# Patient Record
Sex: FEMALE | Race: WHITE | NOT HISPANIC OR LATINO
[De-identification: names, ages, dates, MRNs, and addresses within clinical notes are randomized per-mention and may not be internally consistent; named-entity substitution may affect disease eponyms.]

---

## 2020-03-19 ENCOUNTER — APPOINTMENT (OUTPATIENT)
Dept: GYNECOLOGIC ONCOLOGY | Facility: CLINIC | Age: 50
End: 2020-03-19

## 2020-05-04 ENCOUNTER — APPOINTMENT (OUTPATIENT)
Dept: GYNECOLOGIC ONCOLOGY | Facility: CLINIC | Age: 50
End: 2020-05-04
Payer: COMMERCIAL

## 2020-05-04 VITALS
DIASTOLIC BLOOD PRESSURE: 67 MMHG | HEART RATE: 89 BPM | HEIGHT: 55 IN | BODY MASS INDEX: 11.8 KG/M2 | SYSTOLIC BLOOD PRESSURE: 159 MMHG | WEIGHT: 51 LBS

## 2020-05-04 DIAGNOSIS — Z78.9 OTHER SPECIFIED HEALTH STATUS: ICD-10-CM

## 2020-05-04 DIAGNOSIS — R19.00 INTRA-ABDOMINAL AND PELVIC SWELLING, MASS AND LUMP, UNSPECIFIED SITE: ICD-10-CM

## 2020-05-04 DIAGNOSIS — Z80.1 FAMILY HISTORY OF MALIGNANT NEOPLASM OF TRACHEA, BRONCHUS AND LUNG: ICD-10-CM

## 2020-05-04 PROCEDURE — 99244 OFF/OP CNSLTJ NEW/EST MOD 40: CPT

## 2020-05-04 RX ORDER — LOSARTAN POTASSIUM 50 MG/1
50 TABLET, FILM COATED ORAL
Refills: 0 | Status: ACTIVE | COMMUNITY

## 2020-05-04 RX ORDER — HYDROCHLOROTHIAZIDE 25 MG/1
25 TABLET ORAL
Refills: 0 | Status: ACTIVE | COMMUNITY

## 2020-05-04 RX ORDER — ALPRAZOLAM 2 MG/1
TABLET ORAL
Refills: 0 | Status: ACTIVE | COMMUNITY

## 2020-05-07 LAB — HPV HIGH+LOW RISK DNA PNL CVX: NOT DETECTED

## 2020-05-12 LAB — CYTOLOGY CVX/VAG DOC THIN PREP: NORMAL

## 2020-05-18 ENCOUNTER — TRANSCRIPTION ENCOUNTER (OUTPATIENT)
Age: 50
End: 2020-05-18

## 2020-05-27 ENCOUNTER — TRANSCRIPTION ENCOUNTER (OUTPATIENT)
Age: 50
End: 2020-05-27

## 2022-08-26 ENCOUNTER — APPOINTMENT (OUTPATIENT)
Dept: PEDIATRIC CARDIOLOGY | Facility: CLINIC | Age: 52
End: 2022-08-26

## 2022-08-26 ENCOUNTER — APPOINTMENT (OUTPATIENT)
Dept: CARDIOTHORACIC SURGERY | Facility: CLINIC | Age: 52
End: 2022-08-26

## 2022-08-26 VITALS
WEIGHT: 54.98 LBS | HEIGHT: 55 IN | HEART RATE: 74 BPM | SYSTOLIC BLOOD PRESSURE: 157 MMHG | OXYGEN SATURATION: 98 % | BODY MASS INDEX: 12.72 KG/M2 | DIASTOLIC BLOOD PRESSURE: 70 MMHG

## 2022-08-26 PROCEDURE — 93325 DOPPLER ECHO COLOR FLOW MAPG: CPT

## 2022-08-26 PROCEDURE — 99205 OFFICE O/P NEW HI 60 MIN: CPT

## 2022-08-26 PROCEDURE — 93320 DOPPLER ECHO COMPLETE: CPT

## 2022-08-26 PROCEDURE — 93303 ECHO TRANSTHORACIC: CPT

## 2022-08-26 NOTE — ASSESSMENT
[FreeTextEntry1] : I reviewed  echo images today and her records.  She certainly has important aortic valve regurgitation.  General criteria for intervention depend upon, in the absence of symptoms, the extent of LV dilation and/or dysfunction.  I do no think she meets a surgical indication by those criteria.  We will attempt to review the CT scans that have been done because our echo is limited, and the patient cannot have an MRI because of metal rods used for orthopedic repairs in the past.  I doubt we will see anything on those studies to push us toward valve replacement at this moment.\par \par I think it is important to keep i mind that those guidelines are in the context of aortic valve surgery with the typical expected potential for morbidity or mortality.  It is hard to imagine that her surgical risk is not substantially elevated as she is quite significantly affected by her OI.  I think this should lead us to defer surgery until there is a very clear indication, which I don’t see at this moment.\par \par She also has aortic root dilation.  This has been extensively reported for patients with OI and tends to be mild and stable.  There are very few reported cases of dissection and rupture, so it does not appear, and nor is it in the guidelines, that these patients' aortas should be intervened upon at any earlier size criteria (such as we do for Marfan syndrome).  for now her aorta does not meet any criteria for intervention, but I certainly agree with Dr. Hinojosa's aggressive management of her hypertension.\par \par Should we get to the point that she requires valve surgery we would assess whether she is a candidate for a TAVR.  There are several reasons why this may not be possible for her.\par \par I reviewed all of this carefully with the patient and tried to reassure her that it is unlikely that any acute changes will happen for her.  I suggested she continue to live her life as she is doing and just to let us know if any symptoms develop.  I agree with the current plan of echo and followup every 6 months.  Please recontact us if we can be of assistance.  It was a pleasure to meet this remarkable patient.

## 2022-08-26 NOTE — DATA REVIEWED
[FreeTextEntry1] : transthoracic echo today:\par limited images due to habitus\par minimal aortic stenosis\par significant AI, difficult to grade\par preserved systolic function and only very mild LV enlargment\par aortic root dilation approx 3.5\par \par previous CT scan\par aortic root 3.5 (by report- we do not have those images)

## 2022-08-26 NOTE — HISTORY OF PRESENT ILLNESS
[FreeTextEntry1] : This 52 year old incredible woman with osteogenesis imperfecta is referred by Dr. Hinojosa for aortic insufficiency.  She has been followed for some time for this problem.  She has a bicuspid valve.  She does not feel that she has any limitation.  She is active, worked up until recently (stopping work was not due to physical limitation), she drives, is  and has children.  She travels.  She does not know what subtype of OI she has.

## 2022-08-26 NOTE — CONSULT LETTER
[Consult Letter:] : I had the pleasure of evaluating your patient, [unfilled]. [Please see my note below.] : Please see my note below. [Consult Closing:] : Thank you very much for allowing me to participate in the care of this patient.  If you have any questions, please do not hesitate to contact me. [Sincerely,] : Sincerely, [Dear  ___] : Dear  [unfilled], [FreeTextEntry2] : August 26, 2022\par \par Prosper Hinojosa MD\par [address] [facility] [address]\[place] [address] [FreeTextEntry3] : Zev Stacy MD\par \par Cardiothoracic Surgery and Pediatrics\par [facility]

## 2023-11-16 ENCOUNTER — APPOINTMENT (OUTPATIENT)
Dept: PEDIATRIC CARDIOLOGY | Facility: CLINIC | Age: 53
End: 2023-11-16
Payer: COMMERCIAL

## 2023-11-16 ENCOUNTER — APPOINTMENT (OUTPATIENT)
Dept: CARDIOTHORACIC SURGERY | Facility: CLINIC | Age: 53
End: 2023-11-16
Payer: COMMERCIAL

## 2023-11-16 DIAGNOSIS — Q23.1 CONGENITAL INSUFFICIENCY OF AORTIC VALVE: ICD-10-CM

## 2023-11-16 PROCEDURE — 93303 ECHO TRANSTHORACIC: CPT

## 2023-11-16 PROCEDURE — 93320 DOPPLER ECHO COMPLETE: CPT

## 2023-11-16 PROCEDURE — 93325 DOPPLER ECHO COLOR FLOW MAPG: CPT

## 2023-11-16 PROCEDURE — 99203 OFFICE O/P NEW LOW 30 MIN: CPT

## 2024-03-01 ENCOUNTER — TRANSCRIPTION ENCOUNTER (OUTPATIENT)
Age: 54
End: 2024-03-01

## 2024-03-02 ENCOUNTER — INPATIENT (INPATIENT)
Facility: HOSPITAL | Age: 54
LOS: 9 days | Discharge: NURSING, REHAB OR LONG-TERM CARE | DRG: 987 | End: 2024-03-12
Attending: SURGERY | Admitting: SURGERY
Payer: COMMERCIAL

## 2024-03-02 VITALS
HEART RATE: 65 BPM | HEIGHT: 55 IN | SYSTOLIC BLOOD PRESSURE: 139 MMHG | TEMPERATURE: 98 F | DIASTOLIC BLOOD PRESSURE: 49 MMHG | OXYGEN SATURATION: 95 % | RESPIRATION RATE: 20 BRPM | WEIGHT: 50.93 LBS

## 2024-03-02 DIAGNOSIS — S22.41XA MULTIPLE FRACTURES OF RIBS, RIGHT SIDE, INITIAL ENCOUNTER FOR CLOSED FRACTURE: ICD-10-CM

## 2024-03-02 DIAGNOSIS — Z98.890 OTHER SPECIFIED POSTPROCEDURAL STATES: Chronic | ICD-10-CM

## 2024-03-02 LAB
ALBUMIN SERPL ELPH-MCNC: 4.1 G/DL — SIGNIFICANT CHANGE UP (ref 3.3–5)
ALP SERPL-CCNC: 88 U/L — SIGNIFICANT CHANGE UP (ref 40–120)
ALT FLD-CCNC: 20 U/L — SIGNIFICANT CHANGE UP (ref 10–45)
ANION GAP SERPL CALC-SCNC: 12 MMOL/L — SIGNIFICANT CHANGE UP (ref 5–17)
APPEARANCE UR: CLEAR — SIGNIFICANT CHANGE UP
APTT BLD: 29.4 SEC — SIGNIFICANT CHANGE UP (ref 24.5–35.6)
AST SERPL-CCNC: 37 U/L — SIGNIFICANT CHANGE UP (ref 10–40)
BASOPHILS # BLD AUTO: 0.1 K/UL — SIGNIFICANT CHANGE UP (ref 0–0.2)
BASOPHILS NFR BLD AUTO: 0.6 % — SIGNIFICANT CHANGE UP (ref 0–2)
BILIRUB SERPL-MCNC: 0.5 MG/DL — SIGNIFICANT CHANGE UP (ref 0.2–1.2)
BILIRUB UR-MCNC: NEGATIVE — SIGNIFICANT CHANGE UP
BLD GP AB SCN SERPL QL: NEGATIVE — SIGNIFICANT CHANGE UP
BUN SERPL-MCNC: 13 MG/DL — SIGNIFICANT CHANGE UP (ref 7–23)
CALCIUM SERPL-MCNC: 9.4 MG/DL — SIGNIFICANT CHANGE UP (ref 8.4–10.5)
CHLORIDE SERPL-SCNC: 100 MMOL/L — SIGNIFICANT CHANGE UP (ref 96–108)
CK SERPL-CCNC: 171 U/L — HIGH (ref 25–170)
CO2 SERPL-SCNC: 24 MMOL/L — SIGNIFICANT CHANGE UP (ref 22–31)
COLOR SPEC: YELLOW — SIGNIFICANT CHANGE UP
CREAT SERPL-MCNC: <0.3 MG/DL — LOW (ref 0.5–1.3)
DIFF PNL FLD: NEGATIVE — SIGNIFICANT CHANGE UP
EGFR: 125 ML/MIN/1.73M2 — SIGNIFICANT CHANGE UP
EOSINOPHIL # BLD AUTO: 0.14 K/UL — SIGNIFICANT CHANGE UP (ref 0–0.5)
EOSINOPHIL NFR BLD AUTO: 0.9 % — SIGNIFICANT CHANGE UP (ref 0–6)
GLUCOSE SERPL-MCNC: 130 MG/DL — HIGH (ref 70–99)
GLUCOSE UR QL: NEGATIVE MG/DL — SIGNIFICANT CHANGE UP
HCT VFR BLD CALC: 33.1 % — LOW (ref 34.5–45)
HGB BLD-MCNC: 10.9 G/DL — LOW (ref 11.5–15.5)
IMM GRANULOCYTES NFR BLD AUTO: 1.3 % — HIGH (ref 0–0.9)
INR BLD: 1.05 RATIO — SIGNIFICANT CHANGE UP (ref 0.85–1.18)
KETONES UR-MCNC: 15 MG/DL
LEUKOCYTE ESTERASE UR-ACNC: NEGATIVE — SIGNIFICANT CHANGE UP
LIDOCAIN IGE QN: 52 U/L — SIGNIFICANT CHANGE UP (ref 7–60)
LYMPHOCYTES # BLD AUTO: 14.6 % — SIGNIFICANT CHANGE UP (ref 13–44)
LYMPHOCYTES # BLD AUTO: 2.25 K/UL — SIGNIFICANT CHANGE UP (ref 1–3.3)
MCHC RBC-ENTMCNC: 29.1 PG — SIGNIFICANT CHANGE UP (ref 27–34)
MCHC RBC-ENTMCNC: 32.9 GM/DL — SIGNIFICANT CHANGE UP (ref 32–36)
MCV RBC AUTO: 88.5 FL — SIGNIFICANT CHANGE UP (ref 80–100)
MONOCYTES # BLD AUTO: 0.9 K/UL — SIGNIFICANT CHANGE UP (ref 0–0.9)
MONOCYTES NFR BLD AUTO: 5.8 % — SIGNIFICANT CHANGE UP (ref 2–14)
NEUTROPHILS # BLD AUTO: 11.85 K/UL — HIGH (ref 1.8–7.4)
NEUTROPHILS NFR BLD AUTO: 76.8 % — SIGNIFICANT CHANGE UP (ref 43–77)
NITRITE UR-MCNC: NEGATIVE — SIGNIFICANT CHANGE UP
NRBC # BLD: 0 /100 WBCS — SIGNIFICANT CHANGE UP (ref 0–0)
PH UR: 5 — SIGNIFICANT CHANGE UP (ref 5–8)
PLATELET # BLD AUTO: 391 K/UL — SIGNIFICANT CHANGE UP (ref 150–400)
POTASSIUM SERPL-MCNC: 3.9 MMOL/L — SIGNIFICANT CHANGE UP (ref 3.5–5.3)
POTASSIUM SERPL-SCNC: 3.9 MMOL/L — SIGNIFICANT CHANGE UP (ref 3.5–5.3)
PROT SERPL-MCNC: 7 G/DL — SIGNIFICANT CHANGE UP (ref 6–8.3)
PROT UR-MCNC: NEGATIVE MG/DL — SIGNIFICANT CHANGE UP
PROTHROM AB SERPL-ACNC: 11 SEC — SIGNIFICANT CHANGE UP (ref 9.5–13)
RBC # BLD: 3.74 M/UL — LOW (ref 3.8–5.2)
RBC # FLD: 13.1 % — SIGNIFICANT CHANGE UP (ref 10.3–14.5)
RH IG SCN BLD-IMP: POSITIVE — SIGNIFICANT CHANGE UP
SODIUM SERPL-SCNC: 136 MMOL/L — SIGNIFICANT CHANGE UP (ref 135–145)
SP GR SPEC: >1.03 — HIGH (ref 1–1.03)
UROBILINOGEN FLD QL: 0.2 MG/DL — SIGNIFICANT CHANGE UP (ref 0.2–1)
WBC # BLD: 15.44 K/UL — HIGH (ref 3.8–10.5)
WBC # FLD AUTO: 15.44 K/UL — HIGH (ref 3.8–10.5)

## 2024-03-02 PROCEDURE — 73590 X-RAY EXAM OF LOWER LEG: CPT | Mod: 26,LT,76

## 2024-03-02 PROCEDURE — 73502 X-RAY EXAM HIP UNI 2-3 VIEWS: CPT | Mod: 26,RT

## 2024-03-02 PROCEDURE — 76377 3D RENDER W/INTRP POSTPROCES: CPT | Mod: 26

## 2024-03-02 PROCEDURE — 73060 X-RAY EXAM OF HUMERUS: CPT | Mod: 26

## 2024-03-02 PROCEDURE — 73090 X-RAY EXAM OF FOREARM: CPT | Mod: 26,LT,76

## 2024-03-02 PROCEDURE — 73030 X-RAY EXAM OF SHOULDER: CPT | Mod: 26,LT,76

## 2024-03-02 PROCEDURE — 99222 1ST HOSP IP/OBS MODERATE 55: CPT

## 2024-03-02 PROCEDURE — 71260 CT THORAX DX C+: CPT | Mod: 26,MC

## 2024-03-02 PROCEDURE — 71045 X-RAY EXAM CHEST 1 VIEW: CPT | Mod: 26

## 2024-03-02 PROCEDURE — 70486 CT MAXILLOFACIAL W/O DYE: CPT | Mod: 26,MC

## 2024-03-02 PROCEDURE — 72125 CT NECK SPINE W/O DYE: CPT | Mod: 26,MC

## 2024-03-02 PROCEDURE — 72132 CT LUMBAR SPINE W/DYE: CPT | Mod: 26,MC

## 2024-03-02 PROCEDURE — 74177 CT ABD & PELVIS W/CONTRAST: CPT | Mod: 26,MC

## 2024-03-02 PROCEDURE — 72170 X-RAY EXAM OF PELVIS: CPT | Mod: 26

## 2024-03-02 PROCEDURE — 70450 CT HEAD/BRAIN W/O DYE: CPT | Mod: 26,MC

## 2024-03-02 PROCEDURE — 99291 CRITICAL CARE FIRST HOUR: CPT

## 2024-03-02 PROCEDURE — 73552 X-RAY EXAM OF FEMUR 2/>: CPT | Mod: 26,RT

## 2024-03-02 PROCEDURE — 72129 CT CHEST SPINE W/DYE: CPT | Mod: 26,MC

## 2024-03-02 PROCEDURE — 73701 CT LOWER EXTREMITY W/DYE: CPT | Mod: 26,RT

## 2024-03-02 RX ORDER — ACETAMINOPHEN 500 MG
500 TABLET ORAL EVERY 6 HOURS
Refills: 0 | Status: COMPLETED | OUTPATIENT
Start: 2024-03-02 | End: 2024-03-03

## 2024-03-02 RX ORDER — SODIUM CHLORIDE 9 MG/ML
500 INJECTION, SOLUTION INTRAVENOUS ONCE
Refills: 0 | Status: DISCONTINUED | OUTPATIENT
Start: 2024-03-02 | End: 2024-03-02

## 2024-03-02 RX ORDER — ACETAMINOPHEN 500 MG
350 TABLET ORAL ONCE
Refills: 0 | Status: COMPLETED | OUTPATIENT
Start: 2024-03-02 | End: 2024-03-02

## 2024-03-02 RX ORDER — ALPRAZOLAM 0.25 MG
0.25 TABLET ORAL AT BEDTIME
Refills: 0 | Status: DISCONTINUED | OUTPATIENT
Start: 2024-03-02 | End: 2024-03-09

## 2024-03-02 RX ORDER — LIDOCAINE 4 G/100G
1 CREAM TOPICAL EVERY 24 HOURS
Refills: 0 | Status: DISCONTINUED | OUTPATIENT
Start: 2024-03-02 | End: 2024-03-10

## 2024-03-02 RX ORDER — AMPICILLIN SODIUM AND SULBACTAM SODIUM 250; 125 MG/ML; MG/ML
3 INJECTION, POWDER, FOR SUSPENSION INTRAMUSCULAR; INTRAVENOUS ONCE
Refills: 0 | Status: COMPLETED | OUTPATIENT
Start: 2024-03-02 | End: 2024-03-02

## 2024-03-02 RX ORDER — SODIUM CHLORIDE 9 MG/ML
250 INJECTION INTRAMUSCULAR; INTRAVENOUS; SUBCUTANEOUS ONCE
Refills: 0 | Status: DISCONTINUED | OUTPATIENT
Start: 2024-03-02 | End: 2024-03-02

## 2024-03-02 RX ORDER — OXYCODONE HYDROCHLORIDE 5 MG/1
2.5 TABLET ORAL
Refills: 0 | Status: DISCONTINUED | OUTPATIENT
Start: 2024-03-02 | End: 2024-03-09

## 2024-03-02 RX ORDER — SODIUM CHLORIDE 9 MG/ML
250 INJECTION, SOLUTION INTRAVENOUS ONCE
Refills: 0 | Status: COMPLETED | OUTPATIENT
Start: 2024-03-02 | End: 2024-03-02

## 2024-03-02 RX ORDER — OXYCODONE HYDROCHLORIDE 5 MG/1
5 TABLET ORAL
Refills: 0 | Status: DISCONTINUED | OUTPATIENT
Start: 2024-03-02 | End: 2024-03-05

## 2024-03-02 RX ORDER — ESCITALOPRAM OXALATE 10 MG/1
5 TABLET, FILM COATED ORAL DAILY
Refills: 0 | Status: DISCONTINUED | OUTPATIENT
Start: 2024-03-02 | End: 2024-03-12

## 2024-03-02 RX ORDER — CHLORHEXIDINE GLUCONATE 213 G/1000ML
1 SOLUTION TOPICAL
Refills: 0 | Status: DISCONTINUED | OUTPATIENT
Start: 2024-03-02 | End: 2024-03-12

## 2024-03-02 RX ADMIN — OXYCODONE HYDROCHLORIDE 5 MILLIGRAM(S): 5 TABLET ORAL at 23:04

## 2024-03-02 RX ADMIN — Medication 140 MILLIGRAM(S): at 18:10

## 2024-03-02 RX ADMIN — OXYCODONE HYDROCHLORIDE 5 MILLIGRAM(S): 5 TABLET ORAL at 22:33

## 2024-03-02 RX ADMIN — AMPICILLIN SODIUM AND SULBACTAM SODIUM 200 GRAM(S): 250; 125 INJECTION, POWDER, FOR SUSPENSION INTRAMUSCULAR; INTRAVENOUS at 19:08

## 2024-03-02 RX ADMIN — LIDOCAINE 1 PATCH: 4 CREAM TOPICAL at 23:53

## 2024-03-02 RX ADMIN — Medication 0.25 MILLIGRAM(S): at 23:53

## 2024-03-02 RX ADMIN — SODIUM CHLORIDE 250 MILLILITER(S): 9 INJECTION, SOLUTION INTRAVENOUS at 16:32

## 2024-03-02 NOTE — H&P ADULT - NSHPLABSRESULTS_GEN_ALL_CORE
----------  LABORATORY DATA:  ----------                        10.9   15.44 )-----------( 391      ( 02 Mar 2024 16:16 )             33.1               03-02    136  |  100  |  13  ----------------------------<  130<H>  3.9   |  24  |  <0.30<L>    Ca    9.4      02 Mar 2024 16:16    TPro  7.0  /  Alb  4.1  /  TBili  0.5  /  DBili  x   /  AST  37  /  ALT  20  /  AlkPhos  88  03-02    LIVER FUNCTIONS - ( 02 Mar 2024 16:16 )  Alb: 4.1 g/dL / Pro: 7.0 g/dL / ALK PHOS: 88 U/L / ALT: 20 U/L / AST: 37 U/L / GGT: x           PT/INR - ( 02 Mar 2024 16:16 )   PT: 11.0 sec;   INR: 1.05 ratio         PTT - ( 02 Mar 2024 16:16 )  PTT:29.4 sec  Urinalysis Basic - ( 02 Mar 2024 19:29 )    Color: Yellow / Appearance: Clear / SG: >1.030 / pH: x  Gluc: x / Ketone: 15 mg/dL  / Bili: Negative / Urobili: 0.2 mg/dL   Blood: x / Protein: Negative mg/dL / Nitrite: Negative   Leuk Esterase: Negative / RBC: x / WBC x   Sq Epi: x / Non Sq Epi: x / Bacteria: x      CARDIAC MARKERS ( 02 Mar 2024 16:16 )  x     / x     / 171 U/L / x     / x      High Sensitivity Troponin:x      Serum Pro-BNP: x      ----------    ----------  RADIOGRAPHIC DATA:  ---------  < from: Xray Pelvis AP only (03.02.24 @ 19:19) >    Impression: There is diffuse osseous demineralization with diffuse bony   deformity consistent with osteogenesis imperfecta. There are rods within   both femurs and both proximal and mid tibias. There is an acute,   impacted, periprosthetic fracture at the right distal femoral   metadiaphysis which extends into the lateral femoral condyle.    < end of copied text >    < from: CT Abdomen and Pelvis w/ IV Cont (03.02.24 @ 17:23) >    IMPRESSION:  Likely acute fractures of right 4th-6th ribs.  Age-indeterminate left scapular fracture.  Age-indeterminate fracture of the right humeral head, status post pin   placement.  Fracture of the distal right femur. Status post bilateral femoral anabell   placement.  Please see dedicated reports of the cervical spine, thoracic spine,   lumbar spine, and bilateral femur/pelvis for complete details.  No obvious acute abnormality within the chest, abdomen and pelvis per    < end of copied text >    < from: CT Lumbar Spine Reform w/ IV Cont (03.02.24 @ 17:22) >        < end of copied text >

## 2024-03-02 NOTE — ED PROVIDER NOTE - ATTENDING CONTRIBUTION TO CARE
Attending MD Andrew: I personally have seen and examined this patient.  Resident note reviewed and agree on plan of care and except where noted.  See below for details.     Seen in [facility], brought in by EMS     LEVEL 2 TRAUMA called after initial evaluation    53F with PMH/PSH including osteogenesis imperfecta, extensive orthopedic surgeries including rods in bilateral femurs and humerus, aortic insufficiency, presents to the Emergency Department s/p fall from wheelchair.  Reports did not see a step and steered her wheelchair off of the step, falling forward and onto her R side.  Reports pain at L scapula, R elbow/R upper arm, R thigh.  Reports feels like she "bruised" her chest and her teeth.  Denies lose teeth.  Reports +LOC, reports remembers falling but the next thing she remembers she was on a table.  Reports feels as if her femur and upper arm are broken.  Reports her ortho is Dr. Guillory.  Denies malocclusion, trismus.  Denies neck pain. Denies abdominal pain.  Denies shortness of breath.  Denies vision changes.      Trauma Assessment:  A - airway patent, speaking clearly  B - symmetrical chest rise, no increased work of breathing, breath sounds bilaterally  C - no active bleeding except for small ooze from R upper lip wound, skin warm and dry,   D - GCS 15, PERRL, FS 150s  E - exposed, patient declined removal of underwear    Exam:   General: NAD  HENT: head NCAT except for small abrasion to R temple, R upper lip full thickness lac with ooze, no blood in oropharynx, +chin ecchymosis on R, airway patent  Eyes: bluish tinge to conjunctiva   Lungs: lungs CTAB with good inspiratory effort, no wheezing, no rhonchi, no rales  Cardiac: +S1S2, no obvious m/r/g  GI: abdomen soft with +BS, NT, ND  MSK: ranging neck freely, +tenderness to R shoulder, R elbow with mild edema and ecchymosis, R wrist diffuse tenderness, L scapular tenderness, gentle posteromedial compression of pelvis grossly stable, tenderness to R distal thigh, +distal pulses bilateral UEs and LEs  Neuro: moving all extremities spontaneously, nonfocal  Psych: normal mood and affect     A/P: 53F with     TO BE COMPLETED

## 2024-03-02 NOTE — CONSULT NOTE ADULT - ASSESSMENT
Ms. Atkins is a 53 year old woman with a PMH osteogenesis imperfecta s/p upper & lower extremity hardware, aortic insufficiency, and HTN who presents after fall from her wheelchair. She reports missing a step on the wheelchair, losing control, and falling face first from the wheelchair at a height of around 2-3ft fact first onto a concrete ground. She lost consciousness and regained her awareness of her surroundings some time later, likely seconds to minutes, on a stable with little recollection of the intervening time. She reported L shoulder, R arm, R wrist, and R thigh pain as well but otherwise denied any additional symptoms such as fever, chills, CP, shortness of breath, abdominal pain, nausea or vomiting. She had movement and sensation intact in all four extremities. Secondary survey detailed below was significant for R temporal superficial abrasion, R upper lip laceration through the vermillion border, small ecchymosis on the R chin. It was also notable for tenderness to palpation in the L shoulder, R proximal arm, R wrist, and R thigh with medial rotation of the RLE.     Neuro  -Pain: Tylenol PRN, Oxy PRN, Lidocaine patch  -continue home xanax    Resp:  -maintain O2 saturation >92%  -Incentive spirometry    Cardiac:  -Goal MAP >65    GI  -NPO    Gu  -Strict intake and output q1 hr    Heme  -No chemical prophylaxis    ID  -No active issues    MSK:  -bedrest  -Xrays pending: Elbow AP+ lateral, bilateral, Knee 4 views bilateral, left scapula, right shoulder, 3view bilateral wrist Ms. Atkins is a 53 year old woman with a PMH osteogenesis imperfecta s/p upper & lower extremity hardware, aortic insufficiency, and HTN who presents after fall from her wheelchair. She reports missing a step on the wheelchair, losing control, and falling face first from the wheelchair at a height of around 2-3ft fact first onto a concrete ground. She lost consciousness and regained her awareness of her surroundings some time later, likely seconds to minutes, on a stable with little recollection of the intervening time. She reported L shoulder, R arm, R wrist, and R thigh pain as well but otherwise denied any additional symptoms such as fever, chills, CP, shortness of breath, abdominal pain, nausea or vomiting. She had movement and sensation intact in all four extremities. Secondary survey detailed below was significant for R temporal superficial abrasion, R upper lip laceration through the vermillion border, small ecchymosis on the R chin. It was also notable for tenderness to palpation in the L shoulder, R proximal arm, R wrist, and R thigh with medial rotation of the RLE.     Neuro  -Pain: Tylenol PRN, Oxy PRN, Lidocaine patch  -continue home xanax    Resp:  -maintain O2 saturation >92%  -Incentive spirometry    Cardiac:  -Goal MAP >65  -Acute fractures of right 4th-6th ribs  -Incentive spirometry      GI  -NPO    Gu  -Strict intake and output q1 hr    Heme  -No chemical prophylaxis    ID  -No active issues    MSK:  -bedrest  -Age-indeterminate left scapular fracture, Age-indeterminate fracture of the right humeral head, status post pin   placement, Fracture of the distal right femur, Status post bilateral femoral anabell placement.    -Xrays pending: Elbow AP+ lateral, bilateral, Knee 4 views bilateral, left scapula, right shoulder, 3view bilateral wrist

## 2024-03-02 NOTE — CONSULT NOTE ADULT - ATTENDING COMMENTS
SICU ATTENDING ATTESTATION    I have seen and evaluated this patient at the time of SICU admission. I have reviewed all new labs, imaging and reports. I have participated in formulating the plan for the day, and have read and agree with the history, ROS, exam, assessment and plan as stated above.     Patient with osteogenesis imperfecta s/p fall from wheelchair.   Injuries: right humerus fx, left scapula fracture, right ribs 4-6 fx, right distal hi-prosthetic femur fx.   Plan for admission and multimodal pain control. Will consider dPCA given high fracture burden.   Ortho consult.   IS, repeat CXR in AM.   PT/OT consult.     Total time spent in the care of this patient today (excluding procedures and teaching): 55 min                 Over 50% of the total time was spent in discussion and coordination of care with consulting services, dietary and rehab services.     Jade Marquez M.D., M.S.  Division of Acute Care Surgery

## 2024-03-02 NOTE — ED PROVIDER NOTE - WET READ LAUNCH FT
[Cold Symptoms] : cold symptoms [Moderate] : moderate [___ Days ago] : [unfilled] days ago [Constant] : constant [Congestion] : congestion [Cough] : cough [Wheezing] : no wheezing There are no Wet Read(s) to document. [Chills] : chills [Shortness Of Breath] : no shortness of breath [Earache] : no earache [Fatigue] : fatigue [Fever] : fever [Worsening] : worsening [FreeTextEntry8] : Patient elected and consented today to a TELEHEALTH visit today. This visit was provided via TELEHEALTH using real-time 2-way audio-visual technology. The patient,   was located at home:   at the time of the visit. The provider,  JOYCELYN MOORE M.D. was  located at the medical office located at [address] [place] [address]   Tested Positive today for COVID this morning. [facility] her daughter underwent excision of an ovarian cyst- with ? torsion. Yesterday she noticed some malaise and chills, this morning she noticed diffuse myalgias, worsening chills low-grade fevers and worsening cough and postnasal drip.  She did a home COVID test and the results are positive.  Overall she feels like her symptoms are stable and not worse.  She denies any chest tightness or wheeze or shortness of breath.  She did take NyQuil last night was able to sleep.  She did test positive for COVID back in 2020 symptoms resolved without treatment at that time.

## 2024-03-02 NOTE — ED PROVIDER NOTE - OBJECTIVE STATEMENT
53-year-old history of osteogenesis imperfecta status post bilateral upper extremity, lower extremity rods, hypertension, on aspirin daily presenting to the ED with right shoulder and elbow, left scapular, right femur pain after patient was in a wheel chair and fell off onto the concrete   On her chest.  Patient had possible LOC, as she does not remember events after the fall and remembers waking up on a table.  Denying any nausea, vomiting fevers or chills.  No belly pains.  No dizziness currently.

## 2024-03-02 NOTE — ED ADULT NURSE REASSESSMENT NOTE - NS ED NURSE REASSESS COMMENT FT1
MD [place] bedside. Pt feeling woozy with vision "feeling foggy". Finger stick performed. Manual and digital bp findings consistent.

## 2024-03-02 NOTE — ED ADULT NURSE NOTE - NSFALLHARMRISKINTERV_ED_ALL_ED

## 2024-03-02 NOTE — ED PROVIDER NOTE - PROGRESS NOTE DETAILS
Attending MD Andrew: Plastics at bedside Attending MD Andrew: Called to bedside for patient feeling diaphoretic and SBP 78/36, repeat manual SBP 90s/50s.  When at bedside, patient reports feeling diaphoresis resolved and feeling improved.  CTs reviewed, surgery paged. Attending MD Andrew: Spoke with surgery, will await final recs Attending MD Andrew: Will admit to Dr. Ramos, will await final recs Attending MD Andrew: Called to bedside for patient feeling diaphoretic and SBP 78/36, repeat manual SBP 90s/50s.  When at bedside, patient reports feeling diaphoresis resolved and feeling improved.  CTs reviewed, surgery paged.  Plastics recommended Augmentin for a week. Attending MD Andrew: Call placed to surgery, patient to be admitted to SICU under Dr. Ramos

## 2024-03-02 NOTE — ED PROVIDER NOTE - PHYSICAL EXAMINATION
Const:  Contracted  Eyes: no conjunctival injection, and symmetrical lids.  HEENT:  ecchymosis over right eyebrow and right cheek and mild abrasions over right face  Neck: Symmetric, trachea midline.   RESP: Unlabored respiratory effort.   GI: Nontender/Nondistended,  MSK:  contracted extremities.  Newness over right shoulder, right elbow, right hip and  right knee.  Tenderness to left scapula.  Pulses intact bilateral upper and lower extremities.  Skin: Warm, dry and intact.   Neuro: Motor & Sensation grossly intact.  Psych: Awake, Alert, & Oriented (AAO) x3. Appropriate mood and affect.

## 2024-03-02 NOTE — ED PROVIDER NOTE - CLINICAL SUMMARY MEDICAL DECISION MAKING FREE TEXT BOX
53-year-old history of osteogenesis imperfecta status post bilateral upper extremity, lower extremity rods, hypertension, on aspirin daily presenting to the ED with right shoulder and elbow, left scapular, right femur pain after patient was in a wheel chair and fell off onto the concrete   On her chest.  Patient had possible LOC, as she does not remember events after the fall and remembers waking up on a table.  Denying any nausea, vomiting fevers or chills.  No belly pains.  No dizziness currently.    on examination patient in room, level 2 trauma was called.  Will get trauma scans for likely multiple fractures.  Blood pressure is stable in the room,  not tachycardia currently.

## 2024-03-02 NOTE — CONSULT NOTE ADULT - SUBJECTIVE AND OBJECTIVE BOX
SICU Consult Note    HPI: Ms. Atkins is a 53 year old woman with a PMH osteogenesis imperfecta s/p upper & lower extremity hardware, aortic insufficiency, and HTN who presents after fall from her wheelchair. She reports missing a step on the wheelchair, losing control, and falling face first from the wheelchair at a height of around 2-3ft fact first onto a concrete ground. She lost consciousness and regained her awareness of her surroundings some time later, likely seconds to minutes, on a stable with little recollection of the intervening time. She reported L shoulder, R arm, R wrist, and R thigh pain as well but otherwise denied any additional symptoms such as fever, chills, CP, shortness of breath, abdominal pain, nausea or vomiting. She had movement and sensation intact in all four extremities. Secondary survey detailed below was significant for R temporal superficial abrasion, R upper lip laceration through the vermillion border, small ecchymosis on the R chin. It was also notable for tenderness to palpation in the L shoulder, R proximal arm, R wrist, and R thigh with medial rotation of the RLE.     PMH:   - osteogenesis imperfecta s/p upper & lower extremity hardware   -aortic insufficiency   -HTN     ALLERGIES:  No Known Allergies      --------------------------------------------------------------------------------------    MEDICATIONS:    Neurologic Medications  fentaNYL    Injectable 25 MICROGram(s) IV Push every 5 minutes PRN Moderate Pain (4 - 6)  fentaNYL    Injectable 50 MICROGram(s) IV Push every 15 minutes PRN Severe Pain (7 - 10)  ondansetron Injectable 4 milliGRAM(s) IV Push once PRN Nausea and/or Vomiting    Respiratory Medications    Cardiovascular Medications    Gastrointestinal Medications  lactated ringers. 1000 milliLiter(s) IV Continuous <Continuous>    Genitourinary Medications    Hematologic/Oncologic Medications    Antimicrobial/Immunologic Medications    Endocrine/Metabolic Medications  insulin lispro (ADMELOG) corrective regimen sliding scale   SubCutaneous every 6 hours    Topical/Other Medications  chlorhexidine 4% Liquid 1 Application(s) Topical daily    --------------------------------------------------------------------------------------    VITAL SIGNS:  T(C): 37.2 (03-02-24 @ 20:32), Max: 37.2 (03-02-24 @ 20:32)  HR: 71 (03-02-24 @ 20:32) (64 - 88)  BP: 130/67 (03-02-24 @ 20:32) (80/50 - 139/49)  RR: 20 (03-02-24 @ 20:32) (17 - 20)  SpO2: 100% (03-02-24 @ 20:32) (95% - 100%)  --------------------------------------------------------------------------------------    EXAM    NEURO: NAD, AAOx3, R temporal superficial abrasion, R upper lip laceration, small ecchymosis on R chin, PERRL, EOMI  HEENT: Neck soft, midline trachea  RESPIRATORY: nonlabored respirations, normal CW expansion, Bilateral breath sounds, clear  CARDIO: RRR, S1S2  ABDOMEN: soft, nontender, nondistended, no rebound, no guarding  EXTREMITIES: palp radial b/l UE, b/l DP palp in Lower Extrem, motor and sensory grossly intact in all 4 extremities,  tenderness to palpation in the L shoulder, R proximal arm, R wrist, and R thigh with medial rotation of the RLE.   Back: no TTP, no palpable runoff/stepoff/deformity    --------------------------------------------------------------------------------------    LABS                          10.9   15.44 )-----------( 391      ( 02 Mar 2024 16:16 )             33.1   03-02    136  |  100  |  13  ----------------------------<  130<H>  3.9   |  24  |  <0.30<L>    Ca    9.4      02 Mar 2024 16:16    TPro  7.0  /  Alb  4.1  /  TBili  0.5  /  DBili  x   /  AST  37  /  ALT  20  /  AlkPhos  88  03-02  --------------------------------------------------------------------------------------

## 2024-03-02 NOTE — H&P ADULT - HISTORY OF PRESENT ILLNESS
TRAUMA SERVICE (Acute Care Surgery / [facility] - #[id number])  --------------------------------------------------------------------------------------------    TRAUMA ACTIVATION LEVEL: II    MECHANISM OF INJURY:      [] Blunt  	[] MVC	[x] Fall	[] Pedestrian Struck	[] Motorcycle accident      [] Penetrating  	[] Gun Shot Wound 		[] Stab Wound    GCS: 	E: 4	V: 5	M: 6      HPI:   Patient is a 53y old  Female who presents with a chief complaint of fall from wheelchair     HPI:  Ms. Atkins is a 53 year old woman with a PMH osteogenesis imperfecta s/p upper & lower extremity hardware, aortic insufficiency, and HTN who presents after fall from her wheelchair. She reports missing a step on the wheelchair, losing control, and falling face first from the wheelchair at a height of around 2-3ft fact first onto a concrete ground. She lost consciousness and regained her awareness of her surroundings some time later, likely seconds to minutes, on a stable with little recollection of the intervening time. She reported L shoulder, R arm, R wrist, and R thigh pain as well but otherwise denied any additional symptoms such as fever, chills, CP, shortness of breath, abdominal pain, nausea or vomiting. She had movement and sensation intact in all four extremities. Secondary survey detailed below was significant for R temporal superficial abrasion, R upper lip laceration through the vermillion border, small ecchymosis on the R chin. It was also notable for tenderness to palpation in the L shoulder, R proximal arm, R wrist, and R thigh with medial rotation of the RLE.     Primary Survey:    A - airway intact  B - bilateral breath sounds and good chest rise  C - initial BP: 130/67 (03-02-24 @ 20:32) , HR: 71 (03-02-24 @ 20:32) , palpable pulses in all extremities  D - GCS 15 on arrival  Exposure obtained      Secondary Survey:   General: NAD  HEENT: Normocephalic, R temporal superficial abrasion, R upper lip laceration through the vermillion border, small ecchymosis on the R chin, EOMI, PEERLA.  Neck: Soft, midline trachea  Chest: No chest wall tenderness. , L shoulder tenderness to palpation  Cardiac: S1, S2, RRR  Respiratory: Bilateral breath sounds, clear and equal bilaterally  Abdomen: Soft, non-distended, non-tender, no rebound, no guarding, no masses palpated  Pelvis: Stable, non-tender, no ecchymosis  Ext: palp radial b/l UE, b/l DP palp in Lower Extrem, motor and sensory grossly intact in all 4 extremities,  tenderness to palpation in the L shoulder, R proximal arm, R wrist, and R thigh with medial rotation of the RLE.   Back: no TTP, no palpable runoff/stepoff/deformity      ROS: 10-system review is otherwise negative except HPI above.        HOME MEDICATIONS:   Will follow up for meds/dosing but reports taking   [id number]  multivitamin  HTN medicine

## 2024-03-02 NOTE — H&P ADULT - ASSESSMENT
Ms. Atkins is a 53 year old woman with a PMH osteogenesis imperfecta s/p upper & lower extremity hardware, aortic insufficiency, and HTN who presents after fall from her wheelchair with LOC & multiple injuries noted below.     Injuries:  - acute, comminuted fracture adjacent to the tip of the right femoral anabell which begins in the distal femoral metadiaphysis and continues into the lateral femoral condyle  - right knee hemarthrosis  - Age indeterminate R sacral ala fracture  - Age-indeterminate left scapular body fracture.  - Age-indeterminate fracture of the right humeral head, status post pin placement  -Prior deformity of the right third rib with likely a superimposed   acute fracture.  - Likely acute fractures of right 4th-6th ribs.      Previously known findings/ Chronic Findings  - known large, heterogeneously enhancing fibroid measuring   6.8 x 6.5 cm  - known infrarenal aorta measures 0.8 cm in diameter  - Well-corticated, chronic right ulnar styloid fracture    Plan:  - admit to Dr. Ramos  - appreciate SICU consult  - NPO pending consult recs  - Multimodal pain control, IS   - Orthopedics consult for multiple fractures  - Cardiology consult for comanagement of known AI  - PRS consult for upper lip laceration including vermillion border  - Tertiary in AM  - daily CXR  - med rec in AM  - follow up ortho but will likely need dedicated xrays of wrists, ankles, knees    Efe Munoz, PGY3  Acute Care Surgery   [id number]

## 2024-03-03 DIAGNOSIS — D62 ACUTE POSTHEMORRHAGIC ANEMIA: ICD-10-CM

## 2024-03-03 DIAGNOSIS — I35.1 NONRHEUMATIC AORTIC (VALVE) INSUFFICIENCY: ICD-10-CM

## 2024-03-03 DIAGNOSIS — Q78.0 OSTEOGENESIS IMPERFECTA: ICD-10-CM

## 2024-03-03 DIAGNOSIS — I10 ESSENTIAL (PRIMARY) HYPERTENSION: ICD-10-CM

## 2024-03-03 DIAGNOSIS — T07.XXXA UNSPECIFIED MULTIPLE INJURIES, INITIAL ENCOUNTER: ICD-10-CM

## 2024-03-03 LAB
ANION GAP SERPL CALC-SCNC: 13 MMOL/L — SIGNIFICANT CHANGE UP (ref 5–17)
ANION GAP SERPL CALC-SCNC: 8 MMOL/L — SIGNIFICANT CHANGE UP (ref 5–17)
APTT BLD: 30.5 SEC — SIGNIFICANT CHANGE UP (ref 24.5–35.6)
BUN SERPL-MCNC: 13 MG/DL — SIGNIFICANT CHANGE UP (ref 7–23)
BUN SERPL-MCNC: 9 MG/DL — SIGNIFICANT CHANGE UP (ref 7–23)
CALCIUM SERPL-MCNC: 8.8 MG/DL — SIGNIFICANT CHANGE UP (ref 8.4–10.5)
CALCIUM SERPL-MCNC: 9.2 MG/DL — SIGNIFICANT CHANGE UP (ref 8.4–10.5)
CHLORIDE SERPL-SCNC: 96 MMOL/L — SIGNIFICANT CHANGE UP (ref 96–108)
CHLORIDE SERPL-SCNC: 98 MMOL/L — SIGNIFICANT CHANGE UP (ref 96–108)
CO2 SERPL-SCNC: 24 MMOL/L — SIGNIFICANT CHANGE UP (ref 22–31)
CO2 SERPL-SCNC: 28 MMOL/L — SIGNIFICANT CHANGE UP (ref 22–31)
CREAT SERPL-MCNC: <0.3 MG/DL — LOW (ref 0.5–1.3)
CREAT SERPL-MCNC: <0.3 MG/DL — LOW (ref 0.5–1.3)
EGFR: 125 ML/MIN/1.73M2 — SIGNIFICANT CHANGE UP
EGFR: 125 ML/MIN/1.73M2 — SIGNIFICANT CHANGE UP
GLUCOSE SERPL-MCNC: 119 MG/DL — HIGH (ref 70–99)
GLUCOSE SERPL-MCNC: 145 MG/DL — HIGH (ref 70–99)
HCT VFR BLD CALC: 22 % — LOW (ref 34.5–45)
HCT VFR BLD CALC: 22.2 % — LOW (ref 34.5–45)
HCT VFR BLD CALC: 24.3 % — LOW (ref 34.5–45)
HCT VFR BLD CALC: 26.6 % — LOW (ref 34.5–45)
HGB BLD-MCNC: 7.6 G/DL — LOW (ref 11.5–15.5)
HGB BLD-MCNC: 7.6 G/DL — LOW (ref 11.5–15.5)
HGB BLD-MCNC: 8.2 G/DL — LOW (ref 11.5–15.5)
HGB BLD-MCNC: 8.7 G/DL — LOW (ref 11.5–15.5)
INR BLD: 1.15 RATIO — SIGNIFICANT CHANGE UP (ref 0.85–1.18)
MAGNESIUM SERPL-MCNC: 1.8 MG/DL — SIGNIFICANT CHANGE UP (ref 1.6–2.6)
MAGNESIUM SERPL-MCNC: 1.9 MG/DL — SIGNIFICANT CHANGE UP (ref 1.6–2.6)
MCHC RBC-ENTMCNC: 29 PG — SIGNIFICANT CHANGE UP (ref 27–34)
MCHC RBC-ENTMCNC: 29.5 PG — SIGNIFICANT CHANGE UP (ref 27–34)
MCHC RBC-ENTMCNC: 29.6 PG — SIGNIFICANT CHANGE UP (ref 27–34)
MCHC RBC-ENTMCNC: 29.7 PG — SIGNIFICANT CHANGE UP (ref 27–34)
MCHC RBC-ENTMCNC: 32.7 GM/DL — SIGNIFICANT CHANGE UP (ref 32–36)
MCHC RBC-ENTMCNC: 33.7 GM/DL — SIGNIFICANT CHANGE UP (ref 32–36)
MCHC RBC-ENTMCNC: 34.2 GM/DL — SIGNIFICANT CHANGE UP (ref 32–36)
MCHC RBC-ENTMCNC: 34.5 GM/DL — SIGNIFICANT CHANGE UP (ref 32–36)
MCV RBC AUTO: 85.9 FL — SIGNIFICANT CHANGE UP (ref 80–100)
MCV RBC AUTO: 86 FL — SIGNIFICANT CHANGE UP (ref 80–100)
MCV RBC AUTO: 87.7 FL — SIGNIFICANT CHANGE UP (ref 80–100)
MCV RBC AUTO: 88.7 FL — SIGNIFICANT CHANGE UP (ref 80–100)
NRBC # BLD: 0 /100 WBCS — SIGNIFICANT CHANGE UP (ref 0–0)
PHOSPHATE SERPL-MCNC: 2.6 MG/DL — SIGNIFICANT CHANGE UP (ref 2.5–4.5)
PHOSPHATE SERPL-MCNC: 4 MG/DL — SIGNIFICANT CHANGE UP (ref 2.5–4.5)
PLATELET # BLD AUTO: 234 K/UL — SIGNIFICANT CHANGE UP (ref 150–400)
PLATELET # BLD AUTO: 242 K/UL — SIGNIFICANT CHANGE UP (ref 150–400)
PLATELET # BLD AUTO: 257 K/UL — SIGNIFICANT CHANGE UP (ref 150–400)
PLATELET # BLD AUTO: 295 K/UL — SIGNIFICANT CHANGE UP (ref 150–400)
POTASSIUM SERPL-MCNC: 3.9 MMOL/L — SIGNIFICANT CHANGE UP (ref 3.5–5.3)
POTASSIUM SERPL-MCNC: 4.1 MMOL/L — SIGNIFICANT CHANGE UP (ref 3.5–5.3)
POTASSIUM SERPL-SCNC: 3.9 MMOL/L — SIGNIFICANT CHANGE UP (ref 3.5–5.3)
POTASSIUM SERPL-SCNC: 4.1 MMOL/L — SIGNIFICANT CHANGE UP (ref 3.5–5.3)
PROTHROM AB SERPL-ACNC: 12 SEC — SIGNIFICANT CHANGE UP (ref 9.5–13)
RBC # BLD: 2.56 M/UL — LOW (ref 3.8–5.2)
RBC # BLD: 2.58 M/UL — LOW (ref 3.8–5.2)
RBC # BLD: 2.77 M/UL — LOW (ref 3.8–5.2)
RBC # BLD: 3 M/UL — LOW (ref 3.8–5.2)
RBC # FLD: 12.9 % — SIGNIFICANT CHANGE UP (ref 10.3–14.5)
RBC # FLD: 12.9 % — SIGNIFICANT CHANGE UP (ref 10.3–14.5)
RBC # FLD: 13.1 % — SIGNIFICANT CHANGE UP (ref 10.3–14.5)
RBC # FLD: 13.1 % — SIGNIFICANT CHANGE UP (ref 10.3–14.5)
SODIUM SERPL-SCNC: 132 MMOL/L — LOW (ref 135–145)
SODIUM SERPL-SCNC: 135 MMOL/L — SIGNIFICANT CHANGE UP (ref 135–145)
WBC # BLD: 14.94 K/UL — HIGH (ref 3.8–10.5)
WBC # BLD: 8.49 K/UL — SIGNIFICANT CHANGE UP (ref 3.8–10.5)
WBC # BLD: 9.44 K/UL — SIGNIFICANT CHANGE UP (ref 3.8–10.5)
WBC # BLD: 9.47 K/UL — SIGNIFICANT CHANGE UP (ref 3.8–10.5)
WBC # FLD AUTO: 14.94 K/UL — HIGH (ref 3.8–10.5)
WBC # FLD AUTO: 8.49 K/UL — SIGNIFICANT CHANGE UP (ref 3.8–10.5)
WBC # FLD AUTO: 9.44 K/UL — SIGNIFICANT CHANGE UP (ref 3.8–10.5)
WBC # FLD AUTO: 9.47 K/UL — SIGNIFICANT CHANGE UP (ref 3.8–10.5)

## 2024-03-03 PROCEDURE — 99291 CRITICAL CARE FIRST HOUR: CPT | Mod: GC

## 2024-03-03 PROCEDURE — 71045 X-RAY EXAM CHEST 1 VIEW: CPT | Mod: 26

## 2024-03-03 PROCEDURE — 73560 X-RAY EXAM OF KNEE 1 OR 2: CPT | Mod: 26,RT,59

## 2024-03-03 PROCEDURE — 73070 X-RAY EXAM OF ELBOW: CPT | Mod: 26,RT

## 2024-03-03 PROCEDURE — 99223 1ST HOSP IP/OBS HIGH 75: CPT

## 2024-03-03 RX ORDER — POLYETHYLENE GLYCOL 3350 17 G/17G
17 POWDER, FOR SOLUTION ORAL DAILY
Refills: 0 | Status: DISCONTINUED | OUTPATIENT
Start: 2024-03-03 | End: 2024-03-12

## 2024-03-03 RX ORDER — AMLODIPINE BESYLATE 2.5 MG/1
2.5 TABLET ORAL DAILY
Refills: 0 | Status: DISCONTINUED | OUTPATIENT
Start: 2024-03-03 | End: 2024-03-04

## 2024-03-03 RX ORDER — LOSARTAN POTASSIUM 100 MG/1
50 TABLET, FILM COATED ORAL ONCE
Refills: 0 | Status: COMPLETED | OUTPATIENT
Start: 2024-03-03 | End: 2024-03-03

## 2024-03-03 RX ORDER — SENNA PLUS 8.6 MG/1
2 TABLET ORAL AT BEDTIME
Refills: 0 | Status: DISCONTINUED | OUTPATIENT
Start: 2024-03-03 | End: 2024-03-10

## 2024-03-03 RX ORDER — HEPARIN SODIUM 5000 [USP'U]/ML
5000 INJECTION INTRAVENOUS; SUBCUTANEOUS EVERY 12 HOURS
Refills: 0 | Status: DISCONTINUED | OUTPATIENT
Start: 2024-03-03 | End: 2024-03-12

## 2024-03-03 RX ORDER — SODIUM CHLORIDE 9 MG/ML
1000 INJECTION, SOLUTION INTRAVENOUS
Refills: 0 | Status: DISCONTINUED | OUTPATIENT
Start: 2024-03-03 | End: 2024-03-04

## 2024-03-03 RX ORDER — LOSARTAN POTASSIUM 100 MG/1
50 TABLET, FILM COATED ORAL
Refills: 0 | Status: DISCONTINUED | OUTPATIENT
Start: 2024-03-03 | End: 2024-03-12

## 2024-03-03 RX ADMIN — Medication 500 MILLIGRAM(S): at 11:41

## 2024-03-03 RX ADMIN — LIDOCAINE 1 PATCH: 4 CREAM TOPICAL at 07:12

## 2024-03-03 RX ADMIN — Medication 500 MILLIGRAM(S): at 00:17

## 2024-03-03 RX ADMIN — OXYCODONE HYDROCHLORIDE 5 MILLIGRAM(S): 5 TABLET ORAL at 21:41

## 2024-03-03 RX ADMIN — SODIUM CHLORIDE 30 MILLILITER(S): 9 INJECTION, SOLUTION INTRAVENOUS at 23:13

## 2024-03-03 RX ADMIN — OXYCODONE HYDROCHLORIDE 5 MILLIGRAM(S): 5 TABLET ORAL at 02:55

## 2024-03-03 RX ADMIN — OXYCODONE HYDROCHLORIDE 2.5 MILLIGRAM(S): 5 TABLET ORAL at 14:30

## 2024-03-03 RX ADMIN — OXYCODONE HYDROCHLORIDE 5 MILLIGRAM(S): 5 TABLET ORAL at 02:07

## 2024-03-03 RX ADMIN — Medication 200 MILLIGRAM(S): at 00:03

## 2024-03-03 RX ADMIN — CHLORHEXIDINE GLUCONATE 1 APPLICATION(S): 213 SOLUTION TOPICAL at 06:10

## 2024-03-03 RX ADMIN — LIDOCAINE 1 PATCH: 4 CREAM TOPICAL at 11:26

## 2024-03-03 RX ADMIN — OXYCODONE HYDROCHLORIDE 2.5 MILLIGRAM(S): 5 TABLET ORAL at 08:39

## 2024-03-03 RX ADMIN — Medication 200 MILLIGRAM(S): at 07:18

## 2024-03-03 RX ADMIN — OXYCODONE HYDROCHLORIDE 5 MILLIGRAM(S): 5 TABLET ORAL at 22:41

## 2024-03-03 RX ADMIN — Medication 200 MILLIGRAM(S): at 11:09

## 2024-03-03 RX ADMIN — Medication 500 MILLIGRAM(S): at 19:00

## 2024-03-03 RX ADMIN — LOSARTAN POTASSIUM 50 MILLIGRAM(S): 100 TABLET, FILM COATED ORAL at 18:02

## 2024-03-03 RX ADMIN — ESCITALOPRAM OXALATE 5 MILLIGRAM(S): 10 TABLET, FILM COATED ORAL at 11:05

## 2024-03-03 RX ADMIN — OXYCODONE HYDROCHLORIDE 2.5 MILLIGRAM(S): 5 TABLET ORAL at 09:40

## 2024-03-03 RX ADMIN — Medication 500 MILLIGRAM(S): at 07:23

## 2024-03-03 RX ADMIN — SODIUM CHLORIDE 30 MILLILITER(S): 9 INJECTION, SOLUTION INTRAVENOUS at 19:48

## 2024-03-03 RX ADMIN — LOSARTAN POTASSIUM 50 MILLIGRAM(S): 100 TABLET, FILM COATED ORAL at 09:07

## 2024-03-03 RX ADMIN — AMLODIPINE BESYLATE 2.5 MILLIGRAM(S): 2.5 TABLET ORAL at 09:41

## 2024-03-03 RX ADMIN — Medication 200 MILLIGRAM(S): at 17:49

## 2024-03-03 RX ADMIN — OXYCODONE HYDROCHLORIDE 2.5 MILLIGRAM(S): 5 TABLET ORAL at 15:34

## 2024-03-03 RX ADMIN — HEPARIN SODIUM 5000 UNIT(S): 5000 INJECTION INTRAVENOUS; SUBCUTANEOUS at 18:02

## 2024-03-03 NOTE — CONSULT NOTE ADULT - NSCONSULTADDITIONALINFOA_GEN_ALL_CORE
The necessity of the time spent during the encounter on this date of service was due to:   - Ordering, reviewing, and interpreting labs, testing, and imaging.  - Independently obtaining a review of systems and performing a physical exam  - Reviewing prior hospitalization and where necessary, outpatient records.  - Counselling and educating patient and family regarding interpretation of aforementioned items and plan of care.    Time-based billing (NON-critical care). Total minutes spent: 76

## 2024-03-03 NOTE — CONSULT NOTE ADULT - PROBLEM SELECTOR RECOMMENDATION 5
pt with significant scoliosis, uses a manual wheelchair at baseline  - will need PT/OT      plan d/w and agreed on by pt

## 2024-03-03 NOTE — PROGRESS NOTE ADULT - ASSESSMENT
Ms. Atkins is a 53 year old woman with a PMH osteogenesis imperfecta s/p upper & lower extremity hardware, aortic insufficiency, and HTN who presents after fall from her wheelchair. She reports missing a step on the wheelchair, losing control, and falling face first from the wheelchair at a height of around 2-3ft fact first onto a concrete ground. She lost consciousness and regained her awareness of her surroundings some time later, likely seconds to minutes, on a stable with little recollection of the intervening time. She reported L shoulder, R arm, R wrist, and R thigh pain as well but otherwise denied any additional symptoms such as fever, chills, CP, shortness of breath, abdominal pain, nausea or vomiting. She had movement and sensation intact in all four extremities. Secondary survey detailed below was significant for R temporal superficial abrasion, R upper lip laceration through the vermillion border, small ecchymosis on the R chin. It was also notable for tenderness to palpation in the L shoulder, R proximal arm, R wrist, and R thigh with medial rotation of the RLE.     Neuro  -Pain: Tylenol PRN, Oxy PRN, Lidocaine patch  -continue home xanax    Resp:  -maintain O2 saturation >92%  -Incentive spirometry    Cardiac:  -Goal MAP >65  -Acute fractures of right 4th-6th ribs  -Incentive spirometry      GI  -NPO    Gu  -Strict intake and output q1 hr    Heme  -No chemical prophylaxis    ID  -No active issues    MSK:  -bedrest  -Age-indeterminate left scapular fracture, Age-indeterminate fracture of the right humeral head, status post pin   placement, Fracture of the distal right femur, Status post bilateral femoral anabell placement.  -Xrays pending: Elbow AP+ lateral, bilateral, Knee 4 views bilateral, left scapula, right shoulder, 3view bilateral wrist

## 2024-03-03 NOTE — PROGRESS NOTE ADULT - ASSESSMENT
Ms. Atkins is a 53 year old woman with a PMH osteogenesis imperfecta s/p upper & lower extremity hardware, aortic insufficiency, and HTN who presents after fall from her wheelchair with LOC & multiple injuries noted below.     Injuries:  - acute, comminuted fracture adjacent to the tip of the right femoral anabell which begins in the distal femoral metadiaphysis and continues into the lateral femoral condyle  - right knee hemarthrosis  - Age indeterminate R sacral ala fracture  - Age-indeterminate left scapular body fracture.  - Age-indeterminate fracture of the right humeral head, status post pin placement  -Prior deformity of the right third rib with likely a superimposed   acute fracture.  - Likely acute fractures of right 4th-6th ribs.      Previously known findings/ Chronic Findings  - known large, heterogeneously enhancing fibroid measuring   6.8 x 6.5 cm  - known infrarenal aorta measures 0.8 cm in diameter  - Well-corticated, chronic right ulnar styloid fracture    Plan:  - NPO pending consult recs  - Multimodal pain control, IS   - Orthopedics consult for multiple fractures - pending recommendations  - Cardiology consult for comanagement of known AI  - PRS consult for upper lip laceration including vermillion border  - Tertiary today  - daily CXR  - med rec in AM  - follow up ortho but will likely need dedicated xrays of wrists, ankles, knees    Acute Care Surgery   [id number]

## 2024-03-03 NOTE — CONSULT NOTE ADULT - ASSESSMENT
Ms. Atkins is a 53 year old woman with a PMH osteogenesis imperfecta s/p upper & lower extremity hardware, aortic insufficiency, and HTN who presents after fall from her wheelchair found to have    - acute, comminuted fracture adjacent to the tip of the right femoral anabell which begins in the distal femoral metadiaphysis and continues into the lateral femoral condyle  - right knee hemarthrosis  - Age indeterminate R sacral ala fracture  - Age-indeterminate left scapular body fracture.  - Age-indeterminate fracture of the right humeral head, status post pin placement  -Prior deformity of the right third rib with likely a superimposed   acute fracture.  - Likely acute fractures of right 4th-6th ribs.    now with low hemoglobin of 7.6 compared to OP baseline of 11

## 2024-03-03 NOTE — PROGRESS NOTE ADULT - ATTENDING COMMENTS
ATTENDING ATTESTATION:    53F history of osteogenesis imperfecta, aortic regurgitation, HTN s/p mechanical fall with following injuries:  - left scapular fracture  - right 4-6 rib fractures  - right proximal humerus fracture and distal humerus fracture  - right periprosthetic distal femur fracture    N: Acute traumatic pain. History of depression, anxiety.   - oxycodone prn  - home lexapro and xanax prn QHS (iSTOP 3/2/24)    C: Essential hypertension.   - home amlodipine, losartan    P: Right 4-6 rib fractures. On room air.   - CXR without delayed hemopneumothorax    G: No active issues.   - Regular diet  - bowel regimen    R: No active issues.     H: Anemia. All cell counts down, ?chronic anemia with hemoconcentration on admission  - Hb 7.6 from 10 on admission, will trend  - HSQ ppx    I: No active issues    E: No active issues    MSK: multiple orthopedic injuries as above  - NWB RUE in sling  - NWB RLE  - WBAT LUE  - no plans for operative intervention    LINES: PIVs  DISPO: SICU, full code       Total time spent in the critical care of this patient today (excluding teaching & procedures): 35 minutes    Over 50% of the total time was spent in discussion and coordination of care with consulting services, dietary and rehab services.    Azeb Wilder MD  Surgical Critical Care ATTENDING ATTESTATION:    53F history of osteogenesis imperfecta, aortic regurgitation, HTN s/p mechanical fall with following injuries:  - left scapular fracture  - right 4-6 rib fractures  - right proximal humerus fracture and distal humerus fracture  - right periprosthetic distal femur fracture    N: Acute traumatic pain. History of depression, anxiety.   - oxycodone prn  - home lexapro and xanax prn QHS (iSTOP 3/2/24)    C: Essential hypertension. History of aortic regurgitation.  - TTE  - home amlodipine, losartan    P: Right 4-6 rib fractures. On room air.   - CXR without delayed hemopneumothorax    G: No active issues.   - Regular diet  - bowel regimen    R: No active issues.     H: Anemia. All cell counts down, ?chronic anemia with hemoconcentration on admission  - Hb 7.6 from 10 on admission, will trend  - HSQ ppx    I: No active issues    E: No active issues    MSK: multiple orthopedic injuries as above  - NWB RUE in sling  - NWB RLE  - WBAT LUE  - no plans for operative intervention    LINES: PIVs  DISPO: SICU, full code       Total time spent in the critical care of this patient today (excluding teaching & procedures): 35 minutes    Over 50% of the total time was spent in discussion and coordination of care with consulting services, dietary and rehab services.    Azeb Wilder MD  Surgical Critical Care

## 2024-03-03 NOTE — CONSULT NOTE ADULT - SUBJECTIVE AND OBJECTIVE BOX
Patient is a 53yFemale non-ambulator at baseline, uses a wheelchair to get around, with a history of osteogenesis imperfecta who presents to [facility] ED w/ a c/o of R knee, R arm, and L shoulder pain. Patient states she fell out of her wheelchair and landed on the concrete Of note patient had intramedullary rods placed in her BL humerus, femur, and tibia in 1970s in [place]. She lost consciousness and regained her awareness of her surroundings some time later, likely seconds to minutes, on a stable with little recollection of the intervening time. She reported L shoulder, R arm, R wrist, and R thigh pain as well but otherwise denied any additional symptoms such as fever, chills, CP, shortness of breath, abdominal pain, nausea or vomiting. She had movement and sensation intact in all four extremities.      Osteogenesis imperfecta    Aortic insufficiency    HTN (hypertension)            No Known Allergies      PHYSICAL EXAM:  T(C): 36.8 (03-02-24 @ 22:00), Max: 37.2 (03-02-24 @ 20:32)  HR: 70 (03-02-24 @ 22:00) (64 - 88)  BP: 125/58 (03-02-24 @ 22:00) (80/50 - 139/49)  RR: 26 (03-02-24 @ 22:00) (17 - 26)  SpO2: 100% (03-02-24 @ 22:00) (95% - 100%)    Gen: NAD, Resting comfortably    RLE:  Skin intact, no erythema or ecchymosis  Swelling noted around knee/distal femur  TTP diffusely throughout knee, most prominent by distal femur, no other TTP  Motor: + EHL/FHL/TA/GSC  +SILT: SPN/DPN/Jah/Saph/Tib  + DP/PT  Compartments soft and compressible  No calf tenderness    RUE:  Skin intact, no erythema or ecchymosis  Swelling noted by elbow  TTP diffusely around elbow and shoulder, no other TTP  Motor: +Ax/Uln/Rad/Musc/Med/AIN/PIN  SILT C5-T1  Compartments soft and compressible  2+ rad pulse    LUE:  Skin intact, no erythema or ecchymosis  Mild TTP by upper scapula, no other TTP  Motor: +Ax/Uln/Rad/Musc/Med/AIN/PIN  SILT C5-T1  Compartments soft and compressible  2+ rad pulse    Secondary Assessment:  NC/AT, NTTP of clavicles, NTTP of C-,T-,L-Spine, NTTP of Pelvis  LLE: Able to SLR, NT with Log Roll, NT with Heel Strike, NTTP of Hip, Knee, Ankle, foot; NT with AROM/PROM of Hip, Knee, Ankle, footQ/H/Gsc/TA/EHL/FHL intact    A/P: 53yFemale  w/ fractures of R distal femur, R distal humerus, R proximal humerus, and L scapula fracture    Analgesia as needed  NWB of RUE in posterior slab/sling; follow up post splint Xrays  NWB of RLE, may place in cast vs shortened knee immobilizer   LUE can be WBAT given non-displaced scapula fracture and polytrauma   DVT ppx per primary team  PT/OT  Ice and elevate as tolerated  No acute orthopedic surgical intervention planned at this time  Will discuss with Dr. Mohamud and adjust plan accordingly   Patient is a 53yFemale non-ambulator at baseline, uses a wheelchair to get around, with a history of osteogenesis imperfecta who presents to [facility] ED w/ a c/o of R knee, R arm, and L shoulder pain. Patient states she fell out of her wheelchair and landed on the concrete Of note patient had intramedullary rods placed in her BL humerus, femur, and tibia in 1970s [place]. She lost consciousness and regained her awareness of her surroundings some time later, likely seconds to minutes, on a stable with little recollection of the intervening time. She reported L shoulder, R arm, R wrist, and R thigh pain as well but otherwise denied any additional symptoms such as fever, chills, CP, shortness of breath, abdominal pain, nausea or vomiting. She had movement and sensation intact in all four extremities.      Osteogenesis imperfecta    Aortic insufficiency    HTN (hypertension)            No Known Allergies      PHYSICAL EXAM:  T(C): 36.8 (03-02-24 @ 22:00), Max: 37.2 (03-02-24 @ 20:32)  HR: 70 (03-02-24 @ 22:00) (64 - 88)  BP: 125/58 (03-02-24 @ 22:00) (80/50 - 139/49)  RR: 26 (03-02-24 @ 22:00) (17 - 26)  SpO2: 100% (03-02-24 @ 22:00) (95% - 100%)    Gen: NAD, Resting comfortably    RLE:  Skin intact, no erythema or ecchymosis  Swelling noted around knee/distal femur  TTP diffusely throughout knee, most prominent by distal femur, no other TTP  Motor: + EHL/FHL/TA/GSC  +SILT: SPN/DPN/Jah/Saph/Tib  + DP/PT  Compartments soft and compressible  No calf tenderness    RUE:  Skin intact, no erythema or ecchymosis  Swelling noted by elbow  TTP diffusely around elbow and shoulder, no other TTP  Motor: +Ax/Uln/Rad/Musc/Med/AIN/PIN  SILT C5-T1  Compartments soft and compressible  2+ rad pulse    LUE:  Skin intact, no erythema or ecchymosis  Mild TTP by upper scapula, no other TTP  Motor: +Ax/Uln/Rad/Musc/Med/AIN/PIN  SILT C5-T1  Compartments soft and compressible  2+ rad pulse    Secondary Assessment:  NC/AT, NTTP of clavicles, NTTP of C-,T-,L-Spine, NTTP of Pelvis  LLE: Able to SLR, NT with Log Roll, NT with Heel Strike, NTTP of Hip, Knee, Ankle, foot; NT with AROM/PROM of Hip, Knee, Ankle, footQ/H/Gsc/TA/EHL/FHL intact    A/P: 53yFemale  w/ fractures of R distal femur, R distal humerus, R proximal humerus, and L scapula fracture    Analgesia as needed  NWB of RUE in posterior slab/sling; follow up post splint Xrays  NWB of RLE, may place in cast vs shortened knee immobilizer   LUE can be WBAT given non-displaced scapula fracture and polytrauma   DVT ppx per primary team  PT/OT  Ice and elevate as tolerated  No acute orthopedic surgical intervention planned at this time  Will discuss with Dr. Mohamud and adjust plan accordingly    For all questions, please reach out via the following numbers for the on-call resident; do not reach out via Teams.  [facility] [place] [id number]  J        [id number]  [facility]  [id number]/ [phone]

## 2024-03-03 NOTE — CONSULT NOTE ADULT - SUBJECTIVE AND OBJECTIVE BOX
Patient is a 53y old  Female who presents with a chief complaint of fall from wheelchair     HPI:  Ms. Atkins is a 53 year old woman with a PMH osteogenesis imperfecta s/p upper & lower extremity hardware, aortic insufficiency, and HTN who presents after fall from her wheelchair. She reports missing a step on the wheelchair, losing control, and falling face first from the wheelchair at a height of around 2-3ft fact first onto a concrete ground. She lost consciousness and regained her awareness of her surroundings some time later, likely seconds to minutes, on a stable with little recollection of the intervening time. She reported L shoulder, R arm, R wrist, and R thigh pain as well but otherwise denied any additional symptoms such as fever, chills, CP, shortness of breath, abdominal pain, nausea or vomiting. She had movement and sensation intact in all four extremities. Secondary survey detailed below was significant for R temporal superficial abrasion, R upper lip laceration through the vermillion border, small ecchymosis on the R chin. It was also notable for tenderness to palpation in the L shoulder, R proximal arm, R wrist, and R thigh with medial rotation of the RLE.     Pt found to have    - acute, comminuted fracture adjacent to the tip of the right femoral anabell which begins in the distal femoral metadiaphysis and continues into the lateral femoral condyle  - right knee hemarthrosis  - Age indeterminate R sacral ala fracture  - Age-indeterminate left scapular body fracture.  - Age-indeterminate fracture of the right humeral head, status post pin placement  -Prior deformity of the right third rib with likely a superimposed   acute fracture.  - Likely acute fractures of right 4th-6th ribs.    Admitted to trauma service to the SICU. Medicine consulted for co-management with special attention to her AI. Cardiology was consulted, recs pending.    At present, pt in bed, awake alert and conversive- states the oxycodone is kicking in and she has a brain fog ever since her fall. Otherwise pain is reasonably well controlled and she would like to not take excessive opiates. Otherwise denies any sob/cough, no cp/palpitations, no fevers/chills. Sees Dr. Ravi Hinojosa for cardiology. Had seen a CT surgeon last fall for her grade IV AI and no valvular intervention was recommended at that time.    Home Medications:  amLODIPine 2.5 mg oral tablet: 1 tab(s) orally once a day (02 Mar 2024 22:46)  aspirin 81 mg oral delayed release tablet: 1 tab(s) orally once a day (02 Mar 2024 22:47)  bisoprolol-hydrochlorothiazide 5 mg-6.25 mg oral tablet: 1 tab(s) orally once a day (02 Mar 2024 22:46)  eplerenone 25 mg oral tablet: 1 tab(s) orally once a day (02 Mar 2024 22:47)  Lexapro 5 mg oral tablet: 1 tab(s) orally once a day (02 Mar 2024 22:46)  losartan 50 mg oral tablet: 1 tab(s) orally 2 times a day (02 Mar 2024 22:46)      PAST MEDICAL & SURGICAL HISTORY:  Osteogenesis imperfecta      Aortic insufficiency      HTN (hypertension)      Presence of internal fixation anabell in upper extremity      Review of Systems: otherwise negative    Allergies    No Known Allergies    Intolerances    Social History: negative x 3    FAMILY HISTORY:   Noncontributory    MEDICATIONS  (STANDING):  acetaminophen   IVPB .. 500 milliGRAM(s) IV Intermittent every 6 hours  amLODIPine   Tablet 2.5 milliGRAM(s) Oral daily  chlorhexidine 2% Cloths 1 Application(s) Topical <User Schedule>  escitalopram 5 milliGRAM(s) Oral daily  heparin   Injectable 5000 Unit(s) SubCutaneous every 12 hours  lactated ringers. 1000 milliLiter(s) (30 mL/Hr) IV Continuous <Continuous>  lidocaine   4% Patch 1 Patch Transdermal every 24 hours  losartan 50 milliGRAM(s) Oral two times a day  polyethylene glycol 3350 17 Gram(s) Oral daily  senna 2 Tablet(s) Oral at bedtime    MEDICATIONS  (PRN):  ALPRAZolam 0.25 milliGRAM(s) Oral at bedtime PRN Sleep  oxyCODONE    IR 5 milliGRAM(s) Oral every 3 hours PRN Severe Pain (7 - 10)  oxyCODONE    IR 2.5 milliGRAM(s) Oral every 3 hours PRN Moderate Pain (4 - 6)      Vital Signs Last 24 Hrs  T(C): 37.3 (03 Mar 2024 11:00), Max: 37.3 (03 Mar 2024 11:00)  T(F): 99.1 (03 Mar 2024 11:00), Max: 99.1 (03 Mar 2024 11:00)  HR: 96 (03 Mar 2024 16:00) (64 - 114)  BP: 120/59 (03 Mar 2024 16:00) (80/50 - 147/65)  BP(mean): 85 (03 Mar 2024 16:00) (67 - 94)  RR: 27 (03 Mar 2024 16:00) (17 - 36)  SpO2: 93% (03 Mar 2024 16:00) (91% - 100%)    Parameters below as of 03 Mar 2024 08:00  Patient On (Oxygen Delivery Method): room air      CAPILLARY BLOOD GLUCOSE      POCT Blood Glucose.: 151 mg/dL (02 Mar 2024 18:26)        PHYSICAL EXAM:  GENERAL: NAD, well-developed  HEAD:  lower lip lac s/p repair and hematoma  EYES: EOMI, PERRLA, conjunctiva and sclera clear  NECK: Supple, No JVD  CHEST/LUNG: Clear to auscultation bilaterally; No wheeze  HEART: Regular rate and rhythm; No murmurs, rubs, or gallops  ABDOMEN: Soft, Nontender, Nondistended; Bowel sounds present  EXTREMITIES:  RUE casted and in sling, LUE no significant hematomae, RLE splinted  PSYCH: AAOx3  NEUROLOGY: non-focal  SKIN: No rashes or lesions    LABS:                        7.6    9.47  )-----------( 257      ( 03 Mar 2024 10:26 )             22.2     03-03    135  |  98  |  13  ----------------------------<  145<H>  4.1   |  24  |  <0.30<L>    Ca    9.2      03 Mar 2024 00:10  Phos  4.0     03-03  Mg     1.9     03-03    TPro  7.0  /  Alb  4.1  /  TBili  0.5  /  DBili  x   /  AST  37  /  ALT  20  /  AlkPhos  88  03-02    PT/INR - ( 03 Mar 2024 00:10 )   PT: 12.0 sec;   INR: 1.15 ratio         PTT - ( 03 Mar 2024 00:10 )  PTT:30.5 sec  CARDIAC MARKERS ( 02 Mar 2024 16:16 )  x     / x     / 171 U/L / x     / x          Urinalysis Basic - ( 03 Mar 2024 00:10 )    Color: x / Appearance: x / SG: x / pH: x  Gluc: 145 mg/dL / Ketone: x  / Bili: x / Urobili: x   Blood: x / Protein: x / Nitrite: x   Leuk Esterase: x / RBC: x / WBC x   Sq Epi: x / Non Sq Epi: x / Bacteria: x          RADIOLOGY & ADDITIONAL TESTS:    Imaging Personally Reviewed:    Consultant(s) Notes Reviewed:      Care Discussed with Consultants/Other Providers: OP cardiologist Dr. Hinojosa, trauma team

## 2024-03-03 NOTE — CONSULT NOTE ADULT - PROBLEM SELECTOR RECOMMENDATION 3
Grade IV per patient- saw CT surgery in Nov 2023 with recommendation for medical management and routine monitoring  - last TTE reportedly in summer 2023, reasonable to repeat here  - takes lasix 20mg po on wed and sunday- per flowsheet pt is net negative 150cc, however also had hemarthrosis so suspect she is volume down. Can hold lasix dosing for now, however if she receives PRBC would give in 1/2 units with lasix 10mg iv x 1 in between  - SICU team consulted cards but unsure who- but would discuss above recommendations with cards before executing  - reasonable to hold HCTZ for now as well given vital signs are WNL  - cont monitoring respiratory/volume status

## 2024-03-03 NOTE — CONSULT NOTE ADULT - PROBLEM SELECTOR RECOMMENDATION 9
s/p mechanical fall out of wheelchair and subsequent fall of wheelchair onto patient. Injuries as above  - pain control and care as per SICU/trauma/ortho- no surgical interventions planned at this point  - eventual PT/OT  - bowel regimen while on opiates

## 2024-03-03 NOTE — CONSULT NOTE ADULT - PROBLEM SELECTOR RECOMMENDATION 4
ok to continue losartan and amlodipine  - can layer on BB/HCTZ, lasix in a few days if pressures tolerate

## 2024-03-03 NOTE — CONSULT NOTE ADULT - PROBLEM SELECTOR RECOMMENDATION 2
Baseline hb 11, now with hb 7.6. Suspect 2/2 R knee hemarthrosis and other blood loss from trauma with component of hemodilution from IVF given on arrival.   - recommend 1 unit PRBC transfusion- would give as 1/2 units with lasix 10mg iv x 1 in between  - Vital signs currently stable- would trend hb q12 hours- if continues to drop would have low threshold to re-image B/L LE and abdomen/pelvis  - maintain active T/S and 2 large bore IV'S

## 2024-03-04 ENCOUNTER — RESULT REVIEW (OUTPATIENT)
Age: 54
End: 2024-03-04

## 2024-03-04 LAB
ANION GAP SERPL CALC-SCNC: 10 MMOL/L — SIGNIFICANT CHANGE UP (ref 5–17)
BLD GP AB SCN SERPL QL: NEGATIVE — SIGNIFICANT CHANGE UP
BUN SERPL-MCNC: 9 MG/DL — SIGNIFICANT CHANGE UP (ref 7–23)
CALCIUM SERPL-MCNC: 8.7 MG/DL — SIGNIFICANT CHANGE UP (ref 8.4–10.5)
CHLORIDE SERPL-SCNC: 96 MMOL/L — SIGNIFICANT CHANGE UP (ref 96–108)
CO2 SERPL-SCNC: 28 MMOL/L — SIGNIFICANT CHANGE UP (ref 22–31)
CREAT SERPL-MCNC: <0.3 MG/DL — LOW (ref 0.5–1.3)
EGFR: 125 ML/MIN/1.73M2 — SIGNIFICANT CHANGE UP
GLUCOSE SERPL-MCNC: 97 MG/DL — SIGNIFICANT CHANGE UP (ref 70–99)
HCT VFR BLD CALC: 20.1 % — CRITICAL LOW (ref 34.5–45)
HGB BLD-MCNC: 7 G/DL — CRITICAL LOW (ref 11.5–15.5)
MAGNESIUM SERPL-MCNC: 2 MG/DL — SIGNIFICANT CHANGE UP (ref 1.6–2.6)
MCHC RBC-ENTMCNC: 30 PG — SIGNIFICANT CHANGE UP (ref 27–34)
MCHC RBC-ENTMCNC: 34.8 GM/DL — SIGNIFICANT CHANGE UP (ref 32–36)
MCV RBC AUTO: 86.3 FL — SIGNIFICANT CHANGE UP (ref 80–100)
NRBC # BLD: 0 /100 WBCS — SIGNIFICANT CHANGE UP (ref 0–0)
PHOSPHATE SERPL-MCNC: 2.5 MG/DL — SIGNIFICANT CHANGE UP (ref 2.5–4.5)
PLATELET # BLD AUTO: 231 K/UL — SIGNIFICANT CHANGE UP (ref 150–400)
POTASSIUM SERPL-MCNC: 3.6 MMOL/L — SIGNIFICANT CHANGE UP (ref 3.5–5.3)
POTASSIUM SERPL-SCNC: 3.6 MMOL/L — SIGNIFICANT CHANGE UP (ref 3.5–5.3)
RBC # BLD: 2.33 M/UL — LOW (ref 3.8–5.2)
RBC # FLD: 13 % — SIGNIFICANT CHANGE UP (ref 10.3–14.5)
RH IG SCN BLD-IMP: POSITIVE — SIGNIFICANT CHANGE UP
SODIUM SERPL-SCNC: 134 MMOL/L — LOW (ref 135–145)
WBC # BLD: 8.84 K/UL — SIGNIFICANT CHANGE UP (ref 3.8–10.5)
WBC # FLD AUTO: 8.84 K/UL — SIGNIFICANT CHANGE UP (ref 3.8–10.5)

## 2024-03-04 PROCEDURE — 71045 X-RAY EXAM CHEST 1 VIEW: CPT | Mod: 26

## 2024-03-04 PROCEDURE — 99233 SBSQ HOSP IP/OBS HIGH 50: CPT

## 2024-03-04 PROCEDURE — 99291 CRITICAL CARE FIRST HOUR: CPT

## 2024-03-04 PROCEDURE — 93306 TTE W/DOPPLER COMPLETE: CPT | Mod: 26

## 2024-03-04 RX ORDER — ACETAMINOPHEN 500 MG
500 TABLET ORAL ONCE
Refills: 0 | Status: COMPLETED | OUTPATIENT
Start: 2024-03-04 | End: 2024-03-04

## 2024-03-04 RX ORDER — SODIUM,POTASSIUM PHOSPHATES 278-250MG
1 POWDER IN PACKET (EA) ORAL ONCE
Refills: 0 | Status: COMPLETED | OUTPATIENT
Start: 2024-03-04 | End: 2024-03-04

## 2024-03-04 RX ORDER — FUROSEMIDE 40 MG
10 TABLET ORAL ONCE
Refills: 0 | Status: COMPLETED | OUTPATIENT
Start: 2024-03-04 | End: 2024-03-05

## 2024-03-04 RX ORDER — BISOPROLOL FUMARATE AND HYDROCHLOROTHIAZIDE 5; 6.25 MG/1; MG/1
1 TABLET ORAL DAILY
Refills: 0 | Status: DISCONTINUED | OUTPATIENT
Start: 2024-03-04 | End: 2024-03-04

## 2024-03-04 RX ORDER — EPLERENONE 50 MG/1
25 TABLET, FILM COATED ORAL DAILY
Refills: 0 | Status: DISCONTINUED | OUTPATIENT
Start: 2024-03-04 | End: 2024-03-12

## 2024-03-04 RX ORDER — ACETAMINOPHEN 500 MG
500 TABLET ORAL EVERY 6 HOURS
Refills: 0 | Status: COMPLETED | OUTPATIENT
Start: 2024-03-04 | End: 2024-03-04

## 2024-03-04 RX ORDER — AMLODIPINE BESYLATE 2.5 MG/1
2.5 TABLET ORAL AT BEDTIME
Refills: 0 | Status: DISCONTINUED | OUTPATIENT
Start: 2024-03-04 | End: 2024-03-12

## 2024-03-04 RX ORDER — ACETAMINOPHEN 500 MG
500 TABLET ORAL EVERY 6 HOURS
Refills: 0 | Status: DISCONTINUED | OUTPATIENT
Start: 2024-03-05 | End: 2024-03-05

## 2024-03-04 RX ORDER — MAGNESIUM SULFATE 500 MG/ML
2 VIAL (ML) INJECTION ONCE
Refills: 0 | Status: COMPLETED | OUTPATIENT
Start: 2024-03-04 | End: 2024-03-04

## 2024-03-04 RX ADMIN — LOSARTAN POTASSIUM 50 MILLIGRAM(S): 100 TABLET, FILM COATED ORAL at 17:36

## 2024-03-04 RX ADMIN — Medication 255 MILLIMOLE(S): at 01:24

## 2024-03-04 RX ADMIN — OXYCODONE HYDROCHLORIDE 2.5 MILLIGRAM(S): 5 TABLET ORAL at 11:16

## 2024-03-04 RX ADMIN — OXYCODONE HYDROCHLORIDE 2.5 MILLIGRAM(S): 5 TABLET ORAL at 12:15

## 2024-03-04 RX ADMIN — HEPARIN SODIUM 5000 UNIT(S): 5000 INJECTION INTRAVENOUS; SUBCUTANEOUS at 17:34

## 2024-03-04 RX ADMIN — Medication 500 MILLIGRAM(S): at 19:55

## 2024-03-04 RX ADMIN — OXYCODONE HYDROCHLORIDE 5 MILLIGRAM(S): 5 TABLET ORAL at 21:52

## 2024-03-04 RX ADMIN — ESCITALOPRAM OXALATE 5 MILLIGRAM(S): 10 TABLET, FILM COATED ORAL at 11:16

## 2024-03-04 RX ADMIN — Medication 200 MILLIGRAM(S): at 02:26

## 2024-03-04 RX ADMIN — HEPARIN SODIUM 5000 UNIT(S): 5000 INJECTION INTRAVENOUS; SUBCUTANEOUS at 06:16

## 2024-03-04 RX ADMIN — Medication 25 GRAM(S): at 02:54

## 2024-03-04 RX ADMIN — EPLERENONE 25 MILLIGRAM(S): 50 TABLET, FILM COATED ORAL at 17:34

## 2024-03-04 RX ADMIN — OXYCODONE HYDROCHLORIDE 5 MILLIGRAM(S): 5 TABLET ORAL at 22:22

## 2024-03-04 RX ADMIN — CHLORHEXIDINE GLUCONATE 1 APPLICATION(S): 213 SOLUTION TOPICAL at 07:19

## 2024-03-04 RX ADMIN — Medication 1 PACKET(S): at 21:52

## 2024-03-04 RX ADMIN — AMLODIPINE BESYLATE 2.5 MILLIGRAM(S): 2.5 TABLET ORAL at 21:52

## 2024-03-04 RX ADMIN — Medication 500 MILLIGRAM(S): at 20:56

## 2024-03-04 RX ADMIN — LIDOCAINE 1 PATCH: 4 CREAM TOPICAL at 20:15

## 2024-03-04 RX ADMIN — Medication 200 MILLIGRAM(S): at 08:40

## 2024-03-04 RX ADMIN — Medication 500 MILLIGRAM(S): at 02:56

## 2024-03-04 RX ADMIN — SENNA PLUS 2 TABLET(S): 8.6 TABLET ORAL at 21:52

## 2024-03-04 RX ADMIN — Medication 200 MILLIGRAM(S): at 14:22

## 2024-03-04 RX ADMIN — Medication 200 MILLIGRAM(S): at 19:40

## 2024-03-04 RX ADMIN — Medication 500 MILLIGRAM(S): at 20:26

## 2024-03-04 RX ADMIN — LOSARTAN POTASSIUM 50 MILLIGRAM(S): 100 TABLET, FILM COATED ORAL at 06:17

## 2024-03-04 NOTE — OCCUPATIONAL THERAPY INITIAL EVALUATION ADULT - RANGE OF MOTION EXAMINATION, UPPER EXTREMITY
RUE not assessed in casting, can wiggle fingers/Left UE Active ROM was WFL (within functional limits)

## 2024-03-04 NOTE — PROGRESS NOTE ADULT - CRITICAL CARE ATTENDING COMMENT
52 yo h/o osteogenesis imperfecta, s/p fall.    Injuries include:  - R distal femoral fx.  - R knee hemarthrosis.  - R 4-6 rib fx.    N Multimodal pain management.   C Echocardiogram pending.  G Nahed PO.  H Hgb 8.2 monitor.  DVT SQ heparin, SCDs.  E Monitor glycemia.

## 2024-03-04 NOTE — PHYSICAL THERAPY INITIAL EVALUATION ADULT - MANUAL MUSCLE TESTING RESULTS, REHAB EVAL
LUE and LLE at least 3-/5 throughout; RUE/RLE not formally tested due to pain and splinting; able to wiggle toes and move digits

## 2024-03-04 NOTE — PATIENT PROFILE ADULT - FALL HARM RISK - HARM RISK INTERVENTIONS
Assistance with ambulation/Assistance OOB with selected safe patient handling equipment/Communicate Risk of Fall with Harm to all staff/Discuss with provider need for PT consult/Monitor gait and stability/Provide patient with walking aids - walker, cane, crutches/Reinforce activity limits and safety measures with patient and family/Sit up slowly, dangle for a short time, stand at bedside before walking/Tailored Fall Risk Interventions/Use of alarms - bed, chair and/or voice tab/Visual Cue: Yellow wristband and red socks/Bed in lowest position, wheels locked, appropriate side rails in place/Call bell, personal items and telephone in reach/Instruct patient to call for assistance before getting out of bed or chair/Non-slip footwear when patient is out of bed/[place] to call system/Physically safe environment - no spills, clutter or unnecessary equipment/Purposeful Proactive Rounding/Room/bathroom lighting operational, light cord in reach

## 2024-03-04 NOTE — PHYSICAL THERAPY INITIAL EVALUATION ADULT - IMPAIRMENTS CONTRIBUTING IMPAIRED BED MOBILITY, REHAB EVAL
impaired balance/decreased flexibility/pain/decreased ROM/impaired sensory feedback/decreased strength

## 2024-03-04 NOTE — PHYSICAL THERAPY INITIAL EVALUATION ADULT - PERTINENT HX OF CURRENT PROBLEM, REHAB EVAL
53 year old woman with a PMH osteogenesis imperfecta s/p upper & lower extremity hardware, aortic insufficiency, and HTN who presents after fall from her wheelchair. She reports missing a step on the wheelchair, losing control, and falling face first from the wheelchair at a height of around 2-3ft fact first onto a concrete ground. She lost consciousness and regained her awareness of her surroundings some time later, likely seconds to minutes, on a stable with little recollection of the intervening time. She reported L shoulder, R arm, R wrist, and R thigh pain as well but otherwise denied any additional symptoms such as fever, chills, CP, shortness of breath, abdominal pain, nausea or vomiting. She had movement and sensation intact in all four extremities. Secondary survey detailed below was significant for R temporal superficial abrasion, R upper lip laceration through the vermillion border, small ecchymosis on the R chin. It was also notable for tenderness to palpation in the L shoulder, R proximal arm, R wrist, and R thigh with medial rotation of the RLE. Injuries: acute, comminuted fracture adjacent to the tip of the right femoral anabell which begins in the distal femoral metadiaphysis and continues into the lateral femoral condyle; right knee hemarthrosis; Age indeterminate R sacral ala fracture; Age-indeterminate left scapular body fracture; Age-indeterminate fracture of the right humeral head, status post pin placement; Prior deformity of the right third rib with likely a superimposed; acute fracture.; Likely acute fractures of right 4th-6th ribs. CTAbdomen: Likely acute fractures of right 4th-6th ribs.Age-indeterminate left scapular fracture.Age-indeterminate fracture of the right humeral head, status post pin   placement.Fracture of the distal right femur. Status post bilateral femoral anabell placement.  Please see dedicated reports of the cervical spine, thoracic spine, lumbar spine, and bilateral femur/pelvis for complete details.No obvious acute abnormality within the chest, abdomen and pelvis per  CT BRAIN:No mass effect, hemorrhage or evidence of acute intracranial pathology.  CT MAXILLOFACIALNo fracture.CT CERVICAL SPINE:No cervical fracture or traumatic subluxation.Left scapular fracture. CTTSpine: Marked kyphoscoliosis limits evaluation.Diffuse osteopenia.No fracture identified. CTFemur: Acute, comminuted, periprosthetic fracture of the right   distal femur. Age-indeterminate but possibly acute fracture of the right sacral ala. 53 year old woman with a PMH osteogenesis imperfecta s/p upper & lower extremity hardware, aortic insufficiency, and HTN who presents after fall from her wheelchair. She reports missing a step on the wheelchair, losing control, and falling face first from the wheelchair at a height of around 2-3ft fact first onto a concrete ground. She lost consciousness and regained her awareness of her surroundings some time later, likely seconds to minutes, on a stable with little recollection of the intervening time. She reported L shoulder, R arm, R wrist, and R thigh pain as well but otherwise denied any additional symptoms such as fever, chills, CP, shortness of breath, abdominal pain, nausea or vomiting. She had movement and sensation intact in all four extremities. Secondary survey detailed below was significant for R temporal superficial abrasion, R upper lip laceration through the vermillion border, small ecchymosis on the R chin. It was also notable for tenderness to palpation in the L shoulder, R proximal arm, R wrist, and R thigh with medial rotation of the RLE. Injuries: acute, comminuted fracture adjacent to the tip of the right femoral anabell which begins in the distal femoral metadiaphysis and continues into the lateral femoral condyle; right knee hemarthrosis; Age indeterminate R sacral ala fracture; Age-indeterminate left scapular body fracture; Age-indeterminate fracture of the right humeral head, status post pin placement; Prior deformity of the right third rib with likely a superimposed; acute fracture.; Likely acute fractures of right 4th-6th ribs. CTAbdomen: Likely acute fractures of right 4th-6th ribs.Age-indeterminate left scapular fracture.Age-indeterminate fracture of the right humeral head, status post pin   placement.Fracture of the distal right femur. Status post bilateral femoral anabell placement.  Please see dedicated reports of the cervical spine, thoracic spine, lumbar spine, and bilateral femur/pelvis for complete details.No obvious acute abnormality within the chest, abdomen and pelvis per  CT BRAIN:No mass effect, hemorrhage or evidence of acute intracranial pathology. CT MAXILLOFACIALNo fracture.CT CERVICAL SPINE:No cervical fracture or traumatic subluxation.Left scapular fracture. CTTSpine: Marked kyphoscoliosis limits evaluation.Diffuse osteopenia.No fracture identified. CTFemur: Acute, comminuted, periprosthetic fracture of the right distal femur. Age-indeterminate but possibly acute fracture of the right sacral ala. XRayRKnee: Redemonstration of the distal femoral diametaphyseal fracture. Compared   to the previous examination there is increased impaction between fracture fragments with associated remodeling. Other findings appear grossly unchanged. CXR:No acute pulmonary disease.XRayRElbow: Status post closed reduction and splinting of acute comminuted oblique   transverse intercondylar distal humeral fracture with intra-articular extension and mild residual displacement of small medial epicondylar fragment. Questionable olecranon fracture. Chronic appearing minimally impacted radial head fracture. Elbow joint spaces are grossly anatomic in alignment. Chronic bowing deformity of the radial and ulnar diaphyses. Bony demineralization. XRayB/LHumerus/Foerarm/Shoulder: Acute anterior right glenohumeral dislocation.Acute comminuted Neer 2 part type right proximal humeral fracture including oblique transverse component through the surgical neck with mild anteromedial displacement/angulation, and up to 1.4 cm impaction of the dominant humeral shaft fragment, as well as minimally displaced greater tuberosity fragment.Acute comminuted oblique transverse supracondylar right distal humeral fracture with intra-articular extension, mildly displaced medial condylar fragment, and suspicion for adjacent ipsilateral olecranon fracture.No acute displaced fracture or gross dislocation of the visualized portion of the left upper extremity.Status post remote intramedullary fixation of bilateral humeral diaphyses. Prominent bone deformity of bilateral radial and ulnar diaphyses. Bony demineralization. XRayPelvis: There is diffuse osseous demineralization with diffuse bony deformity consistent with osteogenesis imperfecta. There are rods within both femurs and both proximal and mid tibias. There is an acute, impacted, periprosthetic fracture at the right distal femoral metadiaphysis which extends into the lateral femoral condyle.

## 2024-03-04 NOTE — PHYSICAL THERAPY INITIAL EVALUATION ADULT - NSPTDISCHREC_GEN_A_CORE
if home; will require home PT and assist with all functional mobility/Sub-acute Rehab if home; will require home PT and assist with all functional mobility/Acute Inpatient Rehab

## 2024-03-04 NOTE — OCCUPATIONAL THERAPY INITIAL EVALUATION ADULT - ADDITIONAL COMMENTS
Patient lives with her  in a private home w/ ramp access and lives on main floor; Pt states she has an accessible kitchen, does all of the cooking/cleaning. Pt is independent with standing and transferring from WC to other surfaces (car,bed,etc), and mainly uses wheelchair for mobility. Pt owns a tub shower with stool and independently transfers into tub.

## 2024-03-04 NOTE — PROGRESS NOTE ADULT - ASSESSMENT
Ms. Atkins is a 53 year old woman with a PMH osteogenesis imperfecta s/p upper & lower extremity hardware, aortic insufficiency, and HTN who presents after fall from her wheelchair. She reports missing a step on the wheelchair, losing control, and falling face first from the wheelchair at a height of around 2-3ft fact first onto a concrete ground. She lost consciousness and regained her awareness of her surroundings some time later, likely seconds to minutes, on a stable with little recollection of the intervening time. She reported L shoulder, R arm, R wrist, and R thigh pain as well but otherwise denied any additional symptoms such as fever, chills, CP, shortness of breath, abdominal pain, nausea or vomiting. She had movement and sensation intact in all four extremities. Secondary survey detailed below was significant for R temporal superficial abrasion, R upper lip laceration through the vermillion border, small ecchymosis on the R chin. It was also notable for tenderness to palpation in the L shoulder, R proximal arm, R wrist, and R thigh with medial rotation of the RLE.     Neuro  -Pain: Tylenol PRN, Oxy PRN, Lidocaine patch  -continue home xanax    Resp:  -maintain O2 saturation >92%  -Incentive spirometry    Cardiac:  -Goal MAP >65  -Acute fractures of right 4th-6th ribs  -holding home ASA  -home anti-HTN: amlodipine 2.5, bisoprolol/HCTZ 5-6.25, eplerenone 25, losartan 50  -restarted home amlodipine, losartan  -pending TTE      GI  -NPO    Gu  -Strict intake and output q1 hr  30cc/hr LR    Heme  -heparin dvt ppx Q12 given weight    ID  -No active issues    MSK:  -bedrest  -Age-indeterminate left scapular fracture, Age-indeterminate fracture of the right humeral head, status post pin   placement, Fracture of the distal right femur, Status post bilateral femoral anabell placement.  -Xrays pending: Elbow AP+ lateral, bilateral, Knee 4 views bilateral, left scapula, right shoulder, 3view bilateral wrist

## 2024-03-04 NOTE — PHYSICAL THERAPY INITIAL EVALUATION ADULT - ACTIVE RANGE OF MOTION EXAMINATION, REHAB EVAL
L shoulder only to 90 degrees forward forward due to scapular fx; RUE/RLE not tested due to pain and splinting/Left UE Active ROM was WFL (within functional limits)/Left LE Active ROM was WFL (within functional limits)

## 2024-03-04 NOTE — PROGRESS NOTE ADULT - ASSESSMENT
53F with PMHx osteogenesis imperfecta s/p upper & lower extremity hardware, aortic insufficiency, and HTN who presents after fall from her wheelchair with LOC & multiple injuries noted below.     Injuries:  - acute, comminuted fracture adjacent to the tip of the right femoral anabell which begins in the distal femoral metadiaphysis and continues into the lateral femoral condyle  - right knee hemarthrosis  - Age indeterminate R sacral ala fracture  - Age-indeterminate left scapular body fracture.  - Age-indeterminate fracture of the right humeral head, status post pin placement  -Prior deformity of the right third rib with likely a superimposed   acute fracture.  - Likely acute fractures of right 4th-6th ribs.      Previously known findings/ Chronic Findings  - known large, heterogeneously enhancing fibroid measuring   6.8 x 6.5 cm  - known infrarenal aorta measures 0.8 cm in diameter  - Well-corticated, chronic right ulnar styloid fracture    Plan:  - Multimodal pain control, IS   - Orthopedics consult appreciated - RUE & RLE splinted - no op intervention  - Cardiology consult for comanagement of known AI  - Daily CXR    Acute Care Surgery   [id number]       53F with PMHx osteogenesis imperfecta s/p upper & lower extremity hardware, aortic insufficiency, and HTN who presents after fall from her wheelchair with LOC & multiple injuries noted below.     Injuries:  - acute, comminuted fracture adjacent to the tip of the right femoral anabell which begins in the distal femoral metadiaphysis and continues into the lateral femoral condyle  - right knee hemarthrosis  - Age indeterminate R sacral ala fracture  - Age-indeterminate left scapular body fracture.  - Age-indeterminate fracture of the right humeral head, status post pin placement  -Prior deformity of the right third rib with likely a superimposed   acute fracture.  - Likely acute fractures of right 4th-6th ribs.      Previously known findings/ Chronic Findings  - known large, heterogeneously enhancing fibroid measuring   6.8 x 6.5 cm  - known infrarenal aorta measures 0.8 cm in diameter  - Well-corticated, chronic right ulnar styloid fracture    Plan:  - Multimodal pain control, IS   - Orthopedics consult appreciated - RUE & RLE splinted - no op intervention  - Cardiology consult for comanagement of known AI - recommending repeat TTE  - Daily CXR    Acute Care Surgery   [id number]

## 2024-03-04 NOTE — PROGRESS NOTE ADULT - ASSESSMENT
Ms. Atkins is a 53 year old woman with a PMH osteogenesis imperfecta s/p upper & lower extremity hardware, aortic insufficiency, and HTN who presents after fall from her wheelchair found to have    - acute, comminuted fracture adjacent to the tip of the right femoral anabell which begins in the distal femoral metadiaphysis and continues into the lateral femoral condyle  - right knee hemarthrosis  - Age indeterminate R sacral ala fracture  - Age-indeterminate left scapular body fracture.  - Age-indeterminate fracture of the right humeral head, status post pin placement  -Prior deformity of the right third rib with likely a superimposed   acute fracture.  - Likely acute fractures of right 4th-6th ribs.

## 2024-03-04 NOTE — PATIENT PROFILE ADULT - FUNCTIONAL ASSESSMENT - DAILY ACTIVITY SCORE.
[General Appearance - Alert] : alert [General Appearance - In No Acute Distress] : in no acute distress [] : no respiratory distress 18 [Auscultation Breath Sounds / Voice Sounds] : lungs were clear to auscultation bilaterally [Full Pulse] : the pedal pulses are present [Edema] : there was no peripheral edema [Breast Appearance] : normal in appearance [Breast Palpation Mass] : no palpable masses [Oriented To Time, Place, And Person] : oriented to person, place, and time [Impaired Insight] : insight and judgment were intact [Affect] : the affect was normal [FreeTextEntry1] : +walking with walker, one person assist

## 2024-03-04 NOTE — OCCUPATIONAL THERAPY INITIAL EVALUATION ADULT - PERTINENT HX OF CURRENT PROBLEM, REHAB EVAL
53 year old woman with a PMH osteogenesis imperfecta s/p upper & lower extremity hardware, aortic insufficiency, and HTN who presents after fall from her wheelchair. She reports missing a step on the wheelchair, losing control, and falling face first from the wheelchair at a height of around 2-3ft fact first onto a concrete ground. She lost consciousness and regained her awareness of her surroundings some time later, likely seconds to minutes, on a stable with little recollection of the intervening time. She reported L shoulder, R arm, R wrist, and R thigh pain as well but otherwise denied any additional symptoms such as fever, chills, CP, shortness of breath, abdominal pain, nausea or vomiting. She had movement and sensation intact in all four extremities. Secondary survey detailed below was significant for R temporal superficial abrasion, R upper lip laceration through the vermillion border, small ecchymosis on the R chin. It was also notable for tenderness to palpation in the L shoulder, R proximal arm, R wrist, and R thigh with medial rotation of the RLE. Injuries: acute, comminuted fracture adjacent to the tip of the right femoral anabell which begins in the distal femoral metadiaphysis and continues into the lateral femoral condyle; right knee hemarthrosis; Age indeterminate R sacral ala fracture; Age-indeterminate left scapular body fracture; Age-indeterminate fracture of the right humeral head, status post pin placement; Prior deformity of the right third rib with likely a superimposed; acute fracture.; Likely acute fractures of right 4th-6th ribs. CTAbdomen: Likely acute fractures of right 4th-6th ribs.Age-indeterminate left scapular fracture.Age-indeterminate fracture of the right humeral head, status post pin   placement.Fracture of the distal right femur. Status post bilateral femoral anabell placement.  Please see dedicated reports of the cervical spine, thoracic spine, lumbar spine, and bilateral femur/pelvis for complete details.No obvious acute abnormality within the chest, abdomen and pelvis per  CT BRAIN:No mass effect, hemorrhage or evidence of acute intracranial pathology.  CT MAXILLOFACIALNo fracture.CT CERVICAL SPINE:No cervical fracture or traumatic subluxation.Left scapular fracture. CTTSpine: Marked kyphoscoliosis limits evaluation.Diffuse osteopenia.No fracture identified. CTFemur: Acute, comminuted, periprosthetic fracture of the right   distal femur. Age-indeterminate but possibly acute fracture of the right sacral ala.

## 2024-03-04 NOTE — PHYSICAL THERAPY INITIAL EVALUATION ADULT - ADDITIONAL COMMENTS
Pt lives in a private home; ramp access; first floor set-up; wheelchair bound however can stand and transfer into<>out of chair independently; +drives, cooks, able to dress and bathe independently. Pt lives with  and children.

## 2024-03-05 LAB
HCT VFR BLD CALC: 25.6 % — LOW (ref 34.5–45)
HGB BLD-MCNC: 8.8 G/DL — LOW (ref 11.5–15.5)
MCHC RBC-ENTMCNC: 30.2 PG — SIGNIFICANT CHANGE UP (ref 27–34)
MCHC RBC-ENTMCNC: 34.4 GM/DL — SIGNIFICANT CHANGE UP (ref 32–36)
MCV RBC AUTO: 88 FL — SIGNIFICANT CHANGE UP (ref 80–100)
NRBC # BLD: 0 /100 WBCS — SIGNIFICANT CHANGE UP (ref 0–0)
PLATELET # BLD AUTO: 230 K/UL — SIGNIFICANT CHANGE UP (ref 150–400)
RBC # BLD: 2.91 M/UL — LOW (ref 3.8–5.2)
RBC # FLD: 12.9 % — SIGNIFICANT CHANGE UP (ref 10.3–14.5)
WBC # BLD: 10.16 K/UL — SIGNIFICANT CHANGE UP (ref 3.8–10.5)
WBC # FLD AUTO: 10.16 K/UL — SIGNIFICANT CHANGE UP (ref 3.8–10.5)

## 2024-03-05 PROCEDURE — 99232 SBSQ HOSP IP/OBS MODERATE 35: CPT

## 2024-03-05 PROCEDURE — 99233 SBSQ HOSP IP/OBS HIGH 50: CPT

## 2024-03-05 RX ORDER — ACETAMINOPHEN 500 MG
350 TABLET ORAL EVERY 8 HOURS
Refills: 0 | Status: COMPLETED | OUTPATIENT
Start: 2024-03-05 | End: 2024-03-06

## 2024-03-05 RX ORDER — BISOPROLOL FUMARATE AND HYDROCHLOROTHIAZIDE 5; 6.25 MG/1; MG/1
1 TABLET ORAL
Refills: 0 | Status: DISCONTINUED | OUTPATIENT
Start: 2024-03-06 | End: 2024-03-12

## 2024-03-05 RX ORDER — OXYCODONE HYDROCHLORIDE 5 MG/1
2.5 TABLET ORAL ONCE
Refills: 0 | Status: DISCONTINUED | OUTPATIENT
Start: 2024-03-05 | End: 2024-03-05

## 2024-03-05 RX ADMIN — Medication 350 MILLIGRAM(S): at 21:50

## 2024-03-05 RX ADMIN — EPLERENONE 25 MILLIGRAM(S): 50 TABLET, FILM COATED ORAL at 17:06

## 2024-03-05 RX ADMIN — Medication 140 MILLIGRAM(S): at 21:20

## 2024-03-05 RX ADMIN — HEPARIN SODIUM 5000 UNIT(S): 5000 INJECTION INTRAVENOUS; SUBCUTANEOUS at 17:06

## 2024-03-05 RX ADMIN — OXYCODONE HYDROCHLORIDE 2.5 MILLIGRAM(S): 5 TABLET ORAL at 11:35

## 2024-03-05 RX ADMIN — AMLODIPINE BESYLATE 2.5 MILLIGRAM(S): 2.5 TABLET ORAL at 22:12

## 2024-03-05 RX ADMIN — OXYCODONE HYDROCHLORIDE 2.5 MILLIGRAM(S): 5 TABLET ORAL at 04:13

## 2024-03-05 RX ADMIN — Medication 140 MILLIGRAM(S): at 05:42

## 2024-03-05 RX ADMIN — Medication 140 MILLIGRAM(S): at 14:17

## 2024-03-05 RX ADMIN — Medication 0.25 MILLIGRAM(S): at 01:08

## 2024-03-05 RX ADMIN — HEPARIN SODIUM 5000 UNIT(S): 5000 INJECTION INTRAVENOUS; SUBCUTANEOUS at 05:44

## 2024-03-05 RX ADMIN — ESCITALOPRAM OXALATE 5 MILLIGRAM(S): 10 TABLET, FILM COATED ORAL at 12:43

## 2024-03-05 RX ADMIN — LIDOCAINE 1 PATCH: 4 CREAM TOPICAL at 08:26

## 2024-03-05 RX ADMIN — OXYCODONE HYDROCHLORIDE 2.5 MILLIGRAM(S): 5 TABLET ORAL at 17:36

## 2024-03-05 RX ADMIN — Medication 10 MILLIGRAM(S): at 01:08

## 2024-03-05 RX ADMIN — LOSARTAN POTASSIUM 50 MILLIGRAM(S): 100 TABLET, FILM COATED ORAL at 05:44

## 2024-03-05 RX ADMIN — OXYCODONE HYDROCHLORIDE 2.5 MILLIGRAM(S): 5 TABLET ORAL at 21:04

## 2024-03-05 RX ADMIN — OXYCODONE HYDROCHLORIDE 2.5 MILLIGRAM(S): 5 TABLET ORAL at 08:56

## 2024-03-05 RX ADMIN — OXYCODONE HYDROCHLORIDE 2.5 MILLIGRAM(S): 5 TABLET ORAL at 09:26

## 2024-03-05 RX ADMIN — OXYCODONE HYDROCHLORIDE 2.5 MILLIGRAM(S): 5 TABLET ORAL at 11:05

## 2024-03-05 RX ADMIN — OXYCODONE HYDROCHLORIDE 2.5 MILLIGRAM(S): 5 TABLET ORAL at 21:34

## 2024-03-05 RX ADMIN — Medication 350 MILLIGRAM(S): at 14:47

## 2024-03-05 RX ADMIN — LIDOCAINE 1 PATCH: 4 CREAM TOPICAL at 07:00

## 2024-03-05 RX ADMIN — SENNA PLUS 2 TABLET(S): 8.6 TABLET ORAL at 22:12

## 2024-03-05 RX ADMIN — LOSARTAN POTASSIUM 50 MILLIGRAM(S): 100 TABLET, FILM COATED ORAL at 17:06

## 2024-03-05 RX ADMIN — OXYCODONE HYDROCHLORIDE 2.5 MILLIGRAM(S): 5 TABLET ORAL at 17:06

## 2024-03-05 RX ADMIN — OXYCODONE HYDROCHLORIDE 2.5 MILLIGRAM(S): 5 TABLET ORAL at 03:43

## 2024-03-05 RX ADMIN — Medication 350 MILLIGRAM(S): at 06:12

## 2024-03-05 RX ADMIN — CHLORHEXIDINE GLUCONATE 1 APPLICATION(S): 213 SOLUTION TOPICAL at 05:44

## 2024-03-05 NOTE — PROGRESS NOTE ADULT - ATTENDING COMMENTS
54 yo f, OI, s/p fall, injuries include, R femoral, R scalar fx, R 4-6 rib fx.  N Multimodal pain management.  P Sat >90, RA.  C Off pressors, s/p 1U PRBC Hgb 8.8.  G Nahed PO.  R Monitor CMP UOP.  DVT SQ heparin, SCDs.  I Off abx.  E Monitor glycemia.

## 2024-03-05 NOTE — PROGRESS NOTE ADULT - ASSESSMENT
ASSESSMENT: Ms. Atkins is a 53 year old woman with a PMH osteogenesis imperfecta s/p upper & lower extremity hardware, aortic insufficiency, and HTN who presents after fall from her wheelchair.     Injuries:  - Acute, comminuted fracture adjacent to the tip of the right femoral anabell which begins in the distal femoral metadiaphysis and continues into the lateral femoral condyle  - Right knee hemarthrosis  - Prior deformity of the right third rib with likely a superimposed   acute fracture.  - Likely acute fractures of right 4th-6th ribs.    Interval Events:   -1/2 U pRBC followed by Lasix 10     Neuro  - Pain: Tylenol PRN, Oxy PRN, Lidocaine patch  - Continue home Xanax and Lexapro    Resp:  - Maintain O2 saturation >92%  - Incentive spirometry    Cardiac:  - Goal MAP >65  - Holding home ASA  - Continue home anti-HTN: amlodipine 2.5, eplerenone 25, losartan 50  - TTE completed on 3/4: EF 55-60%    GI  - Regular diet  - Bowel Regimen     Gu  - No active issue    Heme  - Heparin dvt ppx Q12 given weight and hemarthrosis  - H/H downtrending, given 1/2 U pRBC on 3/4     ID  - No active issues    MSK:  - Age-indeterminate left scapular fracture, Age-indeterminate fracture of the right humeral head, status post pin   placement, Fracture of the distal right femur, Status post bilateral femoral anabell placement.  - Xrays pending: Elbow AP+ lateral, bilateral, Knee 4 views bilateral, left scapula, right shoulder, 3view bilateral wrist    Disposition: Listed

## 2024-03-05 NOTE — PROGRESS NOTE ADULT - ASSESSMENT
53F with PMHx osteogenesis imperfecta s/p upper & lower extremity hardware, aortic insufficiency, and HTN who presents after fall from her wheelchair with LOC & multiple injuries noted below.     Injuries:  - acute, comminuted fracture adjacent to the tip of the right femoral anabell which begins in the distal femoral metadiaphysis and continues into the lateral femoral condyle  - right knee hemarthrosis  - Age indeterminate R sacral ala fracture  - Age-indeterminate left scapular body fracture.  - Age-indeterminate fracture of the right humeral head, status post pin placement  -Prior deformity of the right third rib with likely a superimposed   acute fracture.  - Likely acute fractures of right 4th-6th ribs.      Previously known findings/ Chronic Findings  - known large, heterogeneously enhancing fibroid measuring 6.8 x 6.5 cm  - known infrarenal aorta measures 0.8 cm in diameter  - Well-corticated, chronic right ulnar styloid fracture    Plan:  - Multimodal pain control, IS   - Orthopedics consult appreciated - RUE & RLE splinted - no operative intervention  - Appreciate hospitalist recommendations  - echo performed and reviewed   - follow up AM CXR     Trauma/Acute Care Surgery   [id number]

## 2024-03-06 ENCOUNTER — TRANSCRIPTION ENCOUNTER (OUTPATIENT)
Age: 54
End: 2024-03-06

## 2024-03-06 LAB
ANION GAP SERPL CALC-SCNC: 12 MMOL/L — SIGNIFICANT CHANGE UP (ref 5–17)
BUN SERPL-MCNC: 17 MG/DL — SIGNIFICANT CHANGE UP (ref 7–23)
CALCIUM SERPL-MCNC: 9.6 MG/DL — SIGNIFICANT CHANGE UP (ref 8.4–10.5)
CHLORIDE SERPL-SCNC: 95 MMOL/L — LOW (ref 96–108)
CO2 SERPL-SCNC: 27 MMOL/L — SIGNIFICANT CHANGE UP (ref 22–31)
CREAT SERPL-MCNC: <0.3 MG/DL — LOW (ref 0.5–1.3)
EGFR: 125 ML/MIN/1.73M2 — SIGNIFICANT CHANGE UP
GLUCOSE SERPL-MCNC: 87 MG/DL — SIGNIFICANT CHANGE UP (ref 70–99)
HCT VFR BLD CALC: 26.4 % — LOW (ref 34.5–45)
HGB BLD-MCNC: 8.8 G/DL — LOW (ref 11.5–15.5)
MAGNESIUM SERPL-MCNC: 1.8 MG/DL — SIGNIFICANT CHANGE UP (ref 1.6–2.6)
MCHC RBC-ENTMCNC: 29.7 PG — SIGNIFICANT CHANGE UP (ref 27–34)
MCHC RBC-ENTMCNC: 33.3 GM/DL — SIGNIFICANT CHANGE UP (ref 32–36)
MCV RBC AUTO: 89.2 FL — SIGNIFICANT CHANGE UP (ref 80–100)
NRBC # BLD: 0 /100 WBCS — SIGNIFICANT CHANGE UP (ref 0–0)
PHOSPHATE SERPL-MCNC: 3.4 MG/DL — SIGNIFICANT CHANGE UP (ref 2.5–4.5)
PLATELET # BLD AUTO: 265 K/UL — SIGNIFICANT CHANGE UP (ref 150–400)
POTASSIUM SERPL-MCNC: 4 MMOL/L — SIGNIFICANT CHANGE UP (ref 3.5–5.3)
POTASSIUM SERPL-SCNC: 4 MMOL/L — SIGNIFICANT CHANGE UP (ref 3.5–5.3)
RBC # BLD: 2.96 M/UL — LOW (ref 3.8–5.2)
RBC # FLD: 13.3 % — SIGNIFICANT CHANGE UP (ref 10.3–14.5)
SODIUM SERPL-SCNC: 134 MMOL/L — LOW (ref 135–145)
WBC # BLD: 10.02 K/UL — SIGNIFICANT CHANGE UP (ref 3.8–10.5)
WBC # FLD AUTO: 10.02 K/UL — SIGNIFICANT CHANGE UP (ref 3.8–10.5)

## 2024-03-06 PROCEDURE — 99222 1ST HOSP IP/OBS MODERATE 55: CPT

## 2024-03-06 PROCEDURE — 99232 SBSQ HOSP IP/OBS MODERATE 35: CPT

## 2024-03-06 PROCEDURE — 99233 SBSQ HOSP IP/OBS HIGH 50: CPT

## 2024-03-06 RX ORDER — OXYCODONE HYDROCHLORIDE 5 MG/1
5 TABLET ORAL EVERY 4 HOURS
Refills: 0 | Status: DISCONTINUED | OUTPATIENT
Start: 2024-03-06 | End: 2024-03-12

## 2024-03-06 RX ORDER — OXYCODONE HYDROCHLORIDE 5 MG/1
5 TABLET ORAL EVERY 4 HOURS
Refills: 0 | Status: DISCONTINUED | OUTPATIENT
Start: 2024-03-06 | End: 2024-03-06

## 2024-03-06 RX ORDER — ACETAMINOPHEN 500 MG
975 TABLET ORAL EVERY 6 HOURS
Refills: 0 | Status: DISCONTINUED | OUTPATIENT
Start: 2024-03-06 | End: 2024-03-12

## 2024-03-06 RX ADMIN — OXYCODONE HYDROCHLORIDE 2.5 MILLIGRAM(S): 5 TABLET ORAL at 02:49

## 2024-03-06 RX ADMIN — OXYCODONE HYDROCHLORIDE 2.5 MILLIGRAM(S): 5 TABLET ORAL at 11:18

## 2024-03-06 RX ADMIN — Medication 0.25 MILLIGRAM(S): at 01:30

## 2024-03-06 RX ADMIN — HEPARIN SODIUM 5000 UNIT(S): 5000 INJECTION INTRAVENOUS; SUBCUTANEOUS at 06:29

## 2024-03-06 RX ADMIN — AMLODIPINE BESYLATE 2.5 MILLIGRAM(S): 2.5 TABLET ORAL at 17:54

## 2024-03-06 RX ADMIN — EPLERENONE 25 MILLIGRAM(S): 50 TABLET, FILM COATED ORAL at 06:28

## 2024-03-06 RX ADMIN — Medication 975 MILLIGRAM(S): at 12:51

## 2024-03-06 RX ADMIN — OXYCODONE HYDROCHLORIDE 2.5 MILLIGRAM(S): 5 TABLET ORAL at 21:52

## 2024-03-06 RX ADMIN — LOSARTAN POTASSIUM 50 MILLIGRAM(S): 100 TABLET, FILM COATED ORAL at 17:54

## 2024-03-06 RX ADMIN — HEPARIN SODIUM 5000 UNIT(S): 5000 INJECTION INTRAVENOUS; SUBCUTANEOUS at 17:54

## 2024-03-06 RX ADMIN — Medication 140 MILLIGRAM(S): at 06:28

## 2024-03-06 RX ADMIN — OXYCODONE HYDROCHLORIDE 2.5 MILLIGRAM(S): 5 TABLET ORAL at 03:19

## 2024-03-06 RX ADMIN — CHLORHEXIDINE GLUCONATE 1 APPLICATION(S): 213 SOLUTION TOPICAL at 09:28

## 2024-03-06 RX ADMIN — Medication 350 MILLIGRAM(S): at 06:58

## 2024-03-06 RX ADMIN — BISOPROLOL FUMARATE AND HYDROCHLOROTHIAZIDE 1 TABLET(S): 5; 6.25 TABLET ORAL at 09:24

## 2024-03-06 RX ADMIN — LOSARTAN POTASSIUM 50 MILLIGRAM(S): 100 TABLET, FILM COATED ORAL at 06:29

## 2024-03-06 RX ADMIN — Medication 975 MILLIGRAM(S): at 17:54

## 2024-03-06 RX ADMIN — ESCITALOPRAM OXALATE 5 MILLIGRAM(S): 10 TABLET, FILM COATED ORAL at 09:25

## 2024-03-06 RX ADMIN — OXYCODONE HYDROCHLORIDE 2.5 MILLIGRAM(S): 5 TABLET ORAL at 22:22

## 2024-03-06 RX ADMIN — OXYCODONE HYDROCHLORIDE 2.5 MILLIGRAM(S): 5 TABLET ORAL at 12:01

## 2024-03-06 RX ADMIN — Medication 975 MILLIGRAM(S): at 11:05

## 2024-03-06 NOTE — DISCHARGE NOTE PROVIDER - NSDCMRMEDTOKEN_GEN_ALL_CORE_FT
amLODIPine 2.5 mg oral tablet: 1 tab(s) orally once a day  aspirin 81 mg oral delayed release tablet: 1 tab(s) orally once a day  bisoprolol-hydrochlorothiazide 5 mg-6.25 mg oral tablet: 1 tab(s) orally once a day  eplerenone 25 mg oral tablet: 1 tab(s) orally once a day  Lexapro 5 mg oral tablet: 1 tab(s) orally once a day  losartan 50 mg oral tablet: 1 tab(s) orally 2 times a day   acetaminophen 325 mg oral tablet: 3 tab(s) orally every 6 hours  ALPRAZolam 0.25 mg oral tablet: 1 tab(s) orally once a day (at bedtime) As needed Sleep  amLODIPine 2.5 mg oral tablet: 1 tab(s) orally once a day  aspirin 81 mg oral delayed release tablet: 1 tab(s) orally once a day  bisoprolol-hydrochlorothiazide 5 mg-6.25 mg oral tablet: 1 tab(s) orally once a day  cyclobenzaprine 5 mg oral tablet: 1 tab(s) orally 3 times a day As needed Muscle Spasm  eplerenone 25 mg oral tablet: 1 tab(s) orally once a day  Lexapro 5 mg oral tablet: 1 tab(s) orally once a day  losartan 50 mg oral tablet: 1 tab(s) orally 2 times a day  oxyCODONE 5 mg oral tablet: 1 tab(s) orally every 4 hours As needed Severe Pain (7 - 10)  polyethylene glycol 3350 oral powder for reconstitution: 17 gram(s) orally once a day

## 2024-03-06 NOTE — DISCHARGE NOTE PROVIDER - NSDCCPCAREPLAN_GEN_ALL_CORE_FT
PRINCIPAL DISCHARGE DIAGNOSIS  Diagnosis: Fracture of multiple ribs of right side  Assessment and Plan of Treatment: You may take tylenol and motrin around the clock.  Please stagger these medications.  You may take narcotic pain medication for breakthrough (severe) pain not relieved with medications.  -You can call the acute care surgery office with any questions related to your hospital stay.   -You can take Tylenol as needed for mild to moderate pain Please be sure not exceed 4000mg of acetaminophen(Tylenol) in a 24 hour period.   -You can purchase over-the-counter Lidocaine patches such as SalonPas for topical pain relief. You can apply these to your chest wall once daily.  -If you experience fever > 101, pain not controlled with oxycodone, persistent nausea/vomiting please call your surgeon or return to emergency room immediately.   Please follow up with Dr. Tucker or one of his partners: Dr. Prince, Dr. Tinajero, Dr. Garnica, Dr. Ramos, Dr. Marquez, Dr. Lozano,  Dr. Pena, Dr. Wilder, Dr. Davis or Dr. Jeffery.  Please call [phone] to schedule an appointment in 1-2 weeks    -Use your incentive spirometer every hour for deep breathing exercises.

## 2024-03-06 NOTE — CONSULT NOTE ADULT - CONSULT REASON
Rehabilitation consult
R proximal and distal humerus fractures, R distal femur fracture, L scapular fracture
medical co-management
trauma with multiple rib fractures

## 2024-03-06 NOTE — DISCHARGE NOTE PROVIDER - HOSPITAL COURSE
Ms. Atkins is a 53 year old woman with a PMH osteogenesis imperfecta s/p upper & lower extremity hardware, aortic insufficiency, and HTN who presents after fall from her wheelchair. She reports missing a step on the wheelchair, losing control, and falling face first from the wheelchair at a height of around 2-3ft fact first onto a concrete ground. She lost consciousness and regained her awareness of her surroundings some time later, likely seconds to minutes, on a stable with little recollection of the intervening time. She reported L shoulder, R arm, R wrist, and R thigh pain as well but otherwise denied any additional symptoms such as fever, chills, CP, shortness of breath, abdominal pain, nausea or vomiting. She had movement and sensation intact in all four extremities. Secondary survey detailed below was significant for R temporal superficial abrasion, R upper lip laceration through the vermillion border, small ecchymosis on the R chin. It was also notable for tenderness to palpation in the L shoulder, R proximal arm, R wrist, and R thigh with medial rotation of the RLE.  Injuries include - acute, comminuted fracture adjacent to the tip of the right femoral anabell which begins in the distal femoral metadiaphysis and continues into the lateral femoral condyle  - right knee hemarthrosis  - Age indeterminate R sacral ala fracture  - Age-indeterminate left scapular body fracture.  - Age-indeterminate fracture of the right humeral head, status post pin placement  -Prior deformity of the right third rib with likely a superimposed   acute fracture.  - Likely acute fractures of right 4th-6th ribs.  Pt was admitted to the acute care service and was monitored in SICU.  Orthopedics was consulted for multiple fractures, recommended NWB of RUE in posterior slab/sling; follow up post splint Xrays, NWB of RLE, may place in cast vs shortened knee immobilizer, LUE can be WBAT given non-displaced scapula fracture and polytrauma, No acute orthopedic surgical intervention planned at this time.  Tertiary exam showed no additional injuries  Pt was evaluated by PT who recommended [facility]. Ms. Atkins is a 53 year old woman with a PMH osteogenesis imperfecta s/p upper & lower extremity hardware, aortic insufficiency, and HTN who presents after fall from her wheelchair. She reports missing a step on the wheelchair, losing control, and falling face first from the wheelchair at a height of around 2-3ft fact first onto a concrete ground. She lost consciousness and regained her awareness of her surroundings some time later, likely seconds to minutes, on a stable with little recollection of the intervening time. She reported L shoulder, R arm, R wrist, and R thigh pain as well but otherwise denied any additional symptoms such as fever, chills, CP, shortness of breath, abdominal pain, nausea or vomiting. She had movement and sensation intact in all four extremities. Secondary survey detailed below was significant for R temporal superficial abrasion, R upper lip laceration through the vermillion border, small ecchymosis on the R chin. It was also notable for tenderness to palpation in the L shoulder, R proximal arm, R wrist, and R thigh with medial rotation of the RLE.  Injuries include - acute, comminuted fracture adjacent to the tip of the right femoral anabell which begins in the distal femoral metadiaphysis and continues into the lateral femoral condyle  - right knee hemarthrosis  - Age indeterminate R sacral ala fracture  - Age-indeterminate left scapular body fracture.  - Age-indeterminate fracture of the right humeral head, status post pin placement  -Prior deformity of the right third rib with likely a superimposed   acute fracture.  - Likely acute fractures of right 4th-6th ribs.  Pt was admitted to the acute care service and was monitored in SICU.  Orthopedics was consulted for multiple fractures, recommended NWB of RUE in posterior slab/sling; follow up post splint Xrays, NWB of RLE, may place in cast vs shortened knee immobilizer, LUE can be WBAT given non-displaced scapula fracture and polytrauma, No acute orthopedic surgical intervention planned at this time.  Tertiary exam showed no additional injuries  Pt was evaluated by PT who recommended [facility].    PLEASE REVIEW AND UPDATE THIS SUMMARY WHEN THE PT IS DISCHARGED Ms. Atkins is a 53 year old woman with a PMH osteogenesis imperfecta s/p upper & lower extremity hardware, aortic insufficiency, and HTN who presents after fall from her wheelchair. She reports missing a step on the wheelchair, losing control, and falling face first from the wheelchair at a height of around 2-3ft fact first onto a concrete ground. She lost consciousness and regained her awareness of her surroundings some time later, likely seconds to minutes, on a stable with little recollection of the intervening time. She reported L shoulder, R arm, R wrist, and R thigh pain as well but otherwise denied any additional symptoms such as fever, chills, CP, shortness of breath, abdominal pain, nausea or vomiting. She had movement and sensation intact in all four extremities. Secondary survey detailed below was significant for R temporal superficial abrasion, R upper lip laceration through the vermillion border, small ecchymosis on the R chin. It was also notable for tenderness to palpation in the L shoulder, R proximal arm, R wrist, and R thigh with medial rotation of the RLE.  Injuries include - acute, comminuted fracture adjacent to the tip of the right femoral anabell which begins in the distal femoral metadiaphysis and continues into the lateral femoral condyle  - right knee hemarthrosis  - Age indeterminate R sacral ala fracture  - Age-indeterminate left scapular body fracture.  - Age-indeterminate fracture of the right humeral head, status post pin placement  -Prior deformity of the right third rib with likely a superimposed   acute fracture.  - Likely acute fractures of right 4th-6th ribs.  Pt was admitted to the acute care service and was monitored in SICU.  Orthopedics was consulted for multiple fractures, recommended NWB of RUE in posterior slab/sling; follow up post splint Xrays, NWB of RLE, may place in cast vs shortened knee immobilizer, LUE can be WBAT given non-displaced scapula fracture and polytrauma, No acute orthopedic surgical intervention planned at this time.  Tertiary exam showed no additional injuries  Pt was evaluated by PT who recommended acute rehab.  He was evaluated by PM&R.    PLEASE REVIEW AND UPDATE THIS SUMMARY WHEN THE PT IS DISCHARGED Ms. Atkins is a 53 year old woman with a PMH osteogenesis imperfecta s/p upper & lower extremity hardware, aortic insufficiency, and HTN who presents after fall from her wheelchair. She reports missing a step on the wheelchair, losing control, and falling face first from the wheelchair at a height of around 2-3ft fact first onto a concrete ground. She lost consciousness and regained her awareness of her surroundings some time later, likely seconds to minutes, on a stable with little recollection of the intervening time. She reported L shoulder, R arm, R wrist, and R thigh pain as well but otherwise denied any additional symptoms such as fever, chills, CP, shortness of breath, abdominal pain, nausea or vomiting. She had movement and sensation intact in all four extremities. Secondary survey detailed below was significant for R temporal superficial abrasion, R upper lip laceration through the vermillion border, small ecchymosis on the R chin. It was also notable for tenderness to palpation in the L shoulder, R proximal arm, R wrist, and R thigh with medial rotation of the RLE.  Injuries include - acute, comminuted fracture adjacent to the tip of the right femoral anabell which begins in the distal femoral metadiaphysis and continues into the lateral femoral condyle  - right knee hemarthrosis  - Age indeterminate R sacral ala fracture  - Age-indeterminate left scapular body fracture.  - Age-indeterminate fracture of the right humeral head, status post pin placement  -Prior deformity of the right third rib with likely a superimposed   acute fracture.  - Likely acute fractures of right 4th-6th ribs.  Pt was admitted to the acute care service and was monitored in SICU.  Orthopedics was consulted for multiple fractures, recommended NWB of RUE in posterior slab/sling; follow up post splint Xrays, NWB of RLE, may place in cast vs shortened knee immobilizer, LUE can be WBAT given non-displaced scapula fracture and polytrauma, No acute orthopedic surgical intervention planned at this time.  Tertiary exam showed no additional injuries  Pt was evaluated by PT who recommended acute rehab.  He was evaluated by PM&R.

## 2024-03-06 NOTE — PROGRESS NOTE ADULT - ATTENDING COMMENTS
seen and examined 03-06-24 @ 1159    ecchymosis of right lip / chin  milt tenderness over the xiphoid process  RUE in posterior slab splint  RLE in splint    WBC = 10  hgb - stable @ 7-8 g/dL since 3/3      right 4-6 anterolateral nondisplaced rib fractures  -unclear if these fractures are acute given lack of tenderness    possible occult xiphoid fracture  -multimodal pain control    right glenohumeral dislocation  right proximal humerus fracture  supracondylar right humerus fracture  possible right olecranon fracture  -NWB RUE in posterior slab splint    right distal femur periprosthetic fracture  -NMB RLE in splint    left scapula body fracture  -WBAT WILNER      I reviewed her labs and radiologic images.  plan discussed with her  and mother at bedside seen and examined 03-06-24 @ 1159    ecchymosis of right lip / chin  milt tenderness over the xiphoid process  RUE in posterior slab splint  RLE in splint    WBC = 10  hgb - stable @ 7-8 g/dL since 3/3      right 4-6 anterolateral nondisplaced rib fractures  -unclear if these fractures are acute given lack of tenderness    possible occult xiphoid fracture  -multimodal pain control    right glenohumeral dislocation  right proximal humerus fracture  supracondylar right humerus fracture  possible right olecranon fracture  -NWB RUE in posterior slab splint    right distal femur periprosthetic fracture  -NMB RLE in splint    left scapula body fracture  -WBAT LUE    dispo  -transfer to [facility] when available      I reviewed her labs and radiologic images.  plan discussed with her  and mother at bedside seen and examined 03-06-24 @ 1159    ecchymosis of right lip / chin  milt tenderness over the xiphoid process  RUE in posterior slab splint  RLE in splint    WBC = 10  hgb - stable @ 7-8 g/dL since 3/3      right 4-6 anterolateral nondisplaced rib fractures  -unclear if these fractures are acute given lack of tenderness    possible occult xiphoid fracture  -multimodal pain control    right glenohumeral dislocation  right proximal humerus fracture  supracondylar right humerus fracture  possible right olecranon fracture  -NWB RUE in posterior slab splint    right distal femur periprosthetic fracture  -NMB RLE in splint    left scapula body fracture  -WBAT LUE    dispo  -transfer to acute rehab when available      I reviewed her labs and radiologic images.  plan discussed with her  and mother at bedside seen and examined 03-06-24 @ 1159    ecchymosis of right lip / chin  mild tenderness over the xiphoid process  RUE in posterior slab splint  RLE in splint    WBC = 10  hgb - stable @ 7-8 g/dL since 3/3      right 4-6 anterolateral nondisplaced rib fractures  -unclear if these fractures are acute given lack of tenderness    possible occult xiphoid fracture  -multimodal pain control    right glenohumeral dislocation  right proximal humerus fracture  supracondylar right humerus fracture  possible right olecranon fracture  -NWB RUE in posterior slab splint    right distal femur periprosthetic fracture  -NMB RLE in splint    left scapula body fracture  -WBAT LUE    dispo  -transfer to acute rehab when available      I reviewed her labs and radiologic images.  plan discussed with her  and mother at bedside

## 2024-03-06 NOTE — DISCHARGE NOTE PROVIDER - CARE PROVIDER_API CALL
Domenic Newsome  Orthopaedic Surgery  [address]  [place] [address]  Phone: [phone]  Fax: [phone]  Follow Up Time: 2 weeks

## 2024-03-06 NOTE — CONSULT NOTE ADULT - SUBJECTIVE AND OBJECTIVE BOX
HPI:  Ms. Atkins is a 53 year old woman with a PMH osteogenesis imperfecta s/p upper & lower extremity hardware, aortic insufficiency, and HTN who presents after fall from her wheelchair. She reports missing a step on the wheelchair, losing control, and falling face first from the wheelchair at a height of around 2-3ft fact first onto a concrete ground. She lost consciousness and regained her awareness of her surroundings some time later, likely seconds to minutes, on a stable with little recollection of the intervening time. She reported L shoulder, R arm, R wrist, and R thigh pain as well but otherwise denied any additional symptoms such as fever, chills, CP, shortness of breath, abdominal pain, nausea or vomiting. She had movement and sensation intact in all four extremities. Secondary survey detailed below was significant for R temporal superficial abrasion, R upper lip laceration through the vermillion border, small ecchymosis on the R chin. It was also notable for tenderness to palpation in the L shoulder, R proximal arm, R wrist, and R thigh with medial rotation of the RLE.     Patient was admitted on 3/2, found to have acute fracture of right femoral anabell, right knee hemarthrosis, right sacral ala fracture, right rib fractures, right humeral head fracture, seen today. Pain controlled. No recent falls. She transfers in/out of her wheelchair independently, able to stand, was able to pull her chair into her car prior to admission. Works as disability consultant. Feels casts are so heavy, she is unable to move.     REVIEW OF SYSTEMS  Denies chest pain, SOB, N/V, F/C, abdominal pain     VITALS  T(C): 37.6 (03-06-24 @ 13:02), Max: 37.6 (03-06-24 @ 13:02)  HR: 83 (03-06-24 @ 13:02) (65 - 103)  BP: 120/63 (03-06-24 @ 13:02) (120/63 - 156/95)  RR: 18 (03-06-24 @ 13:02) (18 - 23)  SpO2: 91% (03-06-24 @ 13:02) (91% - 100%)  Wt(kg): --    PAST MEDICAL & SURGICAL HISTORY  Osteogenesis imperfecta    Aortic insufficiency    HTN (hypertension)    Presence of internal fixation anabell in upper extremity        FUNCTIONAL HISTORY  Lives with , ramp to enter  Independent ADLs, uses wheelchair     CURRENT FUNCTIONAL STATUS  PT  3/6  therapeutic exercises    PT 3/5  transfers supine to sit max assist x 2    OT 3/5  bed mobility max assist x 1-2  dressing max assist     RECENT LABS/IMAGING  CBC Full  -  ( 06 Mar 2024 07:25 )  WBC Count : 10.02 K/uL  RBC Count : 2.96 M/uL  Hemoglobin : 8.8 g/dL  Hematocrit : 26.4 %  Platelet Count - Automated : 265 K/uL  Mean Cell Volume : 89.2 fl  Mean Cell Hemoglobin : 29.7 pg  Mean Cell Hemoglobin Concentration : 33.3 gm/dL  Auto Neutrophil # : x  Auto Lymphocyte # : x  Auto Monocyte # : x  Auto Eosinophil # : x  Auto Basophil # : x  Auto Neutrophil % : x  Auto Lymphocyte % : x  Auto Monocyte % : x  Auto Eosinophil % : x  Auto Basophil % : x    03-06    134<L>  |  95<L>  |  17  ----------------------------<  87  4.0   |  27  |  <0.30<L>    Ca    9.6      06 Mar 2024 07:27  Phos  3.4     03-06  Mg     1.8     03-06      Urinalysis Basic - ( 06 Mar 2024 07:27 )    Color: x / Appearance: x / SG: x / pH: x  Gluc: 87 mg/dL / Ketone: x  / Bili: x / Urobili: x   Blood: x / Protein: x / Nitrite: x   Leuk Esterase: x / RBC: x / WBC x   Sq Epi: x / Non Sq Epi: x / Bacteria: x    < from: CT Abdomen and Pelvis w/ IV Cont (03.02.24 @ 17:23) >    IMPRESSION:  Likely acute fractures of right 4th-6th ribs.  Age-indeterminate left scapular fracture.  Age-indeterminate fracture of the right humeral head, status post pin   placement.  Fracture of the distal right femur. Status post bilateral femoral anabell   placement.  Please see dedicated reports of the cervical spine, thoracic spine,   lumbar spine, and bilateral femur/pelvis for complete details.  No obvious acute abnormality within the chest, abdomen and pelvis per    < end of copied text >      ALLERGIES  No Known Allergies      MEDICATIONS   acetaminophen     Tablet .. 975 milliGRAM(s) Oral every 6 hours  ALPRAZolam 0.25 milliGRAM(s) Oral at bedtime PRN  amLODIPine   Tablet 2.5 milliGRAM(s) Oral at bedtime  bisoprolol  5 mG/hydrochlorothiazide 6.25 mG 1 Tablet(s) Oral <User Schedule>  chlorhexidine 2% Cloths 1 Application(s) Topical <User Schedule>  eplerenone 25 milliGRAM(s) Oral daily  escitalopram 5 milliGRAM(s) Oral daily  heparin   Injectable 5000 Unit(s) SubCutaneous every 12 hours  lidocaine   4% Patch 1 Patch Transdermal every 24 hours  losartan 50 milliGRAM(s) Oral two times a day  oxyCODONE    IR 2.5 milliGRAM(s) Oral every 3 hours PRN  oxyCODONE    IR 5 milliGRAM(s) Oral every 4 hours PRN  polyethylene glycol 3350 17 Gram(s) Oral daily  senna 2 Tablet(s) Oral at bedtime      ----------------------------------------------------------------------------------------  PHYSICAL EXAM  Constitutional - NAD, Comfortable, in bed   Chest - Breathing comfortably  Cardiovascular - S1S2   Abdomen - Soft   Extremities - decreased left shoulder ROM, chronic    RLE/RUE splint  Neurologic Exam -    follows commands                 Cognitive - Awake, Alert, AAO to self, place, date, year, situation     Communication - Fluent, No dysarthria        Motor -                     LEFT     EF 5/5, EE 5/5, WE 5/5,  5/5                    RIGHT UE -  5/5                    LEFT    LE - HF 5/5, KE 5/5, DF 5/5, PF 5/5                    RIGHT LE - unable to lift      Sensory - Intact to LT     Psychiatric - Mood stable, Affect WNL  ----------------------------------------------------------------------------------------  ASSESSMENT/PLAN  53yFemale h/o OI, HTN, aortic insufficiency with functional deficits after fall from wheelchair  no acute surgical intervention recommended  Rt distal femur fracture, NWB  Rt distal humerus fracture, NWB  L scapula fracture, WBAT  right rib fractures   anemia, s/p transfusion, continue to monitor   Pain - Tylenol oxycodone, lidocaine  DVT PPX - SCDs heparin   Rehab - Will continue to follow for ongoing rehab needs and recommendations.     patient is NWB on right side, limited in mobility, participation with therapy due to heavy splints, she can't lift her right arm/leg, has chronic left shoulder loss of ROM, if lighter material is available would help her rehabilitation    recommend continued inpatient rehabilitation, she will not be independent until weight bearing is restored, and will need assist from family/ on discharge if she goes to acute rehab, also discussed [facility] to allow longer inpatient stay     Recommend ACUTE inpatient rehabilitation for the functional deficits consisting of 3 hours of therapy/day & x 4 weeks, 24 hour RN/daily PMR physician for comorbid medical management. Patient will be able to tolerate 3 hours a day.

## 2024-03-06 NOTE — PROGRESS NOTE ADULT - ASSESSMENT
53F with PMHx osteogenesis imperfecta s/p upper & lower extremity hardware, aortic insufficiency, and HTN who presents after fall from her wheelchair with LOC & multiple injuries noted below.     Injuries:  - acute, comminuted fracture adjacent to the tip of the right femoral anabell which begins in the distal femoral metadiaphysis and continues into the lateral femoral condyle  - right knee hemarthrosis  - Age indeterminate R sacral ala fracture  - Age-indeterminate left scapular body fracture.  - Age-indeterminate fracture of the right humeral head, status post pin placement  -Prior deformity of the right third rib with likely a superimposed   acute fracture.  - Likely acute fractures of right 4th-6th ribs.      Previously known findings/ Chronic Findings  - known large, heterogeneously enhancing fibroid measuring 6.8 x 6.5 cm  - known infrarenal aorta measures 0.8 cm in diameter  - Well-corticated, chronic right ulnar styloid fracture    Plan:  - Multimodal pain control, IS   - Orthopedics consult appreciated - RUE & RLE splinted - no operative intervention  - Appreciate hospitalist recommendations  - echo performed and reviewed   - PT evaluation:  recommending acute rehab  - pending PMNR evaluation  - dispo:  [facility] when arranged     Trauma/Acute Care Surgery   [id number]

## 2024-03-07 LAB
ANION GAP SERPL CALC-SCNC: 10 MMOL/L — SIGNIFICANT CHANGE UP (ref 5–17)
BUN SERPL-MCNC: 18 MG/DL — SIGNIFICANT CHANGE UP (ref 7–23)
CALCIUM SERPL-MCNC: 9.1 MG/DL — SIGNIFICANT CHANGE UP (ref 8.4–10.5)
CHLORIDE SERPL-SCNC: 96 MMOL/L — SIGNIFICANT CHANGE UP (ref 96–108)
CO2 SERPL-SCNC: 28 MMOL/L — SIGNIFICANT CHANGE UP (ref 22–31)
CREAT SERPL-MCNC: <0.3 MG/DL — LOW (ref 0.5–1.3)
EGFR: 125 ML/MIN/1.73M2 — SIGNIFICANT CHANGE UP
GLUCOSE SERPL-MCNC: 84 MG/DL — SIGNIFICANT CHANGE UP (ref 70–99)
HCT VFR BLD CALC: 25.8 % — LOW (ref 34.5–45)
HGB BLD-MCNC: 8.4 G/DL — LOW (ref 11.5–15.5)
MAGNESIUM SERPL-MCNC: 1.8 MG/DL — SIGNIFICANT CHANGE UP (ref 1.6–2.6)
MCHC RBC-ENTMCNC: 29.2 PG — SIGNIFICANT CHANGE UP (ref 27–34)
MCHC RBC-ENTMCNC: 32.6 GM/DL — SIGNIFICANT CHANGE UP (ref 32–36)
MCV RBC AUTO: 89.6 FL — SIGNIFICANT CHANGE UP (ref 80–100)
NRBC # BLD: 0 /100 WBCS — SIGNIFICANT CHANGE UP (ref 0–0)
PHOSPHATE SERPL-MCNC: 3.8 MG/DL — SIGNIFICANT CHANGE UP (ref 2.5–4.5)
PLATELET # BLD AUTO: 282 K/UL — SIGNIFICANT CHANGE UP (ref 150–400)
POTASSIUM SERPL-MCNC: 3.8 MMOL/L — SIGNIFICANT CHANGE UP (ref 3.5–5.3)
POTASSIUM SERPL-SCNC: 3.8 MMOL/L — SIGNIFICANT CHANGE UP (ref 3.5–5.3)
RBC # BLD: 2.88 M/UL — LOW (ref 3.8–5.2)
RBC # FLD: 13.4 % — SIGNIFICANT CHANGE UP (ref 10.3–14.5)
SODIUM SERPL-SCNC: 134 MMOL/L — LOW (ref 135–145)
WBC # BLD: 9.33 K/UL — SIGNIFICANT CHANGE UP (ref 3.8–10.5)
WBC # FLD AUTO: 9.33 K/UL — SIGNIFICANT CHANGE UP (ref 3.8–10.5)

## 2024-03-07 PROCEDURE — 73551 X-RAY EXAM OF FEMUR 1: CPT | Mod: 26,RT

## 2024-03-07 PROCEDURE — 73590 X-RAY EXAM OF LOWER LEG: CPT | Mod: 26,RT

## 2024-03-07 PROCEDURE — 73090 X-RAY EXAM OF FOREARM: CPT | Mod: 26,LT

## 2024-03-07 PROCEDURE — 99232 SBSQ HOSP IP/OBS MODERATE 35: CPT

## 2024-03-07 PROCEDURE — 73060 X-RAY EXAM OF HUMERUS: CPT | Mod: 26,RT,76

## 2024-03-07 RX ADMIN — Medication 975 MILLIGRAM(S): at 05:34

## 2024-03-07 RX ADMIN — EPLERENONE 25 MILLIGRAM(S): 50 TABLET, FILM COATED ORAL at 18:06

## 2024-03-07 RX ADMIN — Medication 975 MILLIGRAM(S): at 13:00

## 2024-03-07 RX ADMIN — OXYCODONE HYDROCHLORIDE 5 MILLIGRAM(S): 5 TABLET ORAL at 21:55

## 2024-03-07 RX ADMIN — Medication 0.25 MILLIGRAM(S): at 01:04

## 2024-03-07 RX ADMIN — OXYCODONE HYDROCHLORIDE 5 MILLIGRAM(S): 5 TABLET ORAL at 01:33

## 2024-03-07 RX ADMIN — CHLORHEXIDINE GLUCONATE 1 APPLICATION(S): 213 SOLUTION TOPICAL at 18:10

## 2024-03-07 RX ADMIN — Medication 975 MILLIGRAM(S): at 00:54

## 2024-03-07 RX ADMIN — OXYCODONE HYDROCHLORIDE 5 MILLIGRAM(S): 5 TABLET ORAL at 01:03

## 2024-03-07 RX ADMIN — OXYCODONE HYDROCHLORIDE 5 MILLIGRAM(S): 5 TABLET ORAL at 22:25

## 2024-03-07 RX ADMIN — LOSARTAN POTASSIUM 50 MILLIGRAM(S): 100 TABLET, FILM COATED ORAL at 18:05

## 2024-03-07 RX ADMIN — Medication 975 MILLIGRAM(S): at 12:48

## 2024-03-07 RX ADMIN — HEPARIN SODIUM 5000 UNIT(S): 5000 INJECTION INTRAVENOUS; SUBCUTANEOUS at 18:08

## 2024-03-07 RX ADMIN — AMLODIPINE BESYLATE 2.5 MILLIGRAM(S): 2.5 TABLET ORAL at 18:06

## 2024-03-07 RX ADMIN — Medication 975 MILLIGRAM(S): at 06:04

## 2024-03-07 RX ADMIN — BISOPROLOL FUMARATE AND HYDROCHLOROTHIAZIDE 1 TABLET(S): 5; 6.25 TABLET ORAL at 10:00

## 2024-03-07 RX ADMIN — Medication 975 MILLIGRAM(S): at 18:00

## 2024-03-07 RX ADMIN — ESCITALOPRAM OXALATE 5 MILLIGRAM(S): 10 TABLET, FILM COATED ORAL at 12:48

## 2024-03-07 RX ADMIN — SENNA PLUS 2 TABLET(S): 8.6 TABLET ORAL at 22:25

## 2024-03-07 RX ADMIN — Medication 975 MILLIGRAM(S): at 00:24

## 2024-03-07 RX ADMIN — LOSARTAN POTASSIUM 50 MILLIGRAM(S): 100 TABLET, FILM COATED ORAL at 05:33

## 2024-03-07 RX ADMIN — OXYCODONE HYDROCHLORIDE 2.5 MILLIGRAM(S): 5 TABLET ORAL at 18:56

## 2024-03-07 RX ADMIN — HEPARIN SODIUM 5000 UNIT(S): 5000 INJECTION INTRAVENOUS; SUBCUTANEOUS at 05:33

## 2024-03-07 RX ADMIN — Medication 975 MILLIGRAM(S): at 18:06

## 2024-03-07 NOTE — PROGRESS NOTE ADULT - ATTENDING COMMENTS
seen and examined 03-07-24 @ 0720    ecchymosis of right lip / chin  no more tenderness over the xiphoid process  no right-sided chest wall tenderness  RUE in posterior slab splint  RLE in splint    WBC = 9  hgb - stable @ 7-8 g/dL since 3/3      right 4-6 anterolateral nondisplaced rib fractures  -unclear if these fractures are acute given lack of tenderness    possible occult xiphoid fracture  -multimodal pain control    right glenohumeral dislocation  right proximal humerus fracture  supracondylar right humerus fracture  possible right olecranon fracture  -NWB RUE in posterior slab splint    right distal femur periprosthetic fracture  -NMB RLE in splint    left scapula body fracture  -WBAT LUE    dispo  -transfer to acute rehab when available      I reviewed her labs and radiologic images.  plan discussed with her  at bedside

## 2024-03-07 NOTE — PROGRESS NOTE ADULT - ASSESSMENT
53F with PMHx osteogenesis imperfecta s/p upper & lower extremity hardware, aortic insufficiency, and HTN who presents after fall from her wheelchair with LOC & multiple injuries noted below.     Injuries:  - acute, comminuted fracture adjacent to the tip of the right femoral anabell which begins in the distal femoral metadiaphysis and continues into the lateral femoral condyle  - right knee hemarthrosis  - Age indeterminate R sacral ala fracture  - Age-indeterminate left scapular body fracture.  - Age-indeterminate fracture of the right humeral head, status post pin placement  -Prior deformity of the right third rib with likely a superimposed   acute fracture.  - Likely acute fractures of right 4th-6th ribs.    Previously known findings/ Chronic Findings  - known large, heterogeneously enhancing fibroid measuring 6.8 x 6.5 cm  - known infrarenal aorta measures 0.8 cm in diameter  - Well-corticated, chronic right ulnar styloid fracture    Plan:  - Multimodal pain control, IS   - Orthopedics consult appreciated - RUE & RLE splinted - no operative intervention, awaiting cast change  - Appreciate hospitalist recommendations  - echo performed and reviewed   - PT evaluation:  recommending acute rehab  - pending PMNR evaluation  - dispo:  [facility] when arranged     Trauma/Acute Care Surgery   [id number]

## 2024-03-08 ENCOUNTER — TRANSCRIPTION ENCOUNTER (OUTPATIENT)
Age: 54
End: 2024-03-08

## 2024-03-08 PROCEDURE — 99232 SBSQ HOSP IP/OBS MODERATE 35: CPT

## 2024-03-08 PROCEDURE — 73551 X-RAY EXAM OF FEMUR 1: CPT | Mod: 26,RT

## 2024-03-08 RX ORDER — METHOCARBAMOL 500 MG/1
500 TABLET, FILM COATED ORAL EVERY 8 HOURS
Refills: 0 | Status: DISCONTINUED | OUTPATIENT
Start: 2024-03-08 | End: 2024-03-09

## 2024-03-08 RX ORDER — METHOCARBAMOL 500 MG/1
500 TABLET, FILM COATED ORAL ONCE
Refills: 0 | Status: COMPLETED | OUTPATIENT
Start: 2024-03-08 | End: 2024-03-08

## 2024-03-08 RX ADMIN — LOSARTAN POTASSIUM 50 MILLIGRAM(S): 100 TABLET, FILM COATED ORAL at 17:42

## 2024-03-08 RX ADMIN — Medication 975 MILLIGRAM(S): at 06:22

## 2024-03-08 RX ADMIN — ESCITALOPRAM OXALATE 5 MILLIGRAM(S): 10 TABLET, FILM COATED ORAL at 12:27

## 2024-03-08 RX ADMIN — HEPARIN SODIUM 5000 UNIT(S): 5000 INJECTION INTRAVENOUS; SUBCUTANEOUS at 05:51

## 2024-03-08 RX ADMIN — METHOCARBAMOL 500 MILLIGRAM(S): 500 TABLET, FILM COATED ORAL at 21:31

## 2024-03-08 RX ADMIN — Medication 975 MILLIGRAM(S): at 05:52

## 2024-03-08 RX ADMIN — OXYCODONE HYDROCHLORIDE 2.5 MILLIGRAM(S): 5 TABLET ORAL at 17:54

## 2024-03-08 RX ADMIN — Medication 975 MILLIGRAM(S): at 17:42

## 2024-03-08 RX ADMIN — OXYCODONE HYDROCHLORIDE 5 MILLIGRAM(S): 5 TABLET ORAL at 01:55

## 2024-03-08 RX ADMIN — Medication 975 MILLIGRAM(S): at 18:09

## 2024-03-08 RX ADMIN — OXYCODONE HYDROCHLORIDE 5 MILLIGRAM(S): 5 TABLET ORAL at 02:25

## 2024-03-08 RX ADMIN — OXYCODONE HYDROCHLORIDE 2.5 MILLIGRAM(S): 5 TABLET ORAL at 10:09

## 2024-03-08 RX ADMIN — Medication 975 MILLIGRAM(S): at 12:57

## 2024-03-08 RX ADMIN — OXYCODONE HYDROCHLORIDE 2.5 MILLIGRAM(S): 5 TABLET ORAL at 18:24

## 2024-03-08 RX ADMIN — POLYETHYLENE GLYCOL 3350 17 GRAM(S): 17 POWDER, FOR SOLUTION ORAL at 12:27

## 2024-03-08 RX ADMIN — METHOCARBAMOL 500 MILLIGRAM(S): 500 TABLET, FILM COATED ORAL at 02:25

## 2024-03-08 RX ADMIN — BISOPROLOL FUMARATE AND HYDROCHLOROTHIAZIDE 1 TABLET(S): 5; 6.25 TABLET ORAL at 08:27

## 2024-03-08 RX ADMIN — CHLORHEXIDINE GLUCONATE 1 APPLICATION(S): 213 SOLUTION TOPICAL at 17:54

## 2024-03-08 RX ADMIN — HEPARIN SODIUM 5000 UNIT(S): 5000 INJECTION INTRAVENOUS; SUBCUTANEOUS at 17:43

## 2024-03-08 RX ADMIN — LOSARTAN POTASSIUM 50 MILLIGRAM(S): 100 TABLET, FILM COATED ORAL at 05:51

## 2024-03-08 RX ADMIN — EPLERENONE 25 MILLIGRAM(S): 50 TABLET, FILM COATED ORAL at 17:42

## 2024-03-08 RX ADMIN — AMLODIPINE BESYLATE 2.5 MILLIGRAM(S): 2.5 TABLET ORAL at 17:43

## 2024-03-08 RX ADMIN — Medication 975 MILLIGRAM(S): at 12:27

## 2024-03-08 RX ADMIN — Medication 0.25 MILLIGRAM(S): at 01:08

## 2024-03-08 RX ADMIN — OXYCODONE HYDROCHLORIDE 2.5 MILLIGRAM(S): 5 TABLET ORAL at 09:39

## 2024-03-08 RX ADMIN — Medication 975 MILLIGRAM(S): at 01:58

## 2024-03-08 RX ADMIN — Medication 975 MILLIGRAM(S): at 01:28

## 2024-03-08 RX ADMIN — METHOCARBAMOL 500 MILLIGRAM(S): 500 TABLET, FILM COATED ORAL at 12:26

## 2024-03-08 NOTE — PROGRESS NOTE ADULT - TIME BILLING
chart review of PT/OT notes, exam, counseling and education on inpatient rehabilitation, coordination of care with rehab team and

## 2024-03-08 NOTE — PROGRESS NOTE ADULT - ASSESSMENT
53F with PMHx osteogenesis imperfecta s/p upper & lower extremity hardware, aortic insufficiency, and HTN who presents after fall from her wheelchair with LOC & multiple injuries noted below.     Injuries:  - acute, comminuted fracture adjacent to the tip of the right femoral anabell which begins in the distal femoral metadiaphysis and continues into the lateral femoral condyle  - right knee hemarthrosis  - Age indeterminate R sacral ala fracture  - Age-indeterminate left scapular body fracture.  - Age-indeterminate fracture of the right humeral head, status post pin placement  -Prior deformity of the right third rib with likely a superimposed   acute fracture.  - Likely acute fractures of right 4th-6th ribs.      Previously known findings/ Chronic Findings  - known large, heterogeneously enhancing fibroid measuring 6.8 x 6.5 cm  - known infrarenal aorta measures 0.8 cm in diameter  - Well-corticated, chronic right ulnar styloid fracture    Plan:  - Multimodal pain control, IS   - Orthopedics consult appreciated - RUE & RLE splinted - no operative intervention  - Appreciate hospitalist recommendations  - echo performed and reviewed   - PT evaluation:  recommending acute rehab  - pending PMNR evaluation  - dispo:  [place] when arranged     Trauma/Acute Care Surgery   [id number]

## 2024-03-08 NOTE — DISCHARGE NOTE NURSING/CASE MANAGEMENT/SOCIAL WORK - NSDCPEFALRISK_GEN_ALL_CORE
For information on Fall & Injury Prevention, visit: [email or web address] OR  [email or web address] OR  https://www.cdc.gov/steadi/patient.html

## 2024-03-08 NOTE — DISCHARGE NOTE NURSING/CASE MANAGEMENT/SOCIAL WORK - PATIENT PORTAL LINK FT
You can access the FollowMyHealth Patient Portal offered by [facility] by registering at the following website: [email or web address]. By joining [employer]’s FollowMyHealth portal, you will also be able to view your health information using other applications (apps) compatible with our system.

## 2024-03-08 NOTE — PROGRESS NOTE ADULT - NSPROGADDITIONALINFOA_GEN_ALL_CORE
time spent reviewing prior charts, meds, discussing plan with patient= 45 minutes    d/w Trauma team
time spent reviewing prior charts, meds, discussing plan with patient= 47 minutes    d/w Trauma team
time spent reviewing prior charts, meds, discussing plan with patient= 47 minutes    d/w SICU team
time spent reviewing prior charts, meds, discussing plan with patient= 54 minutes    d/w  Trauma team
time spent reviewing prior charts, meds, discussing plan = 45 minutes    d/w Trauma team

## 2024-03-08 NOTE — PROGRESS NOTE ADULT - ATTENDING COMMENTS
seen and examined 03-08-24 @ 0905    ecchymosis of right lip / chin  no more tenderness over the xiphoid process  no right-sided chest wall tenderness  RUE in fiberglass cast  RLE in fiberglass cast    hgb - stable @ 7-8 g/dL since 3/3      right 4-6 anterolateral nondisplaced rib fractures  -unclear if these fractures are acute given lack of tenderness    possible occult xiphoid fracture  -multimodal pain control    right glenohumeral dislocation  right proximal humerus fracture  supracondylar right humerus fracture  possible right olecranon fracture  -NWB RUE in cast    right distal femur periprosthetic fracture  -NMB RLE in cast    left scapula body fracture  -WBAT LUE    dispo  -transfer to acute rehab when available

## 2024-03-09 PROCEDURE — 99233 SBSQ HOSP IP/OBS HIGH 50: CPT

## 2024-03-09 RX ORDER — CYCLOBENZAPRINE HYDROCHLORIDE 10 MG/1
5 TABLET, FILM COATED ORAL ONCE
Refills: 0 | Status: COMPLETED | OUTPATIENT
Start: 2024-03-09 | End: 2024-03-09

## 2024-03-09 RX ORDER — ALPRAZOLAM 0.25 MG
0.25 TABLET ORAL AT BEDTIME
Refills: 0 | Status: DISCONTINUED | OUTPATIENT
Start: 2024-03-09 | End: 2024-03-12

## 2024-03-09 RX ORDER — CYCLOBENZAPRINE HYDROCHLORIDE 10 MG/1
5 TABLET, FILM COATED ORAL THREE TIMES A DAY
Refills: 0 | Status: DISCONTINUED | OUTPATIENT
Start: 2024-03-09 | End: 2024-03-12

## 2024-03-09 RX ORDER — OXYCODONE HYDROCHLORIDE 5 MG/1
2.5 TABLET ORAL
Refills: 0 | Status: DISCONTINUED | OUTPATIENT
Start: 2024-03-09 | End: 2024-03-12

## 2024-03-09 RX ADMIN — Medication 975 MILLIGRAM(S): at 05:16

## 2024-03-09 RX ADMIN — ESCITALOPRAM OXALATE 5 MILLIGRAM(S): 10 TABLET, FILM COATED ORAL at 10:06

## 2024-03-09 RX ADMIN — OXYCODONE HYDROCHLORIDE 5 MILLIGRAM(S): 5 TABLET ORAL at 03:41

## 2024-03-09 RX ADMIN — Medication 975 MILLIGRAM(S): at 18:37

## 2024-03-09 RX ADMIN — AMLODIPINE BESYLATE 2.5 MILLIGRAM(S): 2.5 TABLET ORAL at 18:38

## 2024-03-09 RX ADMIN — CYCLOBENZAPRINE HYDROCHLORIDE 5 MILLIGRAM(S): 10 TABLET, FILM COATED ORAL at 12:54

## 2024-03-09 RX ADMIN — HEPARIN SODIUM 5000 UNIT(S): 5000 INJECTION INTRAVENOUS; SUBCUTANEOUS at 05:17

## 2024-03-09 RX ADMIN — Medication 0.25 MILLIGRAM(S): at 00:04

## 2024-03-09 RX ADMIN — OXYCODONE HYDROCHLORIDE 2.5 MILLIGRAM(S): 5 TABLET ORAL at 10:43

## 2024-03-09 RX ADMIN — HEPARIN SODIUM 5000 UNIT(S): 5000 INJECTION INTRAVENOUS; SUBCUTANEOUS at 18:38

## 2024-03-09 RX ADMIN — Medication 975 MILLIGRAM(S): at 00:34

## 2024-03-09 RX ADMIN — Medication 975 MILLIGRAM(S): at 05:46

## 2024-03-09 RX ADMIN — OXYCODONE HYDROCHLORIDE 2.5 MILLIGRAM(S): 5 TABLET ORAL at 11:13

## 2024-03-09 RX ADMIN — CHLORHEXIDINE GLUCONATE 1 APPLICATION(S): 213 SOLUTION TOPICAL at 05:20

## 2024-03-09 RX ADMIN — Medication 975 MILLIGRAM(S): at 00:04

## 2024-03-09 RX ADMIN — LOSARTAN POTASSIUM 50 MILLIGRAM(S): 100 TABLET, FILM COATED ORAL at 18:38

## 2024-03-09 RX ADMIN — Medication 975 MILLIGRAM(S): at 19:00

## 2024-03-09 RX ADMIN — POLYETHYLENE GLYCOL 3350 17 GRAM(S): 17 POWDER, FOR SOLUTION ORAL at 10:07

## 2024-03-09 RX ADMIN — BISOPROLOL FUMARATE AND HYDROCHLOROTHIAZIDE 1 TABLET(S): 5; 6.25 TABLET ORAL at 09:04

## 2024-03-09 RX ADMIN — CYCLOBENZAPRINE HYDROCHLORIDE 5 MILLIGRAM(S): 10 TABLET, FILM COATED ORAL at 22:22

## 2024-03-09 RX ADMIN — Medication 0.25 MILLIGRAM(S): at 22:23

## 2024-03-09 RX ADMIN — EPLERENONE 25 MILLIGRAM(S): 50 TABLET, FILM COATED ORAL at 18:37

## 2024-03-09 RX ADMIN — LOSARTAN POTASSIUM 50 MILLIGRAM(S): 100 TABLET, FILM COATED ORAL at 05:51

## 2024-03-09 RX ADMIN — OXYCODONE HYDROCHLORIDE 5 MILLIGRAM(S): 5 TABLET ORAL at 04:11

## 2024-03-09 RX ADMIN — METHOCARBAMOL 500 MILLIGRAM(S): 500 TABLET, FILM COATED ORAL at 10:06

## 2024-03-09 NOTE — PROGRESS NOTE ADULT - ASSESSMENT
53F with PMHx osteogenesis imperfecta s/p upper & lower extremity hardware, aortic insufficiency, and HTN who presents after fall from her wheelchair with LOC & multiple injuries noted below.     Injuries:  - acute, comminuted fracture adjacent to the tip of the right femoral anabell which begins in the distal femoral metadiaphysis and continues into the lateral femoral condyle  - right knee hemarthrosis  - Age indeterminate R sacral ala fracture  - Age-indeterminate left scapular body fracture.  - Age-indeterminate fracture of the right humeral head, status post pin placement  -Prior deformity of the right third rib with likely a superimposed   acute fracture.  - Likely acute fractures of right 4th-6th ribs.      Previously known findings/ Chronic Findings  - known large, heterogeneously enhancing fibroid measuring 6.8 x 6.5 cm  - known infrarenal aorta measures 0.8 cm in diameter  - Well-corticated, chronic right ulnar styloid fracture    Plan:  - Multimodal pain control, IS   - Orthopedics consult appreciated - RUE & RLE splinted - no operative intervention  - Appreciate hospitalist recommendations  - echo performed and reviewed   - PT evaluation:  recommending acute rehab  - dispo:  [place] when arranged     Trauma/Acute Care Surgery   [id number]

## 2024-03-09 NOTE — PROGRESS NOTE ADULT - ATTENDING COMMENTS
seen and examined 03-09-24 @ 1030    ecchymosis of right lip / chin  no more tenderness over the xiphoid process  no right-sided chest wall tenderness  RUE in fiberglass cast  RLE in fiberglass cast      right 4-6 anterolateral nondisplaced rib fractures  -unclear if these fractures are acute given lack of tenderness    possible occult xiphoid fracture  -multimodal pain control    right glenohumeral dislocation  right proximal humerus fracture  supracondylar right humerus fracture  possible right olecranon fracture  -NWB RUE in cast    right distal femur periprosthetic fracture  -NMB RLE in cast    left scapula body fracture  -WBAT LUE    back spasms  -not controlled with methocarbamol, so will add cyclobenzaprine    dispo  -transfer to acute rehab when available

## 2024-03-10 PROCEDURE — 99232 SBSQ HOSP IP/OBS MODERATE 35: CPT

## 2024-03-10 RX ADMIN — Medication 975 MILLIGRAM(S): at 00:59

## 2024-03-10 RX ADMIN — HEPARIN SODIUM 5000 UNIT(S): 5000 INJECTION INTRAVENOUS; SUBCUTANEOUS at 06:05

## 2024-03-10 RX ADMIN — Medication 975 MILLIGRAM(S): at 11:57

## 2024-03-10 RX ADMIN — CYCLOBENZAPRINE HYDROCHLORIDE 5 MILLIGRAM(S): 10 TABLET, FILM COATED ORAL at 11:58

## 2024-03-10 RX ADMIN — AMLODIPINE BESYLATE 2.5 MILLIGRAM(S): 2.5 TABLET ORAL at 17:55

## 2024-03-10 RX ADMIN — Medication 975 MILLIGRAM(S): at 06:04

## 2024-03-10 RX ADMIN — Medication 975 MILLIGRAM(S): at 17:54

## 2024-03-10 RX ADMIN — Medication 975 MILLIGRAM(S): at 23:33

## 2024-03-10 RX ADMIN — OXYCODONE HYDROCHLORIDE 2.5 MILLIGRAM(S): 5 TABLET ORAL at 13:21

## 2024-03-10 RX ADMIN — Medication 975 MILLIGRAM(S): at 18:25

## 2024-03-10 RX ADMIN — OXYCODONE HYDROCHLORIDE 2.5 MILLIGRAM(S): 5 TABLET ORAL at 13:50

## 2024-03-10 RX ADMIN — ESCITALOPRAM OXALATE 5 MILLIGRAM(S): 10 TABLET, FILM COATED ORAL at 11:58

## 2024-03-10 RX ADMIN — HEPARIN SODIUM 5000 UNIT(S): 5000 INJECTION INTRAVENOUS; SUBCUTANEOUS at 17:55

## 2024-03-10 RX ADMIN — Medication 975 MILLIGRAM(S): at 06:34

## 2024-03-10 RX ADMIN — Medication 975 MILLIGRAM(S): at 12:30

## 2024-03-10 RX ADMIN — Medication 0.25 MILLIGRAM(S): at 22:09

## 2024-03-10 RX ADMIN — CYCLOBENZAPRINE HYDROCHLORIDE 5 MILLIGRAM(S): 10 TABLET, FILM COATED ORAL at 23:03

## 2024-03-10 RX ADMIN — EPLERENONE 25 MILLIGRAM(S): 50 TABLET, FILM COATED ORAL at 17:55

## 2024-03-10 RX ADMIN — Medication 975 MILLIGRAM(S): at 00:29

## 2024-03-10 RX ADMIN — LOSARTAN POTASSIUM 50 MILLIGRAM(S): 100 TABLET, FILM COATED ORAL at 17:55

## 2024-03-10 RX ADMIN — CHLORHEXIDINE GLUCONATE 1 APPLICATION(S): 213 SOLUTION TOPICAL at 12:02

## 2024-03-10 RX ADMIN — BISOPROLOL FUMARATE AND HYDROCHLOROTHIAZIDE 1 TABLET(S): 5; 6.25 TABLET ORAL at 09:58

## 2024-03-10 RX ADMIN — LOSARTAN POTASSIUM 50 MILLIGRAM(S): 100 TABLET, FILM COATED ORAL at 06:04

## 2024-03-10 RX ADMIN — Medication 975 MILLIGRAM(S): at 23:03

## 2024-03-10 NOTE — PROGRESS NOTE ADULT - ATTENDING COMMENTS
seen and examined 03-10-24 @ 0910    back spasms improved after methocarbamol was changed to cyclobenzaprine    ecchymosis of right lip / chin  no more tenderness over the xiphoid process  no right-sided chest wall tenderness  RUE in fiberglass cast  RLE in fiberglass cast      right 4-6 anterolateral nondisplaced rib fractures  -unclear if these fractures are acute given lack of tenderness    possible occult xiphoid fracture  -multimodal pain control    right glenohumeral dislocation  right proximal humerus fracture  supracondylar right humerus fracture  possible right olecranon fracture  -NWB RUE in cast    right distal femur periprosthetic fracture  -NMB RLE in cast    left scapula body fracture  -WBAT LUE    back spasms  -controlled with cyclobenzaprine, and she slept better last night    dispo  -transfer to acute rehab when available      plan discussed with her  at bedside

## 2024-03-10 NOTE — PROGRESS NOTE ADULT - ASSESSMENT
· Assessment	  53F with PMHx osteogenesis imperfecta s/p upper & lower extremity hardware, aortic insufficiency, and HTN who presents after fall from her wheelchair with LOC & multiple injuries noted below.     Injuries:  - acute, comminuted fracture adjacent to the tip of the right femoral anabell which begins in the distal femoral metadiaphysis and continues into the lateral femoral condyle  - right knee hemarthrosis  - Age indeterminate R sacral ala fracture  - Age-indeterminate left scapular body fracture.  - Age-indeterminate fracture of the right humeral head, status post pin placement  -Prior deformity of the right third rib with likely a superimposed   acute fracture.  - Likely acute fractures of right 4th-6th ribs.      Previously known findings/ Chronic Findings  - known large, heterogeneously enhancing fibroid measuring 6.8 x 6.5 cm  - known infrarenal aorta measures 0.8 cm in diameter  - Well-corticated, chronic right ulnar styloid fracture    Plan:  - Multimodal pain control, IS   - Orthopedics consult appreciated - RUE & RLE splinted - no operative intervention, re splinted 3/8 lighter mater   - Appreciate hospitalist recommendations  - echo performed and reviewed   - PT evaluation:  recommending acute rehab  - pending PMNR evaluation  - dispo:  [place] pending bed availability    Trauma/Acute Care Surgery   [id number]

## 2024-03-11 PROCEDURE — 99232 SBSQ HOSP IP/OBS MODERATE 35: CPT

## 2024-03-11 RX ADMIN — CYCLOBENZAPRINE HYDROCHLORIDE 5 MILLIGRAM(S): 10 TABLET, FILM COATED ORAL at 21:04

## 2024-03-11 RX ADMIN — LOSARTAN POTASSIUM 50 MILLIGRAM(S): 100 TABLET, FILM COATED ORAL at 18:05

## 2024-03-11 RX ADMIN — OXYCODONE HYDROCHLORIDE 2.5 MILLIGRAM(S): 5 TABLET ORAL at 00:55

## 2024-03-11 RX ADMIN — Medication 975 MILLIGRAM(S): at 18:05

## 2024-03-11 RX ADMIN — LOSARTAN POTASSIUM 50 MILLIGRAM(S): 100 TABLET, FILM COATED ORAL at 05:23

## 2024-03-11 RX ADMIN — OXYCODONE HYDROCHLORIDE 2.5 MILLIGRAM(S): 5 TABLET ORAL at 01:25

## 2024-03-11 RX ADMIN — HEPARIN SODIUM 5000 UNIT(S): 5000 INJECTION INTRAVENOUS; SUBCUTANEOUS at 18:06

## 2024-03-11 RX ADMIN — AMLODIPINE BESYLATE 2.5 MILLIGRAM(S): 2.5 TABLET ORAL at 18:05

## 2024-03-11 RX ADMIN — OXYCODONE HYDROCHLORIDE 2.5 MILLIGRAM(S): 5 TABLET ORAL at 23:38

## 2024-03-11 RX ADMIN — Medication 975 MILLIGRAM(S): at 05:46

## 2024-03-11 RX ADMIN — EPLERENONE 25 MILLIGRAM(S): 50 TABLET, FILM COATED ORAL at 18:05

## 2024-03-11 RX ADMIN — CYCLOBENZAPRINE HYDROCHLORIDE 5 MILLIGRAM(S): 10 TABLET, FILM COATED ORAL at 10:15

## 2024-03-11 RX ADMIN — CHLORHEXIDINE GLUCONATE 1 APPLICATION(S): 213 SOLUTION TOPICAL at 05:19

## 2024-03-11 RX ADMIN — Medication 975 MILLIGRAM(S): at 05:16

## 2024-03-11 RX ADMIN — Medication 0.25 MILLIGRAM(S): at 22:11

## 2024-03-11 RX ADMIN — Medication 975 MILLIGRAM(S): at 12:06

## 2024-03-11 RX ADMIN — OXYCODONE HYDROCHLORIDE 2.5 MILLIGRAM(S): 5 TABLET ORAL at 23:08

## 2024-03-11 RX ADMIN — BISOPROLOL FUMARATE AND HYDROCHLOROTHIAZIDE 1 TABLET(S): 5; 6.25 TABLET ORAL at 08:13

## 2024-03-11 RX ADMIN — HEPARIN SODIUM 5000 UNIT(S): 5000 INJECTION INTRAVENOUS; SUBCUTANEOUS at 05:16

## 2024-03-11 RX ADMIN — ESCITALOPRAM OXALATE 5 MILLIGRAM(S): 10 TABLET, FILM COATED ORAL at 09:19

## 2024-03-11 NOTE — PROVIDER CONTACT NOTE (OTHER) - SITUATION
Pt requesting removal of IV with no replacement IV.
pt refusing senna and lidocaine patch
Pt declined senna medication and lidocaine patch because she states she had a recent bowel movement and states that the lidocaine patch is not helpful. Pt refuses to have new PIV inserted.
pt refusing tylenol
Pt states tingling sensation in RUE when shifting and moving

## 2024-03-11 NOTE — PROGRESS NOTE ADULT - PROBLEM SELECTOR PLAN 4
- continue losartan and amlodipine  - can add BB/HCTZ, lasix in a few days if pressures tolerate.
- continue losartan and amlodipine  - can add BB/HCTZ, Lasix as BP tolerates
- continue losartan and amlodipine  - can add BB/HCTZ, lasix in a few days if pressures tolerate.
- continue losartan and amlodipine  - can add back BB/HCTZ, Lasix as BP tolerates - BP is well controlled so continue to hold

## 2024-03-11 NOTE — CHART NOTE - NSCHARTNOTESELECT_GEN_ALL_CORE
Cast Placement
IV Refusal
Event Note
Ortho Clearance
SICU Transfer note
Tertiary Exam
iSTOP review/Event Note

## 2024-03-11 NOTE — PROGRESS NOTE ADULT - ASSESSMENT
53F with PMHx osteogenesis imperfecta s/p upper & lower extremity hardware, aortic insufficiency, and HTN who presents after fall from her wheelchair with LOC & multiple injuries noted below.     Injuries:  - acute, comminuted fracture adjacent to the tip of the right femoral anabell which begins in the distal femoral metadiaphysis and continues into the lateral femoral condyle  - right knee hemarthrosis  - Age indeterminate R sacral ala fracture  - Age-indeterminate left scapular body fracture.  - Age-indeterminate fracture of the right humeral head, status post pin placement  -Prior deformity of the right third rib with likely a superimposed   acute fracture.  - Likely acute fractures of right 4th-6th ribs.      Previously known findings/ Chronic Findings  - known large, heterogeneously enhancing fibroid measuring 6.8 x 6.5 cm  - known infrarenal aorta measures 0.8 cm in diameter  - Well-corticated, chronic right ulnar styloid fracture    Plan:  - Multimodal pain control, IS   - Orthopedics consult appreciated - RUE & RLE splinted - no operative intervention, re splinted 3/8 lighter mater   - Appreciate hospitalist recommendations  - echo performed and reviewed   - PT evaluation:  recommending acute rehab  - pending PMNR evaluation  - dispo:  [place] pending bed availability    Trauma/Acute Care Surgery   [id number]

## 2024-03-11 NOTE — PROGRESS NOTE ADULT - PROBLEM SELECTOR PROBLEM 3
Severe aortic insufficiency

## 2024-03-11 NOTE — PROVIDER CONTACT NOTE (OTHER) - ASSESSMENT
pt refusing tylenol, pt received PRN oxy with pain well controlled
Pt states tingling sensation is intermittent and only occurs when shifting around her cast. Feels as tho the cast is hitting a pressure point.
pt refusing senna and lidocaine patch, all VSS
Pt declined senna medication and lidocaine patch because she states she had a recent bowel movement and states that the lidocaine patch is not helpful. Pt very insistent on refusing new PIV insertion after infection risks education provided.
Pt states IV is itchy and does not want a new one due to being a hard stick

## 2024-03-11 NOTE — PROVIDER CONTACT NOTE (OTHER) - REASON
Pt requesting removal of IV with no replacement IV.
pt refusing senna and lidocaine patch
Pt states tingling sensation in RUE when shifting and moving
Pt declined senna medication and lidocaine patch because she states she had a recent bowel movement and states that the lidocaine patch is not helpful. Pt refuses to have new PIV inserted after infection risks education provided.
pt refusing tylenol

## 2024-03-11 NOTE — PROGRESS NOTE ADULT - PROBLEM SELECTOR PROBLEM 1
Critical polytrauma

## 2024-03-11 NOTE — PROVIDER CONTACT NOTE (OTHER) - ACTION/TREATMENT ORDERED:
Will continue to monitor.
no new interventions
Will remove IV upon Provider to RN order.
no new interventions at this time
Quinton Moya MD notified and aware. Provider placed PIV Insertion extension date to be 3/10 morning. Plan of care continues.

## 2024-03-11 NOTE — PROGRESS NOTE ADULT - PROBLEM SELECTOR PLAN 1
s/p mechanical fall out of wheelchair and subsequent fall of wheelchair onto patient. Injuries as above  - pain control and care as per Trauma/Ortho  - no surgical interventions planned at this time  - PT/OT ; recommended for acute rehab  - bowel regimen while on opiates.
s/p mechanical fall out of wheelchair and subsequent fall of wheelchair onto patient. Injuries as above  - pain control and care as per Trauma/Ortho- no surgical interventions planned at this time  - PT/OT ; recommended for acute rehab  - bowel regimen while on opiates.
s/p mechanical fall out of wheelchair and subsequent fall of wheelchair onto patient. Injuries as above  - pain control and care as per SICU/trauma/ortho- no surgical interventions planned at this point  - PT/OT ; recommended for acute rehab  - bowel regimen while on opiates.
s/p mechanical fall out of wheelchair and subsequent fall of wheelchair onto patient. Injuries as above  - pain control and care as per SICU/trauma/ortho- no surgical interventions planned at this point  - PT/OT  - bowel regimen while on opiates.
s/p mechanical fall out of wheelchair and subsequent fall of wheelchair onto patient. Injuries as above  - pain control and care as per Trauma/Ortho  - no surgical interventions planned at this time  - PT/OT ; recommended for acute rehab  - bowel regimen while on opiates.
s/p mechanical fall out of wheelchair and subsequent fall of wheelchair onto patient. Injuries as above  - pain control and care as per Trauma/Ortho- no surgical interventions planned at this time  - PT/OT ; recommended for acute rehab  - bowel regimen while on opiates.

## 2024-03-11 NOTE — CHART NOTE - NSCHARTNOTEFT_GEN_A_CORE
Risks and benefits for long leg splint application over RLE were explained to the patient. The extremity was wrapped with Webril, with care to pad the bony prominences. A plaster splint was applied; wrapped with Webril and an ace wrap; and molded until hardened. Care was taken to avoid sharp edges and to protect the skin. The extremity was elevated on pillows and ice was applied. Neurovascular exam was unchanged after the procedure.    -NWB of RLE in splint  -NWB of RUE in splint/Sling  -WBAT of LUE  -Medical management and DVT ppx per pimray team  -PT/OT  -No orthopedic surgical interventions indicated at this time  -Follow up with Dr. Domenic Newsome 1-2 weeks in office
SICU Transfer Note    Transfer from: SICU  Transfer [facility]: [facility]  Accepting physician: Dr. Ramos    SICU COURSE:  53F with PMHx osteogenesis imperfecta s/p upper & lower extremity hardware, aortic insufficiency, and HTN who presents after fall from her wheelchair with LOC & multiple injuries noted below.     Injuries:  - acute, comminuted fracture adjacent to the tip of the right femoral anabell which begins in the distal femoral metadiaphysis and continues into the lateral femoral condyle  - right knee hemarthrosis  - Age indeterminate R sacral ala fracture  - Age-indeterminate left scapular body fracture.  - Age-indeterminate fracture of the right humeral head, status post pin placement  -Prior deformity of the right third rib with likely a superimposed   acute fracture.  - Likely acute fractures of right 4th-6th ribs.      Previously known findings/ Chronic Findings  - known large, heterogeneously enhancing fibroid measuring 6.8 x 6.5 cm  - known infrarenal aorta measures 0.8 cm in diameter  - Well-corticated, chronic right ulnar styloid fracture    RLE plaster splint placed. RUE splinted. Received PRBC transfusion x1.5 in SICU. CBC trended.     Plan:  - Multimodal pain control, IS   - Orthopedics consult appreciated - RUE & RLE splinted - no operative intervention  - Dispo pending    For Follow-Up:  - Pt requesting change from current splints to lighter splints. Nursing staff already contacted orthopedics    Vital Signs Last 24 Hrs  T(C): 36.7 (05 Mar 2024 18:51), Max: 36.9 (05 Mar 2024 05:00)  T(F): 98.1 (05 Mar 2024 18:51), Max: 98.5 (05 Mar 2024 05:00)  HR: 91 (05 Mar 2024 18:51) (65 - 107)  BP: 121/68 (05 Mar 2024 18:51) (116/58 - 165/63)  BP(mean): 115 (05 Mar 2024 16:00) (81 - 115)  RR: 20 (05 Mar 2024 18:51) (18 - 39)  SpO2: 100% (05 Mar 2024 18:51) (90% - 100%)    Parameters below as of 05 Mar 2024 12:01  Patient On (Oxygen Delivery Method): room air      I&O's Summary    04 Mar 2024 07:01  -  05 Mar 2024 07:00  --------------------------------------------------------  IN: 815 mL / OUT: 1160 mL / NET: -345 mL    05 Mar 2024 07:01  -  05 Mar 2024 20:13  --------------------------------------------------------  IN: 35 mL / OUT: 600 mL / NET: -565 mL          MEDICATIONS  (STANDING):  acetaminophen   IVPB .. 350 milliGRAM(s) IV Intermittent every 8 hours  amLODIPine   Tablet 2.5 milliGRAM(s) Oral at bedtime  chlorhexidine 2% Cloths 1 Application(s) Topical <User Schedule>  eplerenone 25 milliGRAM(s) Oral daily  escitalopram 5 milliGRAM(s) Oral daily  heparin   Injectable 5000 Unit(s) SubCutaneous every 12 hours  lidocaine   4% Patch 1 Patch Transdermal every 24 hours  losartan 50 milliGRAM(s) Oral two times a day  polyethylene glycol 3350 17 Gram(s) Oral daily  senna 2 Tablet(s) Oral at bedtime    MEDICATIONS  (PRN):  ALPRAZolam 0.25 milliGRAM(s) Oral at bedtime PRN Sleep  oxyCODONE    IR 2.5 milliGRAM(s) Oral every 3 hours PRN Moderate Pain (4 - 6)        LABS                                            8.8                   Neurophils% (auto):   x      (03-05 @ 03:44):    10.16)-----------(230          Lymphocytes% (auto):  x                                             25.6                   Eosinphils% (auto):   x        Manual%: Neutrophils x    ; Lymphocytes x    ; Eosinophils x    ; Bands%: x    ; Blasts x                                    134    |  96     |  9                   Calcium: 8.7   / iCa: x      (03-04 @ 21:08)    ----------------------------<  97        Magnesium: 2.0                              3.6     |  28     |  <0.30            Phosphorous: 2.5
Tertiary Trauma Survey (TTS)    Date of TTS:   03/03/24           Time: 1040  Admit Date:    03/02/24              Admit GCS: E- 4    V- 5    M- 6    HPI:    TRAUMA SERVICE (Acute Care Surgery / [facility] - #[id number])  --------------------------------------------------------------------------------------------    TRAUMA ACTIVATION LEVEL: II    MECHANISM OF INJURY:      [] Blunt  	[] MVC	[x] Fall	[] Pedestrian Struck	[] Motorcycle accident      [] Penetrating  	[] Gun Shot Wound 		[] Stab Wound    GCS: 	E: 4	V: 5	M: 6      HPI:   Patient is a 53y old  Female who presents with a chief complaint of fall from wheelchair     HPI:  Ms. Atkins is a 53 year old woman with a PMH osteogenesis imperfecta s/p upper & lower extremity hardware, aortic insufficiency, and HTN who presents after fall from her wheelchair. She reports missing a step on the wheelchair, losing control, and falling face first from the wheelchair at a height of around 2-3ft fact first onto a concrete ground. She lost consciousness and regained her awareness of her surroundings some time later, likely seconds to minutes, on a stable with little recollection of the intervening time. She reported L shoulder, R arm, R wrist, and R thigh pain as well but otherwise denied any additional symptoms such as fever, chills, CP, shortness of breath, abdominal pain, nausea or vomiting. She had movement and sensation intact in all four extremities. Secondary survey detailed below was significant for R temporal superficial abrasion, R upper lip laceration through the vermillion border, small ecchymosis on the R chin. It was also notable for tenderness to palpation in the L shoulder, R proximal arm, R wrist, and R thigh with medial rotation of the RLE.     Primary Survey:    A - airway intact  B - bilateral breath sounds and good chest rise  C - initial BP: 130/67 (03-02-24 @ 20:32) , HR: 71 (03-02-24 @ 20:32) , palpable pulses in all extremities  D - GCS 15 on arrival  Exposure obtained      Secondary Survey:   General: NAD  HEENT: Normocephalic, R temporal superficial abrasion, R upper lip laceration through the vermillion border, small ecchymosis on the R chin, EOMI, PEERLA.  Neck: Soft, midline trachea  Chest: No chest wall tenderness. , L shoulder tenderness to palpation  Cardiac: S1, S2, RRR  Respiratory: Bilateral breath sounds, clear and equal bilaterally  Abdomen: Soft, non-distended, non-tender, no rebound, no guarding, no masses palpated  Pelvis: Stable, non-tender, no ecchymosis  Ext: palp radial b/l UE, b/l DP palp in Lower Extrem, motor and sensory grossly intact in all 4 extremities,  tenderness to palpation in the L shoulder, R proximal arm, R wrist, and R thigh with medial rotation of the RLE.   Back: no TTP, no palpable runoff/stepoff/deformity      ROS: 10-system review is otherwise negative except HPI above.        HOME MEDICATIONS:   Will follow up for meds/dosing but reports taking   ASA81  multivitamin  HTN medicine     (02 Mar 2024 20:58)      PAST MEDICAL & SURGICAL HISTORY:  Osteogenesis imperfecta      Aortic insufficiency      HTN (hypertension)      Presence of internal fixation anabell in upper extremity        [  ] No significant past history as reviewed with the patient and family    FAMILY HISTORY:    [  ] Family history not pertinent as reviewed with the patient and family    SOCIAL HISTORY:    Medications (inpatient): acetaminophen   IVPB .. 500 milliGRAM(s) IV Intermittent every 6 hours  amLODIPine   Tablet 2.5 milliGRAM(s) Oral daily  chlorhexidine 2% Cloths 1 Application(s) Topical <User Schedule>  escitalopram 5 milliGRAM(s) Oral daily  heparin   Injectable 5000 Unit(s) SubCutaneous every 12 hours  lactated ringers. 1000 milliLiter(s) IV Continuous <Continuous>  lidocaine   4% Patch 1 Patch Transdermal every 24 hours  losartan 50 milliGRAM(s) Oral two times a day  polyethylene glycol 3350 17 Gram(s) Oral daily  senna 2 Tablet(s) Oral at bedtime    Medications (PRN):ALPRAZolam 0.25 milliGRAM(s) Oral at bedtime PRN  oxyCODONE    IR 5 milliGRAM(s) Oral every 3 hours PRN  oxyCODONE    IR 2.5 milliGRAM(s) Oral every 3 hours PRN    Allergies: No Known Allergies  (Intolerances: )    Vital Signs Last 24 Hrs  T(C): 36.9 (03 Mar 2024 07:00), Max: 37.2 (02 Mar 2024 20:32)  T(F): 98.5 (03 Mar 2024 07:00), Max: 99 (02 Mar 2024 20:32)  HR: 85 (03 Mar 2024 08:00) (64 - 88)  BP: 147/65 (03 Mar 2024 08:00) (80/50 - 147/65)  BP(mean): 94 (03 Mar 2024 08:00) (67 - 94)  RR: 25 (03 Mar 2024 08:00) (17 - 36)  SpO2: 93% (03 Mar 2024 08:00) (93% - 100%)    Parameters below as of 03 Mar 2024 08:00  Patient On (Oxygen Delivery Method): room air      Drug Dosing Weight  Height (cm): 91.4 (02 Mar 2024 15:30)  Weight (kg): 23.1 (02 Mar 2024 15:30)  BMI (kg/m2): 27.7 (02 Mar 2024 15:30)  BSA (m2): 0.72 (02 Mar 2024 15:30)    PHYSICAL EXAM:  GEN: resting comfortably in bed, with some pain but NAD  HEAD: nontender to palpation   NECK: no JVD, nontender to palpation   CHEST: nontender to palpation across clavicles and b/l anterior ribs   BACK: nontender to palpation along midline and b/l posterior ribs   ABD: soft, nontender, nondistended   EXTREM: RUE in splint and wrapped, LUE nontender to palpation                   RLE in splint and wrapped, LLE nontender to palpation                   Moving all extremities spontaneously; warm, well-perfused, palpable distal pulses   NEURO: AOx3, no focal neuro deficits                           7.6    9.47  )-----------( 257      ( 03 Mar 2024 10:26 )             22.2     03-03    135  |  98  |  13  ----------------------------<  145<H>  4.1   |  24  |  <0.30<L>    Ca    9.2      03 Mar 2024 00:10  Phos  4.0     03-03  Mg     1.9     03-03    TPro  7.0  /  Alb  4.1  /  TBili  0.5  /  DBili  x   /  AST  37  /  ALT  20  /  AlkPhos  88  03-02    PT/INR - ( 03 Mar 2024 00:10 )   PT: 12.0 sec;   INR: 1.15 ratio         PTT - ( 03 Mar 2024 00:10 )  PTT:30.5 sec  Urinalysis Basic - ( 03 Mar 2024 00:10 )    Color: x / Appearance: x / SG: x / pH: x  Gluc: 145 mg/dL / Ketone: x  / Bili: x / Urobili: x   Blood: x / Protein: x / Nitrite: x   Leuk Esterase: x / RBC: x / WBC x   Sq Epi: x / Non Sq Epi: x / Bacteria: x        List Injuries Identified to Date:    List Operative and Interventional Radiological Procedures:     Consults (Date):  [  ] Neurosurgery   [ x ] Orthopedics 03/02/24  [  ] Plastics  [  ] Urology  [  ] PM&R  [  ] Social Work    RADIOLOGICAL FINDINGS REVIEW:    Pelvis XR:    < from: Xray Pelvis AP only (03.02.24 @ 19:19) >    Impression: There is diffuse osseous demineralization with diffuse bony   deformity consistent with osteogenesis imperfecta. There are rods within   both femurs and both proximal and mid tibias. There is an acute,   impacted, periprosthetic fracture at the right distal femoral   metadiaphysis which extends into the lateral femoral condyle.    < end of copied text >      Extremity Films:    < from: Xray Femur 2 Views, Bilat (03.02.24 @ 21:44) >    ACC: [id number] EXAM:  XR TIB FIB AP LAT 2 VIEWS BI   ORDERED BY: KATHLEEN CARMONA     ACC: [id number] EXAM:  XR HIP WITH PELV 2-3V RT   ORDERED BY: KATHLEEN CARMONA     ACC: [id number] EXAM:  XR FEMUR 2 VIEWS BI   ORDERED BY: KATHLEEN CARMONA     PROCEDURE DATE:  03/02/2024      INTERPRETATION:  CLINICAL INFORMATION: Pain. Osteogenesis imperfecta.    COMPARISON: None available.    TECHNIQUE: Single frontal radiograph of the bilateral femurs, single AP   view of bilateral tibia fibula, single AP view of the pelvis single AP   view of the right hip.    IMPRESSION:  Acute mildly comminuted and mildly displaced/impacted oblique   longitudinal fracture through right distal femoral metadiaphysis without   definite intra-articular cortical step-off on this single frontal   radiograph.    No other acute displaced fracture or dislocation. Status post remote   intramedullary anabell fixation of bilateral femoral and tibial diaphyses.   Left worse than right lateral femorotibial compartments predominant knee   arthrosis. Prominent medial bowing deformity of bilateral fibular   diaphyses. Bony demineralization.    < end of copied text >    < from: Xray Humerus, Bilateral (03.02.24 @ 21:45) >    ACC: [id number] EXAM:  XR HUMERUS MIN 2 VIEWS BI   ORDERED BY: KATHLEEN CARMONA     ACC: [id number] EXAM:  XR SHOULDER COMP MIN 2V BI   ORDERED BY: KATHLEEN CARMONA     ACC: [id number] EXAM:  XR FOREARM  2 VIEWS BI   ORDERED BY: KATHLEEN CARMONA     PROCEDURE DATE:  03/02/2024          INTERPRETATION:  CLINICAL INFORMATION: Pain.    COMPARISON: None available.    TECHNIQUE: Single AP view of bilateral forearms, single AP view of   bilateral shoulders, single view of bilateral humerus    IMPRESSION:  Acute anterior right glenohumeral dislocation.    Acute comminuted Neer 2 part type right proximal humeral fracture   including oblique transverse component through the surgical neck with   mild anteromedial displacement/angulation, and up to 1.4 cm impaction of   the dominant humeral shaft fragment, as well as minimally displaced   greater tuberosity fragment.    Acute comminuted oblique transverse supracondylar right distal humeral   fracture with intra-articular extension, mildly displaced medialcondylar   fragment, and suspicion for adjacent ipsilateral olecranon fracture.    No acute displaced fracture or gross dislocation of the visualized   portion of the left upper extremity.    Status post remote intramedullary fixation of bilateral humeral   diaphyses. Prominent bone deformity of bilateral radial and ulnar   diaphyses. Bony demineralization.    < end of copied text >    Head CT:    < from: CT Head No Cont (03.02.24 @ 17:20) >    FINDINGS:  VENTRICLES AND SULCI:  Within normal limits.  INTRA-AXIAL:  No mass, blood or abnormal attenuation is seen.  EXTRA-AXIAL:  Prominent CSF is seen along the high frontoparietal   convexities without associated cortical vein displacement likely   representing prominent subarachnoid spaces. No acute hemorrhage is seen.  CALVARIUM:  Normal.  FACIAL BONES:  Normal. Right medial incisor dental implant within the   maxilla.  MANDIBLE:  Normal.  REMAINING VISUALIZED BONES: Normal.  SINONASAL CAVITIES:  Maxillary sinus hypoplasia. The paranasal sinuses   are clear. Nasal septal deviation to the left anteriorly and to the right   posteriorly. Septal perforation.  ORBITAL CONTENTS:  Normal.  MISCELLANEOUS:  Right supraorbital soft tissue swelling is seen.    CT CERVICAL SPINE:  TECHNIQUE:  Axial images were obtained using multislice helical   technique.  Reformatted coronal and sagittal images were performed.    COMPARISON EXAMINATION:  None.    FINDINGS:  VERTEBRAL BODIES:  Normal in height. No fracture. Diffuse osteopenia.  ALIGNMENT:  No subluxations. Dextroscoliosis of the upper thoracic spine  INTERVERTEBRAL DISC SPACES: Normal.  MISCELLANEOUS:  Bone expansion involving the right third rib. Fused/bifid   ribs are seen on the left.    There is a left scapular fracture which appears angulated.    IMPRESSION:  CT BRAIN:  No mass effect, hemorrhage or evidence of acute intracranial pathology.    CT MAXILLOFACIAL  No fracture.    CT CERVICAL SPINE:  No cervical fracture or traumatic subluxation.    Left scapular fracture.    < end of copied text >    Chest/Abd/Pelvis CT:    < from: CT Chest w/ IV Cont (03.02.24 @ 17:20) >    FINDINGS:  CHEST:  LUNGS AND LARGE AIRWAYS: Patent central airways. Lungs are clear.  PLEURA: Atelectasis at lung bases.  VESSELS: Severe aortic tortuosity. Evaluation is suboptimal due to   breathing artifacts.  HEART: Heart size is normal. No pericardial effusion.  MEDIASTINUM AND LEI: No lymphadenopathy.  CHEST WALL SOFT TISSUES: Within normal limits.  THORACIC OSSEOUS STRUCTURES: Diffuse osteopenia. Age-indeterminate   impacted right humeral neck fracture status post ORIF. Partially imaged   left humeral pin. Age-indeterminate left scapular body fracture. Please   see dedicated report of the thoracic spine for evaluation of the thoracic   spine. Prior deformity of the right third rib with likely a superimposed   acute fracture. Likely additional fractures of right fourth and fifth   ribs. Well-corticated, chronic right ulnar styloid fracture.    ABDOMEN AND PELVIS:  LIVER: Within normal limits.  BILE DUCTS: Normal caliber.  GALLBLADDER: Within normal limits.  SPLEEN: Within normal limits.  PANCREAS: Within normal limits.  ADRENALS: Within normal limits.  KIDNEYS/URETERS: Within normal limits.    BLADDER: Within normal limits.  REPRODUCTIVE ORGANS: Large, heterogeneously enhancing fibroid measuring   6.8 x 6.5 cm.    BOWEL: No bowel obstruction. Appendix is not visualized.  PERITONEUM: No ascites.  VESSELS: The infrarenal aorta measures 0.8 cm in diameter, consistent   with known aortic stenosis.  RETROPERITONEUM/LYMPH NODES: No lymphadenopathy.  ABDOMINAL WALL: Within normal limits.  BONES: Marked scoliosis. Marked osteopenia. Bilateral femoral repairs.   Fracture of the distal right femur. Please see dedicated lumbar spine CT   for report of the lumbar spine and sacrum. Please see bilateral femoral   CT for evaluation of the femur/pelvis.    IMPRESSION:  Likely acute fractures of right 4th-6th ribs.  Age-indeterminate left scapular fracture.  Age-indeterminate fracture of the right humeral head, status post pin   placement.  Fracture of the distal right femur. Status post bilateral femoral anabell   placement.  Please see dedicated reports of the cervical spine, thoracic spine,   lumbar spine, and bilateral femur/pelvis for complete details.  No obvious acute abnormality within the chest, abdomen and pelvis per    < end of copied text >      INTERPRETATION:   Multiple chronic and acute fractures as previously identified. No other acute injuries.    PLAN:  Continue current management.
Xrays of humerus and femur were reviewed with Orthopedic Surgery resident Rubin Antunez, who said that alignment appeared appropriate and patient was ok to discharge from an orthopedic perspective.
iStop reviewed. Patient regularly prescribed alprazolam 0.5mg. Will order for patient sleep.    Reference #: [id number]    Practitioner Count: 1  Pharmacy Count: 1  Current Opioid Prescriptions: 0  Current Benzodiazepine Prescriptions: 1  Current Stimulant Prescriptions: 0      Patient Demographic Information (PDI)       PDI	First Name	Last Name	Birth Date	Gender	Street Address	[facility]	Zip Code  A	Ivis Ortiz	1970	Female	[address]	[address]  B	Ivis Rabago	1970	Female	[address]	[address]    Prescription Information      PDI Filter:    PDI	My Rx	Current Rx	Drug Type	Rx Written	Rx Dispensed	Drug	Quantity	Days Supply	Prescriber Name	Prescriber YUSUF #	Payment Method	Dispenser  A	N	N	B	09/07/2023	09/08/2023	alprazolam 0.5 mg tablet	30	30	Maricel De MD	[id number]	Insurance	[facility] #[id number]  A	N	N	B	07/05/2023	07/07/2023	alprazolam 0.5 mg tablet	30	30	Maricel De MD	[id number]	Insurance	[facility] #[id number]  A	N	N	B	05/11/2023	05/11/2023	alprazolam 0.5 mg tablet	30	15	Maricel De MD	[id number]	Insurance	[facility] #[id number]  A	N	N	B	03/08/2023	03/09/2023	alprazolam 0.5 mg tablet	30	30	Maricel De MD	[id number]	Insurance	[facility] #[id number]  B	N	Y	B	02/05/2024	02/09/2024	alprazolam 0.5 mg tablet	30	30	Maricel De MD	[id number]	Insurance	[facility] #[id number]  B	N	N	B	12/29/2023	01/03/2024	alprazolam 0.5 mg tablet	30	30	Maricel De MD	[id number]	Insurance	[facility] #[id number]  B	N	N	B	10/30/2023	11/02/2023	alprazolam 0.5 mg tablet	30	30	Maricel De MD	[id number]	Insurance	[facility] #[id number]
Ms. Grimm expressed strong desire to have all IVs removed on AM rounds today due to itching and discomfort. Discussed at table rounds, hospital policy is to maintain IV access however given that Ms. Grimm has been stable awaiting discharge for several days without acute medical concerns, and has demonstrated capacity to make decisions against medical advice, she may be permitted to exercise that right and refuse IV access. The above policy and rationale was discussed, and Ms. Grimm verbalized understanding and her continued desire to forego IV access.
Patient was seen due to request for replacement of R long arm and R long leg plaster splints to fiberglass casts.     Well padded casts were placed and patient was NVI afterward.    Please obtain XRays of R arm and R leg to confirm no fracture movement in casts.    Please have patient FU with Dr. Newsome 1 week after discharge, call office to make appointment    For all questions related to patient care, please reach out to the on-call team via the pager.     Catie Coombs, PGY 2  Orthopaedic Surgery  [facility] [phone]  [facility] [phone]  [facility] [phone]

## 2024-03-11 NOTE — PROGRESS NOTE ADULT - PROBLEM SELECTOR PROBLEM 5
Osteogenesis imperfecta

## 2024-03-11 NOTE — PROGRESS NOTE ADULT - PROBLEM SELECTOR PLAN 5
pt with significant scoliosis, uses a manual wheelchair at baseline  - PT/OT as tolerated
pt with significant scoliosis, uses a manual wheelchair at baseline  - PT/OT as tolerated
pt with significant scoliosis, uses a manual wheelchair at baseline  - PT/OT as tolerated  - acute rehab recommended
pt with significant scoliosis, uses a manual wheelchair at baseline  - PT/OT as tolerated  - acute rehab recommended
pt with significant scoliosis, uses a manual wheelchair at baseline  - PT/OT as tolerated
pt with significant scoliosis, uses a manual wheelchair at baseline  - PT/OT as tolerated

## 2024-03-11 NOTE — PROGRESS NOTE ADULT - PROBLEM SELECTOR PLAN 2
Baseline hb 11, now with hb ~8  -Suspect 2/2 R knee hemarthrosis and other blood loss from trauma with component of hemodilution from IVF given on arrival.   - Vital signs currently stable  - trend hb ; if continues to drop would have low threshold to re-image B/L LE and abdomen/pelvis  - maintain active T/S and 2 large bore IV'S.  - if giving PRBC would recommend to give as 1/2 units with lasix 10mg iv x 1 in between
-  s/p 1/2 unit prbc 3/4 ; followed by IV Lasix  - anemia was likely 2/2 R knee hemarthrosis   - Vital signs stable  - trend Hb daily  - maintain active T/S and 2 large bore IV'S.  - agree with transfusing  1/2 unit PRBC  with lasix 10mg iv given severe aortic stenosis
-  s/p 1/2 unit prbc 3/4 ; followed by IV Lasix  - anemia was likely 2/2 R knee hemarthrosis   - Vital signs stable; Hb stable
-  s/p 1/2 unit prbc 3/4 ; followed by IV Lasix  - anemia was likely 2/2 R knee hemarthrosis   - Vital signs stable; Hb stable  - maintain active T/S and 2 large bore IV'S.
Baseline hb 11, down to 7 yesterday  -  s/p 1/2 unit prbc 3/4 ; followed by IV Lasix  - likely 2/2 R knee hemarthrosis   - Vital signs stable  - trend hb per SICU protocol   - maintain active T/S and 2 large bore IV'S.  - agree with transfusing  1/2 unit PRBC  with lasix 10mg iv given severe aortic stenosis
-  s/p 1/2 unit prbc 3/4 ; followed by IV Lasix  - anemia was likely 2/2 R knee hemarthrosis   - Vital signs stable; Hb stable  - maintain active T/S and 2 large bore IVs.

## 2024-03-11 NOTE — PROVIDER CONTACT NOTE (OTHER) - BACKGROUND
Pt is 53 F with PMH osteogenesis imperfecta, HTN, who cam ein due to a fall causing multiple fractures.
Pt is admited s/p causing mutliple fractures on the right side including ribs, RUE and RLE
pt s/p fall
Pt admitted 03/02 for multiple fractures of ribs, right side, initial encounter for closed fracture.
pt s/p fall

## 2024-03-11 NOTE — PROGRESS NOTE ADULT - PROBLEM SELECTOR PLAN 3
Grade IV per patient ; saw CT surgery in Nov 2023 with recommendation for medical management and routine monitoring  - TTE as above  - takes lasix 20mg po on wed and sunday ; would c/t hold for now given acute blood loss  - Cardiology following  - reasonable to hold HCTZ for now as well given vital signs are WNL  - cont monitoring respiratory/volume status.  - follows with Cardiology ( Prosper Hinojosa , [facility] )
Grade IV per patient ; saw CT surgery in Nov 2023 with recommendation for medical management and routine monitoring  - home med Lasix on hold for now given soft BPs  - reasonable to hold HCTZ for now as well   - c/w Eplerenone  - cont monitoring respiratory/volume status.  - Cardiology reccs appreciated  - follows with Cardiology ( Prosper Hinojosa , [facility] )
Grade IV per patient ; saw CT surgery in Nov 2023 with recommendation for medical management and routine monitoring  - TTE as above  - takes lasix 20mg po on wed and sunday ; would c/t hold for now given acute blood loss  - Cardiology following  - reasonable to hold HCTZ for now as well given vital signs are WNL  - cont monitoring respiratory/volume status.  - follows with Cardiology ( Prosper Hinojosa , [facility] )
Grade IV per patient ; saw CT surgery in Nov 2023 with recommendation for medical management and routine monitoring  - home med Lasix on hold for now given soft BPs  - reasonable to hold HCTZ for now as well   - c/w Eplerenone  - cont monitoring respiratory/volume status.  - Cardiology reccs appreciated  - follows with Cardiology ( Prosper Hinojosa , [facility] )
Grade IV per patient ; saw CT surgery in Nov 2023 with recommendation for medical management and routine monitoring  - TTE as above  - takes lasix 20mg po on wed and sunday ; would c/t hold for now given acute blood loss  - Cardiology following  - reasonable to hold HCTZ for now as well given vital signs are WNL  - cont monitoring respiratory/volume status.  - follows with Cardiology ( Prosper Hinojosa , [facility] )
Grade IV per patient- saw CT surgery in Nov 2023 with recommendation for medical management and routine monitoring  - last TTE reportedly in summer 2023, reasonable to repeat here  - takes lasix 20mg po on wed and sunday ; would hold for now given acute blood loss  - Cardiology following  - reasonable to hold HCTZ for now as well given vital signs are WNL  - cont monitoring respiratory/volume status.

## 2024-03-12 ENCOUNTER — INPATIENT (INPATIENT)
Facility: HOSPITAL | Age: 54
LOS: 17 days | Discharge: HOME HEALTH | DRG: 534 | End: 2024-03-30
Attending: PHYSICAL MEDICINE & REHABILITATION | Admitting: PHYSICAL MEDICINE & REHABILITATION
Payer: COMMERCIAL

## 2024-03-12 VITALS
RESPIRATION RATE: 18 BRPM | OXYGEN SATURATION: 94 % | SYSTOLIC BLOOD PRESSURE: 129 MMHG | TEMPERATURE: 98 F | HEART RATE: 82 BPM | DIASTOLIC BLOOD PRESSURE: 60 MMHG

## 2024-03-12 VITALS
HEART RATE: 100 BPM | RESPIRATION RATE: 16 BRPM | SYSTOLIC BLOOD PRESSURE: 119 MMHG | OXYGEN SATURATION: 93 % | DIASTOLIC BLOOD PRESSURE: 57 MMHG | WEIGHT: 60.63 LBS | TEMPERATURE: 99 F

## 2024-03-12 DIAGNOSIS — Z98.890 OTHER SPECIFIED POSTPROCEDURAL STATES: Chronic | ICD-10-CM

## 2024-03-12 DIAGNOSIS — S72.90XA UNSPECIFIED FRACTURE OF UNSPECIFIED FEMUR, INITIAL ENCOUNTER FOR CLOSED FRACTURE: ICD-10-CM

## 2024-03-12 PROBLEM — I10 ESSENTIAL (PRIMARY) HYPERTENSION: Chronic | Status: ACTIVE | Noted: 2024-03-02

## 2024-03-12 PROBLEM — I35.1 NONRHEUMATIC AORTIC (VALVE) INSUFFICIENCY: Chronic | Status: ACTIVE | Noted: 2024-03-02

## 2024-03-12 PROBLEM — Q78.0 OSTEOGENESIS IMPERFECTA: Chronic | Status: ACTIVE | Noted: 2024-03-02

## 2024-03-12 PROCEDURE — 71045 X-RAY EXAM CHEST 1 VIEW: CPT

## 2024-03-12 PROCEDURE — 74177 CT ABD & PELVIS W/CONTRAST: CPT | Mod: MC

## 2024-03-12 PROCEDURE — 82962 GLUCOSE BLOOD TEST: CPT

## 2024-03-12 PROCEDURE — 73560 X-RAY EXAM OF KNEE 1 OR 2: CPT

## 2024-03-12 PROCEDURE — 97162 PT EVAL MOD COMPLEX 30 MIN: CPT

## 2024-03-12 PROCEDURE — 93306 TTE W/DOPPLER COMPLETE: CPT

## 2024-03-12 PROCEDURE — 99291 CRITICAL CARE FIRST HOUR: CPT

## 2024-03-12 PROCEDURE — 85018 HEMOGLOBIN: CPT

## 2024-03-12 PROCEDURE — 36430 TRANSFUSION BLD/BLD COMPNT: CPT

## 2024-03-12 PROCEDURE — 73590 X-RAY EXAM OF LOWER LEG: CPT

## 2024-03-12 PROCEDURE — 85025 COMPLETE CBC W/AUTO DIFF WBC: CPT

## 2024-03-12 PROCEDURE — 81003 URINALYSIS AUTO W/O SCOPE: CPT

## 2024-03-12 PROCEDURE — 83735 ASSAY OF MAGNESIUM: CPT

## 2024-03-12 PROCEDURE — 85730 THROMBOPLASTIN TIME PARTIAL: CPT

## 2024-03-12 PROCEDURE — 80053 COMPREHEN METABOLIC PANEL: CPT

## 2024-03-12 PROCEDURE — 86900 BLOOD TYPING SEROLOGIC ABO: CPT

## 2024-03-12 PROCEDURE — 73701 CT LOWER EXTREMITY W/DYE: CPT | Mod: MC

## 2024-03-12 PROCEDURE — 83605 ASSAY OF LACTIC ACID: CPT

## 2024-03-12 PROCEDURE — 85014 HEMATOCRIT: CPT

## 2024-03-12 PROCEDURE — 73551 X-RAY EXAM OF FEMUR 1: CPT

## 2024-03-12 PROCEDURE — 73030 X-RAY EXAM OF SHOULDER: CPT

## 2024-03-12 PROCEDURE — 70450 CT HEAD/BRAIN W/O DYE: CPT | Mod: MC

## 2024-03-12 PROCEDURE — 97530 THERAPEUTIC ACTIVITIES: CPT

## 2024-03-12 PROCEDURE — 86901 BLOOD TYPING SEROLOGIC RH(D): CPT

## 2024-03-12 PROCEDURE — 82435 ASSAY OF BLOOD CHLORIDE: CPT

## 2024-03-12 PROCEDURE — P9011: CPT

## 2024-03-12 PROCEDURE — 86985 SPLIT BLOOD OR PRODUCTS: CPT

## 2024-03-12 PROCEDURE — 84100 ASSAY OF PHOSPHORUS: CPT

## 2024-03-12 PROCEDURE — 73502 X-RAY EXAM HIP UNI 2-3 VIEWS: CPT

## 2024-03-12 PROCEDURE — 84132 ASSAY OF SERUM POTASSIUM: CPT

## 2024-03-12 PROCEDURE — 84295 ASSAY OF SERUM SODIUM: CPT

## 2024-03-12 PROCEDURE — 70486 CT MAXILLOFACIAL W/O DYE: CPT | Mod: MC

## 2024-03-12 PROCEDURE — 86850 RBC ANTIBODY SCREEN: CPT

## 2024-03-12 PROCEDURE — 97166 OT EVAL MOD COMPLEX 45 MIN: CPT

## 2024-03-12 PROCEDURE — 85027 COMPLETE CBC AUTOMATED: CPT

## 2024-03-12 PROCEDURE — 82550 ASSAY OF CK (CPK): CPT

## 2024-03-12 PROCEDURE — 97110 THERAPEUTIC EXERCISES: CPT

## 2024-03-12 PROCEDURE — 82803 BLOOD GASES ANY COMBINATION: CPT

## 2024-03-12 PROCEDURE — 85610 PROTHROMBIN TIME: CPT

## 2024-03-12 PROCEDURE — 82947 ASSAY GLUCOSE BLOOD QUANT: CPT

## 2024-03-12 PROCEDURE — 86923 COMPATIBILITY TEST ELECTRIC: CPT

## 2024-03-12 PROCEDURE — 72125 CT NECK SPINE W/O DYE: CPT | Mod: MC

## 2024-03-12 PROCEDURE — 71260 CT THORAX DX C+: CPT | Mod: MC

## 2024-03-12 PROCEDURE — 82330 ASSAY OF CALCIUM: CPT

## 2024-03-12 PROCEDURE — 36415 COLL VENOUS BLD VENIPUNCTURE: CPT

## 2024-03-12 PROCEDURE — 73552 X-RAY EXAM OF FEMUR 2/>: CPT

## 2024-03-12 PROCEDURE — 80048 BASIC METABOLIC PNL TOTAL CA: CPT

## 2024-03-12 PROCEDURE — 76377 3D RENDER W/INTRP POSTPROCES: CPT

## 2024-03-12 PROCEDURE — 73090 X-RAY EXAM OF FOREARM: CPT

## 2024-03-12 PROCEDURE — 83690 ASSAY OF LIPASE: CPT

## 2024-03-12 PROCEDURE — 73060 X-RAY EXAM OF HUMERUS: CPT

## 2024-03-12 PROCEDURE — 72170 X-RAY EXAM OF PELVIS: CPT

## 2024-03-12 PROCEDURE — 73070 X-RAY EXAM OF ELBOW: CPT

## 2024-03-12 RX ORDER — OXYCODONE HYDROCHLORIDE 5 MG/1
5 TABLET ORAL EVERY 4 HOURS
Refills: 0 | Status: DISCONTINUED | OUTPATIENT
Start: 2024-03-12 | End: 2024-03-17

## 2024-03-12 RX ORDER — ALPRAZOLAM 0.25 MG
0.25 TABLET ORAL AT BEDTIME
Refills: 0 | Status: DISCONTINUED | OUTPATIENT
Start: 2024-03-12 | End: 2024-03-19

## 2024-03-12 RX ORDER — OXYCODONE HYDROCHLORIDE 5 MG/1
2.5 TABLET ORAL EVERY 4 HOURS
Refills: 0 | Status: DISCONTINUED | OUTPATIENT
Start: 2024-03-12 | End: 2024-03-16

## 2024-03-12 RX ORDER — POLYETHYLENE GLYCOL 3350 17 G/17G
17 POWDER, FOR SOLUTION ORAL
Qty: 0 | Refills: 0 | DISCHARGE
Start: 2024-03-12

## 2024-03-12 RX ORDER — SENNA PLUS 8.6 MG/1
1 TABLET ORAL AT BEDTIME
Refills: 0 | Status: DISCONTINUED | OUTPATIENT
Start: 2024-03-12 | End: 2024-03-30

## 2024-03-12 RX ORDER — ACETAMINOPHEN 500 MG
975 TABLET ORAL EVERY 6 HOURS
Refills: 0 | Status: DISCONTINUED | OUTPATIENT
Start: 2024-03-12 | End: 2024-03-17

## 2024-03-12 RX ORDER — CYCLOBENZAPRINE HYDROCHLORIDE 10 MG/1
1 TABLET, FILM COATED ORAL
Qty: 0 | Refills: 0 | DISCHARGE
Start: 2024-03-12

## 2024-03-12 RX ORDER — ACETAMINOPHEN 500 MG
3 TABLET ORAL
Qty: 0 | Refills: 0 | DISCHARGE
Start: 2024-03-12

## 2024-03-12 RX ORDER — ESCITALOPRAM OXALATE 10 MG/1
5 TABLET, FILM COATED ORAL DAILY
Refills: 0 | Status: DISCONTINUED | OUTPATIENT
Start: 2024-03-13 | End: 2024-03-13

## 2024-03-12 RX ORDER — HEPARIN SODIUM 5000 [USP'U]/ML
5000 INJECTION INTRAVENOUS; SUBCUTANEOUS EVERY 12 HOURS
Refills: 0 | Status: DISCONTINUED | OUTPATIENT
Start: 2024-03-13 | End: 2024-03-18

## 2024-03-12 RX ORDER — CYCLOBENZAPRINE HYDROCHLORIDE 10 MG/1
5 TABLET, FILM COATED ORAL THREE TIMES A DAY
Refills: 0 | Status: DISCONTINUED | OUTPATIENT
Start: 2024-03-12 | End: 2024-03-21

## 2024-03-12 RX ORDER — SENNA PLUS 8.6 MG/1
2 TABLET ORAL AT BEDTIME
Refills: 0 | Status: DISCONTINUED | OUTPATIENT
Start: 2024-03-12 | End: 2024-03-12

## 2024-03-12 RX ORDER — POLYETHYLENE GLYCOL 3350 17 G/17G
17 POWDER, FOR SOLUTION ORAL DAILY
Refills: 0 | Status: DISCONTINUED | OUTPATIENT
Start: 2024-03-12 | End: 2024-03-30

## 2024-03-12 RX ORDER — OXYCODONE HYDROCHLORIDE 5 MG/1
1 TABLET ORAL
Qty: 0 | Refills: 0 | DISCHARGE
Start: 2024-03-12

## 2024-03-12 RX ORDER — LOSARTAN POTASSIUM 100 MG/1
50 TABLET, FILM COATED ORAL
Refills: 0 | Status: DISCONTINUED | OUTPATIENT
Start: 2024-03-13 | End: 2024-03-30

## 2024-03-12 RX ORDER — ALPRAZOLAM 0.25 MG
1 TABLET ORAL
Qty: 0 | Refills: 0 | DISCHARGE
Start: 2024-03-12

## 2024-03-12 RX ORDER — AMLODIPINE BESYLATE 2.5 MG/1
2.5 TABLET ORAL AT BEDTIME
Refills: 0 | Status: DISCONTINUED | OUTPATIENT
Start: 2024-03-12 | End: 2024-03-14

## 2024-03-12 RX ADMIN — LOSARTAN POTASSIUM 50 MILLIGRAM(S): 100 TABLET, FILM COATED ORAL at 06:33

## 2024-03-12 RX ADMIN — HEPARIN SODIUM 5000 UNIT(S): 5000 INJECTION INTRAVENOUS; SUBCUTANEOUS at 18:40

## 2024-03-12 RX ADMIN — Medication 975 MILLIGRAM(S): at 05:42

## 2024-03-12 RX ADMIN — AMLODIPINE BESYLATE 2.5 MILLIGRAM(S): 2.5 TABLET ORAL at 18:40

## 2024-03-12 RX ADMIN — EPLERENONE 25 MILLIGRAM(S): 50 TABLET, FILM COATED ORAL at 18:40

## 2024-03-12 RX ADMIN — Medication 975 MILLIGRAM(S): at 05:12

## 2024-03-12 RX ADMIN — Medication 975 MILLIGRAM(S): at 10:26

## 2024-03-12 RX ADMIN — ESCITALOPRAM OXALATE 5 MILLIGRAM(S): 10 TABLET, FILM COATED ORAL at 09:20

## 2024-03-12 RX ADMIN — CYCLOBENZAPRINE HYDROCHLORIDE 5 MILLIGRAM(S): 10 TABLET, FILM COATED ORAL at 15:24

## 2024-03-12 RX ADMIN — OXYCODONE HYDROCHLORIDE 5 MILLIGRAM(S): 5 TABLET ORAL at 23:37

## 2024-03-12 RX ADMIN — Medication 975 MILLIGRAM(S): at 23:37

## 2024-03-12 RX ADMIN — SENNA PLUS 2 TABLET(S): 8.6 TABLET ORAL at 23:38

## 2024-03-12 RX ADMIN — CYCLOBENZAPRINE HYDROCHLORIDE 5 MILLIGRAM(S): 10 TABLET, FILM COATED ORAL at 03:11

## 2024-03-12 RX ADMIN — BISOPROLOL FUMARATE AND HYDROCHLOROTHIAZIDE 1 TABLET(S): 5; 6.25 TABLET ORAL at 09:20

## 2024-03-12 RX ADMIN — HEPARIN SODIUM 5000 UNIT(S): 5000 INJECTION INTRAVENOUS; SUBCUTANEOUS at 05:12

## 2024-03-12 RX ADMIN — CHLORHEXIDINE GLUCONATE 1 APPLICATION(S): 213 SOLUTION TOPICAL at 05:15

## 2024-03-12 RX ADMIN — CYCLOBENZAPRINE HYDROCHLORIDE 5 MILLIGRAM(S): 10 TABLET, FILM COATED ORAL at 10:26

## 2024-03-12 RX ADMIN — LOSARTAN POTASSIUM 50 MILLIGRAM(S): 100 TABLET, FILM COATED ORAL at 18:40

## 2024-03-12 NOTE — PROGRESS NOTE ADULT - ATTENDING COMMENTS
ATTENDING ATTESTATION  I have seen and examined this patient on rounds thismorning with the surgery team. I have reviewed all new labs, imaging and reports. I have participated in formulating the plan for the day, and have read and agree with the history, ROS, exam, assessment and plan as stated above.     Pending insurance auth for [facility].   No further labs needed, IV removed at patient's request yesterday.     Jade Marquez M.D., M.S.  Division of Acute Care Surgery

## 2024-03-12 NOTE — H&P ADULT - HISTORY OF PRESENT ILLNESS
54 y/o F with PMH osteogenesis imperfecta s/p upper & lower extremity hardware, aortic insufficiency, and HTN who presented to [facility] on 3/2 s/p fall from her wheelchair. She reports missing a step on the wheelchair, losing control, and falling face first from the wheelchair at a height of around 2-3 ft face first onto a concrete ground. She lost consciousness and regained her awareness of her surroundings some time later, likely seconds to minutes, on a stable with little recollection of the intervening time. Patient noted to have right temporal superficial abrasion, R upper lip laceration through the vermillion border, small ecchymosis on the R chin, tenderness to palpation in the L shoulder/R proximal arm/R wrist/ R thigh with medial rotation of the RLE.  Imaging showed acute, comminuted fracture adjacent to the tip of the right femoral anabell which begins in the distal femoral metadiaphysis and continues into the lateral femoral condyle, right knee hemarthrosis, age indeterminate R sacral ala fracture, age-indeterminate left scapular body fracture, age-indeterminate fracture of the right humeral head, status post pin placement, prior deformity of the right third rib with likely a superimposed acute fracture, likely acute fractures of right 4th-6th ribs. Orthopedics was consulted and recommended NWB of RUE in posterior slab/sling; follow up post splint Xrays, NWB of RLE, may place in cast vs shortened knee immobilizer, LUE can be WBAT given non-displaced scapula fracture and polytrauma, No acute orthopedic surgical intervention planned. Patient evaluated by PT/OT and was recommended for acute inpatient rehab. Patient is medically stable for discharge to [facility] on 3/12.   52 y/o F with PMH osteogenesis imperfecta s/p upper & lower extremity hardware, aortic insufficiency, and HTN who presented to [facility] on 3/2 s/p fall from her wheelchair. She reports missing a step on the wheelchair, losing control, and falling face first from the wheelchair at a height of around 2-3 ft face first onto a concrete ground. She lost consciousness and regained her awareness of her surroundings some time later, likely seconds to minutes, on a stable with little recollection of the intervening time. Patient noted to have right temporal superficial abrasion, R upper lip laceration through the vermillion border, small ecchymosis on the R chin, tenderness to palpation in the L shoulder/R proximal arm/R wrist/ R thigh with medial rotation of the RLE.      Imaging showed acute, comminuted fracture adjacent to the tip of the right femoral anabell which begins in the distal femoral metadiaphysis and continues into the lateral femoral condyle, right knee hemarthrosis, age indeterminate R sacral ala fracture, age-indeterminate left scapular body fracture, age-indeterminate fracture of the right humeral head, status post pin placement, prior deformity of the right third rib with likely a superimposed acute fracture, likely acute fractures of right 4th-6th ribs. Orthopedics was consulted and recommended NWB of RUE; NWB of RLE, LUE can be WBAT given non-displaced scapula fracture and polytrauma. Patient now s/p fiberglass casting of R arm and R leg. No acute orthopedic surgical intervention planned. Patient evaluated by PT/OT and was recommended for acute inpatient rehab. Course complicated by anemia likely 2/2 right knee hemarthrosis, s/p 1/2 unit PRBC. Patient is medically stable for discharge to [facility] on 3/12.   54 y/o F with PMH osteogenesis imperfecta s/p upper & lower extremity hardware, aortic insufficiency, and HTN who presented to [facility] on 3/2 s/p fall from her wheelchair. She reports missing a step on the wheelchair, losing control, and falling face first from the wheelchair at a height of around 2-3 ft face first onto a concrete ground. She lost consciousness and regained her awareness of her surroundings some time later, likely seconds to minutes, with little recollection of the intervening time. Patient noted to have right temporal superficial abrasion, R upper lip laceration through the vermillion border, small ecchymosis on the R chin, tenderness to palpation in the L shoulder/R proximal arm/R wrist/ R thigh with medial rotation of the RLE.      Imaging showed acute, comminuted fracture adjacent to the tip of the right femoral anabell which begins in the distal femoral metadiaphysis and continues into the lateral femoral condyle, right knee hemarthrosis, age indeterminate R sacral ala fracture, age-indeterminate left scapular body fracture, age-indeterminate fracture of the right humeral head, status post pin placement, prior deformity of the right third rib with likely a superimposed acute fracture, likely acute fractures of right 4th-6th ribs. Orthopedics was consulted and recommended NWB of RUE; NWB of RLE, LUE can be WBAT given non-displaced scapula fracture and polytrauma. Patient now s/p fiberglass casting of R arm and R leg. No acute orthopedic surgical intervention planned. Patient evaluated by PT/OT and was recommended for acute inpatient rehab. Course complicated by anemia likely 2/2 right knee hemarthrosis, s/p 1/2 unit PRBC. Patient is medically stable for discharge to [facility] on 3/12.   Mrs. Ivis Grimm is a 53-year-old female patient with past medical history of osteogenesis imperfecta s/p upper & lower extremity hardware, aortic insufficiency, and HTN who presented to [facility] on 3/2 s/p fall from her wheelchair. She reports missing a step on the wheelchair, losing control, and falling face first from the wheelchair at a height of around 2-3 ft face first onto a concrete ground. She lost consciousness and regained her awareness of her surroundings some time later, likely seconds to minutes, with little recollection of the intervening time. Patient noted to have right temporal superficial abrasion, R upper lip laceration through the vermillion border, small ecchymosis on the R chin, tenderness to palpation in the L shoulder/R proximal arm/R wrist/ R thigh with medial rotation of the RLE.      Imaging performed reported acute, comminuted fracture adjacent to the tip of the right femoral anabell which begins in the distal femoral metadiaphysis and continues into the lateral femoral condyle, right knee hemarthrosis, age indeterminate R sacral ala fracture, age-indeterminate left scapular body fracture, age-indeterminate fracture of the right humeral head, status post pin placement, prior deformity of the right third rib with likely a superimposed acute fracture, likely acute fractures of right 4th-6th ribs. Orthopedics was consulted and recommended NWB of RUE; NWB of RLE, LUE can be WBAT given non-displaced scapula fracture and polytrauma. Patient now s/p fiberglass casting of R arm and R leg. No acute orthopedic surgical intervention planned. Patient evaluated by PT/OT and was recommended for acute inpatient rehab. Course complicated by anemia likely 2/2 right knee hemarthrosis, s/p 1/2 unit PRBC. Patient was discharged to [facility] on 3/12.

## 2024-03-12 NOTE — PATIENT PROFILE ADULT - FUNCTIONAL ASSESSMENT - BASIC MOBILITY 6.
2-calculated by average/Not able to assess (calculate score using [facility] averaging method)

## 2024-03-12 NOTE — H&P ADULT - NSHPLABSRESULTS_GEN_ALL_CORE
CT Maxillofacial/Head No Cont (03.02.24 @ 17:20): No fracture. No mass effect, hemorrhage or evidence of acute intracranial pathology.    CT Chest w/ IV Cont (03.02.24 @ 17:20)   Likely acute fractures of right 4th-6th ribs. Age-indeterminate left scapular fracture. Age-indeterminate fracture of the right humeral head, status post pin placement. Fracture of the distal right femur. Status post bilateral femoral anabell placement.    CT Cervical Spine No Cont (03.02.24 @ 17:21)   No cervical fracture or traumatic subluxation. Left scapular fracture.    CT Thoracic/Lumbar Spine Reform w/ IV Cont (03.02.24 @ 17:22): Marked kyphoscoliosis limits evaluation. Diffuse osteopenia. No fracture identified.    CT Femur w/ IV Cont, Bilateral (03.02.24 @ 17:21)   Acute, comminuted, periprosthetic fracture of the right distal femur. Age-indeterminate but possibly acute fracture of the right sacral ala.    Xray Pelvis AP only (03.02.24 @ 19:19)   There is diffuse osseous demineralization with diffuse bony deformity consistent with osteogenesis imperfecta. There are rods within both femurs and both proximal and mid tibias. There is an acute, impacted, periprosthetic fracture at the right distal femoral metadiaphysis which extends into the lateral femoral condyle.    Xray Femur 2 Views, Bilat (03.02.24 @ 21:44)   - Acute mildly comminuted and mildly displaced/impacted oblique longitudinal fracture through right distal femoral metadiaphysis without definite intra-articular cortical step-off on this single frontal radiograph.  - No other acute displaced fracture or dislocation. Status post remote intramedullary anabell fixation of bilateral femoral and tibial diaphyses. Left worse than right lateral femorotibial compartments predominant knee arthrosis. Prominent medial bowing deformity of bilateral fibular diaphyses. Bony demineralization.    Xray Humerus, Right (03.07.24 @ 15:47)   - Long arm circumferential cast currently overlies extremity.  - Intact K wire in proximal two thirds of right humerus traversing impacted proximal humeral surgical neck fracture. - - Redemonstrated slightly comminuted distal humeral supracondylar fracture nonunion versus fibrous union deformity and prominent radial diaphyseal bowing deformity and DRUJ malalignment.  - Redemonstrated inferior humeral head subluxation relative to glenoid with widened subacromial space. AC joint alignment maintained.  - Generalized osteopenia otherwise no discrete lytic or blastic lesions.  - No significant change in overall configuration or alignment of proximal and distal right humeral fracture sites and radial shaft bowing deformity.  - Chronic osseous stigmata of underlying osteogenesis imperfecta.    Xray Femur 1 View, Right (03.07.24 @ 15:46)   - Circumferential long leg cast overlies the right lower extremity.  - Gracile osteopenic osseous structures with notable fibular bowing deformity compatible with chronic osseous stigmata of underlying osteogenesis imperfecta.  - Indwelling thick IM wire/nails in bilateral femoral and tibial diaphyses.  - Redemonstrated offset intra-articular distal femoral condylar fracture extending from level of inferior tip of the IM wire/nail 3 medial aspect of the lateral femoral condyle adjacent to intercondylar notch lateral wall.

## 2024-03-12 NOTE — H&P ADULT - NSHPSOCIALHISTORY_GEN_ALL_CORE
Smoking -   EtOH -   Drugs -     Lives   PTA: Independent in ADLs and ambulation     CURRENT FUNCTIONAL STATUS  Bed Mobility:   Transfers:   Gait: Smoking - denies  EtOH - a few times a month 1-2 drinks  Drugs - 1-2x a month    Is a speech language pathologist  PTA: Independent in ADLs and ambulation from .  [place]  and two grown children. Ramp to enter with 1st floor setup.     CURRENT FUNCTIONAL STATUS  Bed Mobility: Max-A  Transfers: Max-A  Gait: n/a

## 2024-03-12 NOTE — PROGRESS NOTE ADULT - PROVIDER SPECIALTY LIST ADULT
Hospitalist
Hospitalist
SICU
SICU
Surgery
Trauma Surgery
SICU
Surgery
Surgery
Trauma Surgery
Surgery
Trauma Surgery
Trauma Surgery
Rehab Medicine
Hospitalist
Internal Medicine

## 2024-03-12 NOTE — PROGRESS NOTE ADULT - ASSESSMENT
53F with PMHx osteogenesis imperfecta s/p upper & lower extremity hardware, aortic insufficiency, and HTN who presents after fall from her wheelchair with LOC & multiple injuries noted below.     Injuries:  - acute, comminuted fracture adjacent to the tip of the right femoral anabell which begins in the distal femoral metadiaphysis and continues into the lateral femoral condyle  - right knee hemarthrosis  - Age indeterminate R sacral ala fracture  - Age-indeterminate left scapular body fracture.  - Age-indeterminate fracture of the right humeral head, status post pin placement  -Prior deformity of the right third rib with likely a superimposed   acute fracture.  - Likely acute fractures of right 4th-6th ribs.      Previously known findings/ Chronic Findings  - known large, heterogeneously enhancing fibroid measuring 6.8 x 6.5 cm  - known infrarenal aorta measures 0.8 cm in diameter  - Well-corticated, chronic right ulnar styloid fracture    Plan:  - Multimodal pain control, IS   - Orthopedics consult appreciated - RUE & RLE splinted - no operative intervention, re splinted 3/8 lighter mater   - Appreciate hospitalist recommendations  - echo performed and reviewed   - PT evaluation:  recommending acute rehab  - pending PMNR evaluation  - dispo:  [place] pending bed availability    Trauma/Acute Care Surgery   [id number]       53F with PMHx osteogenesis imperfecta s/p upper & lower extremity hardware, aortic insufficiency, and HTN who presents after fall from her wheelchair with LOC & multiple injuries noted below.     Injuries:  - acute, comminuted fracture adjacent to the tip of the right femoral anabell which begins in the distal femoral metadiaphysis and continues into the lateral femoral condyle  - right knee hemarthrosis  - Age indeterminate R sacral ala fracture  - Age-indeterminate left scapular body fracture.  - Age-indeterminate fracture of the right humeral head, status post pin placement  -Prior deformity of the right third rib with likely a superimposed   acute fracture.  - Likely acute fractures of right 4th-6th ribs.      Previously known findings/ Chronic Findings  - known large, heterogeneously enhancing fibroid measuring 6.8 x 6.5 cm  - known infrarenal aorta measures 0.8 cm in diameter  - Well-corticated, chronic right ulnar styloid fracture    Plan:  - Multimodal pain control, IS   - Orthopedics consult appreciated - RUE & RLE splinted - no operative intervention, re splinted 3/8 lighter mater   - Appreciate hospitalist recommendations  - echo performed and reviewed   - PT evaluation:  recommending acute rehab  - pending PMNR evaluation  - dispo:  [facility] pending bed availability      non weight bearing for 6-8 weeks per ortho    Trauma/Acute Care Surgery   [id number]

## 2024-03-12 NOTE — H&P ADULT - NSHPREVIEWOFSYSTEMS_GEN_ALL_CORE
REVIEW OF SYSTEMS  Constitutional: No fever, No Chills  HEENT: No eye pain, No visual disturbances   Pulm: No cough,  No shortness of breath  Cardio: No chest pain, No palpitations  GI:  No abdominal pain, No nausea, No vomiting  Neuro: No headaches, No memory loss Constitutional: No fever, No Chills  HEENT: No eye pain, No visual disturbances   Pulm: No cough,  No shortness of breath  Cardio: No chest pain, No palpitations  GI:  No abdominal pain, No nausea, No vomiting  Neuro: No headaches, No memory loss

## 2024-03-12 NOTE — H&P ADULT - ATTENDING COMMENTS
I independently performed the documented the history, exam, and medical decision making. I have made amendments to the documentation where necessary. I have personally seen and examined the patient. Medical records were reviewed and I have made amendments to the documentation where necessary and adjusted the history, physical examination, and plan as documented by the Resident Physicians. Patient was seen and evaluated at bedside today. Reported no overnight events and is in no acute distress. Eager to participate on the recommended rehabilitation program. Denies any CP, SOB, PEREZ, palpitations, fever, chills, body aches, cough, congestion, or any other symptoms at this time. Admission vitals, labs, and physical exam are outlined below.    LAB                        10.3   8.60  )-----------( 495      ( 13 Mar 2024 05:59 )             32.5     03-13    135  |  99  |  20  ----------------------------<  88  4.7   |  25  |  0.28<L>    Ca    10.0      13 Mar 2024 05:59    TPro  7.4  /  Alb  3.4  /  TBili  0.9  /  DBili  x   /  AST  52<H>  /  ALT  45  /  AlkPhos  106  03-13    LIVER FUNCTIONS - ( 13 Mar 2024 05:59 )  Alb: 3.4 g/dL / Pro: 7.4 g/dL / ALK PHOS: 106 U/L / ALT: 45 U/L / AST: 52 U/L / GGT: x             PHYSICAL EXAM  Vital Signs Last 24 Hrs  T(C): 37.1 (12 Mar 2024 22:02), Max: 37.1 (12 Mar 2024 22:02)  T(F): 98.8 (12 Mar 2024 22:02), Max: 98.8 (12 Mar 2024 22:02)  HR: 108 (13 Mar 2024 06:30) (79 - 108)  BP: 109/57 (13 Mar 2024 06:30) (109/57 - 129/60)  RR: 16 (12 Mar 2024 22:02) (16 - 18)  SpO2: 93% (12 Mar 2024 22:02) (93% - 96%) I independently performed the documented the history, exam, and medical decision making. I have made amendments to the documentation where necessary. I have personally seen and examined the patient. Medical records were reviewed and I have made amendments to the documentation where necessary and adjusted the history, physical examination, and plan as documented by the Resident Physicians. Patient was seen and evaluated at bedside today alongside her mother. Reported no overnight events and is in no acute distress. Orthopedic surgery consulted for recommendations regarding RLE cast given patient expressing limited mobility due to its size.  A discussion took place with patient and her mother.  Focus of discussion was treatment plan and initial evaluations. All questions were answered and they expressed understanding and agreement. Eager to participate on the recommended rehabilitation program. Denies any CP, SOB, PEREZ, palpitations, fever, chills, body aches, cough, congestion, or any other symptoms at this time. Admission vitals, labs, and physical exam are outlined below.    LAB                        10.3   8.60  )-----------( 495      ( 13 Mar 2024 05:59 )             32.5     03-13    135  |  99  |  20  ----------------------------<  88  4.7   |  25  |  0.28<L>    Ca    10.0      13 Mar 2024 05:59    TPro  7.4  /  Alb  3.4  /  TBili  0.9  /  DBili  x   /  AST  52<H>  /  ALT  45  /  AlkPhos  106  03-13    LIVER FUNCTIONS - ( 13 Mar 2024 05:59 )  Alb: 3.4 g/dL / Pro: 7.4 g/dL / ALK PHOS: 106 U/L / ALT: 45 U/L / AST: 52 U/L / GGT: x             PHYSICAL EXAM  Vital Signs Last 24 Hrs  T(C): 37.1 (12 Mar 2024 22:02), Max: 37.1 (12 Mar 2024 22:02)  T(F): 98.8 (12 Mar 2024 22:02), Max: 98.8 (12 Mar 2024 22:02)  HR: 108 (13 Mar 2024 06:30) (79 - 108)  BP: 109/57 (13 Mar 2024 06:30) (109/57 - 129/60)  RR: 16 (12 Mar 2024 22:02) (16 - 18)  SpO2: 93% (12 Mar 2024 22:02) (93% - 96%)    Vital Signs  T(C): 37.1 (03-12-24 @ 22:02), Max: 37.1 (03-12-24 @ 22:02)  HR: 100 (03-12-24 @ 22:02) (79 - 100)  BP: 119/57 (03-12-24 @ 22:02) (113/66 - 129/60)  RR: 16 (03-12-24 @ 22:02) (16 - 18)  SpO2: 93% (03-12-24 @ 22:02) (93% - 96%)    Gen - NAD, Comfortable  HEENT -EOMI, MMM. Ecchymosis of chin  Pulm - CTAB, No wheeze, No rhonchi, No crackles  Cardiovascular - RRR, S1S2, +murmur  Abdomen - Soft, NT/ND, +BS  Extremities - right cast on UE and LE.   Neuro-     Cognitive - AAOx3     Communication - Fluent, No dysarthria     Attention: Intact      Judgement: Good evidence of judgement     Memory: Recall 3 objects immediate and 3 min later         Cranial Nerves - CN 2-12 intact     Motor -                    LEFT    UE - ShAB limited 2/2 scap fracture, EF 5/5, EE 5/5, WE 5/5,  5/5                     RIGHT UE - n/a                    LEFT    LE - HF 4/5, KE 4/5, DF 4/5, PF 4/5                    RIGHT LE - n/a     Sensory - Intact to LT     Tone - normal  Psychiatric - Mood stable, Affect WNL  Skin: Intact  Wounds: None Present

## 2024-03-12 NOTE — PROGRESS NOTE ADULT - SUBJECTIVE AND OBJECTIVE BOX
SICU Progress Note    Interval Events:   -1/2 U pRBC for downtrending H/H  -Blood followed by 10 Lasix     SUBJECTIVE: Pt seen and examined at bedside. Patient comfortable and in no-apparent distress. No nausea, vomiting, diarrhea. Pain is controlled. +Gas/+BM. Tolerating diet.    Vital Signs Last 24 Hrs  T(C): 36.8 (04 Mar 2024 22:00), Max: 37.3 (04 Mar 2024 19:00)  T(F): 98.3 (04 Mar 2024 22:00), Max: 99.1 (04 Mar 2024 19:00)  HR: 75 (05 Mar 2024 01:00) (68 - 97)  BP: 132/63 (05 Mar 2024 01:00) (116/58 - 165/63)  BP(mean): 90 (05 Mar 2024 01:00) (81 - 100)  RR: 18 (05 Mar 2024 01:00) (18 - 43)  SpO2: 100% (05 Mar 2024 01:00) (90% - 100%)    Parameters below as of 04 Mar 2024 19:00  Patient On (Oxygen Delivery Method): nasal cannula  O2 Flow (L/min): 1    PHYSICAL EXAM:  Constitutional: NAD  HEENT: R Lip laceration s/p repair  Respiratory: Non-labored breathing, patent airway, IS 500cc  Gastrointestinal: Abdomen soft, nontender, nondistended  Extremities: -NWB of RLE in splint  -NWB of RUE in splint    LABS:                        7.0    8.84  )-----------( 231      ( 04 Mar 2024 21:08 )             20.1     03-04    134<L>  |  96  |  9   ----------------------------<  97  3.6   |  28  |  <0.30<L>    Ca    8.7      04 Mar 2024 21:08  Phos  2.5     03-04  Mg     2.0     03-04        Urinalysis Basic - ( 04 Mar 2024 21:08 )    Color: x / Appearance: x / SG: x / pH: x  Gluc: 97 mg/dL / Ketone: x  / Bili: x / Urobili: x   Blood: x / Protein: x / Nitrite: x   Leuk Esterase: x / RBC: x / WBC x   Sq Epi: x / Non Sq Epi: x / Bacteria: x        INs and OUTs:    03-03-24 @ 07:01  -  03-04-24 @ 07:00  --------------------------------------------------------  IN: 1310 mL / OUT: 750 mL / NET: 560 mL    03-04-24 @ 07:01  -  03-05-24 @ 01:47  --------------------------------------------------------  IN: 780 mL / OUT: 885 mL / NET: -105 mL    
TRAUMA SURGERY DAILY PROGRESS NOTE:         SUBJECTIVE/ROS: Patient seen and examined at bedside.  States new cast feels better.  States she has relief of back spasms after taking Robaxin and pain is controlled on current pain regimen.   Denies nausea, vomiting, chest pain, shortness of breath.           MEDICATIONS  (STANDING):  acetaminophen     Tablet .. 975 milliGRAM(s) Oral every 6 hours  amLODIPine   Tablet 2.5 milliGRAM(s) Oral at bedtime  bisoprolol  5 mG/hydrochlorothiazide 6.25 mG 1 Tablet(s) Oral <User Schedule>  chlorhexidine 2% Cloths 1 Application(s) Topical <User Schedule>  eplerenone 25 milliGRAM(s) Oral daily  escitalopram 5 milliGRAM(s) Oral daily  heparin   Injectable 5000 Unit(s) SubCutaneous every 12 hours  lidocaine   4% Patch 1 Patch Transdermal every 24 hours  losartan 50 milliGRAM(s) Oral two times a day  polyethylene glycol 3350 17 Gram(s) Oral daily  senna 2 Tablet(s) Oral at bedtime    MEDICATIONS  (PRN):  ALPRAZolam 0.25 milliGRAM(s) Oral at bedtime PRN Sleep  oxyCODONE    IR 5 milliGRAM(s) Oral every 4 hours PRN Severe Pain (7 - 10)  oxyCODONE    IR 2.5 milliGRAM(s) Oral every 3 hours PRN Moderate Pain (4 - 6)      OBJECTIVE:    Vital Signs Last 24 Hrs  T(C): 36.3 (08 Mar 2024 06:22), Max: 37.3 (07 Mar 2024 20:43)  T(F): 97.3 (08 Mar 2024 06:22), Max: 99.2 (07 Mar 2024 20:43)  HR: 82 (08 Mar 2024 06:22) (82 - 99)  BP: 102/65 (08 Mar 2024 06:22) (102/65 - 119/65)  BP(mean): --  RR: 18 (08 Mar 2024 06:22) (18 - 18)  SpO2: 92% (08 Mar 2024 06:22) (92% - 95%)    Parameters below as of 08 Mar 2024 06:22  Patient On (Oxygen Delivery Method): room air            I&O's Detail    07 Mar 2024 07:01  -  08 Mar 2024 07:00  --------------------------------------------------------  IN:    Oral Fluid: 120 mL  Total IN: 120 mL    OUT:    Voided (mL): 900 mL  Total OUT: 900 mL    Total NET: -780 mL      PHYSICAL EXAM:  General: laying in bed, NAD  HEENT:  right sided chin and lip bruising and swelling   Respiratory: non labored breathing  Extremities:  RUE and RLE in splints         LABS:                        8.4    9.33  )-----------( 282      ( 07 Mar 2024 07:33 )             25.8     03-07    134<L>  |  96  |  18  ----------------------------<  84  3.8   |  28  |  <0.30<L>    Ca    9.1      07 Mar 2024 07:13  Phos  3.8     03-07  Mg     1.8     03-07        Urinalysis Basic - ( 07 Mar 2024 07:13 )    Color: x / Appearance: x / SG: x / pH: x  Gluc: 84 mg/dL / Ketone: x  / Bili: x / Urobili: x   Blood: x / Protein: x / Nitrite: x   Leuk Esterase: x / RBC: x / WBC x   Sq Epi: x / Non Sq Epi: x / Bacteria: x          
TRAUMA SURGERY DAILY PROGRESS NOTE:     SUBJECTIVE/ROS: Patient seen and examined. She is resting comfortably, no new complaints.      MEDICATIONS  (STANDING):  acetaminophen     Tablet .. 975 milliGRAM(s) Oral every 6 hours  amLODIPine   Tablet 2.5 milliGRAM(s) Oral at bedtime  bisoprolol  5 mG/hydrochlorothiazide 6.25 mG 1 Tablet(s) Oral <User Schedule>  chlorhexidine 2% Cloths 1 Application(s) Topical <User Schedule>  eplerenone 25 milliGRAM(s) Oral daily  escitalopram 5 milliGRAM(s) Oral daily  heparin   Injectable 5000 Unit(s) SubCutaneous every 12 hours  lidocaine   4% Patch 1 Patch Transdermal every 24 hours  losartan 50 milliGRAM(s) Oral two times a day  polyethylene glycol 3350 17 Gram(s) Oral daily  senna 2 Tablet(s) Oral at bedtime    MEDICATIONS  (PRN):  ALPRAZolam 0.25 milliGRAM(s) Oral at bedtime PRN Sleep  oxyCODONE    IR 5 milliGRAM(s) Oral every 4 hours PRN Severe Pain (7 - 10)  oxyCODONE    IR 2.5 milliGRAM(s) Oral every 3 hours PRN Moderate Pain (4 - 6)      OBJECTIVE:    Vital Signs Last 24 Hrs  T(C): 36.6 (07 Mar 2024 04:48), Max: 37.6 (06 Mar 2024 13:02)  T(F): 97.9 (07 Mar 2024 04:48), Max: 99.7 (06 Mar 2024 13:02)  HR: 92 (07 Mar 2024 04:48) (83 - 97)  BP: 114/70 (07 Mar 2024 04:48) (106/60 - 137/72)  BP(mean): --  RR: 18 (07 Mar 2024 04:48) (18 - 18)  SpO2: 93% (07 Mar 2024 04:48) (90% - 93%)    Parameters below as of 07 Mar 2024 04:48  Patient On (Oxygen Delivery Method): room air            I&O's Detail    06 Mar 2024 07:01  -  07 Mar 2024 07:00  --------------------------------------------------------  IN:  Total IN: 0 mL    OUT:    Voided (mL): 800 mL  Total OUT: 800 mL    Total NET: -800 mL          Daily     Daily     LABS:                        8.8    10.02 )-----------( 265      ( 06 Mar 2024 07:25 )             26.4     03-06    134<L>  |  95<L>  |  17  ----------------------------<  87  4.0   |  27  |  <0.30<L>    Ca    9.6      06 Mar 2024 07:27  Phos  3.4     03-06  Mg     1.8     03-06        Urinalysis Basic - ( 06 Mar 2024 07:27 )    Color: x / Appearance: x / SG: x / pH: x  Gluc: 87 mg/dL / Ketone: x  / Bili: x / Urobili: x   Blood: x / Protein: x / Nitrite: x   Leuk Esterase: x / RBC: x / WBC x   Sq Epi: x / Non Sq Epi: x / Bacteria: x                PHYSICAL EXAM:    General: laying in bed, NAD  HEENT:  right sided chin and lip bruising and swelling   Respiratory: non labored breathing  Extremities:  RUE and RLE in splints         
Trauma SURGERY DAILY PROGRESS NOTE:       SUBJECTIVE/ROS: Patient seen and examined. Pain is well controlled. Awaiting transfer to acute rehab.       MEDICATIONS  (STANDING):  acetaminophen     Tablet .. 975 milliGRAM(s) Oral every 6 hours  amLODIPine   Tablet 2.5 milliGRAM(s) Oral at bedtime  bisoprolol  5 mG/hydrochlorothiazide 6.25 mG 1 Tablet(s) Oral <User Schedule>  chlorhexidine 2% Cloths 1 Application(s) Topical <User Schedule>  eplerenone 25 milliGRAM(s) Oral daily  escitalopram 5 milliGRAM(s) Oral daily  heparin   Injectable 5000 Unit(s) SubCutaneous every 12 hours  losartan 50 milliGRAM(s) Oral two times a day  polyethylene glycol 3350 17 Gram(s) Oral daily    MEDICATIONS  (PRN):  ALPRAZolam 0.25 milliGRAM(s) Oral at bedtime PRN Sleep  cyclobenzaprine 5 milliGRAM(s) Oral three times a day PRN Muscle Spasm  oxyCODONE    IR 5 milliGRAM(s) Oral every 4 hours PRN Severe Pain (7 - 10)  oxyCODONE    IR 2.5 milliGRAM(s) Oral every 3 hours PRN Moderate Pain (4 - 6)      OBJECTIVE:    Vital Signs Last 24 Hrs  T(C): 36.9 (12 Mar 2024 06:08), Max: 37 (11 Mar 2024 18:00)  T(F): 98.5 (12 Mar 2024 06:08), Max: 98.6 (11 Mar 2024 18:00)  HR: 98 (12 Mar 2024 06:08) (76 - 98)  BP: 114/71 (12 Mar 2024 06:08) (113/72 - 138/55)  BP(mean): --  RR: 18 (12 Mar 2024 06:08) (18 - 18)  SpO2: 94% (12 Mar 2024 06:08) (93% - 96%)    Parameters below as of 12 Mar 2024 06:08  Patient On (Oxygen Delivery Method): room air            I&O's Detail    11 Mar 2024 07:01  -  12 Mar 2024 07:00  --------------------------------------------------------  IN:    Oral Fluid: 480 mL  Total IN: 480 mL    OUT:    Voided (mL): 400 mL  Total OUT: 400 mL    Total NET: 80 mL          Daily     Daily     LABS:              PHYSICAL EXAM:    General: laying in bed, NAD  HEENT:  right sided chin and lip bruising and swelling   Respiratory: non labored breathing  Extremities:  RUE and RLE in splints         
HISTORY  53y Female    24 HOUR EVENTS:  -heparin dvt ppx  -30cc/hr LR  -3/3 downtrending hgb stable, cbc q6h  -daily labs if cbc stable      SUBJECTIVE/ROS:  [x] A ten-point review of systems was otherwise negative except as noted.  [ ] Due to altered mental status/intubation, subjective information were not able to be obtained from the patient. History was obtained, to the extent possible, from review of the chart and collateral sources of information.      NEURO  Exam: AOx4  Meds: ALPRAZolam 0.25 milliGRAM(s) Oral at bedtime PRN Sleep  escitalopram 5 milliGRAM(s) Oral daily  oxyCODONE    IR 5 milliGRAM(s) Oral every 3 hours PRN Severe Pain (7 - 10)  oxyCODONE    IR 2.5 milliGRAM(s) Oral every 3 hours PRN Moderate Pain (4 - 6)    [x] Adequacy of sedation and pain control has been assessed and adjusted      RESPIRATORY  RR: 23 (03-03-24 @ 23:00) (22 - 41)  SpO2: 99% (03-03-24 @ 23:00) (89% - 100%)  Wt(kg): --  Exam: CTA b/l. No murmurs, rubs, gallops appreciated.       CARDIOVASCULAR  HR: 75 (03-03-24 @ 23:00) (71 - 114)  BP: 140/66 (03-03-24 @ 23:00) (95/54 - 147/65)  BP(mean): 95 (03-03-24 @ 23:00) (72 - 95)  ABP: --  ABP(mean): --  Wt(kg): --  CVP(cm H2O): --  VBG - ( 02 Mar 2024 16:18 )  pH: 7.38  /  pCO2: 43    /  pO2: 51    / HCO3: 25    / Base Excess: 0.1   /  SaO2: 80.5   Lactate: 1.7                Exam: S1S2. No murmurs, rubs, gallops appreciated.  Cardiac Rhythm: NSR  Meds: amLODIPine   Tablet 2.5 milliGRAM(s) Oral daily  losartan 50 milliGRAM(s) Oral two times a day        GI/NUTRITION  Exam: Soft, non-distended, non-tender.   Diet:  Meds: polyethylene glycol 3350 17 Gram(s) Oral daily  senna 2 Tablet(s) Oral at bedtime      GENITOURINARY  I&O's Detail    03-02 @ 07:01  -  03-03 @ 07:00  --------------------------------------------------------  IN:    IV PiggyBack: 50 mL  Total IN: 50 mL    OUT:    Voided (mL): 300 mL  Total OUT: 300 mL    Total NET: -250 mL      03-03 @ 07:01  -  03-04 @ 00:18  --------------------------------------------------------  IN:    IV PiggyBack: 100 mL    Lactated Ringers: 450 mL  Total IN: 550 mL    OUT:    Voided (mL): 500 mL  Total OUT: 500 mL    Total NET: 50 mL          03-03    132<L>  |  96  |  9   ----------------------------<  119<H>  3.9   |  28  |  <0.30<L>    Ca    8.8      03 Mar 2024 22:18  Phos  2.6     03-03  Mg     1.8     03-03    TPro  7.0  /  Alb  4.1  /  TBili  0.5  /  DBili  x   /  AST  37  /  ALT  20  /  AlkPhos  88  03-02    [ ] Mora catheter, indication: N/A  Meds: lactated ringers. 1000 milliLiter(s) IV Continuous <Continuous>        HEMATOLOGIC  Meds: heparin   Injectable 5000 Unit(s) SubCutaneous every 12 hours    [x] VTE Prophylaxis                        8.2    8.49  )-----------( 234      ( 03 Mar 2024 22:18 )             24.3     PT/INR - ( 03 Mar 2024 00:10 )   PT: 12.0 sec;   INR: 1.15 ratio         PTT - ( 03 Mar 2024 00:10 )  PTT:30.5 sec  Transfusion     [ ] PRBC   [ ] Platelets   [ ] FFP   [ ] Cryoprecipitate      INFECTIOUS DISEASES  T(C): 37.2 (03-03-24 @ 22:00), Max: 37.3 (03-03-24 @ 11:00)  Wt(kg): --  WBC Count: 8.49 K/uL (03-03 @ 22:18)  WBC Count: 9.44 K/uL (03-03 @ 16:36)  WBC Count: 9.47 K/uL (03-03 @ 10:26)    Recent Cultures:    Meds:       ENDOCRINE  Capillary Blood Glucose    Meds:       ACCESS DEVICES:  [x] Peripheral IV  [ ] Central Venous Line	[ ] R	[ ] L	[ ] IJ	[ ] Fem	[ ] SC	Placed:   [ ] Arterial Line		[ ] R	[ ] L	[ ] Fem	[ ] Rad	[ ] Ax	Placed:   [ ] PICC:					[ ] Mediport  [ ] Urinary Catheter, Date Placed:   [ ] Necessity of urinary, arterial, and venous catheters discussed    OTHER MEDICATIONS:  chlorhexidine 2% Cloths 1 Application(s) Topical <User Schedule>  lidocaine   4% Patch 1 Patch Transdermal every 24 hours      CODE STATUS: Full    IMAGING:
SURGERY DAILY PROGRESS NOTE    SUBJECTIVE: Patient seen and evaluated on AM rounds.      Overnight Events:  No acute events overnight  -----------------------------------------------------------------------------------------------------------------------------------------------------------------------------------------------------------  OBJECTIVE:  Vital Signs Last 24 Hrs  T(C): 36.9 (10 Mar 2024 05:19), Max: 37.2 (09 Mar 2024 16:34)  T(F): 98.4 (10 Mar 2024 05:19), Max: 98.9 (09 Mar 2024 16:34)  HR: 82 (10 Mar 2024 05:19) (82 - 92)  BP: 116/54 (10 Mar 2024 05:19) (108/59 - 130/69)  BP(mean): --  RR: 18 (10 Mar 2024 05:19) (18 - 18)  SpO2: 95% (10 Mar 2024 05:19) (93% - 95%)    Parameters below as of 10 Mar 2024 05:19  Patient On (Oxygen Delivery Method): room air      I&O's Detail    09 Mar 2024 06:01  -  10 Mar 2024 07:00  --------------------------------------------------------  IN:  Total IN: 0 mL    OUT:    Voided (mL): 775 mL  Total OUT: 775 mL    Total NET: -775 mL        Daily     Daily   MEDICATIONS  (STANDING):  acetaminophen     Tablet .. 975 milliGRAM(s) Oral every 6 hours  amLODIPine   Tablet 2.5 milliGRAM(s) Oral at bedtime  bisoprolol  5 mG/hydrochlorothiazide 6.25 mG 1 Tablet(s) Oral <User Schedule>  chlorhexidine 2% Cloths 1 Application(s) Topical <User Schedule>  eplerenone 25 milliGRAM(s) Oral daily  escitalopram 5 milliGRAM(s) Oral daily  heparin   Injectable 5000 Unit(s) SubCutaneous every 12 hours  losartan 50 milliGRAM(s) Oral two times a day  polyethylene glycol 3350 17 Gram(s) Oral daily  senna 2 Tablet(s) Oral at bedtime    MEDICATIONS  (PRN):  ALPRAZolam 0.25 milliGRAM(s) Oral at bedtime PRN Sleep  cyclobenzaprine 5 milliGRAM(s) Oral three times a day PRN Muscle Spasm  oxyCODONE    IR 2.5 milliGRAM(s) Oral every 3 hours PRN Moderate Pain (4 - 6)  oxyCODONE    IR 5 milliGRAM(s) Oral every 4 hours PRN Severe Pain (7 - 10)      LABS:      PHYSICAL EXAM:  General: laying in bed, NAD  HEENT:  right sided chin and lip bruising and swelling   Respiratory: non labored breathing  Extremities:  RUE and RLE in splints 
TRAUMA SURGERY DAILY PROGRESS NOTE:       SICU Interval Events:   -1/2 U pRBC for downtrending H/H  -Blood followed by 10 Lasix         SUBJECTIVE/ROS: Patient seen and examined at bedside.   States pain controlled with analgesia.   Denies nausea, vomiting, chest pain, shortness of breath.         MEDICATIONS  (STANDING):  acetaminophen   IVPB .. 350 milliGRAM(s) IV Intermittent every 8 hours  amLODIPine   Tablet 2.5 milliGRAM(s) Oral at bedtime  chlorhexidine 2% Cloths 1 Application(s) Topical <User Schedule>  eplerenone 25 milliGRAM(s) Oral daily  escitalopram 5 milliGRAM(s) Oral daily  heparin   Injectable 5000 Unit(s) SubCutaneous every 12 hours  lidocaine   4% Patch 1 Patch Transdermal every 24 hours  losartan 50 milliGRAM(s) Oral two times a day  polyethylene glycol 3350 17 Gram(s) Oral daily  senna 2 Tablet(s) Oral at bedtime    MEDICATIONS  (PRN):  ALPRAZolam 0.25 milliGRAM(s) Oral at bedtime PRN Sleep  oxyCODONE    IR 2.5 milliGRAM(s) Oral every 3 hours PRN Moderate Pain (4 - 6)  oxyCODONE    IR 5 milliGRAM(s) Oral every 3 hours PRN Severe Pain (7 - 10)      OBJECTIVE:    Vital Signs Last 24 Hrs  T(C): 36.9 (05 Mar 2024 05:00), Max: 37.3 (04 Mar 2024 19:00)  T(F): 98.5 (05 Mar 2024 05:00), Max: 99.1 (04 Mar 2024 19:00)  HR: 79 (05 Mar 2024 06:00) (68 - 97)  BP: 153/67 (05 Mar 2024 06:00) (116/58 - 165/63)  BP(mean): 96 (05 Mar 2024 06:00) (81 - 99)  RR: 23 (05 Mar 2024 06:00) (18 - 43)  SpO2: 100% (05 Mar 2024 06:00) (90% - 100%)    Parameters below as of 04 Mar 2024 19:00  Patient On (Oxygen Delivery Method): nasal cannula  O2 Flow (L/min): 1          I&O's Detail    04 Mar 2024 07:01  -  05 Mar 2024 07:00  --------------------------------------------------------  IN:    IV PiggyBack: 185 mL    Lactated Ringers: 30 mL    Oral Fluid: 450 mL    PRBCs (Packed Red Blood Cells): 150 mL  Total IN: 815 mL    OUT:    Voided (mL): 1160 mL  Total OUT: 1160 mL    Total NET: -345 mL          PHYSICAL EXAM:  General:  laying in bed, NAD  Respiratory: non labored breathing      LABS:                        8.8    10.16 )-----------( 230      ( 05 Mar 2024 03:44 )             25.6     03-04    134<L>  |  96  |  9   ----------------------------<  97  3.6   |  28  |  <0.30<L>    Ca    8.7      04 Mar 2024 21:08  Phos  2.5     03-04  Mg     2.0     03-04        Urinalysis Basic - ( 04 Mar 2024 21:08 )    Color: x / Appearance: x / SG: x / pH: x  Gluc: 97 mg/dL / Ketone: x  / Bili: x / Urobili: x   Blood: x / Protein: x / Nitrite: x   Leuk Esterase: x / RBC: x / WBC x   Sq Epi: x / Non Sq Epi: x / Bacteria: x        
complains of back spasms      REVIEW OF SYSTEMS  Constitutional - No fever,  No fatigue  HEENT - No vertigo, No neck pain  Neurological - No headaches, No memory loss  Psychiatric - No depression, No anxiety    FUNCTIONAL PROGRESS  PT 3/7  bed mobility max assist    VITALS  T(C): 36.7 (03-08-24 @ 08:15), Max: 37.3 (03-07-24 @ 20:43)  HR: 82 (03-08-24 @ 08:15) (82 - 99)  BP: 128/65 (03-08-24 @ 08:15) (102/65 - 128/65)  RR: 18 (03-08-24 @ 08:15) (18 - 18)  SpO2: 98% (03-08-24 @ 08:15) (92% - 98%)  Wt(kg): --    MEDICATIONS   acetaminophen     Tablet .. 975 milliGRAM(s) every 6 hours  ALPRAZolam 0.25 milliGRAM(s) at bedtime PRN  amLODIPine   Tablet 2.5 milliGRAM(s) at bedtime  bisoprolol  5 mG/hydrochlorothiazide 6.25 mG 1 Tablet(s) <User Schedule>  chlorhexidine 2% Cloths 1 Application(s) <User Schedule>  eplerenone 25 milliGRAM(s) daily  escitalopram 5 milliGRAM(s) daily  heparin   Injectable 5000 Unit(s) every 12 hours  lidocaine   4% Patch 1 Patch every 24 hours  losartan 50 milliGRAM(s) two times a day  oxyCODONE    IR 5 milliGRAM(s) every 4 hours PRN  oxyCODONE    IR 2.5 milliGRAM(s) every 3 hours PRN  polyethylene glycol 3350 17 Gram(s) daily  senna 2 Tablet(s) at bedtime      RECENT LABS - Reviewed                        8.4    9.33  )-----------( 282      ( 07 Mar 2024 07:33 )             25.8     03-07    134<L>  |  96  |  18  ----------------------------<  84  3.8   |  28  |  <0.30<L>    Ca    9.1      07 Mar 2024 07:13  Phos  3.8     03-07  Mg     1.8     03-07        Urinalysis Basic - ( 07 Mar 2024 07:13 )    Color: x / Appearance: x / SG: x / pH: x  Gluc: 84 mg/dL / Ketone: x  / Bili: x / Urobili: x   Blood: x / Protein: x / Nitrite: x   Leuk Esterase: x / RBC: x / WBC x   Sq Epi: x / Non Sq Epi: x / Bacteria: x      < from: CT Abdomen and Pelvis w/ IV Cont (03.02.24 @ 17:23) >    IMPRESSION:  Likely acute fractures of right 4th-6th ribs.  Age-indeterminate left scapular fracture.  Age-indeterminate fracture of the right humeral head, status post pin   placement.  Fracture of the distal right femur. Status post bilateral femoral anabell   placement.  Please see dedicated reports of the cervical spine, thoracic spine,   lumbar spine, and bilateral femur/pelvis for complete details.  No obvious acute abnormality within the chest, abdomen and pelvis per    < end of copied text >        ----------------------------------------------------------------------------------------  PHYSICAL EXAM  Constitutional - NAD, Comfortable, in bed   Chest - Breathing comfortably  Cardiovascular - S1S2   Abdomen - Soft   Extremities - decreased left shoulder ROM, chronic    RLE/RUE splint  Neurologic Exam -    follows commands                 Cognitive - Awake, Alert, AAO to self, place, date, year, situation     Communication - Fluent, No dysarthria        Motor -                     LEFT     EF 5/5, EE 5/5, WE 5/5,  5/5                    RIGHT UE - moves fingers                     LEFT    LE - HF 5/5, KE 5/5, DF 5/5, PF 5/5                    RIGHT LE - unable to lift      Sensory - Intact to LT     Psychiatric - Mood stable, Affect WNL  ----------------------------------------------------------------------------------------  ASSESSMENT/PLAN  53yFemale h/o OI, HTN, aortic insufficiency with functional deficits after fall from wheelchair  no acute surgical intervention recommended  Rt distal femur fracture, NWB  Rt distal humerus fracture, NWB  L scapula fracture, WBAT  right rib fractures   anemia, s/p transfusion, continue to monitor   Pain - Tylenol oxycodone, lidocaine, back spasms, relief with robaxin   DVT PPX - SCDs heparin   Rehab - Will continue to follow for ongoing rehab needs and recommendations.   plaster splints changed to fiberglass casts, xrays pending   recommend continued inpatient rehabilitation, she will not be independent until weight bearing is restored, and will need assist from family/ on discharge if she goes to acute rehab     Recommend ACUTE inpatient rehabilitation for the functional deficits consisting of 3 hours of therapy/day & x 4 weeks, 24 hour RN/daily PMR physician for comorbid medical management. Patient will be able to tolerate 3 hours a day.       
24 HOUR EVENTS:    SUBJECTIVE/ROS:  [X] A ten-point review of systems was otherwise negative except as noted.  [ ] Due to altered mental status/intubation, subjective information were not able to be obtained from the patient. History was obtained, to the extent possible, from review of the chart and collateral sources of information.      NEURO  RASS: 0     GCS: 15       Exam: awake, alert, oriented  Meds: acetaminophen   IVPB .. 500 milliGRAM(s) IV Intermittent every 6 hours  ALPRAZolam 0.25 milliGRAM(s) Oral at bedtime PRN Sleep  escitalopram 5 milliGRAM(s) Oral daily  oxyCODONE    IR 2.5 milliGRAM(s) Oral every 3 hours PRN Moderate Pain (4 - 6)  oxyCODONE    IR 5 milliGRAM(s) Oral every 3 hours PRN Severe Pain (7 - 10)    [x] Adequacy of sedation and pain control has been assessed and adjusted      RESPIRATORY  RR: 26 (03-02-24 @ 22:00) (17 - 26)  SpO2: 100% (03-02-24 @ 22:00) (95% - 100%)    Exam: unlabored, clear to auscultation bilaterally      CARDIOVASCULAR  HR: 70 (03-02-24 @ 22:00) (64 - 88)  BP: 125/58 (03-02-24 @ 22:00) (80/50 - 139/49)  BP(mean): 83 (03-02-24 @ 22:00) (67 - 83)  VBG - ( 02 Mar 2024 16:18 )  pH: 7.38  /  pCO2: 43    /  pO2: 51    / HCO3: 25    / Base Excess: 0.1   /  SaO2: 80.5   Lactate: 1.7                Exam: regular rate and rhythm  Cardiac Rhythm: sinus  Perfusion     [x]Adequate   [ ]Inadequate  Mentation   [x]Normal       [ ]Reduced  Extremities  [x]Warm         [ ]Cool  Volume Status [ ]Hypervolemic [x]Euvolemic [ ]Hypovolemic      GI/NUTRITION  Exam: soft, nontender, nondistended, incision C/D/I  Diet: NPO  Meds:     GENITOURINARY  I&O's Detail    Weight (kg): 23.1 (03-02 @ 15:30)  03-03    135  |  98  |  13  ----------------------------<  145<H>  4.1   |  24  |  <0.30<L>    Ca    9.2      03 Mar 2024 00:10  Phos  4.0     03-03  Mg     1.9     03-03    TPro  7.0  /  Alb  4.1  /  TBili  0.5  /  DBili  x   /  AST  37  /  ALT  20  /  AlkPhos  88  03-02    [ ] Mora catheter, indication: N/A  Meds:       HEMATOLOGIC  Meds:   [x] VTE Prophylaxis                        8.7    14.94 )-----------( 295      ( 03 Mar 2024 00:10 )             26.6     PT/INR - ( 03 Mar 2024 00:10 )   PT: 12.0 sec;   INR: 1.15 ratio         PTT - ( 03 Mar 2024 00:10 )  PTT:30.5 sec  Transfusion     [ ] PRBC   [ ] Platelets   [ ] FFP   [ ] Cryoprecipitate      INFECTIOUS DISEASES  WBC Count: 14.94 K/uL (03-03 @ 00:10)  WBC Count: 15.44 K/uL (03-02 @ 16:16)    RECENT CULTURES:    Meds:       ENDOCRINE  CAPILLARY BLOOD GLUCOSE      POCT Blood Glucose.: 151 mg/dL (02 Mar 2024 18:26)    Meds:       ACCESS DEVICES:  [X] Peripheral IV  [x] Necessity of urinary, arterial, and venous catheters discussed    OTHER MEDICATIONS:  chlorhexidine 2% Cloths 1 Application(s) Topical <User Schedule>  lidocaine   4% Patch 1 Patch Transdermal every 24 hours      CODE STATUS: FULL      IMAGING:  CT BRAIN/ MAXILLOFACIAL:  TECHNIQUE:  Serial axial images were obtained from the skull base to the   vertex and from the top of the frontal sinuses through the bottom of the   mandible without intravenous contrast. Sagittal and coronal reformatted   images were obtained. Surface rendered 3-D reformatted images to the   maxillofacial region were performed.    COMPARISON EXAMINATION: None.    FINDINGS:  VENTRICLES AND SULCI:  Within normal limits.  INTRA-AXIAL:  No mass, blood or abnormal attenuation is seen.  EXTRA-AXIAL:  Prominent CSF is seen along the high frontoparietal   convexities without associated cortical vein displacement likely   representing prominent subarachnoid spaces. No acute hemorrhage is seen.  CALVARIUM:  Normal.  FACIAL BONES:  Normal. Right medial incisor dental implant within the   maxilla.  MANDIBLE:  Normal.  REMAINING VISUALIZED BONES: Normal.  SINONASAL CAVITIES:  Maxillary sinus hypoplasia. The paranasal sinuses   are clear. Nasal septal deviation to the left anteriorly and to the right   posteriorly. Septal perforation.  ORBITAL CONTENTS:  Normal.  MISCELLANEOUS:  Right supraorbital soft tissue swelling is seen.    CT CERVICAL SPINE:  TECHNIQUE:  Axial images were obtained using multislice helical   technique.  Reformatted coronal and sagittal images were performed.    COMPARISON EXAMINATION:  None.    FINDINGS:  VERTEBRAL BODIES:  Normal in height. No fracture. Diffuse osteopenia.  ALIGNMENT:  No subluxations.Dextroscoliosis of the upper thoracic spine  INTERVERTEBRAL DISC SPACES: Normal.  MISCELLANEOUS:  Bone expansion involving the right third rib. Fused/bifid   ribs are seen on the left.    There is a left scapular fracture which appears angulated.    IMPRESSION:  CT BRAIN:  No mass effect, hemorrhage or evidence of acute intracranial pathology.    CT MAXILLOFACIAL  No fracture.    CT CERVICAL SPINE:  No cervical fracture or traumatic subluxation.    Left scapular fracture.    --- End of Report ---    ACC: [id number] EXAM:  CT ABDOMEN AND PELVIS IC   ORDERED BY: KATHLEEN CARMONA     ACC: [id number] EXAM:  CT CHEST IC   ORDERED BY: KATHLEEN CARMONA     PROCEDURE DATE:  03/02/2024          INTERPRETATION:  CLINICAL INFORMATION: Fall from wheelchair withright   shoulder and leg injury. Patient with osteogenesis imperfecta.    COMPARISON: 3/24/2016.    CONTRAST/COMPLICATIONS:  IV Contrast: Omnipaque 350 90 cc administered   10 cc discarded  Oral Contrast: NONE  Complications: None reported at time ofstudy completion    PROCEDURE:  CT of the Chest, Abdomen and Pelvis was performed.  Imaging was performed through the chest in the arterial phase followed by   imaging of the abdomen and pelvis in the portal venous phase.  Sagittal and coronal reformats were performed.    FINDINGS:  CHEST:  LUNGS AND LARGE AIRWAYS: Patent central airways. Lungs are clear.  PLEURA: Atelectasis at lung bases.  VESSELS: Severe aortic tortuosity. Evaluation is suboptimal due to   breathing artifacts.  HEART: Heart size is normal. No pericardial effusion.  MEDIASTINUM AND LEI: No lymphadenopathy.  CHEST WALL SOFT TISSUES: Within normal limits.  THORACIC OSSEOUS STRUCTURES: Diffuse osteopenia. Age-indeterminate   impacted right humeral neck fracture status post ORIF. Partially imaged   left humeral pin. Age-indeterminate left scapular body fracture. Please   see dedicated report of the thoracic spine for evaluation of the thoracic   spine. Prior deformity of the right third rib with likely a superimposed   acute fracture. Likely additional fractures of right fourth and fifth   ribs. Well-corticated, chronic right ulnar styloid fracture.    ABDOMEN AND PELVIS:  LIVER: Within normal limits.  BILE DUCTS: Normal caliber.  GALLBLADDER: Within normal limits.  SPLEEN: Within normal limits.  PANCREAS: Within normal limits.  ADRENALS: Within normal limits.  KIDNEYS/URETERS: Within normal limits.    BLADDER: Within normal limits.  REPRODUCTIVE ORGANS: Large, heterogeneously enhancing fibroid measuring   6.8 x 6.5 cm.    BOWEL: No bowel obstruction. Appendix is not visualized.  PERITONEUM: No ascites.  VESSELS: The infrarenal aorta measures 0.8 cm in diameter, consistent   with known aortic stenosis.  RETROPERITONEUM/LYMPH NODES: No lymphadenopathy.  ABDOMINAL WALL: Within normal limits.  BONES: Marked scoliosis. Marked osteopenia. Bilateral femoral repairs.   Fracture of the distal right femur. Please see dedicated lumbar spine CT   for report of the lumbar spine and sacrum. Please see bilateral femoral   CT for evaluation of the femur/pelvis.    IMPRESSION:  Likely acute fractures of right 4th-6th ribs.  Age-indeterminate left scapular fracture.  Age-indeterminate fracture of the right humeral head, status post pin   placement.  Fracture of the distal right femur. Status post bilateral femoral anabell   placement.  Please see dedicated reports of the cervical spine, thoracic spine,   lumbar spine, and bilateral femur/pelvis for complete details.  No obvious acute abnormality within the chest, abdomen and pelvis per    --- End of Report ---      ACC: [id number] EXAM:  CT ABDOMEN AND PELVIS IC   ORDERED BY: KATHLEEN CARMONA     ACC: [id number] EXAM:  CT CHEST IC   ORDERED BY: KATHLEEN CARMONA     PROCEDURE DATE:  03/02/2024          INTERPRETATION:  CLINICAL INFORMATION: Fall from wheelchair withright   shoulder and leg injury. Patient with osteogenesis imperfecta.    COMPARISON: 3/24/2016.    CONTRAST/COMPLICATIONS:  IV Contrast: Omnipaque 350 90 cc administered   10 cc discarded  Oral Contrast: NONE  Complications: None reported at time ofstudy completion    PROCEDURE:  CT of the Chest, Abdomen and Pelvis was performed.  Imaging was performed through the chest in the arterial phase followed by   imaging of the abdomen and pelvis in the portal venous phase.  Sagittal and coronal reformats were performed.    FINDINGS:  CHEST:  LUNGS AND LARGE AIRWAYS: Patent central airways. Lungs are clear.  PLEURA: Atelectasis at lung bases.  VESSELS: Severe aortic tortuosity. Evaluation is suboptimal due to   breathing artifacts.  HEART: Heart size is normal. No pericardial effusion.  MEDIASTINUM AND LEI: No lymphadenopathy.  CHEST WALL SOFT TISSUES: Within normal limits.  THORACIC OSSEOUS STRUCTURES: Diffuse osteopenia. Age-indeterminate   impacted right humeral neck fracture status post ORIF. Partially imaged   left humeral pin. Age-indeterminate left scapular body fracture. Please   see dedicated report of the thoracic spine for evaluation of the thoracic   spine. Prior deformity of the right third rib with likely a superimposed   acute fracture. Likely additional fractures of right fourth and fifth   ribs. Well-corticated, chronic right ulnar styloid fracture.    ABDOMEN AND PELVIS:  LIVER: Within normal limits.  BILE DUCTS: Normal caliber.  GALLBLADDER: Within normal limits.  SPLEEN: Within normal limits.  PANCREAS: Within normal limits.  ADRENALS: Within normal limits.  KIDNEYS/URETERS: Within normal limits.    BLADDER: Within normal limits.  REPRODUCTIVE ORGANS: Large, heterogeneously enhancing fibroid measuring   6.8 x 6.5 cm.    BOWEL: No bowel obstruction. Appendix is not visualized.  PERITONEUM: No ascites.  VESSELS: The infrarenal aorta measures 0.8 cm in diameter, consistent   with known aortic stenosis.  RETROPERITONEUM/LYMPH NODES: No lymphadenopathy.  ABDOMINAL WALL: Within normal limits.  BONES: Marked scoliosis. Marked osteopenia. Bilateral femoral repairs.   Fracture of the distal right femur. Please see dedicated lumbar spine CT   for report of the lumbar spine and sacrum. Please see bilateral femoral   CT for evaluation of the femur/pelvis.    IMPRESSION:  Likely acute fractures of right 4th-6th ribs.  Age-indeterminate left scapular fracture.  Age-indeterminate fracture of the right humeral head, status post pin   placement.  Fracture of the distal right femur. Status post bilateral femoral anabell   placement.  Please see dedicated reports of the cervical spine, thoracic spine,   lumbar spine, and bilateral femur/pelvis for complete details.  No obvious acute abnormality within the chest, abdomen and pelvis per    --- End of Report ---        ACC: [id number] EXAM:  XR PELVIS AP ONLY 1-2 VIEWS   ORDERED BY:  DORIAN CANCINO     PROCEDURE DATE:  03/02/2024          INTERPRETATION:  Clinical indications: Fall.    AP view of the pelvis, both femurs and the proximal and mid legs were   performed. No prior studies are available for comparison.    Impression: There is diffuse osseous demineralization with diffuse bony   deformity consistent with osteogenesis imperfecta. There are rods within   both femurs and both proximal and mid tibias. There is an acute,   impacted, periprosthetic fracture at the right distal femoral   metadiaphysis which extends into the lateral femoral condyle.    --- End of Report ---      
Patient is a 53y old  Female who presents with a chief complaint of s/p fall with polytrauma (03 Mar 2024 16:33)      SUBJECTIVE / OVERNIGHT EVENTS:  Pt seen and examined. No acute events overnight.     MEDICATIONS  (STANDING):  acetaminophen     Tablet .. 975 milliGRAM(s) Oral every 6 hours  amLODIPine   Tablet 2.5 milliGRAM(s) Oral at bedtime  bisoprolol  5 mG/hydrochlorothiazide 6.25 mG 1 Tablet(s) Oral <User Schedule>  chlorhexidine 2% Cloths 1 Application(s) Topical <User Schedule>  eplerenone 25 milliGRAM(s) Oral daily  escitalopram 5 milliGRAM(s) Oral daily  heparin   Injectable 5000 Unit(s) SubCutaneous every 12 hours  lidocaine   4% Patch 1 Patch Transdermal every 24 hours  losartan 50 milliGRAM(s) Oral two times a day  polyethylene glycol 3350 17 Gram(s) Oral daily  senna 2 Tablet(s) Oral at bedtime    MEDICATIONS  (PRN):  ALPRAZolam 0.25 milliGRAM(s) Oral at bedtime PRN Sleep  oxyCODONE    IR 5 milliGRAM(s) Oral every 4 hours PRN Severe Pain (7 - 10)  oxyCODONE    IR 2.5 milliGRAM(s) Oral every 3 hours PRN Moderate Pain (4 - 6)      Vital Signs Last 24 Hrs  T(C): 36.8 (07 Mar 2024 11:19), Max: 37.4 (06 Mar 2024 20:46)  T(F): 98.3 (07 Mar 2024 11:19), Max: 99.3 (06 Mar 2024 20:46)  HR: 99 (07 Mar 2024 11:19) (91 - 99)  BP: 110/66 (07 Mar 2024 11:19) (106/60 - 121/67)  BP(mean): --  RR: 18 (07 Mar 2024 11:19) (18 - 18)  SpO2: 94% (07 Mar 2024 11:19) (90% - 94%)    Parameters below as of 07 Mar 2024 11:19  Patient On (Oxygen Delivery Method): room air      CAPILLARY BLOOD GLUCOSE        I&O's Summary    06 Mar 2024 07:01  -  07 Mar 2024 07:00  --------------------------------------------------------  IN: 0 mL / OUT: 800 mL / NET: -800 mL    07 Mar 2024 07:01  -  07 Mar 2024 13:34  --------------------------------------------------------  IN: 120 mL / OUT: 100 mL / NET: 20 mL        PHYSICAL EXAM:  GENERAL: NAD, well-groomed  HEAD:  lower lip lac s/p repair and hematoma  EYES: EOMI, PERRLA, conjunctiva and sclera clear  NECK: Supple, No JVD  CHEST/LUNG: Clear to auscultation bilaterally; No wheeze  HEART: Regular rate and rhythm; grade 4/6 systolic murmur  ABDOMEN: Soft, Nontender, Nondistended; Bowel sounds present  EXTREMITIES:  RUE casted and in sling, LUE no significant hematomae, RLE splinted  PSYCH: AAOx3  NEUROLOGY: non-focal  SKIN: No rashes or lesions    LABS:                        8.4    9.33  )-----------( 282      ( 07 Mar 2024 07:33 )             25.8     03-07    134<L>  |  96  |  18  ----------------------------<  84  3.8   |  28  |  <0.30<L>    Ca    9.1      07 Mar 2024 07:13  Phos  3.8     03-07  Mg     1.8     03-07            Urinalysis Basic - ( 07 Mar 2024 07:13 )    Color: x / Appearance: x / SG: x / pH: x  Gluc: 84 mg/dL / Ketone: x  / Bili: x / Urobili: x   Blood: x / Protein: x / Nitrite: x   Leuk Esterase: x / RBC: x / WBC x   Sq Epi: x / Non Sq Epi: x / Bacteria: x        
SUBJECTIVE: Patient seen and examined on AM rounds. Patient reports that they're feeling well. Denies fever, chills. Reports pain as controlled. No complaints at this time.     Vital Signs Last 24 Hrs  T(C): 36.7 (09 Mar 2024 05:43), Max: 37.1 (08 Mar 2024 16:24)  T(F): 98 (09 Mar 2024 05:43), Max: 98.8 (08 Mar 2024 16:24)  HR: 84 (09 Mar 2024 05:43) (82 - 89)  BP: 127/74 (09 Mar 2024 05:43) (102/65 - 130/73)  BP(mean): --  RR: 18 (09 Mar 2024 05:43) (18 - 18)  SpO2: 93% (09 Mar 2024 05:43) (92% - 98%)    Parameters below as of 09 Mar 2024 05:43  Patient On (Oxygen Delivery Method): room air        General Appearance: Appears well, NAD  Neck: Supple  Chest: Equal expansion bilaterally  CV: Pulse regular presently  Abdomen: Soft, nontense  Extremities: WWP; RUE and RLE in cast    I&O's Summary    07 Mar 2024 07:01  -  08 Mar 2024 07:00  --------------------------------------------------------  IN: 120 mL / OUT: 900 mL / NET: -780 mL    08 Mar 2024 07:01  -  09 Mar 2024 06:21  --------------------------------------------------------  IN: 0 mL / OUT: 1025 mL / NET: -1025 mL      I&O's Detail    07 Mar 2024 07:01  -  08 Mar 2024 07:00  --------------------------------------------------------  IN:    Oral Fluid: 120 mL  Total IN: 120 mL    OUT:    Voided (mL): 900 mL  Total OUT: 900 mL    Total NET: -780 mL      08 Mar 2024 07:01  -  09 Mar 2024 06:21  --------------------------------------------------------  IN:  Total IN: 0 mL    OUT:    Oral Fluid: 0 mL    Voided (mL): 1025 mL  Total OUT: 1025 mL    Total NET: -1025 mL          LABS:                        8.4    9.33  )-----------( 282      ( 07 Mar 2024 07:33 )             25.8     03-07    134<L>  |  96  |  18  ----------------------------<  84  3.8   |  28  |  <0.30<L>    Ca    9.1      07 Mar 2024 07:13  Phos  3.8     03-07  Mg     1.8     03-07        Urinalysis Basic - ( 07 Mar 2024 07:13 )    Color: x / Appearance: x / SG: x / pH: x  Gluc: 84 mg/dL / Ketone: x  / Bili: x / Urobili: x   Blood: x / Protein: x / Nitrite: x   Leuk Esterase: x / RBC: x / WBC x   Sq Epi: x / Non Sq Epi: x / Bacteria: x        RADIOLOGY & ADDITIONAL STUDIES:
SUBJECTIVE: Patient seen and examined on AM rounds. Patient reports that they're feeling well. Denies fever, chills. Reports pain as controlled. No complaints at this time.     Vital Signs Last 24 Hrs  T(C): 36.8 (02 Mar 2024 22:00), Max: 37.2 (02 Mar 2024 20:32)  T(F): 98.2 (02 Mar 2024 22:00), Max: 99 (02 Mar 2024 20:32)  HR: 78 (03 Mar 2024 04:00) (64 - 88)  BP: 128/61 (03 Mar 2024 03:00) (80/50 - 139/49)  BP(mean): 88 (03 Mar 2024 03:00) (67 - 88)  RR: 25 (03 Mar 2024 04:00) (17 - 36)  SpO2: 100% (03 Mar 2024 04:00) (95% - 100%)    Parameters below as of 02 Mar 2024 20:32  Patient On (Oxygen Delivery Method): nasal cannula  O2 Flow (L/min): 2      General Appearance: Appears well, NAD  Chest: Equal expansion bilaterally  CV: Pulse regular presently  Abdomen: Soft, nontense, nontender  Extremities: [facility]    I&O's Summary    02 Mar 2024 07:01  -  03 Mar 2024 04:31  --------------------------------------------------------  IN: 0 mL / OUT: 300 mL / NET: -300 mL      I&O's Detail    02 Mar 2024 07:01  -  03 Mar 2024 04:31  --------------------------------------------------------  IN:  Total IN: 0 mL    OUT:    Voided (mL): 300 mL  Total OUT: 300 mL    Total NET: -300 mL          LABS:                        8.7    14.94 )-----------( 295      ( 03 Mar 2024 00:10 )             26.6     03-03    135  |  98  |  13  ----------------------------<  145<H>  4.1   |  24  |  <0.30<L>    Ca    9.2      03 Mar 2024 00:10  Phos  4.0     03-03  Mg     1.9     03-03    TPro  7.0  /  Alb  4.1  /  TBili  0.5  /  DBili  x   /  AST  37  /  ALT  20  /  AlkPhos  88  03-02    PT/INR - ( 03 Mar 2024 00:10 )   PT: 12.0 sec;   INR: 1.15 ratio         PTT - ( 03 Mar 2024 00:10 )  PTT:30.5 sec  Urinalysis Basic - ( 03 Mar 2024 00:10 )    Color: x / Appearance: x / SG: x / pH: x  Gluc: 145 mg/dL / Ketone: x  / Bili: x / Urobili: x   Blood: x / Protein: x / Nitrite: x   Leuk Esterase: x / RBC: x / WBC x   Sq Epi: x / Non Sq Epi: x / Bacteria: x        RADIOLOGY & ADDITIONAL STUDIES:    
SURGERY DAILY PROGRESS NOTE:     SUBJECTIVE/ROS: No acute events overnight. Patient seen and examined bedside on AM rounds.  Continue to trend H&H. Pt c/o back pain.      OBJECTIVE:  Vital Signs Last 24 Hrs  T(C): 37.2 (04 Mar 2024 05:00), Max: 37.3 (03 Mar 2024 11:00)  T(F): 99 (04 Mar 2024 05:00), Max: 99.1 (03 Mar 2024 11:00)  HR: 83 (04 Mar 2024 07:00) (73 - 114)  BP: 123/58 (04 Mar 2024 07:00) (95/54 - 154/65)  BP(mean): 83 (04 Mar 2024 07:00) (72 - 100)  RR: 21 (04 Mar 2024 07:00) (21 - 41)  SpO2: 98% (04 Mar 2024 07:00) (89% - 99%)    Parameters below as of 03 Mar 2024 20:00  Patient On (Oxygen Delivery Method): nasal cannula  O2 Flow (L/min): 1      PHYSICAL EXAM:  Constitutional: NAD  HEENT: R Lip laceration s/p repair  Respiratory: non-labored breathing, patent airway, IS 500cc  Gastrointestinal: abdomen soft, nontender, nondistended  Extremities: -NWB of RLE in splint  -NWB of RUE in splint/Sling  Neurological: intact                          8.2    8.49  )-----------( 234      ( 03 Mar 2024 22:18 )             24.3     03-03    132<L>  |  96  |  9   ----------------------------<  119<H>  3.9   |  28  |  <0.30<L>    Ca    8.8      03 Mar 2024 22:18  Phos  2.6     03-03  Mg     1.8     03-03    TPro  7.0  /  Alb  4.1  /  TBili  0.5  /  DBili  x   /  AST  37  /  ALT  20  /  AlkPhos  88  03-02   PT/INR - ( 03 Mar 2024 00:10 )   PT: 12.0 sec;   INR: 1.15 ratio         PTT - ( 03 Mar 2024 00:10 )  PTT:30.5 sec  I&O's Detail    03 Mar 2024 07:01  -  04 Mar 2024 07:00  --------------------------------------------------------  IN:    IV PiggyBack: 150 mL    IV PiggyBack: 250 mL    IV PiggyBack: 50 mL    Lactated Ringers: 660 mL    Oral Fluid: 200 mL  Total IN: 1310 mL    OUT:    Voided (mL): 750 mL  Total OUT: 750 mL    Total NET: 560 mL    
TRAUMA SURGERY DAILY PROGRESS NOTE:         SUBJECTIVE/ROS: Patient seen and examined at bedside. No events overnight. States pain controlled with analgesia.   Denies nausea, vomiting, chest pain, shortness of breath.  States splints of RUE and RLE are too heavy and are requesting a lighter splint.  Ortho contacted.          MEDICATIONS  (STANDING):  acetaminophen     Tablet .. 975 milliGRAM(s) Oral every 6 hours  amLODIPine   Tablet 2.5 milliGRAM(s) Oral at bedtime  bisoprolol  5 mG/hydrochlorothiazide 6.25 mG 1 Tablet(s) Oral <User Schedule>  chlorhexidine 2% Cloths 1 Application(s) Topical <User Schedule>  eplerenone 25 milliGRAM(s) Oral daily  escitalopram 5 milliGRAM(s) Oral daily  heparin   Injectable 5000 Unit(s) SubCutaneous every 12 hours  lidocaine   4% Patch 1 Patch Transdermal every 24 hours  losartan 50 milliGRAM(s) Oral two times a day  oxyCODONE    IR 5 milliGRAM(s) Oral every 4 hours  polyethylene glycol 3350 17 Gram(s) Oral daily  senna 2 Tablet(s) Oral at bedtime    MEDICATIONS  (PRN):  ALPRAZolam 0.25 milliGRAM(s) Oral at bedtime PRN Sleep  oxyCODONE    IR 2.5 milliGRAM(s) Oral every 3 hours PRN Moderate Pain (4 - 6)      OBJECTIVE:    Vital Signs Last 24 Hrs  T(C): 37.3 (06 Mar 2024 06:30), Max: 37.3 (06 Mar 2024 06:30)  T(F): 99.2 (06 Mar 2024 06:30), Max: 99.2 (06 Mar 2024 06:30)  HR: 103 (06 Mar 2024 06:30) (65 - 107)  BP: 135/73 (06 Mar 2024 06:30) (121/68 - 156/95)  BP(mean): 115 (05 Mar 2024 16:00) (83 - 115)  RR: 18 (06 Mar 2024 06:30) (18 - 36)  SpO2: 100% (05 Mar 2024 18:51) (90% - 100%)    Parameters below as of 05 Mar 2024 12:01  Patient On (Oxygen Delivery Method): room air            I&O's Detail    05 Mar 2024 07:01  -  06 Mar 2024 07:00  --------------------------------------------------------  IN:    IV PiggyBack: 35 mL  Total IN: 35 mL    OUT:    Voided (mL): 800 mL  Total OUT: 800 mL    Total NET: -765 mL        PHYSICAL EXAM:  General: laying in bed, NAD  HEENT:  right sided chin and lip bruising and swelling   Respiratory: non labored breathing  Extremities:  RUE and RLE in splints     LABS:                        8.8    10.02 )-----------( 265      ( 06 Mar 2024 07:25 )             26.4     03-04    134<L>  |  96  |  9   ----------------------------<  97  3.6   |  28  |  <0.30<L>    Ca    8.7      04 Mar 2024 21:08  Phos  2.5     03-04  Mg     2.0     03-04        Urinalysis Basic - ( 04 Mar 2024 21:08 )    Color: x / Appearance: x / SG: x / pH: x  Gluc: 97 mg/dL / Ketone: x  / Bili: x / Urobili: x   Blood: x / Protein: x / Nitrite: x   Leuk Esterase: x / RBC: x / WBC x   Sq Epi: x / Non Sq Epi: x / Bacteria: x          
TRAUMA SURGERY DAILY PROGRESS NOTE:     SUBJECTIVE/ROS: Patient seen and examined. Pain is well controlled. Awaiting transfer to acute rehab.     MEDICATIONS  (STANDING):  acetaminophen     Tablet .. 975 milliGRAM(s) Oral every 6 hours  amLODIPine   Tablet 2.5 milliGRAM(s) Oral at bedtime  bisoprolol  5 mG/hydrochlorothiazide 6.25 mG 1 Tablet(s) Oral <User Schedule>  chlorhexidine 2% Cloths 1 Application(s) Topical <User Schedule>  eplerenone 25 milliGRAM(s) Oral daily  escitalopram 5 milliGRAM(s) Oral daily  heparin   Injectable 5000 Unit(s) SubCutaneous every 12 hours  losartan 50 milliGRAM(s) Oral two times a day  polyethylene glycol 3350 17 Gram(s) Oral daily    MEDICATIONS  (PRN):  ALPRAZolam 0.25 milliGRAM(s) Oral at bedtime PRN Sleep  cyclobenzaprine 5 milliGRAM(s) Oral three times a day PRN Muscle Spasm  oxyCODONE    IR 5 milliGRAM(s) Oral every 4 hours PRN Severe Pain (7 - 10)  oxyCODONE    IR 2.5 milliGRAM(s) Oral every 3 hours PRN Moderate Pain (4 - 6)      OBJECTIVE:    Vital Signs Last 24 Hrs  T(C): 36.8 (11 Mar 2024 05:23), Max: 36.8 (10 Mar 2024 15:45)  T(F): 98.2 (11 Mar 2024 05:23), Max: 98.2 (10 Mar 2024 15:45)  HR: 83 (11 Mar 2024 05:23) (75 - 86)  BP: 125/70 (11 Mar 2024 05:23) (104/57 - 125/70)  BP(mean): --  RR: 18 (11 Mar 2024 05:23) (18 - 18)  SpO2: 94% (11 Mar 2024 05:23) (94% - 96%)    Parameters below as of 11 Mar 2024 05:23  Patient On (Oxygen Delivery Method): room air            I&O's Detail    10 Mar 2024 07:01  -  11 Mar 2024 07:00  --------------------------------------------------------  IN:  Total IN: 0 mL    OUT:    Voided (mL): 800 mL  Total OUT: 800 mL    Total NET: -800 mL          Daily     Daily     LABS:                        PHYSICAL EXAM:    General: laying in bed, NAD  HEENT:  right sided chin and lip bruising and swelling   Respiratory: non labored breathing  Extremities:  RUE and RLE in splints           
Patient is a 53y old  Female who presents with a chief complaint of s/p fall with polytrauma (03 Mar 2024 16:33)      SUBJECTIVE / OVERNIGHT EVENTS:  Pt seen and examined. s/p 1/2 u prbc yesterday followed by Lasix 10mg IVP x 1. Well tolerated. She denies SOB.     MEDICATIONS  (STANDING):  acetaminophen   IVPB .. 350 milliGRAM(s) IV Intermittent every 8 hours  amLODIPine   Tablet 2.5 milliGRAM(s) Oral at bedtime  chlorhexidine 2% Cloths 1 Application(s) Topical <User Schedule>  eplerenone 25 milliGRAM(s) Oral daily  escitalopram 5 milliGRAM(s) Oral daily  heparin   Injectable 5000 Unit(s) SubCutaneous every 12 hours  lidocaine   4% Patch 1 Patch Transdermal every 24 hours  losartan 50 milliGRAM(s) Oral two times a day  polyethylene glycol 3350 17 Gram(s) Oral daily  senna 2 Tablet(s) Oral at bedtime    MEDICATIONS  (PRN):  ALPRAZolam 0.25 milliGRAM(s) Oral at bedtime PRN Sleep  oxyCODONE    IR 2.5 milliGRAM(s) Oral every 3 hours PRN Moderate Pain (4 - 6)      Vital Signs Last 24 Hrs  T(C): 36.9 (05 Mar 2024 11:00), Max: 37.3 (04 Mar 2024 19:00)  T(F): 98.4 (05 Mar 2024 11:00), Max: 99.1 (04 Mar 2024 19:00)  HR: 95 (05 Mar 2024 12:01) (68 - 107)  BP: 141/65 (05 Mar 2024 12:01) (116/58 - 165/63)  BP(mean): 93 (05 Mar 2024 12:01) (81 - 99)  RR: 36 (05 Mar 2024 12:01) (18 - 39)  SpO2: 90% (05 Mar 2024 12:01) (90% - 100%)    Parameters below as of 05 Mar 2024 12:01  Patient On (Oxygen Delivery Method): room air      CAPILLARY BLOOD GLUCOSE        I&O's Summary    04 Mar 2024 07:01  -  05 Mar 2024 07:00  --------------------------------------------------------  IN: 815 mL / OUT: 1160 mL / NET: -345 mL        PHYSICAL EXAM:  GENERAL: NAD, well-groomed  HEAD:  lower lip lac s/p repair and hematoma  EYES: EOMI, PERRLA, conjunctiva and sclera clear  NECK: Supple, No JVD  CHEST/LUNG: Clear to auscultation bilaterally; No wheeze  HEART: Regular rate and rhythm; grade 4/6 systolic murmur  ABDOMEN: Soft, Nontender, Nondistended; Bowel sounds present  EXTREMITIES:  RUE casted and in sling, LUE no significant hematomae, RLE splinted  PSYCH: AAOx3  NEUROLOGY: non-focal  SKIN: No rashes or lesions    LABS:                        8.8    10.16 )-----------( 230      ( 05 Mar 2024 03:44 )             25.6     03-04    134<L>  |  96  |  9   ----------------------------<  97  3.6   |  28  |  <0.30<L>    Ca    8.7      04 Mar 2024 21:08  Phos  2.5     03-04  Mg     2.0     03-04            Urinalysis Basic - ( 04 Mar 2024 21:08 )    Color: x / Appearance: x / SG: x / pH: x  Gluc: 97 mg/dL / Ketone: x  / Bili: x / Urobili: x   Blood: x / Protein: x / Nitrite: x   Leuk Esterase: x / RBC: x / WBC x   Sq Epi: x / Non Sq Epi: x / Bacteria: x        RADIOLOGY & ADDITIONAL TESTS:    Imaging Personally Reviewed:  TTE 3/4  1. Technically difficult image quality.   2. Left ventricular wall thickness is normal. Left ventricular systolic function is normal with an ejection fraction visually estimated at 55 to 60 %. There are no regional wall motion abnormalities seen.   3. There is moderate (grade 2) left ventricular diastolic dysfunction.   4. Basal septum mesures 1.6cm, there is very turbulant flow seen witth color Doppler. Theres LVOT obstruction however gradients are LIMITED. Highest 20mmHg which may be underestimated.   5. Normal right ventricular cavity size, with wall thickness, and normal systolic function.   6. The aortic valve anatomy cannot be determined. There is mild thickening of the aortic valve leaflets.   7. Severe aortic regurgitation. Know severe AR-No Previous TTE here.   8. Estimated pulmonary artery systolic pressure is 67 mmHg, consistent with severe pulmonary hypertension.     
[facility] Division of Hospital Medicine  Lashanda Conte DO  Available on Teams [place]    Patient is a 53y old  Female who presents with a chief complaint of s/p fall with polytrauma (03 Mar 2024 16:33)      SUBJECTIVE / OVERNIGHT EVENTS: none. Patient feels some back spasms that are well managed with flexeril. No diarrhea/constipation. No BM yesterday. Asks to have her peripheral IV removed since she is otherwise ready for discharge.  ADDITIONAL REVIEW OF SYSTEMS: negative    MEDICATIONS  (STANDING):  acetaminophen     Tablet .. 975 milliGRAM(s) Oral every 6 hours  amLODIPine   Tablet 2.5 milliGRAM(s) Oral at bedtime  bisoprolol  5 mG/hydrochlorothiazide 6.25 mG 1 Tablet(s) Oral <User Schedule>  chlorhexidine 2% Cloths 1 Application(s) Topical <User Schedule>  eplerenone 25 milliGRAM(s) Oral daily  escitalopram 5 milliGRAM(s) Oral daily  heparin   Injectable 5000 Unit(s) SubCutaneous every 12 hours  losartan 50 milliGRAM(s) Oral two times a day  polyethylene glycol 3350 17 Gram(s) Oral daily    MEDICATIONS  (PRN):  ALPRAZolam 0.25 milliGRAM(s) Oral at bedtime PRN Sleep  cyclobenzaprine 5 milliGRAM(s) Oral three times a day PRN Muscle Spasm  oxyCODONE    IR 5 milliGRAM(s) Oral every 4 hours PRN Severe Pain (7 - 10)  oxyCODONE    IR 2.5 milliGRAM(s) Oral every 3 hours PRN Moderate Pain (4 - 6)      CAPILLARY BLOOD GLUCOSE        I&O's Summary    10 Mar 2024 07:01  -  11 Mar 2024 07:00  --------------------------------------------------------  IN: 0 mL / OUT: 800 mL / NET: -800 mL    11 Mar 2024 07:01  -  11 Mar 2024 12:30  --------------------------------------------------------  IN: 240 mL / OUT: 150 mL / NET: 90 mL        PHYSICAL EXAM:  Vital Signs Last 24 Hrs  T(C): 36.7 (11 Mar 2024 09:24), Max: 36.8 (10 Mar 2024 15:45)  T(F): 98 (11 Mar 2024 09:24), Max: 98.2 (10 Mar 2024 15:45)  HR: 76 (11 Mar 2024 09:24) (75 - 86)  BP: 113/72 (11 Mar 2024 09:24) (104/57 - 125/70)  BP(mean): --  RR: 18 (11 Mar 2024 09:24) (18 - 18)  SpO2: 94% (11 Mar 2024 09:24) (94% - 95%)    Parameters below as of 11 Mar 2024 09:24  Patient On (Oxygen Delivery Method): room air        CONSTITUTIONAL: Well-groomed, in no apparent distress, short stature  EYES: No conjunctival or scleral injection, non-icteric; PERRLA and symmetric  ENMT: No external nasal lesions; no pharyngeal injection or exudates, oral mucosa with moist membranes  NECK: Trachea midline without palpable neck mass; thyroid not enlarged and non-tender  RESPIRATORY: Breathing comfortably; lungs CTA without wheeze/rhonchi/rales. +Pectus carinatum  CARDIOVASCULAR: +S1S2, RRR, +systolic murmur; pedal pulses full and symmetric; no lower extremity edema  GASTROINTESTINAL: No palpable masses or tenderness, +BS throughout, no rebound/guarding  MUSCULOSKELETAL: RLE splinted. B/L upper extremities w/deformity  NEUROLOGIC: CN II-XII intact; sensation intact in LEs b/l to light touch  PSYCHIATRIC: A+O x 3; mood and affect appropriate; appropriate insight and judgment  
Patient is a 53y old  Female who presents with a chief complaint of s/p fall with polytrauma (03 Mar 2024 16:33)      SUBJECTIVE / OVERNIGHT EVENTS:  Pt seen and examined w/ at bedside. No acute events overnight. She states that pain is fairly well controlled. Denies SOB.    MEDICATIONS  (STANDING):  acetaminophen   IVPB .. 500 milliGRAM(s) IV Intermittent every 6 hours  amLODIPine   Tablet 2.5 milliGRAM(s) Oral at bedtime  chlorhexidine 2% Cloths 1 Application(s) Topical <User Schedule>  eplerenone 25 milliGRAM(s) Oral daily  escitalopram 5 milliGRAM(s) Oral daily  heparin   Injectable 5000 Unit(s) SubCutaneous every 12 hours  lidocaine   4% Patch 1 Patch Transdermal every 24 hours  losartan 50 milliGRAM(s) Oral two times a day  polyethylene glycol 3350 17 Gram(s) Oral daily  senna 2 Tablet(s) Oral at bedtime    MEDICATIONS  (PRN):  ALPRAZolam 0.25 milliGRAM(s) Oral at bedtime PRN Sleep  oxyCODONE    IR 2.5 milliGRAM(s) Oral every 3 hours PRN Moderate Pain (4 - 6)  oxyCODONE    IR 5 milliGRAM(s) Oral every 3 hours PRN Severe Pain (7 - 10)      Vital Signs Last 24 Hrs  T(C): 36.8 (04 Mar 2024 15:00), Max: 37.2 (03 Mar 2024 22:00)  T(F): 98.2 (04 Mar 2024 15:00), Max: 99 (03 Mar 2024 22:00)  HR: 68 (04 Mar 2024 15:00) (68 - 97)  BP: 117/56 (04 Mar 2024 15:00) (114/55 - 154/65)  BP(mean): 81 (04 Mar 2024 15:00) (79 - 100)  RR: 18 (04 Mar 2024 15:00) (18 - 43)  SpO2: 99% (04 Mar 2024 15:00) (89% - 99%)    Parameters below as of 04 Mar 2024 12:04  Patient On (Oxygen Delivery Method): room air      CAPILLARY BLOOD GLUCOSE        I&O's Summary    03 Mar 2024 07:01  -  04 Mar 2024 07:00  --------------------------------------------------------  IN: 1310 mL / OUT: 750 mL / NET: 560 mL    04 Mar 2024 07:01  -  04 Mar 2024 15:55  --------------------------------------------------------  IN: 430 mL / OUT: 350 mL / NET: 80 mL        PHYSICAL EXAM:  GENERAL: NAD, well-groomed  HEAD:  lower lip lac s/p repair and hematoma  EYES: EOMI, PERRLA, conjunctiva and sclera clear  NECK: Supple, No JVD  CHEST/LUNG: Clear to auscultation bilaterally; No wheeze  HEART: Regular rate and rhythm; grade 4/6 systolic murmur  ABDOMEN: Soft, Nontender, Nondistended; Bowel sounds present  EXTREMITIES:  RUE casted and in sling, LUE no significant hematomae, RLE splinted  PSYCH: AAOx3  NEUROLOGY: non-focal  SKIN: No rashes or lesions    LABS:                        8.2    8.49  )-----------( 234      ( 03 Mar 2024 22:18 )             24.3     03-03    132<L>  |  96  |  9   ----------------------------<  119<H>  3.9   |  28  |  <0.30<L>    Ca    8.8      03 Mar 2024 22:18  Phos  2.6     03-03  Mg     1.8     03-03    TPro  7.0  /  Alb  4.1  /  TBili  0.5  /  DBili  x   /  AST  37  /  ALT  20  /  AlkPhos  88  03-02    PT/INR - ( 03 Mar 2024 00:10 )   PT: 12.0 sec;   INR: 1.15 ratio         PTT - ( 03 Mar 2024 00:10 )  PTT:30.5 sec  CARDIAC MARKERS ( 02 Mar 2024 16:16 )  x     / x     / 171 U/L / x     / x          Urinalysis Basic - ( 03 Mar 2024 22:18 )    Color: x / Appearance: x / SG: x / pH: x  Gluc: 119 mg/dL / Ketone: x  / Bili: x / Urobili: x   Blood: x / Protein: x / Nitrite: x   Leuk Esterase: x / RBC: x / WBC x   Sq Epi: x / Non Sq Epi: x / Bacteria: x        
Patient is a 53y old  Female who presents with a chief complaint of s/p fall with polytrauma (03 Mar 2024 16:33)      SUBJECTIVE / OVERNIGHT EVENTS: Pt seen and examined. No acute events overnight. Pt denies any acute c/o.    MEDICATIONS  (STANDING):  acetaminophen     Tablet .. 975 milliGRAM(s) Oral every 6 hours  amLODIPine   Tablet 2.5 milliGRAM(s) Oral at bedtime  bisoprolol  5 mG/hydrochlorothiazide 6.25 mG 1 Tablet(s) Oral <User Schedule>  chlorhexidine 2% Cloths 1 Application(s) Topical <User Schedule>  eplerenone 25 milliGRAM(s) Oral daily  escitalopram 5 milliGRAM(s) Oral daily  heparin   Injectable 5000 Unit(s) SubCutaneous every 12 hours  lidocaine   4% Patch 1 Patch Transdermal every 24 hours  losartan 50 milliGRAM(s) Oral two times a day  polyethylene glycol 3350 17 Gram(s) Oral daily  senna 2 Tablet(s) Oral at bedtime    MEDICATIONS  (PRN):  ALPRAZolam 0.25 milliGRAM(s) Oral at bedtime PRN Sleep  methocarbamol 500 milliGRAM(s) Oral every 8 hours PRN Muscle Spasm  oxyCODONE    IR 5 milliGRAM(s) Oral every 4 hours PRN Severe Pain (7 - 10)  oxyCODONE    IR 2.5 milliGRAM(s) Oral every 3 hours PRN Moderate Pain (4 - 6)      Vital Signs Last 24 Hrs  T(C): 36.7 (08 Mar 2024 12:44), Max: 37.3 (07 Mar 2024 20:43)  T(F): 98.1 (08 Mar 2024 12:44), Max: 99.2 (07 Mar 2024 20:43)  HR: 86 (08 Mar 2024 12:44) (82 - 90)  BP: 110/61 (08 Mar 2024 12:44) (102/65 - 128/65)  BP(mean): --  RR: 18 (08 Mar 2024 12:44) (18 - 18)  SpO2: 93% (08 Mar 2024 12:44) (92% - 98%)    Parameters below as of 08 Mar 2024 12:44  Patient On (Oxygen Delivery Method): room air      CAPILLARY BLOOD GLUCOSE        I&O's Summary    07 Mar 2024 07:01  -  08 Mar 2024 07:00  --------------------------------------------------------  IN: 120 mL / OUT: 900 mL / NET: -780 mL        PHYSICAL EXAM:  GENERAL: NAD, well-groomed  HEAD:  lower lip lac s/p repair and hematoma  EYES: EOMI, PERRLA, conjunctiva and sclera clear  NECK: Supple, No JVD  CHEST/LUNG: Clear to auscultation bilaterally; No wheeze  HEART: Regular rate and rhythm; grade 4/6 systolic murmur  ABDOMEN: Soft, Nontender, Nondistended; Bowel sounds present  EXTREMITIES:  RUE casted and in sling, LUE no significant hematomae, RLE splinted  PSYCH: somnolent but arousable, oriented x 3  NEUROLOGY: non-focal  SKIN: No rashes or lesions    LABS:                        8.4    9.33  )-----------( 282      ( 07 Mar 2024 07:33 )             25.8     03-07    134<L>  |  96  |  18  ----------------------------<  84  3.8   |  28  |  <0.30<L>    Ca    9.1      07 Mar 2024 07:13  Phos  3.8     03-07  Mg     1.8     03-07            Urinalysis Basic - ( 07 Mar 2024 07:13 )    Color: x / Appearance: x / SG: x / pH: x  Gluc: 84 mg/dL / Ketone: x  / Bili: x / Urobili: x   Blood: x / Protein: x / Nitrite: x   Leuk Esterase: x / RBC: x / WBC x   Sq Epi: x / Non Sq Epi: x / Bacteria: x        
Patient is a 53y old  Female who presents with a chief complaint of s/p fall with polytrauma (03 Mar 2024 16:33)      SUBJECTIVE / OVERNIGHT EVENTS: Pt seen and examined with  at bedside. No acute events overnight. She denies SOB. Reports right sided chest soreness.    MEDICATIONS  (STANDING):  acetaminophen     Tablet .. 975 milliGRAM(s) Oral every 6 hours  amLODIPine   Tablet 2.5 milliGRAM(s) Oral at bedtime  bisoprolol  5 mG/hydrochlorothiazide 6.25 mG 1 Tablet(s) Oral <User Schedule>  chlorhexidine 2% Cloths 1 Application(s) Topical <User Schedule>  eplerenone 25 milliGRAM(s) Oral daily  escitalopram 5 milliGRAM(s) Oral daily  heparin   Injectable 5000 Unit(s) SubCutaneous every 12 hours  lidocaine   4% Patch 1 Patch Transdermal every 24 hours  losartan 50 milliGRAM(s) Oral two times a day  polyethylene glycol 3350 17 Gram(s) Oral daily  senna 2 Tablet(s) Oral at bedtime    MEDICATIONS  (PRN):  ALPRAZolam 0.25 milliGRAM(s) Oral at bedtime PRN Sleep  oxyCODONE    IR 2.5 milliGRAM(s) Oral every 3 hours PRN Moderate Pain (4 - 6)  oxyCODONE    IR 5 milliGRAM(s) Oral every 4 hours PRN Severe Pain (7 - 10)      Vital Signs Last 24 Hrs  T(C): 37.3 (06 Mar 2024 06:30), Max: 37.3 (06 Mar 2024 06:30)  T(F): 99.2 (06 Mar 2024 06:30), Max: 99.2 (06 Mar 2024 06:30)  HR: 91 (06 Mar 2024 08:15) (65 - 103)  BP: 137/72 (06 Mar 2024 08:15) (121/68 - 156/95)  BP(mean): 115 (05 Mar 2024 16:00) (115 - 115)  RR: 18 (06 Mar 2024 08:15) (18 - 23)  SpO2: 91% (06 Mar 2024 08:15) (91% - 100%)      CAPILLARY BLOOD GLUCOSE        I&O's Summary    05 Mar 2024 07:01  -  06 Mar 2024 07:00  --------------------------------------------------------  IN: 35 mL / OUT: 800 mL / NET: -765 mL    06 Mar 2024 07:01  -  06 Mar 2024 12:43  --------------------------------------------------------  IN: 0 mL / OUT: 400 mL / NET: -400 mL        PHYSICAL EXAM:  GENERAL: NAD, well-groomed  HEAD:  lower lip lac s/p repair and hematoma  EYES: EOMI, PERRLA, conjunctiva and sclera clear  NECK: Supple, No JVD  CHEST/LUNG: Clear to auscultation bilaterally; No wheeze  HEART: Regular rate and rhythm; grade 4/6 systolic murmur  ABDOMEN: Soft, Nontender, Nondistended; Bowel sounds present  EXTREMITIES:  RUE casted and in sling, LUE no significant hematomae, RLE splinted  PSYCH: AAOx3  NEUROLOGY: non-focal  SKIN: No rashes or lesions    LABS:                        8.8    10.02 )-----------( 265      ( 06 Mar 2024 07:25 )             26.4     03-06    134<L>  |  95<L>  |  17  ----------------------------<  87  4.0   |  27  |  <0.30<L>    Ca    9.6      06 Mar 2024 07:27  Phos  3.4     03-06  Mg     1.8     03-06            Urinalysis Basic - ( 06 Mar 2024 07:27 )    Color: x / Appearance: x / SG: x / pH: x  Gluc: 87 mg/dL / Ketone: x  / Bili: x / Urobili: x   Blood: x / Protein: x / Nitrite: x   Leuk Esterase: x / RBC: x / WBC x   Sq Epi: x / Non Sq Epi: x / Bacteria: x

## 2024-03-12 NOTE — PATIENT PROFILE ADULT - NSPROIMPLANTSMEDDEV_GEN_A_NUR
Screening Questions for Radiology Injections:    Injection to be done at which interventional clinic site? [facility]    Procedure ordered by Olga Chowdhury     Procedure ordered? caudal epidural injection     Transforaminal Cervical LUANNE - Send to [facility] [place] ([facility]) - No [facility] providers perform this procedure    What insurance would patient like us to bill for this procedure? MEDICA & MEDICARE     IF SCHEDULING IN [place] PAIN OR SPINE PLEASE SCHEDULE AT LEAST 7-10 BUSINESS DAYS OUT SO A PA CAN BE OBTAINED    Worker's comp or MVA (motor vehicle accident) -Any injection DO NOT SCHEDULE and route to Kofi Kaur.      [employer] insurance - For SI joint injections, DO NOT SCHEDULE and route to Marnie Canas.       ALL BCBS, [employer] and [employer] - DO NOT SCHEDULE and route to Marnie Canas    MEDICA- facet joint injections, route to Marnie Canas    Is patient scheduled at [facility]?    If YES, route every encounter to [facility]INE [facility] CARE NAVIGATION POOL [[id number]]    Is an  needed? No     Patient has a  home? (Review Grid) YES: Informed     Any chance of pregnancy? Not Applicable   If YES, do NOT schedule and route to RN pool  - Dr. Badillo route to Teresa Perry and PM&R Nurse  [[id number]]      Is patient actively being treated for cancer or immunocompromised? No  If YES, do NOT schedule and route to RN pool/ Dr. Badillo's Team    Does the patient have a bleeding or clotting disorder? No     If YES, okay to schedule AND route to RN nurse coni/ Dr. Badillo's Team     (For any patients with platelet count <100, RN must forward to provider)    Is patient taking any Blood Thinners OR Antiplatelet medication?  No   If hold needed, do NOT schedule, route to RN pool/ Dr. Badillo's Team    Examples:   o Blood Thinners: (Coumadin, Warfarin, Jantoven, Pradaxa, Xarelto, Eliquis, Edoxaban, Enoxaparin, Lovenox, Heparin, Arixtra, Fondaparinux or Fragmin)  o Antiplatelet  Medications: (Plavix, Brilinta or Effient)     Is patient taking any aspirin products (includes Excedrin and Fiorinal)? No     If more than 325mg/day, OK to schedule; Instruct Pt to decrease to less than 325 mg for 7 days AND route to RN pool/ Dr. Badillo's Team     For CERVICAL procedures, hold all aspirin products for 6 days.     Tell Pt that if aspirin product is not held for 6 days, the procedure WILL BE cancelled.     Any allergies to contrast dye, iodine, shellfish, or numbing and steroid medications? No    If YES, schedule and add allergy information to appointment notes AND route to the RN pool/ Dr. Badillo's Team    If LUANNE and Contrast Dye / Iodine Allergy? DO NOT SCHEDULE, route to RN pool/ Dr. Badillo's Team    Allergies: Amoxicillin     Does patient have an active infection or treated for one within the past week? No    Is patient currently taking any antibiotics or steroid medications?  No     For patients on chronic, preventative, or prophylactic antibiotics, procedures may be scheduled.     For patients on antibiotics for active or recent infection, schedule 4 days after completed.    For patients on steroid medications, schedule 4 days after completed.     Has the patient had a flu shot or any other vaccinations within the past 7 days? No  If yes, explain that for the vaccine to work best they need to:       wait 1 week before and 1 week after getting any Vaccine    wait 1 week before and 2 weeks after getting Covid Vaccine #2 or BOOSTER    If patient has concerns about the timing, send to RN pool/ Dr. Badillo's Team    Does patient have an MRI/CT?  YES: 05/24/22  Include Date and Check Procedure Scheduling Grid to see if required.    Was the MRI/CT done within the last 3 years?  Yes     If no route to RN Pool/ Dr. Badillo's Team    If yes, where was the MRI/CT done? [facility]      Refer to PACS Transmissions list for approved external locations and route to RN Pool High Priority/ Dr. Badillo's Team    If  MRI was not done at approved external location do NOT schedule and route to RN pool/ Dr. Badillo's Team      If patient has an imaging disc, the injection MAY be scheduled but patient must bring disc to appt or appt will be cancelled.    Is patient able to transfer to a procedure table with minimal or no assistance? Yes     If no, do NOT schedule and route to RN Pool/ Dr. Badillo's Team    Procedure Specific Instructions:    If celiac plexus block, informed patient NPO for 6 hours and that it is okay to take medications with sips of water, especially blood pressure medications Not Applicable         If this is for a cervical procedure, informed patient that aspirin needs to be held for 6 days.   Not Applicable      Sedation, If Sedation is ordered for any procedure, patient must be NPO for 6 hours prior to procedure Not Applicable      If IV needed:    Do not schedule procedures requiring IV placement in the first appointment of the day or first appointment after lunch. Do NOT schedule at 0745, 0815 or 1245.       Instructed patient to arrive 30 minutes early for IV start if required. (Check Procedure Scheduling Grid)  Not Applicable    Reminders:    If you are started on any steroids or antibiotics between now and your appointment, you must contact us because the procedure may need to be cancelled.  Yes      As a reminder, receiving steroids can decrease your body's ability to fight infection.   Would you still like to move forward with scheduling the injection?  Yes      IV Sedation is not provided for procedures. If oral anti-anxiety medication is needed, the patient should request this from their referring provider.      Instruct patient to arrive as directed prior to the scheduled appointment time:  If IV needed 30 minutes before appointment time       For patients 85 or older we recommend having an adult stay w/ them for the remainder of the day.       If the patient is Diabetic, remind them to bring their  glucometer.      Does the patient have any questions?  NO  Delaney Triplett  [facility]      None Long bone metal rods b/l upper arms , b/l upper and lower leg/None

## 2024-03-12 NOTE — H&P ADULT - ASSESSMENT
Assessment/Plan:  LO VLADOVINOS is a 53y with PMH osteogenesis imperfecta s/p upper & lower extremity hardware, aortic insufficiency, and HTN who presented to [facility] on 3/2 s/p fall from her wheelchair. Found to have right distal femur fracture, right distal humerus fracture, left scapula fracture, and right rib fractures.  Hospital Course complicated by anemia, s/p transfusion. Patient now admitted for a multidisciplinary rehab program. 03-12-24 @ 15:20    Polytrauma s/p fall  - acute, comminuted fracture adjacent to the tip of the right femoral anabell which begins in the distal femoral metadiaphysis and continues into the lateral femoral condyle,   - Age indeterminate fractures: R sacral ala, left scapular body, and right humeral head, status post pin placement  - Prior deformity of the right 3rd rib with likely a superimposed acute fracture, likely acute fractures of right 4th-6th ribs  - s/p fiberglass casting of R arm and R leg  - Impaired ADLs and mobility  - Need for assistance with personal care   - Start comprehensive rehab program of PT/OT/SLP - 3 hours a day, 5 days a week. P&O as needed   - Pain control as below  - Precautions: Fall  - WB Status: NWB of JAZMIN, NWB of WILNER APONTE WBAT  - f/u with orthopedics, Dr. Newsome, 1 week after discharge    Pain Control  - Tylenol 975 mg Q6H  - Flexeril 5 mg TID PRN muscle spasm  - Oxycodone 2.5 Q4H PRN mod pain, oxycodone 5 mg Q4H PRN severe pain    HTN  - Losartan 50 mg BID  - Norvasc 2.5 mg QHS  - Eplerenone 25 mg QD  - Bisoprolol 5 mg/HCTZ 6.25 mg QD  - Monitor BP    Mood / Cognition  - Neuropsychology consult PRN  - Lexapro 5 mg QD    Sleep  - Maintain quiet hours and a low stim environment.   - Xanax 0.25 mg PRN    GI / Bowel  - Senna qHS  - Miralax PRN Daily     / Bladder  - Continue bladder scans Q8 hours with straight cath for >400cc    Skin / Pressure injury  - Skin assessment on admission performed [  ] :   - Monitor Incisions:    - Pressure Injury/Skin: OOB to chair, PT/OT  - nursing to monitor skin qShift    Diet/Dysphagia:  - Diet Consistency: regular  - Nutrition consult    DVT prophylaxis:   - Heparin 5000u Q12H      Outpatient Follow-up:  Domenic Newsome  Orthopaedic Surgery  [address], [address]  [place] [address]  Phone: [phone]  Fax: [phone]  Follow Up Time: 2 weeks      Code Status/Emergency Contact:      ---------------    Goals: Safe discharge to home  Estimated Length of Stay: 10-14 days  Rehab Potential: Good  Medical Prognosis: Good  Estimated Disposition: Home with home care      PRESCREEN COMPARISON:  I have reviewed the prescreen information and I have found no relevant changes between the preadmission screening and my post admission evaluation.    RATIONALE FOR INPATIENT ADMISSION: Patient demonstrates the following:  [X] Medically appropriate for rehabilitation admission  [X] Has attainable rehab goals with an appropriate initial discharge plan  [X]Has rehabilitation potential (expected to make a significant improvement within a reasonable period of time)  [X] Requires close medical management by a rehab physician, rehab nursing care, Hospitalist and comprehensive interdisciplinary team (including PT, OT and/or SLP, Prosthetics and Orthotics)     Assessment/Plan:  LO VALDOVINOS is a 53y with PMH osteogenesis imperfecta s/p upper & lower extremity hardware, aortic insufficiency, and HTN who presented to [facility] on 3/2 s/p fall from her wheelchair. Found to have right distal femur fracture, right distal humerus fracture, left scapula fracture, and right rib fractures.  Hospital Course complicated by anemia likely 2/2 right knee hemarthrosis, s/p 1/2 unit PRBC transfusion. Patient now admitted for a multidisciplinary rehab program. 03-12-24 @ 15:20    Polytrauma s/p fall  - uses manual wheelchair at baseline  - acute, comminuted fracture adjacent to the tip of the right femoral anabell which begins in the distal femoral metadiaphysis and continues into the lateral femoral condyle  - Age indeterminate fractures: R sacral ala, left scapular body, and right humeral head, status post pin placement  - Prior deformity of the right 3rd rib with likely a superimposed acute fracture, likely acute fractures of right 4th-6th ribs  - s/p fiberglass casting of R arm and R leg  - Impaired ADLs and mobility  - Need for assistance with personal care   - Start comprehensive rehab program of PT/OT/SLP - 3 hours a day, 5 days a week. P&O as needed   - Pain control as below  - Precautions: Fall  - WB Status: NWB of JAZMIN, NWB of WILNER APONTE WBAT  - f/u with orthopedics, Dr. Newsome, 1 week after discharge    Pain Control  - Tylenol 975 mg Q6H  - Flexeril 5 mg TID PRN muscle spasm  - Oxycodone 2.5 Q4H PRN mod pain, oxycodone 5 mg Q4H PRN severe pain    Anemia  - likely 2/2 right knee hemarthrosis  - s/p 1/2 unit PRBC 3/4  - monitor H/H    Aortic Insufficiency/HTN  - Losartan 50 mg BID  - Norvasc 2.5 mg QHS  - Eplerenone 25 mg QD  - Bisoprolol 5 mg/HCTZ 6.25 mg QD  - Monitor BP    Mood / Cognition  - Neuropsychology consult PRN  - Lexapro 5 mg QD    Sleep  - Maintain quiet hours and a low stim environment.   - Xanax 0.25 mg PRN    GI / Bowel  - Senna qHS  - Miralax PRN Daily     / Bladder  - Continue bladder scans Q8 hours with straight cath for >400cc    Skin / Pressure injury  - Skin assessment on admission performed [  ] :   - Monitor Incisions:    - Pressure Injury/Skin: OOB to chair, PT/OT  - nursing to monitor skin qShift    Diet/Dysphagia:  - Diet Consistency: regular  - Nutrition consult    DVT prophylaxis:   - Heparin 5000u Q12H      Outpatient Follow-up:  Domenic Newsome  Orthopaedic Surgery  [address], [address]  [place] [address]  Phone: [phone]  Fax: [phone]  Follow Up Time: 2 weeks      Code Status/Emergency Contact:      ---------------    Goals: Safe discharge to home  Estimated Length of Stay: 10-14 days  Rehab Potential: Good  Medical Prognosis: Good  Estimated Disposition: Home with home care      PRESCREEN COMPARISON:  I have reviewed the prescreen information and I have found no relevant changes between the preadmission screening and my post admission evaluation.    RATIONALE FOR INPATIENT ADMISSION: Patient demonstrates the following:  [X] Medically appropriate for rehabilitation admission  [X] Has attainable rehab goals with an appropriate initial discharge plan  [X]Has rehabilitation potential (expected to make a significant improvement within a reasonable period of time)  [X] Requires close medical management by a rehab physician, rehab nursing care, Hospitalist and comprehensive interdisciplinary team (including PT, OT and/or SLP, Prosthetics and Orthotics)     Assessment/Plan:  LO VALDOVINOS is a 53y with PMH osteogenesis imperfecta s/p upper & lower extremity hardware, aortic insufficiency, and HTN who presented to [facility] on 3/2 s/p fall from her wheelchair. Found to have right distal femur fracture, right distal humerus fracture, left scapula fracture, and right rib fractures.  Hospital Course complicated by anemia likely 2/2 right knee hemarthrosis, s/p 1/2 unit PRBC transfusion. Patient now admitted for a multidisciplinary rehab program. 03-12-24 @ 15:20    Polytrauma s/p fall  - uses manual wheelchair at baseline  - acute, comminuted fracture adjacent to the tip of the right femoral anabell which begins in the distal femoral metadiaphysis and continues into the lateral femoral condyle  - Age indeterminate fractures: R sacral ala, left scapular body, and right humeral head, status post pin placement  - Prior deformity of the right 3rd rib with likely a superimposed acute fracture, likely acute fractures of right 4th-6th ribs  - s/p fiberglass casting of R arm and R leg  - Impaired ADLs and mobility  - Need for assistance with personal care   - Start comprehensive rehab program of PT/OT/SLP - 3 hours a day, 5 days a week. P&O as needed   - Pain control as below  - Precautions: Fall  - WB Status: NWB of JAZMIN, NWB of WILNER APONTE WBAT  - f/u with orthopedics, Dr. Newsome, 1 week after discharge    Pain Control  - Tylenol 975 mg Q6H  - Flexeril 5 mg TID PRN muscle spasm  - Oxycodone 2.5 Q4H PRN mod pain, oxycodone 5 mg Q4H PRN severe pain    Anemia  - likely 2/2 right knee hemarthrosis  - s/p 1/2 unit PRBC 3/4  - monitor H/H    Aortic Insufficiency/HTN  - Losartan 50 mg BID  - Norvasc 2.5 mg QHS  - Eplerenone 25 mg QD  - Bisoprolol 5 mg/HCTZ 6.25 mg QD **Home med, please have pharmacy verify. It is not in a prescription bottle, may need to call pharmacy.   - Monitor BP    Mood / Cognition  - Neuropsychology consult PRN  - Lexapro 5 mg QD    Sleep  - Maintain quiet hours and a low stim environment.   - Xanax 0.25 mg PRN    GI / Bowel  - Senna 1 cap qHS  - Miralax PRN Daily     / Bladder  - Continue bladder scans Q8 hours with straight cath for >400cc    Skin / Pressure injury  - Skin assessment on admission performed: intact  - Monitor Incisions:    - Pressure Injury/Skin: OOB to chair, PT/OT  - nursing to monitor skin qShift    Diet/Dysphagia:  - Diet Consistency: regular  - Nutrition consult    DVT prophylaxis:   - Heparin 5000u Q12H      Outpatient Follow-up:  Domenic Newsome  Orthopaedic Surgery  [address], [address]  [place] [address]  Phone: [phone]  Fax: [phone]  Follow Up Time: 2 weeks      Code Status/Emergency Contact:      ---------------    Goals: Safe discharge to home  Estimated Length of Stay: 10-14 days  Rehab Potential: Good  Medical Prognosis: Good  Estimated Disposition: Home with home care      PRESCREEN COMPARISON:  I have reviewed the prescreen information and I have found no relevant changes between the preadmission screening and my post admission evaluation.    RATIONALE FOR INPATIENT ADMISSION: Patient demonstrates the following:  [X] Medically appropriate for rehabilitation admission  [X] Has attainable rehab goals with an appropriate initial discharge plan  [X]Has rehabilitation potential (expected to make a significant improvement within a reasonable period of time)  [X] Requires close medical management by a rehab physician, rehab nursing care, Hospitalist and comprehensive interdisciplinary team (including PT, OT and/or SLP, Prosthetics and Orthotics)     Assessment/Plan:  Mrs. Ivis Grimm is a 53-year-old female patient with past medical history of osteogenesis imperfecta s/p upper & lower extremity hardware, aortic insufficiency, and HTN who is admitted for Acute Inpatient Rehabilitation with a multidisciplinary rehab program at [facility] with functional impairments in ADLs and mobility secondary to multiple fall related trauma.      Polytrauma s/p fall  - uses manual wheelchair at baseline  - acute, comminuted fracture adjacent to the tip of the right femoral anabell which begins in the distal femoral metadiaphysis and continues into the lateral femoral condyle  - Age indeterminate fractures: R sacral ala, left scapular body, and right humeral head, status post pin placement  - Prior deformity of the right 3rd rib with likely a superimposed acute fracture, likely acute fractures of right 4th-6th ribs  - s/p fiberglass casting of R arm and R leg  - Impaired ADLs and mobility  - Need for assistance with personal care   - Start comprehensive rehab program of PT/OT/SLP - 3 hours a day, 5 days a week. P&O as needed   - Pain control as below  - Precautions: Fall  - WB Status: NWB of RUE, NWB of RLE, JAMARIE WBAT  - f/u with orthopedics, Dr. Newsome, 1 week after discharge    Pain Control  - Tylenol 975 mg Q6H  - Flexeril 5 mg TID PRN muscle spasm  - Oxycodone 2.5 Q4H PRN mod pain, oxycodone 5 mg Q4H PRN severe pain    Anemia  - likely 2/2 right knee hemarthrosis  - s/p 1/2 unit PRBC 3/4  - monitor H/H    Aortic Insufficiency/HTN  - Losartan 50 mg BID  - Norvasc 2.5 mg QHS  - Eplerenone 25 mg QD  - Bisoprolol 5 mg/HCTZ 6.25 mg QD **Home med, please have pharmacy verify. It is not in a prescription bottle, may need to call pharmacy.   - Monitor BP    Mood / Cognition  - Neuropsychology consult PRN  - Lexapro 5 mg QD    Sleep  - Maintain quiet hours and a low stim environment.   - Xanax 0.25 mg PRN    GI / Bowel  - Senna 1 cap qHS  - Miralax PRN Daily     / Bladder  - Continue bladder scans Q8 hours with straight cath for >400cc    Skin / Pressure injury  - Skin assessment on admission performed: intact  - Monitor Incisions:    - Pressure Injury/Skin: OOB to chair, PT/OT  - nursing to monitor skin qShift    Diet/Dysphagia:  - Diet Consistency: regular  - Nutrition consult    DVT prophylaxis:   - Heparin 5000u Q12H      Outpatient Follow-up:  Domenic Newsome  Orthopaedic Surgery  [address], [address]  [place] [address]  Phone: [phone]  Fax: [phone]  Follow Up Time: 2 weeks      Code Status/Emergency Contact:      ---------------    Goals: Safe discharge to home  Estimated Length of Stay: 10-14 days  Rehab Potential: Good  Medical Prognosis: Good  Estimated Disposition: Home with home care      PRESCREEN COMPARISON:  I have reviewed the prescreen information and I have found no relevant changes between the preadmission screening and my post admission evaluation.    RATIONALE FOR INPATIENT ADMISSION: Patient demonstrates the following:  [X] Medically appropriate for rehabilitation admission  [X] Has attainable rehab goals with an appropriate initial discharge plan  [X]Has rehabilitation potential (expected to make a significant improvement within a reasonable period of time)  [X] Requires close medical management by a rehab physician, rehab nursing care, Hospitalist and comprehensive interdisciplinary team (including PT, OT and/or SLP, Prosthetics and Orthotics)     Assessment/Plan:  Mrs. Ivis Grimm is a 53-year-old female patient with past medical history of osteogenesis imperfecta s/p upper & lower extremity hardware, aortic insufficiency, and HTN who is admitted for Acute Inpatient Rehabilitation with a multidisciplinary rehab program at [facility] with functional impairments in ADLs and mobility secondary to multiple fall related trauma.      Polytrauma       * S/P fall       * acute, comminuted fracture adjacent to the tip of the right femoral anabell which begins in the distal femoral metadiaphysis and continues into the lateral femoral condyle       * Age indeterminate fractures: R sacral ala, left scapular body, and right humeral head, status post pin placement       * Prior deformity of the right 3rd rib with likely a superimposed acute fracture, likely acute fractures of right 4th-6th ribs       * S/P fiberglass casting of R arm and R leg       * WB Status: NWB of RUE, NWB of RLE, LUE WBAT       * f/u with orthopedics, Dr. Newsome, 1 week after discharge       * Used manual (self-propelled) wheelchair at baseline  - Impaired ADLs and mobility  - Need for assistance with personal care   - Start comprehensive rehab program of PT/OT - 3 hours a day, 5 days a week. P&O as needed        * Pain control as below       * Precautions: Fall    Pain Control  - Tylenol 975 mg Q6H  - Flexeril 5 mg TID PRN muscle spasm  - Oxycodone 2.5 Q4H PRN mod pain, oxycodone 5 mg Q4H PRN severe pain    Anemia  - likely 2/2 right knee hemarthrosis  - s/p 1/2 unit PRBC 3/4  - monitor H/H    Aortic Insufficiency/HTN  - Losartan 50 mg BID  - Norvasc 2.5 mg QHS  - Eplerenone 25 mg QD  - Bisoprolol 5 mg/HCTZ 6.25 mg QD **Home med, please have pharmacy verify. It is not in a prescription bottle, may need to call pharmacy.   - Monitor BP    Mood / Cognition  - Neuropsychology consult PRN  - Lexapro 5 mg QD    Sleep  - Maintain quiet hours and a low stim environment.   - Xanax 0.25 mg PRN    GI / Bowel  - Senna 1 cap qHS  - Miralax PRN Daily     / Bladder  - Continue bladder scans Q8 hours with straight cath for >400cc    Skin / Pressure injury  - Skin assessment on admission performed: intact  - Monitor Incisions:    - Pressure Injury/Skin: OOB to chair, PT/OT  - nursing to monitor skin qShift    Diet/Dysphagia:  - Diet Consistency: regular  - Nutrition consult    DVT prophylaxis:   - Heparin 5000u Q12H      Outpatient Follow-up:  Domenic Newsome  Orthopaedic Surgery  [address], [address]  [place] [address]  Phone: [phone]  Fax: [phone]  Follow Up Time: 2 weeks      Code Status/Emergency Contact:      ---------------    Goals: Safe discharge to home  Estimated Length of Stay: 10-14 days  Rehab Potential: Good  Medical Prognosis: Good  Estimated Disposition: Home with home care      PRESCREEN COMPARISON:  I have reviewed the prescreen information and I have found no relevant changes between the preadmission screening and my post admission evaluation.    RATIONALE FOR INPATIENT ADMISSION: Patient demonstrates the following:  [X] Medically appropriate for rehabilitation admission  [X] Has attainable rehab goals with an appropriate initial discharge plan  [X]Has rehabilitation potential (expected to make a significant improvement within a reasonable period of time)  [X] Requires close medical management by a rehab physician, rehab nursing care, Hospitalist and comprehensive interdisciplinary team (including PT, OT and/or SLP, Prosthetics and Orthotics)     Assessment/Plan:  Mrs. Ivis Grimm is a 53-year-old female patient with past medical history of osteogenesis imperfecta s/p upper & lower extremity hardware, aortic insufficiency, and HTN who is admitted for Acute Inpatient Rehabilitation with a multidisciplinary rehab program at [facility] with functional impairments in ADLs and mobility secondary to multiple fall related trauma.      Polytrauma       * S/P fall       * acute, comminuted periprosthetic fracture adjacent to the tip of the right femoral anabell which begins in the distal femoral metadiaphysis and continues into the lateral femoral condyle       * Age indeterminate fractures: R sacral ala, left scapular body, and right humeral head, status post pin placement       * Prior deformity of the right 3rd rib with likely a superimposed acute fracture, likely acute fractures of right 4th-6th ribs       * S/P fiberglass casting of R arm and R leg       * WB Status: NWB of RUE, NWB of RLE, LUE WBAT       * f/u with orthopedics, Dr. Newsome, 1 week after discharge       * Used manual (self-propelled) wheelchair at baseline  - Impaired ADLs and mobility  - Need for assistance with personal care   - Start comprehensive rehab program of PT/OT - 3 hours a day, 5 days a week. P&O as needed        * Pain control as below       * Precautions: Fall    Pain Control  - Tylenol 975 mg Q6H  - Flexeril 5 mg TID PRN muscle spasm  - Oxycodone 2.5 Q4H PRN mod pain, oxycodone 5 mg Q4H PRN severe pain    Anemia  - likely 2/2 right knee hemarthrosis  - s/p 1/2 unit PRBC 3/4  - monitor H/H    Aortic Insufficiency/HTN  - Losartan 50 mg BID  - Norvasc 2.5 mg QHS  - Eplerenone 25 mg QD  - Bisoprolol 5 mg/HCTZ 6.25 mg QD **Home med, please have pharmacy verify. It is not in a prescription bottle, may need to call pharmacy.   - Monitor BP    Mood / Cognition  - Neuropsychology consult PRN  - Lexapro 5 mg QD    Sleep  - Maintain quiet hours and a low stim environment.   - Xanax 0.25 mg PRN    GI / Bowel  - Senna 1 cap qHS  - Miralax PRN Daily     / Bladder  - Continue bladder scans Q8 hours with straight cath for >400cc    Skin / Pressure injury  - Skin assessment on admission performed: intact  - Monitor Incisions:    - Pressure Injury/Skin: OOB to chair, PT/OT  - nursing to monitor skin qShift    Diet/Dysphagia:  - Diet Consistency: regular  - Nutrition consult    DVT prophylaxis:   - Heparin 5000u Q12H      Outpatient Follow-up:  Domenic Newsome  Orthopaedic Surgery  [address], [address]  [place] [address]  Phone: [phone]  Fax: [phone]  Follow Up Time: 2 weeks      Code Status/Emergency Contact:      ---------------    Goals: Safe discharge to home  Estimated Length of Stay: 10-14 days  Rehab Potential: Good  Medical Prognosis: Good  Estimated Disposition: Home with home care      PRESCREEN COMPARISON:  I have reviewed the prescreen information and I have found no relevant changes between the preadmission screening and my post admission evaluation.    RATIONALE FOR INPATIENT ADMISSION: Patient demonstrates the following:  [X] Medically appropriate for rehabilitation admission  [X] Has attainable rehab goals with an appropriate initial discharge plan  [X]Has rehabilitation potential (expected to make a significant improvement within a reasonable period of time)  [X] Requires close medical management by a rehab physician, rehab nursing care, Hospitalist and comprehensive interdisciplinary team (including PT, OT and/or SLP, Prosthetics and Orthotics)

## 2024-03-12 NOTE — H&P ADULT - NSHPPHYSICALEXAM_GEN_ALL_CORE
Vital Signs  T(C): 37.1 (03-12-24 @ 22:02), Max: 37.1 (03-12-24 @ 22:02)  HR: 100 (03-12-24 @ 22:02) (79 - 100)  BP: 119/57 (03-12-24 @ 22:02) (113/66 - 129/60)  RR: 16 (03-12-24 @ 22:02) (16 - 18)  SpO2: 93% (03-12-24 @ 22:02) (93% - 96%)    Gen - NAD, Comfortable  HEENT -EOMI, MMM. Ecchymosis of chin  Neck - Supple, No limited ROM  Pulm - CTAB, No wheeze, No rhonchi, No crackles  Cardiovascular - RRR, S1S2, +murmur  Abdomen - Soft, NT/ND, +BS  Extremities - right cast on UE and LE.   Neuro-     Cognitive - AAOx3     Communication - Fluent, No dysarthria     Attention: Intact      Judgement: Good evidence of judgement     Memory: Recall 3 objects immediate and 3 min later         Cranial Nerves - CN 2-12 intact     Motor -                    LEFT    UE - ShAB limited 2/2 scap fracture, EF 5/5, EE 5/5, WE 5/5,  5/5                     RIGHT UE - ShAB n/a, EF 5/5, EE 5/5, WE 5/5,  5/5                    LEFT    LE - HF 4/5, KE 4/5, DF 4/5, PF 4/5                    RIGHT LE - n/a     Sensory - Intact to LT     Tone - normal  Psychiatric - Mood stable, Affect WNL  Skin: Intact  Wounds: None Present

## 2024-03-13 LAB
ALBUMIN SERPL ELPH-MCNC: 3.4 G/DL — SIGNIFICANT CHANGE UP (ref 3.3–5)
ALP SERPL-CCNC: 106 U/L — SIGNIFICANT CHANGE UP (ref 40–120)
ALT FLD-CCNC: 45 U/L — SIGNIFICANT CHANGE UP (ref 10–45)
ANION GAP SERPL CALC-SCNC: 11 MMOL/L — SIGNIFICANT CHANGE UP (ref 5–17)
AST SERPL-CCNC: 52 U/L — HIGH (ref 10–40)
BASOPHILS # BLD AUTO: 0.06 K/UL — SIGNIFICANT CHANGE UP (ref 0–0.2)
BASOPHILS NFR BLD AUTO: 0.7 % — SIGNIFICANT CHANGE UP (ref 0–2)
BILIRUB SERPL-MCNC: 0.9 MG/DL — SIGNIFICANT CHANGE UP (ref 0.2–1.2)
BUN SERPL-MCNC: 20 MG/DL — SIGNIFICANT CHANGE UP (ref 7–23)
CALCIUM SERPL-MCNC: 10 MG/DL — SIGNIFICANT CHANGE UP (ref 8.4–10.5)
CHLORIDE SERPL-SCNC: 99 MMOL/L — SIGNIFICANT CHANGE UP (ref 96–108)
CO2 SERPL-SCNC: 25 MMOL/L — SIGNIFICANT CHANGE UP (ref 22–31)
CREAT SERPL-MCNC: 0.28 MG/DL — LOW (ref 0.5–1.3)
EGFR: 127 ML/MIN/1.73M2 — SIGNIFICANT CHANGE UP
EOSINOPHIL # BLD AUTO: 0.44 K/UL — SIGNIFICANT CHANGE UP (ref 0–0.5)
EOSINOPHIL NFR BLD AUTO: 5.1 % — SIGNIFICANT CHANGE UP (ref 0–6)
FLUAV AG NPH QL: SIGNIFICANT CHANGE UP
FLUBV AG NPH QL: SIGNIFICANT CHANGE UP
GLUCOSE SERPL-MCNC: 88 MG/DL — SIGNIFICANT CHANGE UP (ref 70–99)
HCT VFR BLD CALC: 32.5 % — LOW (ref 34.5–45)
HGB BLD-MCNC: 10.3 G/DL — LOW (ref 11.5–15.5)
IMM GRANULOCYTES NFR BLD AUTO: 0.8 % — SIGNIFICANT CHANGE UP (ref 0–0.9)
LYMPHOCYTES # BLD AUTO: 2.05 K/UL — SIGNIFICANT CHANGE UP (ref 1–3.3)
LYMPHOCYTES # BLD AUTO: 23.8 % — SIGNIFICANT CHANGE UP (ref 13–44)
MCHC RBC-ENTMCNC: 28.8 PG — SIGNIFICANT CHANGE UP (ref 27–34)
MCHC RBC-ENTMCNC: 31.7 GM/DL — LOW (ref 32–36)
MCV RBC AUTO: 90.8 FL — SIGNIFICANT CHANGE UP (ref 80–100)
MONOCYTES # BLD AUTO: 0.86 K/UL — SIGNIFICANT CHANGE UP (ref 0–0.9)
MONOCYTES NFR BLD AUTO: 10 % — SIGNIFICANT CHANGE UP (ref 2–14)
NEUTROPHILS # BLD AUTO: 5.12 K/UL — SIGNIFICANT CHANGE UP (ref 1.8–7.4)
NEUTROPHILS NFR BLD AUTO: 59.6 % — SIGNIFICANT CHANGE UP (ref 43–77)
NRBC # BLD: 0 /100 WBCS — SIGNIFICANT CHANGE UP (ref 0–0)
PLATELET # BLD AUTO: 495 K/UL — HIGH (ref 150–400)
POTASSIUM SERPL-MCNC: 4.7 MMOL/L — SIGNIFICANT CHANGE UP (ref 3.5–5.3)
POTASSIUM SERPL-SCNC: 4.7 MMOL/L — SIGNIFICANT CHANGE UP (ref 3.5–5.3)
PROT SERPL-MCNC: 7.4 G/DL — SIGNIFICANT CHANGE UP (ref 6–8.3)
RBC # BLD: 3.58 M/UL — LOW (ref 3.8–5.2)
RBC # FLD: 14.7 % — HIGH (ref 10.3–14.5)
RSV RNA NPH QL NAA+NON-PROBE: SIGNIFICANT CHANGE UP
SARS-COV-2 RNA SPEC QL NAA+PROBE: SIGNIFICANT CHANGE UP
SODIUM SERPL-SCNC: 135 MMOL/L — SIGNIFICANT CHANGE UP (ref 135–145)
WBC # BLD: 8.6 K/UL — SIGNIFICANT CHANGE UP (ref 3.8–10.5)
WBC # FLD AUTO: 8.6 K/UL — SIGNIFICANT CHANGE UP (ref 3.8–10.5)

## 2024-03-13 PROCEDURE — 99223 1ST HOSP IP/OBS HIGH 75: CPT

## 2024-03-13 RX ORDER — BISOPROLOL FUMARATE AND HYDROCHLOROTHIAZIDE 5; 6.25 MG/1; MG/1
1 TABLET ORAL DAILY
Refills: 0 | Status: DISCONTINUED | OUTPATIENT
Start: 2024-03-13 | End: 2024-03-30

## 2024-03-13 RX ORDER — EPLERENONE 50 MG/1
25 TABLET, FILM COATED ORAL DAILY
Refills: 0 | Status: DISCONTINUED | OUTPATIENT
Start: 2024-03-13 | End: 2024-03-14

## 2024-03-13 RX ORDER — ASPIRIN/CALCIUM CARB/MAGNESIUM 324 MG
81 TABLET ORAL DAILY
Refills: 0 | Status: DISCONTINUED | OUTPATIENT
Start: 2024-03-13 | End: 2024-03-13

## 2024-03-13 RX ORDER — NYSTATIN CREAM 100000 [USP'U]/G
1 CREAM TOPICAL
Refills: 0 | Status: DISCONTINUED | OUTPATIENT
Start: 2024-03-13 | End: 2024-03-30

## 2024-03-13 RX ORDER — ESCITALOPRAM OXALATE 10 MG/1
5 TABLET, FILM COATED ORAL
Refills: 0 | Status: DISCONTINUED | OUTPATIENT
Start: 2024-03-13 | End: 2024-03-30

## 2024-03-13 RX ADMIN — CYCLOBENZAPRINE HYDROCHLORIDE 5 MILLIGRAM(S): 10 TABLET, FILM COATED ORAL at 08:58

## 2024-03-13 RX ADMIN — HEPARIN SODIUM 5000 UNIT(S): 5000 INJECTION INTRAVENOUS; SUBCUTANEOUS at 17:24

## 2024-03-13 RX ADMIN — Medication 0.25 MILLIGRAM(S): at 21:24

## 2024-03-13 RX ADMIN — NYSTATIN CREAM 1 APPLICATION(S): 100000 CREAM TOPICAL at 06:40

## 2024-03-13 RX ADMIN — OXYCODONE HYDROCHLORIDE 5 MILLIGRAM(S): 5 TABLET ORAL at 00:30

## 2024-03-13 RX ADMIN — EPLERENONE 25 MILLIGRAM(S): 50 TABLET, FILM COATED ORAL at 06:22

## 2024-03-13 RX ADMIN — Medication 975 MILLIGRAM(S): at 19:09

## 2024-03-13 RX ADMIN — BISOPROLOL FUMARATE AND HYDROCHLOROTHIAZIDE 1 TABLET(S): 5; 6.25 TABLET ORAL at 08:38

## 2024-03-13 RX ADMIN — LOSARTAN POTASSIUM 50 MILLIGRAM(S): 100 TABLET, FILM COATED ORAL at 19:37

## 2024-03-13 RX ADMIN — OXYCODONE HYDROCHLORIDE 5 MILLIGRAM(S): 5 TABLET ORAL at 23:49

## 2024-03-13 RX ADMIN — Medication 975 MILLIGRAM(S): at 12:15

## 2024-03-13 RX ADMIN — HEPARIN SODIUM 5000 UNIT(S): 5000 INJECTION INTRAVENOUS; SUBCUTANEOUS at 06:23

## 2024-03-13 RX ADMIN — Medication 975 MILLIGRAM(S): at 00:30

## 2024-03-13 RX ADMIN — Medication 975 MILLIGRAM(S): at 06:22

## 2024-03-13 RX ADMIN — Medication 975 MILLIGRAM(S): at 11:35

## 2024-03-13 RX ADMIN — AMLODIPINE BESYLATE 2.5 MILLIGRAM(S): 2.5 TABLET ORAL at 21:24

## 2024-03-13 RX ADMIN — NYSTATIN CREAM 1 APPLICATION(S): 100000 CREAM TOPICAL at 21:24

## 2024-03-13 RX ADMIN — ESCITALOPRAM OXALATE 5 MILLIGRAM(S): 10 TABLET, FILM COATED ORAL at 06:40

## 2024-03-13 RX ADMIN — LOSARTAN POTASSIUM 50 MILLIGRAM(S): 100 TABLET, FILM COATED ORAL at 08:38

## 2024-03-13 NOTE — DIETITIAN INITIAL EVALUATION ADULT - FLUID ACCUMULATION
Daily Note     Today's date: 2019  Patient name: Dipak Steele  : 2001  MRN: [id number]  Referring provider: Chio Stack PA-C  Dx:   Encounter Diagnosis     ICD-10-CM    1  Concussion without loss of consciousness, subsequent encounter S06 0X0D    2  Dizziness and giddiness R42    3  Low back pain without sciatica, unspecified back pain laterality, unspecified chronicity M54 5    4  Neck pain M54 2    5  Chronic migraine without aura without status migrainosus, not intractable G43 709                   Subjective: Pt has 8/10 HA today  Objective: See treatment diary below    Objective: See treatment diary below     Precautions none      Specialty Daily Treatment Diary      Manual   1/10  1/14          SOR 5 min   3 min                                                                        Exercise Diary   1/10  1/14  1/21       UT S  30" x 2     30" x 2       LS S  30" x 2     30" x 2       Chin tucks  5" 10x    5" 10x       Upright bike  L1, 6 min   L1, 10 min  L1, 10 min        VOR x 1  45" x 2 standing plain  45" x 2 standing plain  45" x 2 standing plain       VOR cx  45" x 2 standing plain  45" x 2 standing plain  45" x 2 standing plain       Ambulation with HT/HN  2 laps  2 laps  2 laps        FAEC - airex  30" x 2  FTEC - airex 30" x 2 FTEC - airex 30" x 2       Tandem gait  foam beams 2 laps  foam beams 2 laps  fw/bw foam beams 2 laps         Rockerboard   EO 30x A/P, M/L          Sidestepping   Foam beams 2 laps         Ambulation with turns    180 deg 2 laps ea   180 deg 2 laps ea                                                                                                                              Modalities  1/10  1/14  1/21        Stim + MHP  10 min     10 min         Stim + Ice   10 min                           5:00-5:25: self directed  5:25-5:35: group  5:35-6:00: one on one    Assessment: Tolerated treatment fair   No significant increase in HA reported with light aerobics on recumbent bike  Continues to be challenged with ambulation with HT/HN and static balance utilizing vestibular cues  Patient would benefit from continued PT      Plan: Progress treatment as tolerated  None Noted (as Per Documentation)

## 2024-03-13 NOTE — DIETITIAN INITIAL EVALUATION ADULT - OTHER INFO
Initial Nutrition Assessment   53yr Old Female   Denies Food Allergy/Intolerance  Tolerates Diet Well - No Chewing/Swallowing Complications (Per Patient)  No Pressure Ulcers (as Per Nursing Flow Sheets)  No Edema Noted (as Per Nursing Flow Sheets)  No Recent Nausea/Vomiting/Diarrhea/Constipation (as Per Patient)

## 2024-03-13 NOTE — DIETITIAN INITIAL EVALUATION ADULT - NS FNS DIET ORDER
on Regular Diet (IDDSI Level 7) w/ Thin Liquids (IDDSI Level 0)   Declines Nutrition Supplementation & Declines Multivitamin  Education Provided on Proper Nutrition

## 2024-03-13 NOTE — DIETITIAN INITIAL EVALUATION ADULT - FACTORS AFF FOOD INTAKE
Poor PO Intake over Last Week  Consuming Less than Prior to Admission to Hospital (Per Patient)/persistent lack of appetite

## 2024-03-13 NOTE — DIETITIAN INITIAL EVALUATION ADULT - ORAL INTAKE PTA/DIET HISTORY
Patient Does Follow Low Carb, High Protein/Fiber Diet @Home  Consumes 3 Meals a Day   Usually Cooks For Self & Family  Does Take Vitamin/Supplements @Home (Vitamin D, Iron, Multivitamin, Electrolyte Drink)

## 2024-03-13 NOTE — DIETITIAN INITIAL EVALUATION ADULT - ETIOLOGY-BASIS
Ongoing SW/CM Assessment/Plan of Care Note     See SW/CM flowsheets for goals and other objective data.    Patient/Family discharge goal (s):  Goal #1: Extended Care Facility discharge arranged  Goal #2: Transportation arranged or issues addressed       PT Recommendation:  Recommendation for Discharge: PT WI: Sub-acute nursing home    OT Recommendation:       SLP Recommendation:       Disposition:       Progress note:   Notes reviewed. Met with pt at bedside to confirm plans. She has been at a [facility] in [place] for rehab for the past 2 months. Pt plans to return to [facility] at d/c. At the SNF, she uses a walker and a w/c. She is independent with dressing and toileting.  She needs staff assistance for bathing.  Pt is on 2L oxygen. She is noncompliant with a fluid restriction, per the nurse this restriction has been dc'd.  Pt. Also continues to smoke at the facility.      Transport will be by [facility]. Try to schedule ride 24 hours in advance.  [facility] pays for transport.    [facility] -  #  [phone]  Fax # [phone]     [facility] - call at [place]  Ph#  [phone]     [facility] # [phone]     Discharge Destination:  SNF        SNF’s: Facility Choices Offered. Patient's preferred facilities are:  [facility]     Following Provider at [facility]:  Emiliano Feliciano     Transportation:  Uses [facility] transport.  SNF uses [facility]   # [phone].  Call to schedule ride preferably 24 hours in advance, bill transport cost to [facility].     Completed ADA card: N/A     Medical Appointments: TBD      Hospital Discharge Appeal Notices: IMM  pending     Medications will be filled by facility pharmacy and DME provided by facility.                     Acute illness or injury

## 2024-03-13 NOTE — CONSULT NOTE ADULT - ASSESSMENT
53-year-old female patient with past medical history of osteogenesis imperfecta s/p upper & lower extremity hardware, aortic insufficiency, and HTN who is admitted for Acute Inpatient Rehabilitation with a multidisciplinary rehab program at [facility] with functional impairments in ADLs and mobility secondary to multiple fall related trauma.    #History of Osteogenesis Imperfecta  #Polytrauma Status Post Fall   #Gait and Functional Impairment  -Imaging notable for acute comminuted fracture adjacent to the tip of the right femoral anabell which begins in the distal femoral metadiaphysis and continues into the lateral femoral condyle, Age indeterminate fractures: R sacral ala, left scapular body, and right humeral head, status post pin placement, Prior deformity of the right 3rd rib with likely a superimposed acute fracture, likely acute fractures of right 4th-6th ribs, right knee hemarthrosis   -Seen by orthopedics, status post fiberglass casting of R arm and R leg  -WB Status: NWB of RUE, NWB of RLE, LUE WBAT  -Fall precaution  -Pain control and bowel regimen per rehab team   -Comprehensive rehab program with PT/OT  -f/u with orthopedics, Dr. Newsome, 1 week after discharge  -Used manual (self-propelled) wheelchair at baseline    Anemia  - likely 2/2 right knee hemarthrosis  - s/p 1/2 unit PRBC 3/4  - H/H stable  - Monitor CBC     Severe Aortic Insufficiency  HTN  -Grade IV per patient ; saw CT surgery in Nov 2023 with recommendation for medical management and routine monitoring  -Echo 3/4 showed EF 55 to 60 %, grade 2 LV diastolic dysfunction. There's LVOT obstruction however gradients are LIMITED. Highest 20mmHg which may be underestimated, Severe aortic regurgitation, severe pulmonary hypertension.  -Continue aspirin  -Continue amlodipine, losartan, eplerenone, bisoprolol/HCTZ (home med)  -Monitor BP  -Monitor respiratory/volume status   -Outpatient cardiology follow up ( Prosper Hinojosa , [facility] ).    Anxiety  - Continue Lexapro 5 mg QD  - Continue xanax 0.25mg PO QHS PRN     DVT PPx  -Heparin SC    Discussed with rehab team    53-year-old female patient with past medical history of osteogenesis imperfecta s/p upper & lower extremity hardware, aortic insufficiency, and HTN who is admitted for Acute Inpatient Rehabilitation with a multidisciplinary rehab program at [facility] with functional impairments in ADLs and mobility secondary to multiple fall related trauma.    #History of Osteogenesis Imperfecta  #Polytrauma Status Post Fall   #Gait and Functional Impairment  -Imaging notable for acute comminuted fracture adjacent to the tip of the right femoral anabell which begins in the distal femoral metadiaphysis and continues into the lateral femoral condyle, Age indeterminate fractures: R sacral ala, left scapular body, and right humeral head, status post pin placement, Prior deformity of the right 3rd rib with likely a superimposed acute fracture, likely acute fractures of right 4th-6th ribs, right knee hemarthrosis   -Seen by orthopedics, status post fiberglass casting of R arm and R leg  -WB Status: NWB of RUE, NWB of RLE, LUE WBAT  -Fall precaution  -Pain control and bowel regimen per rehab team   -Comprehensive rehab program with PT/OT  -f/u with orthopedics, Dr. Newsome, 1 week after discharge  -Used manual (self-propelled) wheelchair at baseline    Anemia  - likely 2/2 right knee hemarthrosis  - s/p 1/2 unit PRBC 3/4  - H/H stable  - Monitor CBC     Severe Aortic Insufficiency  HTN  -Grade IV per patient ; saw CT surgery in Nov 2023 with recommendation for medical management and routine monitoring  -Echo 3/4 showed EF 55 to 60 %, grade 2 LV diastolic dysfunction. There's LVOT obstruction however gradients are LIMITED. Highest 20mmHg which may be underestimated, Severe aortic regurgitation, severe pulmonary hypertension.  -She takes a baby aspirin at home (started by her cardiologist during her previous hospitalization for bacteremia), she does not want to continue it while at rehab since she is also on heparin SC, discussed with Dr. Hinojosa - no true indication from his standpoint, okay to discontinue  -Continue amlodipine, losartan, eplerenone, bisoprolol/HCTZ (home med)  -Monitor BP  -Monitor respiratory/volume status   -Outpatient cardiology follow up ( Prosper Hinojosa , [facility] ).    Anxiety  - Continue Lexapro 5 mg QD  - Continue xanax 0.25mg PO QHS PRN     DVT PPx  -Heparin SC    Discussed with rehab team

## 2024-03-13 NOTE — DIETITIAN INITIAL EVALUATION ADULT - PERTINENT LABORATORY DATA
03-13    135  |  99  |  20  ----------------------------<  88  4.7   |  25  |  0.28<L>    Ca    10.0      13 Mar 2024 05:59    TPro  7.4  /  Alb  3.4  /  TBili  0.9  /  DBili  x   /  AST  52<H>  /  ALT  45  /  AlkPhos  106  03-13

## 2024-03-13 NOTE — DIETITIAN INITIAL EVALUATION ADULT - PERTINENT MEDS FT
MEDICATIONS  (STANDING):  acetaminophen     Tablet .. 975 milliGRAM(s) Oral every 6 hours  amLODIPine   Tablet 2.5 milliGRAM(s) Oral at bedtime  aspirin enteric coated 81 milliGRAM(s) Oral daily  bisoprolol  5 mG/hydrochlorothiazide 6.25 mG 1 Tablet(s) Oral daily  eplerenone 25 milliGRAM(s) Oral daily  escitalopram 5 milliGRAM(s) Oral <User Schedule>  heparin   Injectable 5000 Unit(s) SubCutaneous every 12 hours  losartan 50 milliGRAM(s) Oral two times a day  nystatin Powder 1 Application(s) Topical two times a day  senna 1 Tablet(s) Oral at bedtime    MEDICATIONS  (PRN):  ALPRAZolam 0.25 milliGRAM(s) Oral at bedtime PRN Sleep  cyclobenzaprine 5 milliGRAM(s) Oral three times a day PRN Muscle Spasm  oxyCODONE    IR 2.5 milliGRAM(s) Oral every 4 hours PRN Moderate Pain (4 - 6)  oxyCODONE    IR 5 milliGRAM(s) Oral every 4 hours PRN Severe Pain (7 - 10)  polyethylene glycol 3350 17 Gram(s) Oral daily PRN Constipation

## 2024-03-14 PROCEDURE — 99232 SBSQ HOSP IP/OBS MODERATE 35: CPT

## 2024-03-14 RX ORDER — EPLERENONE 50 MG/1
25 TABLET, FILM COATED ORAL
Refills: 0 | Status: DISCONTINUED | OUTPATIENT
Start: 2024-03-15 | End: 2024-03-30

## 2024-03-14 RX ORDER — AMLODIPINE BESYLATE 2.5 MG/1
2.5 TABLET ORAL
Refills: 0 | Status: DISCONTINUED | OUTPATIENT
Start: 2024-03-15 | End: 2024-03-30

## 2024-03-14 RX ORDER — AMLODIPINE BESYLATE 2.5 MG/1
2.5 TABLET ORAL ONCE
Refills: 0 | Status: COMPLETED | OUTPATIENT
Start: 2024-03-14 | End: 2024-03-14

## 2024-03-14 RX ADMIN — OXYCODONE HYDROCHLORIDE 5 MILLIGRAM(S): 5 TABLET ORAL at 22:46

## 2024-03-14 RX ADMIN — LOSARTAN POTASSIUM 50 MILLIGRAM(S): 100 TABLET, FILM COATED ORAL at 06:24

## 2024-03-14 RX ADMIN — OXYCODONE HYDROCHLORIDE 5 MILLIGRAM(S): 5 TABLET ORAL at 00:49

## 2024-03-14 RX ADMIN — HEPARIN SODIUM 5000 UNIT(S): 5000 INJECTION INTRAVENOUS; SUBCUTANEOUS at 19:00

## 2024-03-14 RX ADMIN — NYSTATIN CREAM 1 APPLICATION(S): 100000 CREAM TOPICAL at 19:01

## 2024-03-14 RX ADMIN — CYCLOBENZAPRINE HYDROCHLORIDE 5 MILLIGRAM(S): 10 TABLET, FILM COATED ORAL at 08:52

## 2024-03-14 RX ADMIN — BISOPROLOL FUMARATE AND HYDROCHLOROTHIAZIDE 1 TABLET(S): 5; 6.25 TABLET ORAL at 06:09

## 2024-03-14 RX ADMIN — LOSARTAN POTASSIUM 50 MILLIGRAM(S): 100 TABLET, FILM COATED ORAL at 19:01

## 2024-03-14 RX ADMIN — Medication 975 MILLIGRAM(S): at 06:08

## 2024-03-14 RX ADMIN — CYCLOBENZAPRINE HYDROCHLORIDE 5 MILLIGRAM(S): 10 TABLET, FILM COATED ORAL at 21:29

## 2024-03-14 RX ADMIN — EPLERENONE 25 MILLIGRAM(S): 50 TABLET, FILM COATED ORAL at 06:08

## 2024-03-14 RX ADMIN — AMLODIPINE BESYLATE 2.5 MILLIGRAM(S): 2.5 TABLET ORAL at 19:00

## 2024-03-14 RX ADMIN — HEPARIN SODIUM 5000 UNIT(S): 5000 INJECTION INTRAVENOUS; SUBCUTANEOUS at 06:25

## 2024-03-14 RX ADMIN — NYSTATIN CREAM 1 APPLICATION(S): 100000 CREAM TOPICAL at 06:08

## 2024-03-14 RX ADMIN — ESCITALOPRAM OXALATE 5 MILLIGRAM(S): 10 TABLET, FILM COATED ORAL at 06:08

## 2024-03-14 NOTE — PROGRESS NOTE ADULT - ASSESSMENT
Mrs. Ivis Grimm is a 53-year-old female patient with past medical history of osteogenesis imperfecta s/p upper & lower extremity hardware, aortic insufficiency, and HTN who is admitted for Acute Inpatient Rehabilitation with a multidisciplinary rehab program at [facility] with functional impairments in ADLs and mobility secondary to multiple fall related trauma.      Polytrauma       * S/P fall       * acute, comminuted fracture adjacent to the tip of the right femoral anabell which begins in the distal femoral metadiaphysis and continues into the lateral femoral condyle       * Age indeterminate fractures: R sacral ala, left scapular body, and right humeral head, status post pin placement       * Prior deformity of the right 3rd rib with likely a superimposed acute fracture, likely acute fractures of right 4th-6th ribs       * S/P fiberglass casting of R arm and R leg       * WB Status: NWB of RUE, NWB of RLE, LUE WBAT       * f/u with orthopedics, Dr. Neswome, 1 week after discharge       * Used manual (self-propelled) wheelchair at baseline  - Impaired ADLs and mobility  - Need for assistance with personal care   - Continue comprehensive rehab program of PT/OT - 3 hours a day, 5 days a week. P&O as needed        * Pain control as below       * Precautions: Fall    Pain Control  - Tylenol 975 mg Q6H  - Flexeril 5 mg TID PRN muscle spasm  - Oxycodone 2.5 Q4H PRN mod pain, oxycodone 5 mg Q4H PRN severe pain    Anemia  - likely 2/2 right knee hemarthrosis  - s/p 1/2 unit PRBC 3/4  - monitor H/H    Aortic Insufficiency/HTN  - Losartan 50 mg BID  - Norvasc 2.5 mg QHS  - Eplerenone 25 mg QD  - Bisoprolol 5 mg/HCTZ 6.25 mg QD **Home med, please have pharmacy verify. It is not in a prescription bottle, may need to call pharmacy.   - Monitor BP    Mood / Cognition  - Neuropsychology consult PRN  - Lexapro 5 mg QD    Sleep  - Maintain quiet hours and a low stim environment.   - Xanax 0.25 mg PRN    GI / Bowel  - Senna 1 cap qHS  - Miralax PRN Daily     / Bladder  - Continue bladder scans Q8 hours with straight cath for >400cc    Skin / Pressure injury  - Skin assessment on admission performed: intact  - Monitor Incisions:    - Pressure Injury/Skin: OOB to chair, PT/OT  - nursing to monitor skin qShift    Diet/Dysphagia:  - Diet Consistency: regular  - Nutrition consult    DVT prophylaxis:   - Heparin 5000u Q12H      Outpatient Follow-up:  Domenic Newsome  Orthopaedic Surgery  [address], [address]  [place] [address]  Phone: [phone]  Fax: [phone]  Follow Up Time: 2 weeks Mrs. Ivis Grimm is a 53-year-old female patient with past medical history of osteogenesis imperfecta s/p upper & lower extremity hardware, aortic insufficiency, and HTN who is admitted for Acute Inpatient Rehabilitation with a multidisciplinary rehab program at [facility] with functional impairments in ADLs and mobility secondary to multiple fall related trauma.      Polytrauma       * S/P fall       * acute, comminuted periprosthetic fracture adjacent to the tip of the right femoral anabell which begins in the distal femoral metadiaphysis and continues into the lateral femoral condyle       * Age indeterminate fractures: R sacral ala, left scapular body, and right humeral head, status post pin placement       * Prior deformity of the right 3rd rib with likely a superimposed acute fracture, likely acute fractures of right 4th-6th ribs       * S/P fiberglass casting of R arm and R leg       * WB Status: NWB of RUE, NWB of RLE, LUE WBAT       * f/u with orthopedics, Dr. Newsome, 1 week after discharge       * Used manual (self-propelled) wheelchair at baseline  - Impaired ADLs and mobility  - Need for assistance with personal care   - Continue comprehensive rehab program of PT/OT - 3 hours a day, 5 days a week. P&O as needed        * Pain control as below       * Precautions: Fall    Pain Control  - Tylenol 975 mg Q6H  - Flexeril 5 mg TID PRN muscle spasm  - Oxycodone 2.5 Q4H PRN mod pain, oxycodone 5 mg Q4H PRN severe pain    Anemia  - likely 2/2 right knee hemarthrosis  - s/p 1/2 unit PRBC 3/4  - monitor H/H    Aortic Insufficiency/HTN  - Losartan 50 mg BID  - Norvasc 2.5 mg QHS  - Eplerenone 25 mg QD  - Bisoprolol 5 mg/HCTZ 6.25 mg QD **Home med, please have pharmacy verify. It is not in a prescription bottle, may need to call pharmacy.   - Monitor BP    Mood / Cognition  - Neuropsychology consult PRN  - Lexapro 5 mg QD    Sleep  - Maintain quiet hours and a low stim environment.   - Xanax 0.25 mg PRN    GI / Bowel  - Senna 1 cap qHS  - Miralax PRN Daily     / Bladder  - Continue bladder scans Q8 hours with straight cath for >400cc    Skin / Pressure injury  - Skin assessment on admission performed: intact  - Monitor Incisions:    - Pressure Injury/Skin: OOB to chair, PT/OT  - nursing to monitor skin qShift    Diet/Dysphagia:  - Diet Consistency: regular  - Nutrition consult    DVT prophylaxis:   - Heparin 5000u Q12H      Outpatient Follow-up:  Domenic Newsome  Orthopaedic Surgery  [address], [address]  [place] [address]  Phone: [phone]  Fax: [phone]  Follow Up Time: 2 weeks

## 2024-03-14 NOTE — PROGRESS NOTE ADULT - SUBJECTIVE AND OBJECTIVE BOX
HPI:  Mrs. Ivis Grimm is a 53-year-old female patient with past medical history of osteogenesis imperfecta s/p upper & lower extremity hardware, aortic insufficiency, and HTN who presented to [facility] on 3/2 s/p fall from her wheelchair. She reports missing a step on the wheelchair, losing control, and falling face first from the wheelchair at a height of around 2-3 ft face first onto a concrete ground. She lost consciousness and regained her awareness of her surroundings some time later, likely seconds to minutes, with little recollection of the intervening time. Patient noted to have right temporal superficial abrasion, R upper lip laceration through the vermillion border, small ecchymosis on the R chin, tenderness to palpation in the L shoulder/R proximal arm/R wrist/ R thigh with medial rotation of the RLE.      Imaging performed reported acute, comminuted fracture adjacent to the tip of the right femoral anabell which begins in the distal femoral metadiaphysis and continues into the lateral femoral condyle, right knee hemarthrosis, age indeterminate R sacral ala fracture, age-indeterminate left scapular body fracture, age-indeterminate fracture of the right humeral head, status post pin placement, prior deformity of the right third rib with likely a superimposed acute fracture, likely acute fractures of right 4th-6th ribs. Orthopedics was consulted and recommended NWB of RUE; NWB of RLE, LUE can be WBAT given non-displaced scapula fracture and polytrauma. Patient now s/p fiberglass casting of R arm and R leg. No acute orthopedic surgical intervention planned. Patient evaluated by PT/OT and was recommended for acute inpatient rehab. Course complicated by anemia likely 2/2 right knee hemarthrosis, s/p 1/2 unit PRBC. Patient was discharged to [facility] on 3/12.   (12 Mar 2024 15:15)    ___________________________________________________________________________    SUBJECTIVE/ROS  Patient was seen and evaluated at bedside today.  Reported no overnight events and is in no acute distress.  Tolerated admission process and initial evaluations.  Orthopedic surgery consulted for reassessment on immobilizers and will see tomorrow.  Case to be evaluated at Interdisciplinary Team meeting tomorrow.  Eager to participate on the recommended rehabilitation program.  Denies any CP, SOB, PEREZ, palpitations, fever, chills, body aches, cough, congestion, or any other symptoms at this time.   ___________________________________________________________________________    VITALS  T(C): 36.7 (03-13-24 @ 21:19), Max: 36.8 (03-13-24 @ 08:41)  HR: 96 (03-14-24 @ 06:04) (88 - 96)  BP: 112/72 (03-14-24 @ 06:04) (112/72 - 131/74)  RR: 16 (03-13-24 @ 21:19) (16 - 17)  SpO2: 94% (03-13-24 @ 21:19) (94% - 95%)  ___________________________________________________________________________    LABS                          10.3   8.60  )-----------( 495      ( 13 Mar 2024 05:59 )             32.5       03-13    135  |  99  |  20  ----------------------------<  88  4.7   |  25  |  0.28<L>    Ca    10.0      13 Mar 2024 05:59    TPro  7.4  /  Alb  3.4  /  TBili  0.9  /  DBili  x   /  AST  52<H>  /  ALT  45  /  AlkPhos  106  03-13    ___________________________________________________________________________    MEDICATIONS  (STANDING):  acetaminophen     Tablet .. 975 milliGRAM(s) Oral every 6 hours  amLODIPine   Tablet 2.5 milliGRAM(s) Oral at bedtime  bisoprolol  5 mG/hydrochlorothiazide 6.25 mG 1 Tablet(s) Oral daily  eplerenone 25 milliGRAM(s) Oral daily  escitalopram 5 milliGRAM(s) Oral <User Schedule>  heparin   Injectable 5000 Unit(s) SubCutaneous every 12 hours  losartan 50 milliGRAM(s) Oral two times a day  nystatin Powder 1 Application(s) Topical two times a day  senna 1 Tablet(s) Oral at bedtime    MEDICATIONS  (PRN):  ALPRAZolam 0.25 milliGRAM(s) Oral at bedtime PRN Sleep  cyclobenzaprine 5 milliGRAM(s) Oral three times a day PRN Muscle Spasm  oxyCODONE    IR 5 milliGRAM(s) Oral every 4 hours PRN Severe Pain (7 - 10)  oxyCODONE    IR 2.5 milliGRAM(s) Oral every 4 hours PRN Moderate Pain (4 - 6)  polyethylene glycol 3350 17 Gram(s) Oral daily PRN Constipation    ___________________________________________________________________________    PHYSICAL EXAM:    Gen - NAD, Comfortable  HEENT -EOMI, MMM. Ecchymosis of chin  Pulm - CTAB, No wheeze, No rhonchi, No crackles  Cardiovascular - RRR, S1S2, +murmur  Abdomen - Soft, NT/ND, +BS  Extremities - right cast on UE and LE.   Neuro-     Cognitive - AAOx3     Communication - Fluent, No dysarthria     Attention: Intact      Judgement: Good evidence of judgement     Memory: Recall 3 objects immediate and 3 min later         Cranial Nerves - CN 2-12 intact     Motor -                    LEFT    UE - ShAB limited 2/2 scap fracture, EF 5/5, EE 5/5, WE 5/5,  5/5                     RIGHT UE - n/a                    LEFT    LE - HF 4/5, KE 4/5, DF 4/5, PF 4/5                    RIGHT LE - n/a     Sensory - Intact to LT     Tone - normal  Psychiatric - Mood stable, Affect WNL  Skin: Intact  Wounds: None Present.     ___________________________________________________________________________ HPI:  Mrs. Ivis Grimm is a 53-year-old female patient with past medical history of osteogenesis imperfecta s/p upper & lower extremity hardware, aortic insufficiency, and HTN who presented to [facility] on 3/2 s/p fall from her wheelchair. She reports missing a step on the wheelchair, losing control, and falling face first from the wheelchair at a height of around 2-3 ft face first onto a concrete ground. She lost consciousness and regained her awareness of her surroundings some time later, likely seconds to minutes, with little recollection of the intervening time. Patient noted to have right temporal superficial abrasion, R upper lip laceration through the vermillion border, small ecchymosis on the R chin, tenderness to palpation in the L shoulder/R proximal arm/R wrist/ R thigh with medial rotation of the RLE.      Imaging performed reported acute, comminuted fracture adjacent to the tip of the right femoral anabell which begins in the distal femoral metadiaphysis and continues into the lateral femoral condyle, right knee hemarthrosis, age indeterminate R sacral ala fracture, age-indeterminate left scapular body fracture, age-indeterminate fracture of the right humeral head, status post pin placement, prior deformity of the right third rib with likely a superimposed acute fracture, likely acute fractures of right 4th-6th ribs. Orthopedics was consulted and recommended NWB of RUE; NWB of RLE, LUE can be WBAT given non-displaced scapula fracture and polytrauma. Patient now s/p fiberglass casting of R arm and R leg. No acute orthopedic surgical intervention planned. Patient evaluated by PT/OT and was recommended for acute inpatient rehab. Course complicated by anemia likely 2/2 right knee hemarthrosis, s/p 1/2 unit PRBC. Patient was discharged to [facility] on 3/12.   (12 Mar 2024 15:15)    ___________________________________________________________________________    SUBJECTIVE/ROS  Patient was seen and evaluated at OT gym and at bedside today.  Reported no overnight events and is in no acute distress.  Tolerated admission process and initial evaluations.  Orthopedic surgery consulted for reassessment on immobilizers and will see tomorrow.  Case to be evaluated at Interdisciplinary Team meeting tomorrow.  Eager to participate on the recommended rehabilitation program.  Denies any CP, SOB, PEREZ, palpitations, fever, chills, body aches, cough, congestion, or any other symptoms at this time.   ___________________________________________________________________________    VITALS  T(C): 36.7 (03-13-24 @ 21:19), Max: 36.8 (03-13-24 @ 08:41)  HR: 96 (03-14-24 @ 06:04) (88 - 96)  BP: 112/72 (03-14-24 @ 06:04) (112/72 - 131/74)  RR: 16 (03-13-24 @ 21:19) (16 - 17)  SpO2: 94% (03-13-24 @ 21:19) (94% - 95%)  ___________________________________________________________________________    LABS                          10.3   8.60  )-----------( 495      ( 13 Mar 2024 05:59 )             32.5       03-13    135  |  99  |  20  ----------------------------<  88  4.7   |  25  |  0.28<L>    Ca    10.0      13 Mar 2024 05:59    TPro  7.4  /  Alb  3.4  /  TBili  0.9  /  DBili  x   /  AST  52<H>  /  ALT  45  /  AlkPhos  106  03-13    ___________________________________________________________________________    MEDICATIONS  (STANDING):  acetaminophen     Tablet .. 975 milliGRAM(s) Oral every 6 hours  amLODIPine   Tablet 2.5 milliGRAM(s) Oral at bedtime  bisoprolol  5 mG/hydrochlorothiazide 6.25 mG 1 Tablet(s) Oral daily  eplerenone 25 milliGRAM(s) Oral daily  escitalopram 5 milliGRAM(s) Oral <User Schedule>  heparin   Injectable 5000 Unit(s) SubCutaneous every 12 hours  losartan 50 milliGRAM(s) Oral two times a day  nystatin Powder 1 Application(s) Topical two times a day  senna 1 Tablet(s) Oral at bedtime    MEDICATIONS  (PRN):  ALPRAZolam 0.25 milliGRAM(s) Oral at bedtime PRN Sleep  cyclobenzaprine 5 milliGRAM(s) Oral three times a day PRN Muscle Spasm  oxyCODONE    IR 5 milliGRAM(s) Oral every 4 hours PRN Severe Pain (7 - 10)  oxyCODONE    IR 2.5 milliGRAM(s) Oral every 4 hours PRN Moderate Pain (4 - 6)  polyethylene glycol 3350 17 Gram(s) Oral daily PRN Constipation    ___________________________________________________________________________    PHYSICAL EXAM:    Gen - NAD, Comfortable  HEENT -EOMI, MMM. Ecchymosis of chin  Pulm - CTAB, No wheeze, No rhonchi, No crackles  Cardiovascular - RRR, S1S2, +murmur  Abdomen - Soft, NT/ND, +BS  Extremities - right cast on UE and LE.   Neuro-     Cognitive - AAOx3     Communication - Fluent, No dysarthria     Attention: Intact      Judgement: Good evidence of judgement     Memory: Recall 3 objects immediate and 3 min later         Cranial Nerves - CN 2-12 intact     Motor -                    LEFT    UE - ShAB limited 2/2 scap fracture, EF 5/5, EE 5/5, WE 5/5,  5/5                     RIGHT UE - n/a                    LEFT    LE - HF 4/5, KE 4/5, DF 4/5, PF 4/5                    RIGHT LE - n/a     Sensory - Intact to LT     Tone - normal  Psychiatric - Mood stable, Affect WNL  Skin: Intact  Wounds: None Present.     ___________________________________________________________________________ HPI:  Mrs. Ivis Grimm is a 53-year-old female patient with past medical history of osteogenesis imperfecta s/p upper & lower extremity hardware, aortic insufficiency, and HTN who presented to [facility] on 3/2 s/p fall from her wheelchair. She reports missing a step on the wheelchair, losing control, and falling face first from the wheelchair at a height of around 2-3 ft face first onto a concrete ground. She lost consciousness and regained her awareness of her surroundings some time later, likely seconds to minutes, with little recollection of the intervening time. Patient noted to have right temporal superficial abrasion, R upper lip laceration through the vermillion border, small ecchymosis on the R chin, tenderness to palpation in the L shoulder/R proximal arm/R wrist/ R thigh with medial rotation of the RLE.      Imaging performed reported acute, comminuted fracture adjacent to the tip of the right femoral anabell which begins in the distal femoral metadiaphysis and continues into the lateral femoral condyle, right knee hemarthrosis, age indeterminate R sacral ala fracture, age-indeterminate left scapular body fracture, age-indeterminate fracture of the right humeral head, status post pin placement, prior deformity of the right third rib with likely a superimposed acute fracture, likely acute fractures of right 4th-6th ribs. Orthopedics was consulted and recommended NWB of RUE; NWB of RLE, LUE can be WBAT given non-displaced scapula fracture and polytrauma. Patient now s/p fiberglass casting of R arm and R leg. No acute orthopedic surgical intervention planned. Patient evaluated by PT/OT and was recommended for acute inpatient rehab. Course complicated by anemia likely 2/2 right knee hemarthrosis, s/p 1/2 unit PRBC. Patient was discharged to [facility] on 3/12.   (12 Mar 2024 15:15)    ___________________________________________________________________________    SUBJECTIVE/ROS  Patient was seen and evaluated at OT gym and at bedside today.  Reported no overnight events and is in no acute distress.  Tolerated admission process and initial evaluations.  Orthopedic surgery consulted for reassessment on immobilizers and will see tomorrow.  Case to be evaluated at Interdisciplinary Team meeting later today.  Eager to participate on the recommended rehabilitation program.  Denies any CP, SOB, PEREZ, palpitations, fever, chills, body aches, cough, congestion, or any other symptoms at this time.   ___________________________________________________________________________    VITALS  T(C): 36.7 (03-13-24 @ 21:19), Max: 36.8 (03-13-24 @ 08:41)  HR: 96 (03-14-24 @ 06:04) (88 - 96)  BP: 112/72 (03-14-24 @ 06:04) (112/72 - 131/74)  RR: 16 (03-13-24 @ 21:19) (16 - 17)  SpO2: 94% (03-13-24 @ 21:19) (94% - 95%)  ___________________________________________________________________________    LABS                          10.3   8.60  )-----------( 495      ( 13 Mar 2024 05:59 )             32.5       03-13    135  |  99  |  20  ----------------------------<  88  4.7   |  25  |  0.28<L>    Ca    10.0      13 Mar 2024 05:59    TPro  7.4  /  Alb  3.4  /  TBili  0.9  /  DBili  x   /  AST  52<H>  /  ALT  45  /  AlkPhos  106  03-13    ___________________________________________________________________________    MEDICATIONS  (STANDING):  acetaminophen     Tablet .. 975 milliGRAM(s) Oral every 6 hours  amLODIPine   Tablet 2.5 milliGRAM(s) Oral at bedtime  bisoprolol  5 mG/hydrochlorothiazide 6.25 mG 1 Tablet(s) Oral daily  eplerenone 25 milliGRAM(s) Oral daily  escitalopram 5 milliGRAM(s) Oral <User Schedule>  heparin   Injectable 5000 Unit(s) SubCutaneous every 12 hours  losartan 50 milliGRAM(s) Oral two times a day  nystatin Powder 1 Application(s) Topical two times a day  senna 1 Tablet(s) Oral at bedtime    MEDICATIONS  (PRN):  ALPRAZolam 0.25 milliGRAM(s) Oral at bedtime PRN Sleep  cyclobenzaprine 5 milliGRAM(s) Oral three times a day PRN Muscle Spasm  oxyCODONE    IR 5 milliGRAM(s) Oral every 4 hours PRN Severe Pain (7 - 10)  oxyCODONE    IR 2.5 milliGRAM(s) Oral every 4 hours PRN Moderate Pain (4 - 6)  polyethylene glycol 3350 17 Gram(s) Oral daily PRN Constipation    ___________________________________________________________________________    PHYSICAL EXAM:    Gen - NAD, Comfortable  HEENT -EOMI, MMM. Ecchymosis of chin  Pulm - CTAB, No wheeze, No rhonchi, No crackles  Cardiovascular - RRR, S1S2, +murmur  Abdomen - Soft, NT/ND, +BS  Extremities - right cast on UE and LE.   Neuro-     Cognitive - AAOx3     Communication - Fluent, No dysarthria     Attention: Intact      Judgement: Good evidence of judgement     Memory: Recall 3 objects immediate and 3 min later         Cranial Nerves - CN 2-12 intact     Motor -                    LEFT    UE - ShAB limited 2/2 scap fracture, EF 5/5, EE 5/5, WE 5/5,  5/5                     RIGHT UE - n/a                    LEFT    LE - HF 4/5, KE 4/5, DF 4/5, PF 4/5                    RIGHT LE - n/a     Sensory - Intact to LT     Tone - normal  Psychiatric - Mood stable, Affect WNL  Skin: Intact  Wounds: None Present.     ___________________________________________________________________________

## 2024-03-14 NOTE — PROGRESS NOTE ADULT - SUBJECTIVE AND OBJECTIVE BOX
PROGRESS NOTE:     Patient is a 53y old  Female who presents with a chief complaint of Polytrauma (14 Mar 2024 07:56)          SUBJECTIVE & OBJECTIVE:   Pt seen and examined at bedside in AM. pt complaining about frequency of blood draws. if possible prefer once a week   no overnight events.       REVIEW OF SYSTEMS: remaining ROS negative     PHYSICAL EXAM:  VITALS:  Vital Signs Last 24 Hrs  T(C): 36.9 (14 Mar 2024 08:39), Max: 36.9 (14 Mar 2024 08:39)  T(F): 98.5 (14 Mar 2024 08:39), Max: 98.5 (14 Mar 2024 08:39)  HR: 87 (14 Mar 2024 08:39) (87 - 96)  BP: 112/66 (14 Mar 2024 08:39) (112/66 - 131/74)  BP(mean): --  RR: 16 (14 Mar 2024 08:39) (16 - 16)  SpO2: 94% (14 Mar 2024 08:39) (94% - 98%)    Parameters below as of 14 Mar 2024 08:39  Patient On (Oxygen Delivery Method): room air          GENERAL: NAD,  no increased WOB  EYES: EOMI, conjunctiva and sclera clear  ENMT: Moist mucous membranes  NERVOUS SYSTEM:  Alert & Oriented   CHEST/LUNG: Clear to auscultation bilaterally;  HEART: Regular rate and rhythm; + murmur  ABDOMEN: Soft, Nontender, Nondistended; Bowel sounds present  EXTREMITIES:  cast on RUE      MEDICATIONS  (STANDING):  acetaminophen     Tablet .. 975 milliGRAM(s) Oral every 6 hours  amLODIPine   Tablet 2.5 milliGRAM(s) Oral at bedtime  bisoprolol  5 mG/hydrochlorothiazide 6.25 mG 1 Tablet(s) Oral daily  eplerenone 25 milliGRAM(s) Oral daily  escitalopram 5 milliGRAM(s) Oral <User Schedule>  heparin   Injectable 5000 Unit(s) SubCutaneous every 12 hours  losartan 50 milliGRAM(s) Oral two times a day  nystatin Powder 1 Application(s) Topical two times a day  senna 1 Tablet(s) Oral at bedtime    MEDICATIONS  (PRN):  ALPRAZolam 0.25 milliGRAM(s) Oral at bedtime PRN Sleep  cyclobenzaprine 5 milliGRAM(s) Oral three times a day PRN Muscle Spasm  oxyCODONE    IR 2.5 milliGRAM(s) Oral every 4 hours PRN Moderate Pain (4 - 6)  oxyCODONE    IR 5 milliGRAM(s) Oral every 4 hours PRN Severe Pain (7 - 10)  polyethylene glycol 3350 17 Gram(s) Oral daily PRN Constipation      Allergies    No Known Allergies    Intolerances              LABS:                           10.3   8.60  )-----------( 495      ( 13 Mar 2024 05:59 )             32.5     03-13    135  |  99  |  20  ----------------------------<  88  4.7   |  25  |  0.28<L>    Ca    10.0      13 Mar 2024 05:59    TPro  7.4  /  Alb  3.4  /  TBili  0.9  /  DBili  x   /  AST  52<H>  /  ALT  45  /  AlkPhos  106  03-13      Urinalysis Basic - ( 13 Mar 2024 05:59 )    Color: x / Appearance: x / SG: x / pH: x  Gluc: 88 mg/dL / Ketone: x  / Bili: x / Urobili: x   Blood: x / Protein: x / Nitrite: x   Leuk Esterase: x / RBC: x / WBC x   Sq Epi: x / Non Sq Epi: x / Bacteria: x      CAPILLARY BLOOD GLUCOSE                    RECENT CULTURES:          RADIOLOGY & ADDITIONAL TESTS:

## 2024-03-14 NOTE — PROGRESS NOTE ADULT - ASSESSMENT
53-year-old female patient with past medical history of osteogenesis imperfecta s/p upper & lower extremity hardware, aortic insufficiency, and HTN who is admitted for Acute Inpatient Rehabilitation with a multidisciplinary rehab program at [facility] with functional impairments in ADLs and mobility secondary to multiple fall related trauma.    #History of Osteogenesis Imperfecta  #Polytrauma Status Post Fall   #Gait and Functional Impairment  -Imaging notable for acute comminuted fracture adjacent to the tip of the right femoral anabell which begins in the distal femoral metadiaphysis and continues into the lateral femoral condyle, Age indeterminate fractures: R sacral ala, left scapular body, and right humeral head, status post pin placement, Prior deformity of the right 3rd rib with likely a superimposed acute fracture, likely acute fractures of right 4th-6th ribs, right knee hemarthrosis   -Seen by orthopedics, status post fiberglass casting of R arm and R leg  -WB Status: NWB of RUE, NWB of RLE, LUE WBAT  -Fall precaution  -Pain control and bowel regimen per rehab team   -Comprehensive rehab program with PT/OT  -f/u with orthopedics, Dr. Newsome, 1 week after discharge  -Used manual (self-propelled) wheelchair at baseline    Anemia  - likely 2/2 right knee hemarthrosis  - s/p 1/2 unit PRBC 3/4  - H/H stable      Severe Aortic Insufficiency  HTN  -Grade IV per patient ; saw CT surgery in Nov 2023 with recommendation for medical management and routine monitoring  -Echo 3/4 showed EF 55 to 60 %, grade 2 LV diastolic dysfunction. There's LVOT obstruction however gradients are LIMITED. Highest 20mmHg which may be underestimated, Severe aortic regurgitation, severe pulmonary hypertension.  -She takes a baby aspirin at home (started by her cardiologist during her previous hospitalization for bacteremia), she does not want to continue it while at rehab since she is also on heparin SC, discussed with Dr. Hinojosa - no true indication from his standpoint, okay to discontinue  -Continue amlodipine, losartan, eplerenone, bisoprolol/HCTZ (home med)  -Monitor BP  -Monitor respiratory/volume status   -Outpatient cardiology follow up ( Prosper Hinojosa , [facility] [place]    Anxiety  - Continue Lexapro 5 mg QD  - Continue xanax 0.25mg PO QHS PRN     DVT PPx  -Heparin SC

## 2024-03-15 LAB
ALBUMIN SERPL ELPH-MCNC: 3.4 G/DL — SIGNIFICANT CHANGE UP (ref 3.3–5)
ALP SERPL-CCNC: 124 U/L — HIGH (ref 40–120)
ALT FLD-CCNC: 28 U/L — SIGNIFICANT CHANGE UP (ref 10–45)
ANION GAP SERPL CALC-SCNC: 10 MMOL/L — SIGNIFICANT CHANGE UP (ref 5–17)
AST SERPL-CCNC: 22 U/L — SIGNIFICANT CHANGE UP (ref 10–40)
BILIRUB SERPL-MCNC: 0.9 MG/DL — SIGNIFICANT CHANGE UP (ref 0.2–1.2)
BUN SERPL-MCNC: 21 MG/DL — SIGNIFICANT CHANGE UP (ref 7–23)
CALCIUM SERPL-MCNC: 9.7 MG/DL — SIGNIFICANT CHANGE UP (ref 8.4–10.5)
CHLORIDE SERPL-SCNC: 99 MMOL/L — SIGNIFICANT CHANGE UP (ref 96–108)
CO2 SERPL-SCNC: 25 MMOL/L — SIGNIFICANT CHANGE UP (ref 22–31)
CREAT SERPL-MCNC: 0.33 MG/DL — LOW (ref 0.5–1.3)
EGFR: 122 ML/MIN/1.73M2 — SIGNIFICANT CHANGE UP
GLUCOSE SERPL-MCNC: 72 MG/DL — SIGNIFICANT CHANGE UP (ref 70–99)
HCT VFR BLD CALC: 30.8 % — LOW (ref 34.5–45)
HGB BLD-MCNC: 10.1 G/DL — LOW (ref 11.5–15.5)
MCHC RBC-ENTMCNC: 29.4 PG — SIGNIFICANT CHANGE UP (ref 27–34)
MCHC RBC-ENTMCNC: 32.8 GM/DL — SIGNIFICANT CHANGE UP (ref 32–36)
MCV RBC AUTO: 89.8 FL — SIGNIFICANT CHANGE UP (ref 80–100)
NRBC # BLD: 0 /100 WBCS — SIGNIFICANT CHANGE UP (ref 0–0)
PLATELET # BLD AUTO: 862 K/UL — HIGH (ref 150–400)
POTASSIUM SERPL-MCNC: 4.1 MMOL/L — SIGNIFICANT CHANGE UP (ref 3.5–5.3)
POTASSIUM SERPL-SCNC: 4.1 MMOL/L — SIGNIFICANT CHANGE UP (ref 3.5–5.3)
PROT SERPL-MCNC: 7.1 G/DL — SIGNIFICANT CHANGE UP (ref 6–8.3)
RBC # BLD: 3.43 M/UL — LOW (ref 3.8–5.2)
RBC # FLD: 14.5 % — SIGNIFICANT CHANGE UP (ref 10.3–14.5)
SODIUM SERPL-SCNC: 134 MMOL/L — LOW (ref 135–145)
WBC # BLD: 11 K/UL — HIGH (ref 3.8–10.5)
WBC # FLD AUTO: 11 K/UL — HIGH (ref 3.8–10.5)

## 2024-03-15 PROCEDURE — 99232 SBSQ HOSP IP/OBS MODERATE 35: CPT

## 2024-03-15 RX ADMIN — Medication 975 MILLIGRAM(S): at 05:53

## 2024-03-15 RX ADMIN — CYCLOBENZAPRINE HYDROCHLORIDE 5 MILLIGRAM(S): 10 TABLET, FILM COATED ORAL at 09:44

## 2024-03-15 RX ADMIN — Medication 0.25 MILLIGRAM(S): at 21:35

## 2024-03-15 RX ADMIN — BISOPROLOL FUMARATE AND HYDROCHLOROTHIAZIDE 1 TABLET(S): 5; 6.25 TABLET ORAL at 05:55

## 2024-03-15 RX ADMIN — NYSTATIN CREAM 1 APPLICATION(S): 100000 CREAM TOPICAL at 17:51

## 2024-03-15 RX ADMIN — ESCITALOPRAM OXALATE 5 MILLIGRAM(S): 10 TABLET, FILM COATED ORAL at 05:53

## 2024-03-15 RX ADMIN — OXYCODONE HYDROCHLORIDE 5 MILLIGRAM(S): 5 TABLET ORAL at 22:56

## 2024-03-15 RX ADMIN — LOSARTAN POTASSIUM 50 MILLIGRAM(S): 100 TABLET, FILM COATED ORAL at 05:53

## 2024-03-15 RX ADMIN — EPLERENONE 25 MILLIGRAM(S): 50 TABLET, FILM COATED ORAL at 17:42

## 2024-03-15 RX ADMIN — LOSARTAN POTASSIUM 50 MILLIGRAM(S): 100 TABLET, FILM COATED ORAL at 17:42

## 2024-03-15 RX ADMIN — NYSTATIN CREAM 1 APPLICATION(S): 100000 CREAM TOPICAL at 05:54

## 2024-03-15 RX ADMIN — OXYCODONE HYDROCHLORIDE 5 MILLIGRAM(S): 5 TABLET ORAL at 22:54

## 2024-03-15 RX ADMIN — Medication 975 MILLIGRAM(S): at 05:56

## 2024-03-15 RX ADMIN — CYCLOBENZAPRINE HYDROCHLORIDE 5 MILLIGRAM(S): 10 TABLET, FILM COATED ORAL at 16:14

## 2024-03-15 RX ADMIN — HEPARIN SODIUM 5000 UNIT(S): 5000 INJECTION INTRAVENOUS; SUBCUTANEOUS at 17:43

## 2024-03-15 RX ADMIN — Medication 975 MILLIGRAM(S): at 17:42

## 2024-03-15 RX ADMIN — HEPARIN SODIUM 5000 UNIT(S): 5000 INJECTION INTRAVENOUS; SUBCUTANEOUS at 05:53

## 2024-03-15 RX ADMIN — OXYCODONE HYDROCHLORIDE 5 MILLIGRAM(S): 5 TABLET ORAL at 05:40

## 2024-03-15 RX ADMIN — AMLODIPINE BESYLATE 2.5 MILLIGRAM(S): 2.5 TABLET ORAL at 17:42

## 2024-03-15 NOTE — PROGRESS NOTE ADULT - SUBJECTIVE AND OBJECTIVE BOX
HPI:  Mrs. Ivis Grimm is a 53-year-old female patient with past medical history of osteogenesis imperfecta s/p upper & lower extremity hardware, aortic insufficiency, and HTN who presented to [facility] on 3/2 s/p fall from her wheelchair. She reports missing a step on the wheelchair, losing control, and falling face first from the wheelchair at a height of around 2-3 ft face first onto a concrete ground. She lost consciousness and regained her awareness of her surroundings some time later, likely seconds to minutes, with little recollection of the intervening time. Patient noted to have right temporal superficial abrasion, R upper lip laceration through the vermillion border, small ecchymosis on the R chin, tenderness to palpation in the L shoulder/R proximal arm/R wrist/ R thigh with medial rotation of the RLE.      Imaging performed reported acute, comminuted fracture adjacent to the tip of the right femoral anabell which begins in the distal femoral metadiaphysis and continues into the lateral femoral condyle, right knee hemarthrosis, age indeterminate R sacral ala fracture, age-indeterminate left scapular body fracture, age-indeterminate fracture of the right humeral head, status post pin placement, prior deformity of the right third rib with likely a superimposed acute fracture, likely acute fractures of right 4th-6th ribs. Orthopedics was consulted and recommended NWB of RUE; NWB of RLE, LUE can be WBAT given non-displaced scapula fracture and polytrauma. Patient now s/p fiberglass casting of R arm and R leg. No acute orthopedic surgical intervention planned. Patient evaluated by PT/OT and was recommended for acute inpatient rehab. Course complicated by anemia likely 2/2 right knee hemarthrosis, s/p 1/2 unit PRBC. Patient was discharged to [facility] on 3/12.   (12 Mar 2024 15:15)    TDD: 4/9/24 to Home  ___________________________________________________________________________    SUBJECTIVE/ROS  Patient was seen and evaluated at bedside today alongside mother present.  Reported no overnight events and is in no acute distress.  Orthopedic surgery communication regarding reassessment on immobilizers took place and will continue as is.  Leukocytosis noted today with no fever or notable infectious focus.  Will repeat CBC in AM tomorrow and reassess.  Case was discussed at Interdisciplinary Team meeting yesterday.  A tentative discharge date is outlined above.  Reviewed weekend coverage with patient.  Patient expressed understanding and felt comfortable.  Patient is motivated to participate on the recommended rehabilitation program.  Denies any CP, SOB, PEREZ, palpitations, fever, chills, body aches, cough, congestion, or any other symptoms at this time.   ___________________________________________________________________________    Vital Signs Last 24 Hrs  T(C): 36.8 (15 Mar 2024 08:20), Max: 36.8 (15 Mar 2024 08:20)  T(F): 98.2 (15 Mar 2024 08:20), Max: 98.2 (15 Mar 2024 08:20)  HR: 82 (15 Mar 2024 08:20) (70 - 82)  BP: 105/66 (15 Mar 2024 08:20) (105/66 - 116/71)  RR: 16 (15 Mar 2024 08:20) (16 - 17)  SpO2: 95% (15 Mar 2024 08:20) (95% - 95%)    ___________________________________________________________________________    LAB                        10.1   11.00 )-----------( 862      ( 15 Mar 2024 06:00 )             30.8                           10.3   8.60  )-----------( 495      ( 13 Mar 2024 05:59 )             32.5       03-15    134<L>  |  99  |  21  ----------------------------<  72  4.1   |  25  |  0.33<L>    Ca    9.7      15 Mar 2024 06:00    TPro  7.1  /  Alb  3.4  /  TBili  0.9  /  DBili  x   /  AST  22  /  ALT  28  /  AlkPhos  124<H>  03-15    LIVER FUNCTIONS - ( 15 Mar 2024 06:00 )  Alb: 3.4 g/dL / Pro: 7.1 g/dL / ALK PHOS: 124 U/L / ALT: 28 U/L / AST: 22 U/L / GGT: x             03-13    135  |  99  |  20  ----------------------------<  88  4.7   |  25  |  0.28<L>    Ca    10.0      13 Mar 2024 05:59    TPro  7.4  /  Alb  3.4  /  TBili  0.9  /  DBili  x   /  AST  52<H>  /  ALT  45  /  AlkPhos  106  03-13    ___________________________________________________________________________    MEDICATIONS  (STANDING):  acetaminophen     Tablet .. 975 milliGRAM(s) Oral every 6 hours  amLODIPine   Tablet 2.5 milliGRAM(s) Oral at bedtime  bisoprolol  5 mG/hydrochlorothiazide 6.25 mG 1 Tablet(s) Oral daily  eplerenone 25 milliGRAM(s) Oral daily  escitalopram 5 milliGRAM(s) Oral <User Schedule>  heparin   Injectable 5000 Unit(s) SubCutaneous every 12 hours  losartan 50 milliGRAM(s) Oral two times a day  nystatin Powder 1 Application(s) Topical two times a day  senna 1 Tablet(s) Oral at bedtime    MEDICATIONS  (PRN):  ALPRAZolam 0.25 milliGRAM(s) Oral at bedtime PRN Sleep  cyclobenzaprine 5 milliGRAM(s) Oral three times a day PRN Muscle Spasm  oxyCODONE    IR 5 milliGRAM(s) Oral every 4 hours PRN Severe Pain (7 - 10)  oxyCODONE    IR 2.5 milliGRAM(s) Oral every 4 hours PRN Moderate Pain (4 - 6)  polyethylene glycol 3350 17 Gram(s) Oral daily PRN Constipation    ___________________________________________________________________________    PHYSICAL EXAM:    Gen - NAD, Comfortable  HEENT -EOMI, MMM. Ecchymosis of chin  Pulm - CTAB, No wheeze, No rhonchi, No crackles  Cardiovascular - RRR, S1S2, +murmur  Abdomen - Soft, NT/ND, +BS  Extremities - right cast on UE and LE.   Neuro-     Cognitive - AAOx3     Communication - Fluent, No dysarthria     Attention: Intact      Judgement: Good evidence of judgement     Memory: Recall 3 objects immediate and 3 min later         Cranial Nerves - CN 2-12 intact     Motor -                    LEFT    UE - ShAB limited 2/2 scap fracture, EF 5/5, EE 5/5, WE 5/5,  5/5                     RIGHT UE - n/a                    LEFT    LE - HF 4/5, KE 4/5, DF 4/5, PF 4/5                    RIGHT LE - n/a     Sensory - Intact to LT     Tone - normal  Psychiatric - Mood stable, Affect WNL  Skin: Intact  Wounds: None Present.     ___________________________________________________________________________

## 2024-03-15 NOTE — PROGRESS NOTE ADULT - ASSESSMENT
Mrs. Ivis Grimm is a 53-year-old female patient with past medical history of osteogenesis imperfecta s/p upper & lower extremity hardware, aortic insufficiency, and HTN who is admitted for Acute Inpatient Rehabilitation with a multidisciplinary rehab program at [facility] [place] with functional impairments in ADLs and mobility secondary to multiple fall related trauma.      Polytrauma       * S/P fall       * acute, comminuted periprosthetic fracture adjacent to the tip of the right femoral anabell which begins in the distal femoral metadiaphysis and continues into the lateral femoral condyle       * Age indeterminate fractures: R sacral ala, left scapular body, and right humeral head, status post pin placement       * Prior deformity of the right 3rd rib with likely a superimposed acute fracture, likely acute fractures of right 4th-6th ribs       * S/P fiberglass casting of R arm and R leg       * WB Status: NWB of RUE, NWB of RLE, LUE WBAT       * f/u with orthopedics, Dr. Newsome, 1 week after discharge       * Used manual (self-propelled) wheelchair at baseline  - Impaired ADLs and mobility  - Need for assistance with personal care   - Continue comprehensive rehab program of PT/OT - 3 hours a day, 5 days a week. P&O as needed        * Pain control as below       * Precautions: Fall    Pain Control  - Tylenol 975 mg Q6H  - Flexeril 5 mg TID PRN muscle spasm  - Oxycodone 2.5 Q4H PRN mod pain, oxycodone 5 mg Q4H PRN severe pain    Anemia  - likely 2/2 right knee hemarthrosis  - s/p 1/2 unit PRBC 3/4  - monitor H/H    Aortic Insufficiency/HTN  - Losartan 50 mg BID  - Norvasc 2.5 mg QHS  - Eplerenone 25 mg QD  - Bisoprolol 5 mg/HCTZ 6.25 mg QD **Home med, please have pharmacy verify. It is not in a prescription bottle, may need to call pharmacy.   - Monitor BP    Mood / Cognition  - Neuropsychology consult PRN  - Lexapro 5 mg QD    Sleep  - Maintain quiet hours and a low stim environment.   - Xanax 0.25 mg PRN    GI / Bowel  - Senna 1 cap qHS  - Miralax PRN Daily     / Bladder  - Continue bladder scans Q8 hours with straight cath for >400cc    Skin / Pressure injury  - Skin assessment on admission performed: intact  - Monitor Incisions:    - Pressure Injury/Skin: OOB to chair, PT/OT  - nursing to monitor skin qShift    Diet/Dysphagia:  - Diet Consistency: regular  - Nutrition consult    DVT prophylaxis:   - Heparin 5000u Q12H      Outpatient Follow-up:  Domenic Newsome  Orthopaedic Surgery  [address], [address]  [place] [address]  Phone: [phone]  Fax: [phone]  Follow Up Time: 2 weeks

## 2024-03-16 PROCEDURE — 99232 SBSQ HOSP IP/OBS MODERATE 35: CPT

## 2024-03-16 RX ADMIN — Medication 975 MILLIGRAM(S): at 06:00

## 2024-03-16 RX ADMIN — LOSARTAN POTASSIUM 50 MILLIGRAM(S): 100 TABLET, FILM COATED ORAL at 06:00

## 2024-03-16 RX ADMIN — Medication 975 MILLIGRAM(S): at 06:37

## 2024-03-16 RX ADMIN — BISOPROLOL FUMARATE AND HYDROCHLOROTHIAZIDE 1 TABLET(S): 5; 6.25 TABLET ORAL at 06:01

## 2024-03-16 RX ADMIN — NYSTATIN CREAM 1 APPLICATION(S): 100000 CREAM TOPICAL at 17:17

## 2024-03-16 RX ADMIN — CYCLOBENZAPRINE HYDROCHLORIDE 5 MILLIGRAM(S): 10 TABLET, FILM COATED ORAL at 22:48

## 2024-03-16 RX ADMIN — ESCITALOPRAM OXALATE 5 MILLIGRAM(S): 10 TABLET, FILM COATED ORAL at 06:00

## 2024-03-16 RX ADMIN — Medication 975 MILLIGRAM(S): at 13:47

## 2024-03-16 RX ADMIN — HEPARIN SODIUM 5000 UNIT(S): 5000 INJECTION INTRAVENOUS; SUBCUTANEOUS at 17:18

## 2024-03-16 RX ADMIN — Medication 975 MILLIGRAM(S): at 11:38

## 2024-03-16 RX ADMIN — CYCLOBENZAPRINE HYDROCHLORIDE 5 MILLIGRAM(S): 10 TABLET, FILM COATED ORAL at 07:33

## 2024-03-16 RX ADMIN — EPLERENONE 25 MILLIGRAM(S): 50 TABLET, FILM COATED ORAL at 17:18

## 2024-03-16 RX ADMIN — HEPARIN SODIUM 5000 UNIT(S): 5000 INJECTION INTRAVENOUS; SUBCUTANEOUS at 06:00

## 2024-03-16 RX ADMIN — OXYCODONE HYDROCHLORIDE 2.5 MILLIGRAM(S): 5 TABLET ORAL at 22:49

## 2024-03-16 RX ADMIN — Medication 0.25 MILLIGRAM(S): at 22:49

## 2024-03-16 NOTE — PROGRESS NOTE ADULT - SUBJECTIVE AND OBJECTIVE BOX
Cc: Gait dysfunction    HPI: Patient seen and examined at bedside. No acute events overnight.   Pain somewhat controlled but gets spasms. Muscle relaxant helps when taken. She is trying not to take oxycodone.  No other new ROS  Has been tolerating rehabilitation program.    acetaminophen     Tablet .. 975 milliGRAM(s) Oral every 6 hours  ALPRAZolam 0.25 milliGRAM(s) Oral at bedtime PRN  amLODIPine   Tablet 2.5 milliGRAM(s) Oral <User Schedule>  bisoprolol  5 mG/hydrochlorothiazide 6.25 mG 1 Tablet(s) Oral daily  cyclobenzaprine 5 milliGRAM(s) Oral three times a day PRN  eplerenone 25 milliGRAM(s) Oral <User Schedule>  escitalopram 5 milliGRAM(s) Oral <User Schedule>  heparin   Injectable 5000 Unit(s) SubCutaneous every 12 hours  losartan 50 milliGRAM(s) Oral two times a day  nystatin Powder 1 Application(s) Topical two times a day  oxyCODONE    IR 2.5 milliGRAM(s) Oral every 4 hours PRN  oxyCODONE    IR 5 milliGRAM(s) Oral every 4 hours PRN  polyethylene glycol 3350 17 Gram(s) Oral daily PRN  senna 1 Tablet(s) Oral at bedtime      T(C): 36.7 (03-16-24 @ 08:42), Max: 36.7 (03-16-24 @ 08:42)  HR: 78 (03-16-24 @ 08:42) (68 - 78)  BP: 101/63 (03-16-24 @ 08:42) (101/63 - 122/75)  RR: 16 (03-16-24 @ 08:42) (16 - 18)  SpO2: 94% (03-16-24 @ 08:42) (94% - 98%)    In NAD  HEENT- EOMI  Heart- S1S2  Lungs- CTA bl.  Abd- Non-distended  Ext- RLE in hard cast, able to wiggle R toes  Neuro- Exam unchanged      Imp: Patient with diagnosis of polytrauma s/p fall admitted for comprehensive acute rehabilitation.    Plan:  - Continue PT/OT/SLP as indicated  - DVT prophylaxis - Continue heparin subQ  - Skin- Turn q2h, check skin daily  - Continue current medications  -Active issues-   Pain - Continue Tylenol, Cyclobenzaprine PRN, Oxycodone PRN  - Patient is stable to continue current rehabilitation program.    Cc: Gait dysfunction    HPI: Patient seen and examined at bedside. No acute events overnight.   Pain somewhat controlled but gets spasms. Muscle relaxant helps when taken. She is trying not to take oxycodone.  No other new ROS  Has been tolerating rehabilitation program.    acetaminophen     Tablet .. 975 milliGRAM(s) Oral every 6 hours  ALPRAZolam 0.25 milliGRAM(s) Oral at bedtime PRN  amLODIPine   Tablet 2.5 milliGRAM(s) Oral <User Schedule>  bisoprolol  5 mG/hydrochlorothiazide 6.25 mG 1 Tablet(s) Oral daily  cyclobenzaprine 5 milliGRAM(s) Oral three times a day PRN  eplerenone 25 milliGRAM(s) Oral <User Schedule>  escitalopram 5 milliGRAM(s) Oral <User Schedule>  heparin   Injectable 5000 Unit(s) SubCutaneous every 12 hours  losartan 50 milliGRAM(s) Oral two times a day  nystatin Powder 1 Application(s) Topical two times a day  oxyCODONE    IR 2.5 milliGRAM(s) Oral every 4 hours PRN  oxyCODONE    IR 5 milliGRAM(s) Oral every 4 hours PRN  polyethylene glycol 3350 17 Gram(s) Oral daily PRN  senna 1 Tablet(s) Oral at bedtime      T(C): 36.7 (03-16-24 @ 08:42), Max: 36.7 (03-16-24 @ 08:42)  HR: 78 (03-16-24 @ 08:42) (68 - 78)  BP: 101/63 (03-16-24 @ 08:42) (101/63 - 122/75)  RR: 16 (03-16-24 @ 08:42) (16 - 18)  SpO2: 94% (03-16-24 @ 08:42) (94% - 98%)    In NAD  HEENT- EOMI  Heart- S1S2  Lungs- CTA bl.  Abd- Non-distended  Ext- RLE in hard cast, able to wiggle R toes  Neuro- Exam unchanged    CBC 3/15/24 reviewed  CMP 3/15/24 reviewed  CMP 3/13/24 reviewed    Imp: Patient with diagnosis of polytrauma s/p fall admitted for comprehensive acute rehabilitation.    Plan:  - Continue PT/OT/SLP as indicated  - DVT prophylaxis - Continue heparin subQ  - Skin- Turn q2h, check skin daily  - Continue current medications  -Active issues-   Pain - Continue Tylenol, Cyclobenzaprine PRN, Oxycodone PRN  - Patient is stable to continue current rehabilitation program.

## 2024-03-17 PROCEDURE — 99232 SBSQ HOSP IP/OBS MODERATE 35: CPT

## 2024-03-17 RX ORDER — ALPRAZOLAM 0.25 MG
0.25 TABLET ORAL AT BEDTIME
Refills: 0 | Status: DISCONTINUED | OUTPATIENT
Start: 2024-03-20 | End: 2024-03-25

## 2024-03-17 RX ORDER — GABAPENTIN 400 MG/1
100 CAPSULE ORAL AT BEDTIME
Refills: 0 | Status: DISCONTINUED | OUTPATIENT
Start: 2024-03-17 | End: 2024-03-30

## 2024-03-17 RX ORDER — OXYCODONE HYDROCHLORIDE 5 MG/1
5 TABLET ORAL EVERY 4 HOURS
Refills: 0 | Status: DISCONTINUED | OUTPATIENT
Start: 2024-03-17 | End: 2024-03-22

## 2024-03-17 RX ORDER — ACETAMINOPHEN 500 MG
975 TABLET ORAL EVERY 6 HOURS
Refills: 0 | Status: DISCONTINUED | OUTPATIENT
Start: 2024-03-17 | End: 2024-03-30

## 2024-03-17 RX ORDER — OXYCODONE HYDROCHLORIDE 5 MG/1
2.5 TABLET ORAL EVERY 4 HOURS
Refills: 0 | Status: DISCONTINUED | OUTPATIENT
Start: 2024-03-20 | End: 2024-03-22

## 2024-03-17 RX ADMIN — EPLERENONE 25 MILLIGRAM(S): 50 TABLET, FILM COATED ORAL at 18:02

## 2024-03-17 RX ADMIN — Medication 0.25 MILLIGRAM(S): at 21:35

## 2024-03-17 RX ADMIN — Medication 975 MILLIGRAM(S): at 21:35

## 2024-03-17 RX ADMIN — Medication 975 MILLIGRAM(S): at 06:47

## 2024-03-17 RX ADMIN — LOSARTAN POTASSIUM 50 MILLIGRAM(S): 100 TABLET, FILM COATED ORAL at 18:02

## 2024-03-17 RX ADMIN — HEPARIN SODIUM 5000 UNIT(S): 5000 INJECTION INTRAVENOUS; SUBCUTANEOUS at 18:02

## 2024-03-17 RX ADMIN — AMLODIPINE BESYLATE 2.5 MILLIGRAM(S): 2.5 TABLET ORAL at 18:02

## 2024-03-17 RX ADMIN — HEPARIN SODIUM 5000 UNIT(S): 5000 INJECTION INTRAVENOUS; SUBCUTANEOUS at 06:47

## 2024-03-17 RX ADMIN — ESCITALOPRAM OXALATE 5 MILLIGRAM(S): 10 TABLET, FILM COATED ORAL at 06:47

## 2024-03-17 RX ADMIN — BISOPROLOL FUMARATE AND HYDROCHLOROTHIAZIDE 1 TABLET(S): 5; 6.25 TABLET ORAL at 06:51

## 2024-03-17 RX ADMIN — Medication 975 MILLIGRAM(S): at 07:30

## 2024-03-17 RX ADMIN — LOSARTAN POTASSIUM 50 MILLIGRAM(S): 100 TABLET, FILM COATED ORAL at 06:47

## 2024-03-17 RX ADMIN — NYSTATIN CREAM 1 APPLICATION(S): 100000 CREAM TOPICAL at 18:07

## 2024-03-17 RX ADMIN — CYCLOBENZAPRINE HYDROCHLORIDE 5 MILLIGRAM(S): 10 TABLET, FILM COATED ORAL at 21:35

## 2024-03-17 NOTE — PROGRESS NOTE ADULT - ASSESSMENT
53-year-old female patient with past medical history of osteogenesis imperfecta s/p upper & lower extremity hardware, aortic insufficiency, and HTN who is admitted for Acute Inpatient Rehabilitation with a multidisciplinary rehab program at [facility] with functional impairments in ADLs and mobility secondary to multiple fall related trauma.    #History of Osteogenesis Imperfecta  #Polytrauma Status Post Fall  #Gait and Functional Impairment  -Imaging notable for acute comminuted fracture adjacent to the tip of the right femoral anabell which begins in the distal femoral metadiaphysis and continues into the lateral femoral condyle, Age indeterminate fractures: R sacral ala, left scapular body, and right humeral head, status post pin placement, Prior deformity of the right 3rd rib with likely a superimposed acute fracture, likely acute fractures of right 4th-6th ribs, right knee hemarthrosis   -Seen by orthopedics, status post fiberglass casting of R arm and R leg  -WB Status: NWB of RUE, NWB of RLE, LUE WBAT  -Fall precaution  -Pain control and bowel regimen per rehab team   -Comprehensive rehab program with PT/OT  -f/u with orthopedics, Dr. Newsome, 1 week after discharge  -Used manual (self-propelled) wheelchair at baseline    Anemia  - likely 2/2 right knee hemarthrosis  - s/p 1/2 unit PRBC 3/4  - H/H stable    Mild Leukocytosis  Thrombocytosis  -She has been afebrile/hemodynamically stable, monitor off abx  -Thrombocytosis possibly likely reactive  -Monitor CBC   -Would consider heme eval for platelet continues to uptrend     Severe Aortic Insufficiency  HTN  -Grade IV per patient ; saw CT surgery in Nov 2023 with recommendation for medical management and routine monitoring  -Echo 3/4 showed EF 55 to 60 %, grade 2 LV diastolic dysfunction. There's LVOT obstruction however gradients are LIMITED. Highest 20mmHg which may be underestimated, Severe aortic regurgitation, severe pulmonary hypertension.  -She takes a baby aspirin at home (started by her cardiologist during her previous hospitalization for bacteremia), she does not want to continue it while at rehab since she is also on heparin SC, discussed with Dr. Hinojosa - no true indication from his standpoint, okay to discontinue  -Continue amlodipine, losartan, eplerenone, bisoprolol/HCTZ (home med)  -Monitor BP  -Monitor respiratory/volume status   -Outpatient cardiology follow up ( Prosper Hinojosa , [facility] ).    Anxiety  - Continue Lexapro 5 mg QD  - Continue xanax 0.25mg PO QHS PRN     DVT PPx  -Heparin SC

## 2024-03-17 NOTE — PROGRESS NOTE ADULT - SUBJECTIVE AND OBJECTIVE BOX
Interval History  Patient seen and examined at bedside. No acute events noted.  Patient reports having back spasms, flexeril is helping.   No chest pain/SOB.   No other complaints.     ALLERGIES:  No Known Allergies    MEDICATIONS  (STANDING):  amLODIPine   Tablet 2.5 milliGRAM(s) Oral <User Schedule>  bisoprolol  5 mG/hydrochlorothiazide 6.25 mG 1 Tablet(s) Oral daily  eplerenone 25 milliGRAM(s) Oral <User Schedule>  escitalopram 5 milliGRAM(s) Oral <User Schedule>  heparin   Injectable 5000 Unit(s) SubCutaneous every 12 hours  losartan 50 milliGRAM(s) Oral two times a day  nystatin Powder 1 Application(s) Topical two times a day  senna 1 Tablet(s) Oral at bedtime    MEDICATIONS  (PRN):  acetaminophen     Tablet .. 975 milliGRAM(s) Oral every 6 hours PRN Mild Pain (1 - 3)  ALPRAZolam 0.25 milliGRAM(s) Oral at bedtime PRN Sleep  cyclobenzaprine 5 milliGRAM(s) Oral three times a day PRN Muscle Spasm  oxyCODONE    IR 2.5 milliGRAM(s) Oral every 4 hours PRN Moderate Pain (4 - 6)  oxyCODONE    IR 5 milliGRAM(s) Oral every 4 hours PRN Severe Pain (7 - 10)  polyethylene glycol 3350 17 Gram(s) Oral daily PRN Constipation    Vital Signs Last 24 Hrs  T(F): 98.4 (17 Mar 2024 08:24), Max: 98.4 (17 Mar 2024 08:24)  HR: 88 (17 Mar 2024 08:24) (88 - 91)  BP: 115/74 (17 Mar 2024 08:24) (107/66 - 122/67)  RR: 16 (17 Mar 2024 08:24) (16 - 16)  SpO2: 96% (17 Mar 2024 08:24) (96% - 96%)  I&O's Summary    PHYSICAL EXAM:  GENERAL: NAD  HEENT: NCAT, resolving right mandible ecchymosis  CHEST/LUNG: Clear to percussion bilaterally; No wheezing  HEART: Regular rate and rhythm; +murmur   ABDOMEN: Soft, Nontender, Nondistended; Bowel sounds present  MUSCULOSKELETAL/EXTREMITIES:  RUE/RLE cast in place, no LLE swelling  PSYCH: Appropriate affect  NEURO: Alert & Oriented x 3    I personally reviewed the below data/images/labs:    LABS:                        10.1   11.00 )-----------( 862      ( 15 Mar 2024 06:00 )             30.8       03-15    134  |  99  |  21  ----------------------------<  72  4.1   |  25  |  0.33    Ca    9.7      15 Mar 2024 06:00    TPro  7.1  /  Alb  3.4  /  TBili  0.9  /  DBili  x   /  AST  22  /  ALT  28  /  AlkPhos  124  03-15     Urinalysis Basic - ( 15 Mar 2024 06:00 )    Color: x / Appearance: x / SG: x / pH: x  Gluc: 72 mg/dL / Ketone: x  / Bili: x / Urobili: x   Blood: x / Protein: x / Nitrite: x   Leuk Esterase: x / RBC: x / WBC x   Sq Epi: x / Non Sq Epi: x / Bacteria: x    Consultant(s) Notes Reviewed:   Care Discused with Consultants/Other Providers:  Imaging Personally Reviewed:

## 2024-03-17 NOTE — PROGRESS NOTE ADULT - SUBJECTIVE AND OBJECTIVE BOX
Cc: Gait dysfunction    HPI: Patient seen and examined at bedside. No acute events overnight but did have episode of spasms again, lasting for a few seconds.   Pain otherwise controlled, no chest pain, no N/V, no Fevers/Chills. No other new ROS  Has been tolerating rehabilitation program.    acetaminophen     Tablet .. 975 milliGRAM(s) Oral every 6 hours PRN  ALPRAZolam 0.25 milliGRAM(s) Oral at bedtime PRN  amLODIPine   Tablet 2.5 milliGRAM(s) Oral <User Schedule>  bisoprolol  5 mG/hydrochlorothiazide 6.25 mG 1 Tablet(s) Oral daily  cyclobenzaprine 5 milliGRAM(s) Oral three times a day PRN  eplerenone 25 milliGRAM(s) Oral <User Schedule>  escitalopram 5 milliGRAM(s) Oral <User Schedule>  gabapentin 100 milliGRAM(s) Oral at bedtime  heparin   Injectable 5000 Unit(s) SubCutaneous every 12 hours  losartan 50 milliGRAM(s) Oral two times a day  nystatin Powder 1 Application(s) Topical two times a day  oxyCODONE    IR 2.5 milliGRAM(s) Oral every 4 hours PRN  oxyCODONE    IR 5 milliGRAM(s) Oral every 4 hours PRN  polyethylene glycol 3350 17 Gram(s) Oral daily PRN  senna 1 Tablet(s) Oral at bedtime      T(C): 36.9 (03-17-24 @ 08:24), Max: 36.9 (03-17-24 @ 08:24)  HR: 88 (03-17-24 @ 08:24) (88 - 91)  BP: 115/74 (03-17-24 @ 08:24) (107/66 - 122/67)  RR: 16 (03-17-24 @ 08:24) (16 - 16)  SpO2: 96% (03-17-24 @ 08:24) (96% - 96%)    In NAD  HEENT- EOMI  Heart- S1S2  Lungs- CTA bl.  Abd- Non-distended  Ext- RLE in hard cast, able to wiggle R toes  Neuro- Exam unchanged    ACC: [id number] EXAM: XR FEMUR 1 VIEW RT ORDERED BY: PILY CAMPA    PROCEDURE DATE: 03/08/2024      X-ray R femur 3/7/24 reviewed and interpreted by me: distal femur fracture seen  INTERPRETATION: XR RIGHT FEMUR AND TIBIA AND FIBULA    HISTORY: Distal femur fracture. Osteogenesis imperfecta.    VIEWS: 1 IMAGES: 1    COMPARISON: Right femur x-rays dated 3/7/2024.    FINDINGS:    OSSEOUS STRUCTURES   Fractures: Fracture is again seen involving the distal femoral metadiaphysis with impaction and mildly displaced intercondylar component. Overlying cast is noted.   Morphology: Severe gracile appearance of the femoral, tibial, and fibular diaphyses is again seen with medial bowing of the fibula. There is dysmorphic appearance of the visualized pelvis.   Postoperative Changes: Flexible intramedullary rods are noted within the femoral and tibial diaphyses. The femoral anabell extends approximately 1 cm proud and superior to the greater trochanter.    JOINTS   Joint Space(s): There is dysmorphic appearance of the visualized joints.    IMPRESSION:  1. Distal femur fracture is again seen that appears similar on this AP view.    --- End of Report ---            TAMIKO THOMPSON MD; Attending Radiologist  This document has been electronically signed. Mar 8 2024 1:41PM    Imp: Patient with diagnosis of polytrauma s/p fall admitted for comprehensive acute rehabilitation.    Plan:  - Continue PT/OT/SLP as indicated  - DVT prophylaxis - Continue heparin subQ  - Skin- Turn q2h, check skin daily  - Continue current medications  -Active issues-   Pain - Continue Tylenol, Cyclobenzaprine PRN, Oxycodone PRN. Will start Gabapentin 100mg Qhs.   - Patient is stable to continue current rehabilitation program.

## 2024-03-18 PROCEDURE — 99232 SBSQ HOSP IP/OBS MODERATE 35: CPT

## 2024-03-18 RX ORDER — ENOXAPARIN SODIUM 100 MG/ML
30 INJECTION SUBCUTANEOUS EVERY 24 HOURS
Refills: 0 | Status: DISCONTINUED | OUTPATIENT
Start: 2024-03-19 | End: 2024-03-30

## 2024-03-18 RX ADMIN — EPLERENONE 25 MILLIGRAM(S): 50 TABLET, FILM COATED ORAL at 17:41

## 2024-03-18 RX ADMIN — LOSARTAN POTASSIUM 50 MILLIGRAM(S): 100 TABLET, FILM COATED ORAL at 06:17

## 2024-03-18 RX ADMIN — Medication 0.25 MILLIGRAM(S): at 22:01

## 2024-03-18 RX ADMIN — AMLODIPINE BESYLATE 2.5 MILLIGRAM(S): 2.5 TABLET ORAL at 17:41

## 2024-03-18 RX ADMIN — Medication 975 MILLIGRAM(S): at 18:29

## 2024-03-18 RX ADMIN — NYSTATIN CREAM 1 APPLICATION(S): 100000 CREAM TOPICAL at 22:07

## 2024-03-18 RX ADMIN — BISOPROLOL FUMARATE AND HYDROCHLOROTHIAZIDE 1 TABLET(S): 5; 6.25 TABLET ORAL at 05:59

## 2024-03-18 RX ADMIN — Medication 975 MILLIGRAM(S): at 08:56

## 2024-03-18 RX ADMIN — ESCITALOPRAM OXALATE 5 MILLIGRAM(S): 10 TABLET, FILM COATED ORAL at 05:58

## 2024-03-18 RX ADMIN — Medication 975 MILLIGRAM(S): at 22:01

## 2024-03-18 RX ADMIN — Medication 975 MILLIGRAM(S): at 23:12

## 2024-03-18 RX ADMIN — HEPARIN SODIUM 5000 UNIT(S): 5000 INJECTION INTRAVENOUS; SUBCUTANEOUS at 05:58

## 2024-03-18 RX ADMIN — CYCLOBENZAPRINE HYDROCHLORIDE 5 MILLIGRAM(S): 10 TABLET, FILM COATED ORAL at 09:15

## 2024-03-18 RX ADMIN — CYCLOBENZAPRINE HYDROCHLORIDE 5 MILLIGRAM(S): 10 TABLET, FILM COATED ORAL at 22:01

## 2024-03-18 RX ADMIN — LOSARTAN POTASSIUM 50 MILLIGRAM(S): 100 TABLET, FILM COATED ORAL at 17:41

## 2024-03-18 NOTE — PROGRESS NOTE ADULT - ASSESSMENT
Mrs. Ivis Grimm is a 53-year-old female patient with past medical history of osteogenesis imperfecta s/p upper & lower extremity hardware, aortic insufficiency, and HTN who is admitted for Acute Inpatient Rehabilitation with a multidisciplinary rehab program at [facility] with functional impairments in ADLs and mobility secondary to multiple fall related trauma.      Polytrauma       * S/P fall       * acute, comminuted periprosthetic fracture adjacent to the tip of the right femoral anabell which begins in the distal femoral metadiaphysis and continues into the lateral femoral condyle       * Age indeterminate fractures: R sacral ala, left scapular body, and right humeral head, status post pin placement       * Prior deformity of the right 3rd rib with likely a superimposed acute fracture, likely acute fractures of right 4th-6th ribs       * S/P fiberglass casting of R arm and R leg       * WB Status: NWB of RUE, NWB of RLE, LUE WBAT       * f/u with orthopedics, Dr. Newsome, 1 week after discharge       * Used manual (self-propelled) wheelchair at baseline  - Impaired ADLs and mobility  - Need for assistance with personal care   - Continue comprehensive rehab program of PT/OT - 3 hours a day, 5 days a week. P&O as needed        * Pain control as below       * Precautions: Fall    Pain Control  - Tylenol 975 mg Q6H  - Flexeril 5 mg TID PRN muscle spasm  - Oxycodone 2.5 Q4H PRN mod pain, oxycodone 5 mg Q4H PRN severe pain    Anemia  - likely 2/2 right knee hemarthrosis  - s/p 1/2 unit PRBC 3/4  - monitor H/H    Aortic Insufficiency/HTN  - Losartan 50 mg BID  - Norvasc 2.5 mg QHS  - Eplerenone 25 mg QD  - Bisoprolol 5 mg/HCTZ 6.25 mg QD **Home med, please have pharmacy verify. It is not in a prescription bottle, may need to call pharmacy.   - Monitor BP    Mood / Cognition  - Neuropsychology consult PRN  - Lexapro 5 mg QD    Sleep  - Maintain quiet hours and a low stim environment.   - Xanax 0.25 mg PRN    GI / Bowel  - Senna 1 cap qHS  - Miralax PRN Daily     / Bladder  - Continue bladder scans Q8 hours with straight cath for >400cc    Skin / Pressure injury  - Skin assessment on admission performed: intact  - Monitor Incisions:    - Pressure Injury/Skin: OOB to chair, PT/OT  - nursing to monitor skin qShift    Diet/Dysphagia:  - Diet Consistency: regular  - Nutrition consult    DVT prophylaxis:   - Heparin 5000u Q12H      Outpatient Follow-up:  Domenic Newsome  Orthopaedic Surgery  [address], [address]  [place] [address]  Phone: [phone]  Fax: [phone]  Follow Up Time: 2 weeks

## 2024-03-18 NOTE — PROGRESS NOTE ADULT - SUBJECTIVE AND OBJECTIVE BOX
HPI:  Mrs. Ivis Grimm is a 53-year-old female patient with past medical history of osteogenesis imperfecta s/p upper & lower extremity hardware, aortic insufficiency, and HTN who presented to [facility] on 3/2 s/p fall from her wheelchair. She reports missing a step on the wheelchair, losing control, and falling face first from the wheelchair at a height of around 2-3 ft face first onto a concrete ground. She lost consciousness and regained her awareness of her surroundings some time later, likely seconds to minutes, with little recollection of the intervening time. Patient noted to have right temporal superficial abrasion, R upper lip laceration through the vermillion border, small ecchymosis on the R chin, tenderness to palpation in the L shoulder/R proximal arm/R wrist/ R thigh with medial rotation of the RLE.      Imaging performed reported acute, comminuted fracture adjacent to the tip of the right femoral anabell which begins in the distal femoral metadiaphysis and continues into the lateral femoral condyle, right knee hemarthrosis, age indeterminate R sacral ala fracture, age-indeterminate left scapular body fracture, age-indeterminate fracture of the right humeral head, status post pin placement, prior deformity of the right third rib with likely a superimposed acute fracture, likely acute fractures of right 4th-6th ribs. Orthopedics was consulted and recommended NWB of RUE; NWB of RLE, LUE can be WBAT given non-displaced scapula fracture and polytrauma. Patient now s/p fiberglass casting of R arm and R leg. No acute orthopedic surgical intervention planned. Patient evaluated by PT/OT and was recommended for acute inpatient rehab. Course complicated by anemia likely 2/2 right knee hemarthrosis, s/p 1/2 unit PRBC. Patient was discharged to [facility] on 3/12.   (12 Mar 2024 15:15)    ___________________________________________________________________________    SUBJECTIVE/ROS  Patient was seen and evaluated at bedside today alongside her mother.  Reported no overnight events and is in no acute distress.  Refused labs over weekend and counseled regarding need to monitor parameters.  Heparin to be changed for Lovenox today by Hositalist.  Eager to participate on the recommended rehabilitation program.  Denies any CP, SOB, PEREZ, palpitations, fever, chills, body aches, cough, congestion, or any other symptoms at this time.   ___________________________________________________________________________    Vital Signs Last 24 Hrs  T(C): 37 (18 Mar 2024 09:34), Max: 37 (18 Mar 2024 09:34)  T(F): 98.6 (18 Mar 2024 09:34), Max: 98.6 (18 Mar 2024 09:34)  HR: 91 (18 Mar 2024 09:34) (87 - 91)  BP: 101/64 (18 Mar 2024 09:34) (101/64 - 132/79)  RR: 16 (18 Mar 2024 09:34) (16 - 17)  SpO2: 94% (18 Mar 2024 09:34) (94% - 98%)    ___________________________________________________________________________    LAB                          10.3   8.60  )-----------( 495      ( 13 Mar 2024 05:59 )             32.5       03-13    135  |  99  |  20  ----------------------------<  88  4.7   |  25  |  0.28<L>    Ca    10.0      13 Mar 2024 05:59    TPro  7.4  /  Alb  3.4  /  TBili  0.9  /  DBili  x   /  AST  52<H>  /  ALT  45  /  AlkPhos  106  03-13    ___________________________________________________________________________    MEDICATIONS  (STANDING):  amLODIPine   Tablet 2.5 milliGRAM(s) Oral <User Schedule>  bisoprolol  5 mG/hydrochlorothiazide 6.25 mG 1 Tablet(s) Oral daily  eplerenone 25 milliGRAM(s) Oral <User Schedule>  escitalopram 5 milliGRAM(s) Oral <User Schedule>  gabapentin 100 milliGRAM(s) Oral at bedtime  losartan 50 milliGRAM(s) Oral two times a day  nystatin Powder 1 Application(s) Topical two times a day  senna 1 Tablet(s) Oral at bedtime    MEDICATIONS  (PRN):  acetaminophen     Tablet .. 975 milliGRAM(s) Oral every 6 hours PRN Mild Pain (1 - 3)  ALPRAZolam 0.25 milliGRAM(s) Oral at bedtime PRN Sleep  cyclobenzaprine 5 milliGRAM(s) Oral three times a day PRN Muscle Spasm  oxyCODONE    IR 2.5 milliGRAM(s) Oral every 4 hours PRN Moderate Pain (4 - 6)  oxyCODONE    IR 5 milliGRAM(s) Oral every 4 hours PRN Severe Pain (7 - 10)  polyethylene glycol 3350 17 Gram(s) Oral daily PRN Constipation    ___________________________________________________________________________    PHYSICAL EXAM:    Gen - NAD, Comfortable  HEENT -EOMI, MMM. Ecchymosis of chin  Pulm - CTAB, No wheeze, No rhonchi, No crackles  Cardiovascular - RRR, S1S2, +murmur  Abdomen - Soft, NT/ND, +BS  Extremities - right cast on UE and LE.   Neuro-     Cognitive - AAOx3     Communication - Fluent, No dysarthria     Attention: Intact      Judgement: Good evidence of judgement     Memory: Recall 3 objects immediate and 3 min later         Cranial Nerves - CN 2-12 intact     Motor -                    LEFT    UE - ShAB limited 2/2 scap fracture, EF 5/5, EE 5/5, WE 5/5,  5/5                     RIGHT UE - n/a                    LEFT    LE - HF 4/5, KE 4/5, DF 4/5, PF 4/5                    RIGHT LE - n/a     Sensory - Intact to LT     Tone - normal  Psychiatric - Mood stable, Affect WNL  Skin: Intact  Wounds: None Present.     ___________________________________________________________________________

## 2024-03-18 NOTE — PROGRESS NOTE ADULT - SUBJECTIVE AND OBJECTIVE BOX
|-------------------------------------------|                       Subjective   |-------------------------------------------|    No events overnight  No new complaints to offer me    |------------------------------------------|                       Objective  |------------------------------------------|    Vital Signs Last 24 Hrs  T(F): 98.6 (18 Mar 2024 09:34), Max: 98.6 (18 Mar 2024 09:34)  HR: 91 (18 Mar 2024 09:34) (87 - 91)  BP: 101/64 (18 Mar 2024 09:34) (101/64 - 132/79)  RR: 16 (18 Mar 2024 09:34) (16 - 17)  SpO2: 94% (18 Mar 2024 09:34) (94% - 98%)  I&O's Summary      Examination:   General: NAD, Comfortable  Pulm: CTA BL No Rhonchi Rales or wheezes  Neck: Supple, No JVD  CVS: RRR No rubs murmurs or gallops  Abdomen: Soft, Nontender, Nondistended; No masses or organomegally  Extremities: No calf tenderness, No pitting edema  Labs:    |-------------------------------------------------|                  Assessment and plan  |-------------------------------------------------|  53F w/ Osteogenesis imperfecta, Aortic Insufficiency HTN  Admit SP Fall from wheelchair/Polytrauma      Polytrauma Status Post Fall  acute comminuted fracture femur  Scaula, Humerus, Rt Rib  SP casting of R arm and R leg  orthopedics, Dr. Mercedez del angel in clinic one week post dc  manual (self-propelled) wheelchair at baseline    Anemia  attributed to right knee hemarthrosis  s/p 1/2 unit PRBC 3/4  ASA was held. appears to be taken for primary prevention. May resume outpt      Severe Aortic Insufficiency  -Grade IV per patient ; saw CT surgery in Nov 2023 with recommendation for medical management and routine monitoring  Echo 3/4 showed EF 55 to 60 %, grade 2 LV diastolic dysfunction. There's LVOT obstruction however gradients are LIMITED. Highest 20mmHg which may be underestimated, Severe aortic regurgitation, severe pulmonary hypertension.    HTN  -on amlodipine, losartan, eplerenone, bisoprolol/HCTZ (home med)    Anxiety  - Continue Lexapro 5 mg QD  - Continue xanax 0.25mg PO QHS PRN     DVT PPx  -Heparin SC

## 2024-03-18 NOTE — CHART NOTE - NSCHARTNOTEFT_GEN_A_CORE
Nutrition Follow Up Note  Hospital Course   (Per Electronic Medical Record)    Source:  Patient [X]  Medical Record [X]      Diet:   Regular Diet (IDDSI Level 7) w/ Thin Liquids (IDDSI Level 0)  Tolerates Diet Consistency Well  No Chewing/Swallowing Difficulties  No Recent Nausea, Vomiting, Diarrhea or Constipation (as Per Patient)  Consumes % of Meals (as Per Documentation) - States Good PO Intake/Appetite (Per Patient)   Declines Nutrition Supplementation  Obtained Food Preferences from Patient    Enteral/Parenteral Nutrition: Not Applicable    Current Weight: 60.6lb on 3/17  Obtain New Weight  Obtain Weights Weekly     Pertinent Medications: MEDICATIONS  (STANDING):  amLODIPine   Tablet 2.5 milliGRAM(s) Oral <User Schedule>  bisoprolol  5 mG/hydrochlorothiazide 6.25 mG 1 Tablet(s) Oral daily  eplerenone 25 milliGRAM(s) Oral <User Schedule>  escitalopram 5 milliGRAM(s) Oral <User Schedule>  gabapentin 100 milliGRAM(s) Oral at bedtime  heparin   Injectable 5000 Unit(s) SubCutaneous every 12 hours  losartan 50 milliGRAM(s) Oral two times a day  nystatin Powder 1 Application(s) Topical two times a day  senna 1 Tablet(s) Oral at bedtime    MEDICATIONS  (PRN):  acetaminophen     Tablet .. 975 milliGRAM(s) Oral every 6 hours PRN Mild Pain (1 - 3)  ALPRAZolam 0.25 milliGRAM(s) Oral at bedtime PRN Sleep  cyclobenzaprine 5 milliGRAM(s) Oral three times a day PRN Muscle Spasm  oxyCODONE    IR 2.5 milliGRAM(s) Oral every 4 hours PRN Moderate Pain (4 - 6)  oxyCODONE    IR 5 milliGRAM(s) Oral every 4 hours PRN Severe Pain (7 - 10)  polyethylene glycol 3350 17 Gram(s) Oral daily PRN Constipation    Pertinent Labs:   03-15 Alb 3.4 g/dL    Skin: No Pressure Ulcers     Edema: None Noted (as Per Documentation)     Last Bowel Movement: on 3/17    Estimated Needs:   [X] No Change Since Previous Assessment    Previous Nutrition Diagnosis:   Severe Malnutrition    Nutrition Diagnosis is [X] Ongoing - Declines Nutrition Supplementation     New Nutrition Diagnosis: [X] Not Applicable    Interventions:   1. Recommend Continue Nutrition Plan of Care     Monitoring & Evaluation:   [X] Weights   [X] PO Intake   [X] Skin Integrity   [X] Follow Up (Per Protocol)  [X] Tolerance to Diet Prescription   [X] Other: Labs     Registered Dietitian/Nutritionist Remains Available.  Armando Cheung, YRNN, CDN    Phone# [phone]

## 2024-03-19 LAB
ALBUMIN SERPL ELPH-MCNC: 3.3 G/DL — SIGNIFICANT CHANGE UP (ref 3.3–5)
ALP SERPL-CCNC: 153 U/L — HIGH (ref 40–120)
ALT FLD-CCNC: 25 U/L — SIGNIFICANT CHANGE UP (ref 10–45)
ANION GAP SERPL CALC-SCNC: 10 MMOL/L — SIGNIFICANT CHANGE UP (ref 5–17)
AST SERPL-CCNC: 22 U/L — SIGNIFICANT CHANGE UP (ref 10–40)
BILIRUB SERPL-MCNC: 0.9 MG/DL — SIGNIFICANT CHANGE UP (ref 0.2–1.2)
BUN SERPL-MCNC: 18 MG/DL — SIGNIFICANT CHANGE UP (ref 7–23)
CALCIUM SERPL-MCNC: 9.5 MG/DL — SIGNIFICANT CHANGE UP (ref 8.4–10.5)
CHLORIDE SERPL-SCNC: 98 MMOL/L — SIGNIFICANT CHANGE UP (ref 96–108)
CO2 SERPL-SCNC: 27 MMOL/L — SIGNIFICANT CHANGE UP (ref 22–31)
CREAT SERPL-MCNC: 0.2 MG/DL — LOW (ref 0.5–1.3)
EGFR: 138 ML/MIN/1.73M2 — SIGNIFICANT CHANGE UP
GLUCOSE SERPL-MCNC: 82 MG/DL — SIGNIFICANT CHANGE UP (ref 70–99)
HCT VFR BLD CALC: 32.1 % — LOW (ref 34.5–45)
HGB BLD-MCNC: 10.6 G/DL — LOW (ref 11.5–15.5)
MCHC RBC-ENTMCNC: 29.4 PG — SIGNIFICANT CHANGE UP (ref 27–34)
MCHC RBC-ENTMCNC: 33 GM/DL — SIGNIFICANT CHANGE UP (ref 32–36)
MCV RBC AUTO: 89.2 FL — SIGNIFICANT CHANGE UP (ref 80–100)
NRBC # BLD: 0 /100 WBCS — SIGNIFICANT CHANGE UP (ref 0–0)
PLATELET # BLD AUTO: 767 K/UL — HIGH (ref 150–400)
POTASSIUM SERPL-MCNC: 4.1 MMOL/L — SIGNIFICANT CHANGE UP (ref 3.5–5.3)
POTASSIUM SERPL-SCNC: 4.1 MMOL/L — SIGNIFICANT CHANGE UP (ref 3.5–5.3)
PROT SERPL-MCNC: 7.2 G/DL — SIGNIFICANT CHANGE UP (ref 6–8.3)
RBC # BLD: 3.6 M/UL — LOW (ref 3.8–5.2)
RBC # FLD: 14 % — SIGNIFICANT CHANGE UP (ref 10.3–14.5)
SODIUM SERPL-SCNC: 135 MMOL/L — SIGNIFICANT CHANGE UP (ref 135–145)
WBC # BLD: 6.86 K/UL — SIGNIFICANT CHANGE UP (ref 3.8–10.5)
WBC # FLD AUTO: 6.86 K/UL — SIGNIFICANT CHANGE UP (ref 3.8–10.5)

## 2024-03-19 PROCEDURE — 99232 SBSQ HOSP IP/OBS MODERATE 35: CPT

## 2024-03-19 RX ADMIN — BISOPROLOL FUMARATE AND HYDROCHLOROTHIAZIDE 1 TABLET(S): 5; 6.25 TABLET ORAL at 06:51

## 2024-03-19 RX ADMIN — Medication 975 MILLIGRAM(S): at 08:19

## 2024-03-19 RX ADMIN — NYSTATIN CREAM 1 APPLICATION(S): 100000 CREAM TOPICAL at 18:14

## 2024-03-19 RX ADMIN — LOSARTAN POTASSIUM 50 MILLIGRAM(S): 100 TABLET, FILM COATED ORAL at 06:51

## 2024-03-19 RX ADMIN — SENNA PLUS 1 TABLET(S): 8.6 TABLET ORAL at 18:20

## 2024-03-19 RX ADMIN — ENOXAPARIN SODIUM 30 MILLIGRAM(S): 100 INJECTION SUBCUTANEOUS at 06:50

## 2024-03-19 RX ADMIN — LOSARTAN POTASSIUM 50 MILLIGRAM(S): 100 TABLET, FILM COATED ORAL at 18:18

## 2024-03-19 RX ADMIN — NYSTATIN CREAM 1 APPLICATION(S): 100000 CREAM TOPICAL at 06:55

## 2024-03-19 RX ADMIN — Medication 975 MILLIGRAM(S): at 22:03

## 2024-03-19 RX ADMIN — Medication 975 MILLIGRAM(S): at 11:31

## 2024-03-19 RX ADMIN — Medication 0.25 MILLIGRAM(S): at 22:04

## 2024-03-19 RX ADMIN — AMLODIPINE BESYLATE 2.5 MILLIGRAM(S): 2.5 TABLET ORAL at 18:18

## 2024-03-19 RX ADMIN — CYCLOBENZAPRINE HYDROCHLORIDE 5 MILLIGRAM(S): 10 TABLET, FILM COATED ORAL at 08:20

## 2024-03-19 RX ADMIN — EPLERENONE 25 MILLIGRAM(S): 50 TABLET, FILM COATED ORAL at 18:18

## 2024-03-19 RX ADMIN — Medication 975 MILLIGRAM(S): at 23:00

## 2024-03-19 RX ADMIN — ESCITALOPRAM OXALATE 5 MILLIGRAM(S): 10 TABLET, FILM COATED ORAL at 06:50

## 2024-03-19 RX ADMIN — CYCLOBENZAPRINE HYDROCHLORIDE 5 MILLIGRAM(S): 10 TABLET, FILM COATED ORAL at 22:04

## 2024-03-19 NOTE — PROGRESS NOTE ADULT - SUBJECTIVE AND OBJECTIVE BOX
HPI:  Mrs. Ivis Grimm is a 53-year-old female patient with past medical history of osteogenesis imperfecta s/p upper & lower extremity hardware, aortic insufficiency, and HTN who presented to [facility] on 3/2 s/p fall from her wheelchair. She reports missing a step on the wheelchair, losing control, and falling face first from the wheelchair at a height of around 2-3 ft face first onto a concrete ground. She lost consciousness and regained her awareness of her surroundings some time later, likely seconds to minutes, with little recollection of the intervening time. Patient noted to have right temporal superficial abrasion, R upper lip laceration through the vermillion border, small ecchymosis on the R chin, tenderness to palpation in the L shoulder/R proximal arm/R wrist/ R thigh with medial rotation of the RLE.      Imaging performed reported acute, comminuted fracture adjacent to the tip of the right femoral anabell which begins in the distal femoral metadiaphysis and continues into the lateral femoral condyle, right knee hemarthrosis, age indeterminate R sacral ala fracture, age-indeterminate left scapular body fracture, age-indeterminate fracture of the right humeral head, status post pin placement, prior deformity of the right third rib with likely a superimposed acute fracture, likely acute fractures of right 4th-6th ribs. Orthopedics was consulted and recommended NWB of RUE; NWB of RLE, LUE can be WBAT given non-displaced scapula fracture and polytrauma. Patient now s/p fiberglass casting of R arm and R leg. No acute orthopedic surgical intervention planned. Patient evaluated by PT/OT and was recommended for acute inpatient rehab. Course complicated by anemia likely 2/2 right knee hemarthrosis, s/p 1/2 unit PRBC. Patient was discharged to [facility] on 3/12.   (12 Mar 2024 15:15)    ___________________________________________________________________________    SUBJECTIVE/ROS  Patient was seen and evaluated at bedside today alongside Occupational Therapy.  Reported no overnight events and is in no acute distress.  Established communication with her primary care physician's office today.  Resolution of leukocytosis noted today with persistence of thrombocytosis.  She states previous episodes of thrombocytosis and will consult Hematology for assessment.  Transition yesterday to Lovenox.  Follow-up imaging to take place on 3/23 at the three-week [place] since her trauma.  She remains eager to participate on the recommended rehabilitation program.  Denies any CP, SOB, PEREZ, palpitations, fever, chills, body aches, cough, congestion, or any other symptoms at this time.   ___________________________________________________________________________    Vital Signs Last 24 Hrs  T(C): 36.7 (19 Mar 2024 09:10), Max: 36.8 (18 Mar 2024 20:51)  T(F): 98 (19 Mar 2024 09:10), Max: 98.3 (18 Mar 2024 20:51)  HR: 88 (19 Mar 2024 09:10) (88 - 99)  BP: 88/57 (19 Mar 2024 09:10) (88/57 - 130/76)  RR: 18 (19 Mar 2024 09:10) (17 - 18)  SpO2: 95% (19 Mar 2024 09:10) (95% - 96%)    ___________________________________________________________________________    LAB                        10.6   6.86  )-----------( 767      ( 19 Mar 2024 05:30 )             32.1                             10.3   8.60  )-----------( 495      ( 13 Mar 2024 05:59 )             32.5         03-19    135  |  98  |  18  ----------------------------<  82  4.1   |  27  |  0.20<L>    Ca    9.5      19 Mar 2024 05:30    TPro  7.2  /  Alb  3.3  /  TBili  0.9  /  DBili  x   /  AST  22  /  ALT  25  /  AlkPhos  153<H>  03-19    LIVER FUNCTIONS - ( 19 Mar 2024 05:30 )  Alb: 3.3 g/dL / Pro: 7.2 g/dL / ALK PHOS: 153 U/L / ALT: 25 U/L / AST: 22 U/L / GGT: x           ___________________________________________________________________________    MEDICATIONS  (STANDING):  amLODIPine   Tablet 2.5 milliGRAM(s) Oral <User Schedule>  bisoprolol  5 mG/hydrochlorothiazide 6.25 mG 1 Tablet(s) Oral daily  enoxaparin Injectable 30 milliGRAM(s) SubCutaneous every 24 hours  eplerenone 25 milliGRAM(s) Oral <User Schedule>  escitalopram 5 milliGRAM(s) Oral <User Schedule>  gabapentin 100 milliGRAM(s) Oral at bedtime  losartan 50 milliGRAM(s) Oral two times a day  nystatin Powder 1 Application(s) Topical two times a day  senna 1 Tablet(s) Oral at bedtime    MEDICATIONS  (PRN):  acetaminophen     Tablet .. 975 milliGRAM(s) Oral every 6 hours PRN Mild Pain (1 - 3)  ALPRAZolam 0.25 milliGRAM(s) Oral at bedtime PRN Sleep  cyclobenzaprine 5 milliGRAM(s) Oral three times a day PRN Muscle Spasm  oxyCODONE    IR 2.5 milliGRAM(s) Oral every 4 hours PRN Moderate Pain (4 - 6)  oxyCODONE    IR 5 milliGRAM(s) Oral every 4 hours PRN Severe Pain (7 - 10)  polyethylene glycol 3350 17 Gram(s) Oral daily PRN Constipation    ___________________________________________________________________________    PHYSICAL EXAM:    Gen - NAD, Comfortable  HEENT -EOMI, MMM. Ecchymosis of chin  Pulm - CTAB, No wheeze, No rhonchi, No crackles  Cardiovascular - RRR, S1S2, +murmur  Abdomen - Soft, NT/ND, +BS  Extremities - right cast on UE and LE.   Neuro-     Cognitive - AAOx3     Communication - Fluent, No dysarthria     Attention: Intact      Judgement: Good evidence of judgement     Memory: Recall 3 objects immediate and 3 min later         Cranial Nerves - CN 2-12 intact     Motor -                    LEFT    UE - ShAB limited 2/2 scap fracture, EF 5/5, EE 5/5, WE 5/5,  5/5                     RIGHT UE - n/a                    LEFT    LE - HF 4/5, KE 4/5, DF 4/5, PF 4/5                    RIGHT LE - n/a     Sensory - Intact to LT     Tone - normal  Psychiatric - Mood stable, Affect WNL  Skin: Intact  Wounds: None Present.     ___________________________________________________________________________

## 2024-03-19 NOTE — PROGRESS NOTE ADULT - ASSESSMENT
Mrs. Ivis Grimm is a 53-year-old female patient with past medical history of osteogenesis imperfecta s/p upper & lower extremity hardware, aortic insufficiency, and HTN who is admitted for Acute Inpatient Rehabilitation with a multidisciplinary rehab program at [facility] with functional impairments in ADLs and mobility secondary to multiple fall related trauma.      Polytrauma       * S/P fall       * acute, comminuted periprosthetic fracture adjacent to the tip of the right femoral anabell which begins in the distal femoral metadiaphysis and continues into the lateral femoral condyle       * Age indeterminate fractures: R sacral ala, left scapular body, and right humeral head, status post pin placement       * Prior deformity of the right 3rd rib with likely a superimposed acute fracture, likely acute fractures of right 4th-6th ribs       * S/P fiberglass casting of R arm and R leg       * WB Status: NWB of RUE, NWB of RLE, LUE WBAT       * f/u with orthopedics, Dr. Newsome, 1 week after discharge       * Used manual (self-propelled) wheelchair at baseline  - Impaired ADLs and mobility  - Need for assistance with personal care   - Continue comprehensive rehab program of PT/OT - 3 hours a day, 5 days a week. P&O as needed        * Pain control as below       * Precautions: Fall    Pain Control  - Tylenol 975 mg Q6H  - Flexeril 5 mg TID PRN muscle spasm  - Oxycodone 2.5 Q4H PRN mod pain, oxycodone 5 mg Q4H PRN severe pain    Anemia  - likely 2/2 right knee hemarthrosis  - s/p 1/2 unit PRBC 3/4  - monitor H/H    Aortic Insufficiency/HTN  - Losartan 50 mg BID  - Norvasc 2.5 mg QHS  - Eplerenone 25 mg QD  - Bisoprolol 5 mg/HCTZ 6.25 mg QD **Home med, please have pharmacy verify. It is not in a prescription bottle, may need to call pharmacy.   - Monitor BP    Mood / Cognition  - Neuropsychology consult PRN  - Lexapro 5 mg QD    Sleep  - Maintain quiet hours and a low stim environment.   - Xanax 0.25 mg PRN    GI / Bowel  - Senna 1 cap qHS  - Miralax PRN Daily     / Bladder  - Continue bladder scans Q8 hours with straight cath for >400cc    Skin / Pressure injury  - Skin assessment on admission performed: intact  - Monitor Incisions:    - Pressure Injury/Skin: OOB to chair, PT/OT  - nursing to monitor skin qShift    Diet/Dysphagia:  - Diet Consistency: regular  - Nutrition consult    DVT prophylaxis:   - Lovenox      Outpatient Follow-up:  Domenic Newsome  Orthopaedic Surgery  [address], [address]  [place] [address]  Phone: [phone]  Fax: [phone]  Follow Up Time: 2 weeks

## 2024-03-19 NOTE — PROGRESS NOTE ADULT - SUBJECTIVE AND OBJECTIVE BOX
|-------------------------------------------|                       Subjective   |-------------------------------------------|    No events overnight  No new complaints to offer me    |------------------------------------------|                       Objective  |------------------------------------------|    Vital Signs Last 24 Hrs  T(F): 98 (19 Mar 2024 09:10), Max: 98.3 (18 Mar 2024 20:51)  HR: 88 (19 Mar 2024 09:10) (88 - 99)  BP: 88/57 (19 Mar 2024 09:10) (88/57 - 130/76)  RR: 18 (19 Mar 2024 09:10) (17 - 18)  SpO2: 95% (19 Mar 2024 09:10) (95% - 96%)  I&O's Summary      Examination:   General: NAD, Comfortable  Pulm: CTA BL No Rhonchi Rales or wheezes  Neck: Supple, No JVD  CVS: RRR No rubs murmurs or gallops  Abdomen: Soft, Nontender, Nondistended; No masses or organomegally  Extremities: No calf tenderness, No pitting edema  Labs:                        10.6   6.86  )-----------( 767      ( 19 Mar 2024 05:30 )             32.1       03-19    135  |  98  |  18  ----------------------------<  82  4.1   |  27  |  0.20    Ca    9.5      19 Mar 2024 05:30    TPro  7.2  /  Alb  3.3  /  TBili  0.9  /  DBili  x   /  AST  22  /  ALT  25  /  AlkPhos  153  03-19       |-------------------------------------------------|                  Assessment and plan  |-------------------------------------------------|    53F w/ Osteogenesis imperfecta, Aortic Insufficiency HTN  Admit SP Fall from wheelchair/Polytrauma      Polytrauma Status Post Fall  acute comminuted fracture femur  Scaula, Humerus, Rt Rib  SP casting of R arm and R leg  orthopedics, Dr. Mercedez del angel in clinic one week post dc  manual (self-propelled) wheelchair at baseline    Anemia  attributed to right knee hemarthrosis  s/p 1/2 unit PRBC 3/4  ASA was held. appears to be taken for primary prevention. May resume outpt      Severe Aortic Insufficiency  -Grade IV per patient ; saw CT surgery in Nov 2023 with recommendation for medical management and routine monitoring  Echo 3/4 showed EF 55 to 60 %, grade 2 LV diastolic dysfunction. There's LVOT obstruction however gradients are LIMITED. Highest 20mmHg which may be underestimated, Severe aortic regurgitation, severe pulmonary hypertension.    HTN  -on amlodipine, losartan, eplerenone, bisoprolol/HCTZ (home med)    Anxiety  - Continue Lexapro 5 mg QD  - Continue xanax 0.25mg PO QHS PRN     DVT PPx  Lovenox for dvt prox. minimal dose

## 2024-03-20 DIAGNOSIS — D75.839 THROMBOCYTOSIS, UNSPECIFIED: ICD-10-CM

## 2024-03-20 PROCEDURE — 99232 SBSQ HOSP IP/OBS MODERATE 35: CPT

## 2024-03-20 RX ADMIN — Medication 975 MILLIGRAM(S): at 22:09

## 2024-03-20 RX ADMIN — ENOXAPARIN SODIUM 30 MILLIGRAM(S): 100 INJECTION SUBCUTANEOUS at 06:31

## 2024-03-20 RX ADMIN — CYCLOBENZAPRINE HYDROCHLORIDE 5 MILLIGRAM(S): 10 TABLET, FILM COATED ORAL at 22:09

## 2024-03-20 RX ADMIN — ESCITALOPRAM OXALATE 5 MILLIGRAM(S): 10 TABLET, FILM COATED ORAL at 06:31

## 2024-03-20 RX ADMIN — EPLERENONE 25 MILLIGRAM(S): 50 TABLET, FILM COATED ORAL at 18:19

## 2024-03-20 RX ADMIN — LOSARTAN POTASSIUM 50 MILLIGRAM(S): 100 TABLET, FILM COATED ORAL at 06:31

## 2024-03-20 RX ADMIN — BISOPROLOL FUMARATE AND HYDROCHLOROTHIAZIDE 1 TABLET(S): 5; 6.25 TABLET ORAL at 06:32

## 2024-03-20 RX ADMIN — LOSARTAN POTASSIUM 50 MILLIGRAM(S): 100 TABLET, FILM COATED ORAL at 18:19

## 2024-03-20 RX ADMIN — CYCLOBENZAPRINE HYDROCHLORIDE 5 MILLIGRAM(S): 10 TABLET, FILM COATED ORAL at 06:31

## 2024-03-20 RX ADMIN — Medication 0.25 MILLIGRAM(S): at 22:09

## 2024-03-20 RX ADMIN — Medication 975 MILLIGRAM(S): at 11:54

## 2024-03-20 RX ADMIN — AMLODIPINE BESYLATE 2.5 MILLIGRAM(S): 2.5 TABLET ORAL at 18:19

## 2024-03-20 RX ADMIN — Medication 975 MILLIGRAM(S): at 23:00

## 2024-03-20 RX ADMIN — SENNA PLUS 1 TABLET(S): 8.6 TABLET ORAL at 22:10

## 2024-03-20 RX ADMIN — Medication 975 MILLIGRAM(S): at 11:03

## 2024-03-20 NOTE — CONSULT NOTE ADULT - ASSESSMENT
Mrs. Ivis Grimm is a 53-year-old female patient with past medical history of osteogenesis imperfecta s/p upper & lower extremity hardware, aortic insufficiency, and HTN who presented to [facility] on 3/2 s/p fall from her wheelchair. She reports missing a step on the wheelchair, losing control, and falling face first from the wheelchair at a height of around 2-3 ft face first onto a concrete ground. She lost consciousness and regained her awareness of her surroundings some time later, likely seconds to minutes, with little recollection of the intervening time.     Imaging performed reported acute, comminuted fracture adjacent to the tip of the right femoral anabell which begins in the distal femoral metadiaphysis and continues into the lateral femoral condyle, right knee hemarthrosis, age indeterminate R sacral ala fracture, age-indeterminate left scapular body fracture, age-indeterminate fracture of the right humeral head, status post pin placement, prior deformity of the right third rib with likely a superimposed acute fracture, likely acute fractures of right 4th-6th ribs. Orthopedics was consulted and recommended NWB of RUE; NWB of RLE, LUE can be WBAT given non-displaced scapula fracture and polytrauma. Patient now s/p fiberglass casting of R arm and R leg. No acute orthopedic surgical intervention planned. Patient evaluated by PT/OT and was recommended for acute inpatient rehab. Course complicated by anemia likely 2/2 right knee hemarthrosis, s/p 1/2 unit PRBC. Patient was discharged to [facility] on 3/12.  Hematology consulted for thrombocytosis.

## 2024-03-20 NOTE — PROGRESS NOTE ADULT - ASSESSMENT
53F w/ Osteogenesis imperfecta, Aortic Insufficiency HTN  Admit SP Fall from wheelchair/Polytrauma      Polytrauma Status Post Fall  acute comminuted fracture femur  Scaula, Humerus, Rt Rib  SP casting of R arm and R leg  orthopedics, Dr. Mercedez del angel in clinic one week post dc  manual (self-propelled) wheelchair at baseline    Anemia  attributed to right knee hemarthrosis  s/p 1/2 unit PRBC 3/4  ASA was held. appears to be taken for primary prevention. May resume outpt    Thrombocytosis   - seen by heme/onc, suspect reactive process. continue to monitor cbc. if persistent, may need further workup.     Severe Aortic Insufficiency  -Grade IV per patient ; saw CT surgery in Nov 2023 with recommendation for medical management and routine monitoring  Echo 3/4 showed EF 55 to 60 %, grade 2 LV diastolic dysfunction. There's LVOT obstruction however gradients are LIMITED. Highest 20mmHg which may be underestimated, Severe aortic regurgitation, severe pulmonary hypertension.    HTN  -on amlodipine, losartan, eplerenone, bisoprolol    Anxiety  - Continue Lexapro 5 mg QD  - Continue xanax 0.25mg PO QHS PRN     DVT PPx  Lovenox for dvt prox. minimal dose

## 2024-03-20 NOTE — PROGRESS NOTE ADULT - SUBJECTIVE AND OBJECTIVE BOX
Patient is a 53y old  Female who presents with a chief complaint of Polytrauma (20 Mar 2024 08:42)      Subjective and overnight events:  Patient seen and examined at bedside. no new complaints. slept well. no fever, chills, sob, cp, abd pain.    ALLERGIES:  No Known Allergies    MEDICATIONS  (STANDING):  amLODIPine   Tablet 2.5 milliGRAM(s) Oral <User Schedule>  bisoprolol  5 mG/hydrochlorothiazide 6.25 mG 1 Tablet(s) Oral daily  enoxaparin Injectable 30 milliGRAM(s) SubCutaneous every 24 hours  eplerenone 25 milliGRAM(s) Oral <User Schedule>  escitalopram 5 milliGRAM(s) Oral <User Schedule>  gabapentin 100 milliGRAM(s) Oral at bedtime  losartan 50 milliGRAM(s) Oral two times a day  nystatin Powder 1 Application(s) Topical two times a day  senna 1 Tablet(s) Oral at bedtime    MEDICATIONS  (PRN):  acetaminophen     Tablet .. 975 milliGRAM(s) Oral every 6 hours PRN Mild Pain (1 - 3)  ALPRAZolam 0.25 milliGRAM(s) Oral at bedtime PRN Sleep  cyclobenzaprine 5 milliGRAM(s) Oral three times a day PRN Muscle Spasm  oxyCODONE    IR 5 milliGRAM(s) Oral every 4 hours PRN Severe Pain (7 - 10)  oxyCODONE    IR 2.5 milliGRAM(s) Oral every 4 hours PRN Moderate Pain (4 - 6)  polyethylene glycol 3350 17 Gram(s) Oral daily PRN Constipation    Vital Signs Last 24 Hrs  T(F): 98.1 (20 Mar 2024 08:00), Max: 98.1 (20 Mar 2024 08:00)  HR: 89 (20 Mar 2024 08:00) (89 - 101)  BP: 102/64 (20 Mar 2024 08:00) (102/64 - 146/72)  RR: 17 (20 Mar 2024 08:00) (17 - 18)  SpO2: 94% (20 Mar 2024 08:00) (94% - 96%)  I&O's Summary    PHYSICAL EXAM:  General: NAD, A/O x 3  ENT: MMM  Neck: Supple, No JVD  Lungs: Clear to auscultation bilaterally  Cardio: RRR, S1/S2, No murmurs  Abdomen: Soft, Nontender, Nondistended; Bowel sounds present  Extremities: No calf tenderness, No pitting edema    LABS:                        10.6   6.86  )-----------( 767      ( 19 Mar 2024 05:30 )             32.1     03-19    135  |  98  |  18  ----------------------------<  82  4.1   |  27  |  0.20    Ca    9.5      19 Mar 2024 05:30    TPro  7.2  /  Alb  3.3  /  TBili  0.9  /  DBili  x   /  AST  22  /  ALT  25  /  AlkPhos  153  03-19          Urinalysis Basic - ( 19 Mar 2024 05:30 )    Color: x / Appearance: x / SG: x / pH: x  Gluc: 82 mg/dL / Ketone: x  / Bili: x / Urobili: x   Blood: x / Protein: x / Nitrite: x   Leuk Esterase: x / RBC: x / WBC x   Sq Epi: x / Non Sq Epi: x / Bacteria: x            RADIOLOGY & ADDITIONAL TESTS:    Care Discussed with Consultants/Other Providers:

## 2024-03-20 NOTE — PROGRESS NOTE ADULT - ASSESSMENT
Mrs. Ivis Grimm is a 53-year-old female patient with past medical history of osteogenesis imperfecta s/p upper & lower extremity hardware, aortic insufficiency, and HTN who is admitted for Acute Inpatient Rehabilitation with a multidisciplinary rehab program at [facility] with functional impairments in ADLs and mobility secondary to multiple fall related trauma.      Polytrauma       * S/P fall       * acute, comminuted periprosthetic fracture adjacent to the tip of the right femoral anabell which begins in the distal femoral metadiaphysis and continues into the lateral femoral condyle       * Age indeterminate fractures: R sacral ala, left scapular body, and right humeral head, status post pin placement       * Prior deformity of the right 3rd rib with likely a superimposed acute fracture, likely acute fractures of right 4th-6th ribs       * S/P fiberglass casting of R arm and R leg       * WB Status: NWB of RUE, NWB of RLE, LUE WBAT       * f/u with orthopedics, Dr. Newsome, 1 week after discharge       * Used manual (self-propelled) wheelchair at baseline  - Impaired ADLs and mobility  - Need for assistance with personal care   - Continue comprehensive rehab program of PT/OT - 3 hours a day, 5 days a week. P&O as needed        * Pain control as below       * Precautions: Fall    Pain Control  - Tylenol 975 mg Q6H  - Flexeril 5 mg TID PRN muscle spasm  - Oxycodone 2.5 Q4H PRN mod pain, oxycodone 5 mg Q4H PRN severe pain    Anemia  - likely 2/2 right knee hemarthrosis  - s/p 1/2 unit PRBC 3/4  - monitor H/H    Aortic Insufficiency/HTN  - Losartan 50 mg BID  - Norvasc 2.5 mg QHS  - Eplerenone 25 mg QD  - Bisoprolol 5 mg/HCTZ 6.25 mg QD **Home med, please have pharmacy verify. It is not in a prescription bottle, may need to call pharmacy.   - Monitor BP    Mood / Cognition  - Neuropsychology consult PRN  - Lexapro 5 mg QD    Sleep  - Maintain quiet hours and a low stim environment.   - Xanax 0.25 mg PRN    GI / Bowel  - Senna 1 cap qHS  - Miralax PRN Daily     / Bladder  - Continue bladder scans Q8 hours with straight cath for >400cc    Skin / Pressure injury  - Skin assessment on admission performed: intact  - Monitor Incisions:    - Pressure Injury/Skin: OOB to chair, PT/OT  - nursing to monitor skin qShift    Diet/Dysphagia:  - Diet Consistency: regular  - Nutrition consult    DVT prophylaxis:   - Lovenox      Outpatient Follow-up:  Domenic Newsome  Orthopaedic Surgery  [address], [address]  [place] [address]  Phone: [phone]  Fax: [phone]  Follow Up Time: 2 weeks

## 2024-03-20 NOTE — CONSULT NOTE ADULT - SUBJECTIVE AND OBJECTIVE BOX
HPI  53-year-old female patient with past medical history of osteogenesis imperfecta s/p upper & lower extremity hardware, aortic insufficiency, and HTN who presented to [facility] on 3/2 s/p fall from her wheelchair. Imaging performed reported acute, comminuted fracture adjacent to the tip of the right femoral anabell which begins in the distal femoral metadiaphysis and continues into the lateral femoral condyle, right knee hemarthrosis, age indeterminate R sacral ala fracture, age-indeterminate left scapular body fracture, age-indeterminate fracture of the right humeral head, status post pin placement, prior deformity of the right third rib with likely a superimposed acute fracture, likely acute fractures of right 4th-6th ribs. Orthopedics was consulted and recommended NWB of RUE; NWB of RLE, LUE can be WBAT given non-displaced scapula fracture and polytrauma. Patient now s/p fiberglass casting of R arm and R leg. No acute orthopedic surgical intervention planned. Patient evaluated by PT/OT and was recommended for acute inpatient rehab. Course complicated by anemia likely 2/2 right knee hemarthrosis, s/p 1/2 unit PRBC. Patient was discharged to [facility] on 3/12.    Patient seen and examined at bedside. Endorses back spasm but otherwise no complaints. ROS is negative.    ALLERGIES:  No Known Allergies    PAST MEDICAL HISTORY:  Aortic insufficiency   HTN (hypertension)   Osteogenesis imperfecta.     PAST SURGICAL HISTORY:  Presence of internal fixation anabell in upper extremity.      Social History (marital status, living situation, occupation, and sexual history)	  Smoking - denies  EtOH - a few times a month 1-2 drinks  Drugs - 1-2x a month    MEDICATIONS  (STANDING):  acetaminophen     Tablet .. 975 milliGRAM(s) Oral every 6 hours  amLODIPine   Tablet 2.5 milliGRAM(s) Oral at bedtime  aspirin enteric coated 81 milliGRAM(s) Oral daily  bisoprolol  5 mG/hydrochlorothiazide 6.25 mG 1 Tablet(s) Oral daily  eplerenone 25 milliGRAM(s) Oral daily  escitalopram 5 milliGRAM(s) Oral <User Schedule>  heparin   Injectable 5000 Unit(s) SubCutaneous every 12 hours  losartan 50 milliGRAM(s) Oral two times a day  nystatin Powder 1 Application(s) Topical two times a day  senna 1 Tablet(s) Oral at bedtime    MEDICATIONS  (PRN):  ALPRAZolam 0.25 milliGRAM(s) Oral at bedtime PRN Sleep  cyclobenzaprine 5 milliGRAM(s) Oral three times a day PRN Muscle Spasm  oxyCODONE    IR 2.5 milliGRAM(s) Oral every 4 hours PRN Moderate Pain (4 - 6)  oxyCODONE    IR 5 milliGRAM(s) Oral every 4 hours PRN Severe Pain (7 - 10)  polyethylene glycol 3350 17 Gram(s) Oral daily PRN Constipation    Vital Signs Last 24 Hrs  T(F): 98.3 (13 Mar 2024 08:41), Max: 98.8 (12 Mar 2024 22:02)  HR: 94 (13 Mar 2024 08:41) (79 - 108)  BP: 120/69 (13 Mar 2024 08:41) (109/57 - 129/60)  RR: 17 (13 Mar 2024 08:41) (16 - 18)  SpO2: 95% (13 Mar 2024 08:41) (93% - 96%)  I&O's Summary    PHYSICAL EXAM:  GENERAL: NAD  HEENT: NCAT, resolving right mandible ecchymosis  CHEST/LUNG: Clear to percussion bilaterally; No wheezing  HEART: Regular rate and rhythm; +murmur   ABDOMEN: Soft, Nontender, Nondistended; Bowel sounds present  MUSCULOSKELETAL/EXTREMITIES:  RUE/RLE cast in place, no LLE swelling  PSYCH: Appropriate affect  NEURO: Alert & Oriented x 3    I personally reviewed the below data/images/labs:    LABS:                        10.3   8.60  )-----------( 495      ( 13 Mar 2024 05:59 )             32.5       03-13    135  |  99  |  20  ----------------------------<  88  4.7   |  25  |  0.28    Ca    10.0      13 Mar 2024 05:59    TPro  7.4  /  Alb  3.4  /  TBili  0.9  /  DBili  x   /  AST  52  /  ALT  45  /  AlkPhos  106  03-13    Urinalysis Basic - ( 13 Mar 2024 05:59 )    Color: x / Appearance: x / SG: x / pH: x  Gluc: 88 mg/dL / Ketone: x  / Bili: x / Urobili: x   Blood: x / Protein: x / Nitrite: x   Leuk Esterase: x / RBC: x / WBC x   Sq Epi: x / Non Sq Epi: x / Bacteria: x    Consultant(s) Notes Reviewed: yes  Care Discused with Consultants/Other Providers: yes  Imaging Personally Reviewed:   
LO GRIMM  53y  Female  Admitting: [facility]    HPI:  Mrs. Lo Grimm is a 53-year-old female patient with past medical history of osteogenesis imperfecta s/p upper & lower extremity hardware, aortic insufficiency, and HTN who presented to [facility] on 3/2 s/p fall from her wheelchair. She reports missing a step on the wheelchair, losing control, and falling face first from the wheelchair at a height of around 2-3 ft face first onto a concrete ground. She lost consciousness and regained her awareness of her surroundings some time later, likely seconds to minutes, with little recollection of the intervening time.     Imaging performed reported acute, comminuted fracture adjacent to the tip of the right femoral anabell which begins in the distal femoral metadiaphysis and continues into the lateral femoral condyle, right knee hemarthrosis, age indeterminate R sacral ala fracture, age-indeterminate left scapular body fracture, age-indeterminate fracture of the right humeral head, status post pin placement, prior deformity of the right third rib with likely a superimposed acute fracture, likely acute fractures of right 4th-6th ribs. Orthopedics was consulted and recommended NWB of RUE; NWB of RLE, LUE can be WBAT given non-displaced scapula fracture and polytrauma. Patient now s/p fiberglass casting of R arm and R leg. No acute orthopedic surgical intervention planned. Patient evaluated by PT/OT and was recommended for acute inpatient rehab. Course complicated by anemia likely 2/2 right knee hemarthrosis, s/p 1/2 unit PRBC. Patient was discharged to [facility] on 3/12.  Hematology consulted for thrombocytosis.    PAST MEDICAL & SURGICAL HISTORY:  Osteogenesis imperfecta      Aortic insufficiency      HTN (hypertension)      Presence of internal fixation anabell in upper extremity        MEDICATIONS  (STANDING):  amLODIPine   Tablet 2.5 milliGRAM(s) Oral <User Schedule>  bisoprolol  5 mG/hydrochlorothiazide 6.25 mG 1 Tablet(s) Oral daily  enoxaparin Injectable 30 milliGRAM(s) SubCutaneous every 24 hours  eplerenone 25 milliGRAM(s) Oral <User Schedule>  escitalopram 5 milliGRAM(s) Oral <User Schedule>  gabapentin 100 milliGRAM(s) Oral at bedtime  losartan 50 milliGRAM(s) Oral two times a day  nystatin Powder 1 Application(s) Topical two times a day  senna 1 Tablet(s) Oral at bedtime    MEDICATIONS  (PRN):  acetaminophen     Tablet .. 975 milliGRAM(s) Oral every 6 hours PRN Mild Pain (1 - 3)  ALPRAZolam 0.25 milliGRAM(s) Oral at bedtime PRN Sleep  cyclobenzaprine 5 milliGRAM(s) Oral three times a day PRN Muscle Spasm  oxyCODONE    IR 5 milliGRAM(s) Oral every 4 hours PRN Severe Pain (7 - 10)  oxyCODONE    IR 2.5 milliGRAM(s) Oral every 4 hours PRN Moderate Pain (4 - 6)  polyethylene glycol 3350 17 Gram(s) Oral daily PRN Constipation    Allergies    No Known Allergies    FAMILY HISTORY: nom known blood disorders in first degree relatives      SOCIAL HISTORY: No EtOH, no tobacco    REVIEW OF SYSTEMS:    CONSTITUTIONAL: No fevers or chills  EYES/ENT: No visual changes;  No vertigo or throat pain   NECK: No pain   RESPIRATORY: No hemoptysis; No shortness of breath  CARDIOVASCULAR: No chest pain or palpitations  GASTROINTESTINAL:  No nausea, vomiting, or hematemesis; No melena or hematochezia.  GENITOURINARY: No hematuria  NEUROLOGICAL: No numbness   SKIN: No itching,   All other review of systems is negative unless indicated above.      Weight (kg): 27.5 (03-19 @ 15:00)    T(F): 98 (03-19-24 @ 19:51), Max: 98 (03-19-24 @ 09:10)  HR: 101 (03-20-24 @ 06:25)  BP: 111/62 (03-20-24 @ 06:25)  RR: 17 (03-19-24 @ 19:51)  SpO2: 95% (03-19-24 @ 19:51)      GENERAL: NAD  EYES: EOMI, PERRLA, conjunctiva and sclera clear  NECK: Supple, No JVD  CHEST/LUNG: decreased BS bases ant. ; No wheeze  HEART: Regular rate and rhythm; No murmurs, rubs, or gallops  ABDOMEN: Soft, Nontender; no palpable masses  EXTREMITIES:  no calf tenderness elicited  NEUROLOGY: awake, alert      Labs:             10.6   6.86  )-----------( 767      ( 03-19 @ 05:30 )             32.1       03-19    135  |  98  |  18  ----------------------------<  82  4.1   |  27  |  0.20<L>    Ca    9.5      19 Mar 2024 05:30    TPro  7.2  /  Alb  3.3  /  TBili  0.9  /  DBili  x   /  AST  22  /  ALT  25  /  AlkPhos  153<H>  03-19      Consultant notes reviewed : YES [ x] ; NO [ ]

## 2024-03-20 NOTE — CONSULT NOTE ADULT - PROBLEM SELECTOR RECOMMENDATION 9
Patient with normal platelet count of 282,000 3/7/24. Patient reports platelet count has gone up in the past. Suspect current thrombocytosis represents a reactive process (required PRBC transfusion recently for anemia/hemarthrosis). Would continue to monitor CBC for now-if refractory, can consider further evaluation. DVT prophylaxis.

## 2024-03-20 NOTE — PROGRESS NOTE ADULT - SUBJECTIVE AND OBJECTIVE BOX
HPI:  Mrs. Ivis Grimm is a 53-year-old female patient with past medical history of osteogenesis imperfecta s/p upper & lower extremity hardware, aortic insufficiency, and HTN who presented to [facility] on 3/2 s/p fall from her wheelchair. She reports missing a step on the wheelchair, losing control, and falling face first from the wheelchair at a height of around 2-3 ft face first onto a concrete ground. She lost consciousness and regained her awareness of her surroundings some time later, likely seconds to minutes, with little recollection of the intervening time. Patient noted to have right temporal superficial abrasion, R upper lip laceration through the vermillion border, small ecchymosis on the R chin, tenderness to palpation in the L shoulder/R proximal arm/R wrist/ R thigh with medial rotation of the RLE.      Imaging performed reported acute, comminuted fracture adjacent to the tip of the right femoral anabell which begins in the distal femoral metadiaphysis and continues into the lateral femoral condyle, right knee hemarthrosis, age indeterminate R sacral ala fracture, age-indeterminate left scapular body fracture, age-indeterminate fracture of the right humeral head, status post pin placement, prior deformity of the right third rib with likely a superimposed acute fracture, likely acute fractures of right 4th-6th ribs. Orthopedics was consulted and recommended NWB of RUE; NWB of RLE, LUE can be WBAT given non-displaced scapula fracture and polytrauma. Patient now s/p fiberglass casting of R arm and R leg. No acute orthopedic surgical intervention planned. Patient evaluated by PT/OT and was recommended for acute inpatient rehab. Course complicated by anemia likely 2/2 right knee hemarthrosis, s/p 1/2 unit PRBC. Patient was discharged to [facility] on 3/12.   (12 Mar 2024 15:15)    ___________________________________________________________________________    SUBJECTIVE/ROS  Patient was seen and evaluated at bedside today alongside Occupational Therapy.  Reported no overnight events and is in no acute distress.  Established communication with her primary care physician's office today.  Resolution of leukocytosis noted today with persistence of thrombocytosis, likely reactive.  Consulted Hematology for assessment and no additional management was recommended.  Follow-up imaging to take place on 3/23 at the three-week [place] since her trauma.  She remains eager to participate on the recommended rehabilitation program.  Denies any CP, SOB, PEREZ, palpitations, fever, chills, body aches, cough, congestion, or any other symptoms at this time.   ___________________________________________________________________________    Vital Signs Last 24 Hrs  T(C): 36.7 (20 Mar 2024 08:00), Max: 36.7 (19 Mar 2024 19:51)  T(F): 98.1 (20 Mar 2024 08:00), Max: 98.1 (20 Mar 2024 08:00)  HR: 89 (20 Mar 2024 08:00) (89 - 101)  BP: 102/64 (20 Mar 2024 08:00) (102/64 - 146/72)  RR: 17 (20 Mar 2024 08:00) (17 - 18)  SpO2: 94% (20 Mar 2024 08:00) (94% - 96%)    ___________________________________________________________________________    LAB                        10.6   6.86  )-----------( 767      ( 19 Mar 2024 05:30 )             32.1                             10.3   8.60  )-----------( 495      ( 13 Mar 2024 05:59 )             32.5         03-19    135  |  98  |  18  ----------------------------<  82  4.1   |  27  |  0.20<L>    Ca    9.5      19 Mar 2024 05:30    TPro  7.2  /  Alb  3.3  /  TBili  0.9  /  DBili  x   /  AST  22  /  ALT  25  /  AlkPhos  153<H>  03-19    LIVER FUNCTIONS - ( 19 Mar 2024 05:30 )  Alb: 3.3 g/dL / Pro: 7.2 g/dL / ALK PHOS: 153 U/L / ALT: 25 U/L / AST: 22 U/L / GGT: x           ___________________________________________________________________________    MEDICATIONS  (STANDING):  amLODIPine   Tablet 2.5 milliGRAM(s) Oral <User Schedule>  bisoprolol  5 mG/hydrochlorothiazide 6.25 mG 1 Tablet(s) Oral daily  enoxaparin Injectable 30 milliGRAM(s) SubCutaneous every 24 hours  eplerenone 25 milliGRAM(s) Oral <User Schedule>  escitalopram 5 milliGRAM(s) Oral <User Schedule>  gabapentin 100 milliGRAM(s) Oral at bedtime  losartan 50 milliGRAM(s) Oral two times a day  nystatin Powder 1 Application(s) Topical two times a day  senna 1 Tablet(s) Oral at bedtime    MEDICATIONS  (PRN):  acetaminophen     Tablet .. 975 milliGRAM(s) Oral every 6 hours PRN Mild Pain (1 - 3)  ALPRAZolam 0.25 milliGRAM(s) Oral at bedtime PRN Sleep  cyclobenzaprine 5 milliGRAM(s) Oral three times a day PRN Muscle Spasm  oxyCODONE    IR 5 milliGRAM(s) Oral every 4 hours PRN Severe Pain (7 - 10)  oxyCODONE    IR 2.5 milliGRAM(s) Oral every 4 hours PRN Moderate Pain (4 - 6)  polyethylene glycol 3350 17 Gram(s) Oral daily PRN Constipation    ___________________________________________________________________________    PHYSICAL EXAM:    Gen - NAD, Comfortable  HEENT -EOMI, MMM. Ecchymosis of chin  Pulm - CTAB, No wheeze, No rhonchi, No crackles  Cardiovascular - RRR, S1S2, +murmur  Abdomen - Soft, NT/ND, +BS  Extremities - right cast on UE and LE.   Neuro-     Cognitive - AAOx3     Communication - Fluent, No dysarthria     Attention: Intact      Judgement: Good evidence of judgement     Memory: Recall 3 objects immediate and 3 min later         Cranial Nerves - CN 2-12 intact     Motor -                    LEFT    UE - ShAB limited 2/2 scap fracture, EF 5/5, EE 5/5, WE 5/5,  5/5                     RIGHT UE - n/a                    LEFT    LE - HF 4/5, KE 4/5, DF 4/5, PF 4/5                    RIGHT LE - n/a     Sensory - Intact to LT     Tone - normal  Psychiatric - Mood stable, Affect WNL  Skin: Intact  Wounds: None Present.     ___________________________________________________________________________

## 2024-03-21 LAB
ALBUMIN SERPL ELPH-MCNC: 3.4 G/DL — SIGNIFICANT CHANGE UP (ref 3.3–5)
ALP SERPL-CCNC: 177 U/L — HIGH (ref 40–120)
ALT FLD-CCNC: 25 U/L — SIGNIFICANT CHANGE UP (ref 10–45)
ANION GAP SERPL CALC-SCNC: 11 MMOL/L — SIGNIFICANT CHANGE UP (ref 5–17)
AST SERPL-CCNC: 19 U/L — SIGNIFICANT CHANGE UP (ref 10–40)
BILIRUB SERPL-MCNC: 0.7 MG/DL — SIGNIFICANT CHANGE UP (ref 0.2–1.2)
BUN SERPL-MCNC: 17 MG/DL — SIGNIFICANT CHANGE UP (ref 7–23)
CALCIUM SERPL-MCNC: 9.8 MG/DL — SIGNIFICANT CHANGE UP (ref 8.4–10.5)
CHLORIDE SERPL-SCNC: 99 MMOL/L — SIGNIFICANT CHANGE UP (ref 96–108)
CO2 SERPL-SCNC: 28 MMOL/L — SIGNIFICANT CHANGE UP (ref 22–31)
CREAT SERPL-MCNC: 0.3 MG/DL — LOW (ref 0.5–1.3)
EGFR: 125 ML/MIN/1.73M2 — SIGNIFICANT CHANGE UP
GLUCOSE SERPL-MCNC: 100 MG/DL — HIGH (ref 70–99)
HCT VFR BLD CALC: 32.8 % — LOW (ref 34.5–45)
HGB BLD-MCNC: 10.7 G/DL — LOW (ref 11.5–15.5)
MCHC RBC-ENTMCNC: 29.4 PG — SIGNIFICANT CHANGE UP (ref 27–34)
MCHC RBC-ENTMCNC: 32.6 GM/DL — SIGNIFICANT CHANGE UP (ref 32–36)
MCV RBC AUTO: 90.1 FL — SIGNIFICANT CHANGE UP (ref 80–100)
NRBC # BLD: 0 /100 WBCS — SIGNIFICANT CHANGE UP (ref 0–0)
PLATELET # BLD AUTO: 755 K/UL — HIGH (ref 150–400)
POTASSIUM SERPL-MCNC: 4 MMOL/L — SIGNIFICANT CHANGE UP (ref 3.5–5.3)
POTASSIUM SERPL-SCNC: 4 MMOL/L — SIGNIFICANT CHANGE UP (ref 3.5–5.3)
PROT SERPL-MCNC: 7.4 G/DL — SIGNIFICANT CHANGE UP (ref 6–8.3)
RBC # BLD: 3.64 M/UL — LOW (ref 3.8–5.2)
RBC # FLD: 14.2 % — SIGNIFICANT CHANGE UP (ref 10.3–14.5)
SODIUM SERPL-SCNC: 138 MMOL/L — SIGNIFICANT CHANGE UP (ref 135–145)
WBC # BLD: 8.52 K/UL — SIGNIFICANT CHANGE UP (ref 3.8–10.5)
WBC # FLD AUTO: 8.52 K/UL — SIGNIFICANT CHANGE UP (ref 3.8–10.5)

## 2024-03-21 PROCEDURE — 99232 SBSQ HOSP IP/OBS MODERATE 35: CPT

## 2024-03-21 RX ORDER — CYCLOBENZAPRINE HYDROCHLORIDE 10 MG/1
10 TABLET, FILM COATED ORAL THREE TIMES A DAY
Refills: 0 | Status: DISCONTINUED | OUTPATIENT
Start: 2024-03-21 | End: 2024-03-30

## 2024-03-21 RX ORDER — METHOCARBAMOL 500 MG/1
500 TABLET, FILM COATED ORAL THREE TIMES A DAY
Refills: 0 | Status: DISCONTINUED | OUTPATIENT
Start: 2024-03-21 | End: 2024-03-22

## 2024-03-21 RX ORDER — ALPRAZOLAM 0.25 MG
0.25 TABLET ORAL AT BEDTIME
Refills: 0 | Status: DISCONTINUED | OUTPATIENT
Start: 2024-03-21 | End: 2024-03-25

## 2024-03-21 RX ADMIN — BISOPROLOL FUMARATE AND HYDROCHLOROTHIAZIDE 1 TABLET(S): 5; 6.25 TABLET ORAL at 08:20

## 2024-03-21 RX ADMIN — CYCLOBENZAPRINE HYDROCHLORIDE 5 MILLIGRAM(S): 10 TABLET, FILM COATED ORAL at 12:16

## 2024-03-21 RX ADMIN — Medication 975 MILLIGRAM(S): at 07:45

## 2024-03-21 RX ADMIN — Medication 975 MILLIGRAM(S): at 21:28

## 2024-03-21 RX ADMIN — LOSARTAN POTASSIUM 50 MILLIGRAM(S): 100 TABLET, FILM COATED ORAL at 08:19

## 2024-03-21 RX ADMIN — Medication 0.25 MILLIGRAM(S): at 21:26

## 2024-03-21 RX ADMIN — CYCLOBENZAPRINE HYDROCHLORIDE 5 MILLIGRAM(S): 10 TABLET, FILM COATED ORAL at 06:40

## 2024-03-21 RX ADMIN — Medication 975 MILLIGRAM(S): at 23:10

## 2024-03-21 RX ADMIN — ESCITALOPRAM OXALATE 5 MILLIGRAM(S): 10 TABLET, FILM COATED ORAL at 06:38

## 2024-03-21 RX ADMIN — CYCLOBENZAPRINE HYDROCHLORIDE 10 MILLIGRAM(S): 10 TABLET, FILM COATED ORAL at 21:27

## 2024-03-21 RX ADMIN — Medication 975 MILLIGRAM(S): at 12:16

## 2024-03-21 RX ADMIN — EPLERENONE 25 MILLIGRAM(S): 50 TABLET, FILM COATED ORAL at 18:09

## 2024-03-21 RX ADMIN — LOSARTAN POTASSIUM 50 MILLIGRAM(S): 100 TABLET, FILM COATED ORAL at 18:09

## 2024-03-21 RX ADMIN — ENOXAPARIN SODIUM 30 MILLIGRAM(S): 100 INJECTION SUBCUTANEOUS at 06:45

## 2024-03-21 RX ADMIN — AMLODIPINE BESYLATE 2.5 MILLIGRAM(S): 2.5 TABLET ORAL at 18:09

## 2024-03-21 RX ADMIN — Medication 975 MILLIGRAM(S): at 13:07

## 2024-03-21 RX ADMIN — Medication 975 MILLIGRAM(S): at 06:45

## 2024-03-21 NOTE — PROGRESS NOTE ADULT - ASSESSMENT
Mrs. Ivis Grimm is a 53-year-old female patient with past medical history of osteogenesis imperfecta s/p upper & lower extremity hardware, aortic insufficiency, and HTN who is admitted for Acute Inpatient Rehabilitation with a multidisciplinary rehab program at [facility] with functional impairments in ADLs and mobility secondary to multiple fall related trauma.      Polytrauma       * S/P fall       * acute, comminuted periprosthetic fracture adjacent to the tip of the right femoral anabell which begins in the distal femoral metadiaphysis and continues into the lateral femoral condyle       * Age indeterminate fractures: R sacral ala, left scapular body, and right humeral head, status post pin placement       * Prior deformity of the right 3rd rib with likely a superimposed acute fracture, likely acute fractures of right 4th-6th ribs       * S/P fiberglass casting of R arm and R leg       * WB Status: NWB of RUE, NWB of RLE, LUE WBAT       * f/u with orthopedics, Dr. Newsome, 1 week after discharge       * Used manual (self-propelled) wheelchair at baseline  - Impaired ADLs and mobility  - Need for assistance with personal care   - Continue comprehensive rehab program of PT/OT - 3 hours a day, 5 days a week. P&O as needed        * Pain control as below       * Precautions: Fall    Pain Control  - Tylenol 975 mg Q6H  - Flexeril 5 mg TID PRN muscle spasm  - Oxycodone 2.5 Q4H PRN mod pain, oxycodone 5 mg Q4H PRN severe pain    Anemia  - likely 2/2 right knee hemarthrosis  - s/p 1/2 unit PRBC 3/4  - monitor H/H    Aortic Insufficiency/HTN  - Losartan 50 mg BID  - Norvasc 2.5 mg QHS  - Eplerenone 25 mg QD  - Bisoprolol 5 mg/HCTZ 6.25 mg QD **Home med, please have pharmacy verify. It is not in a prescription bottle, may need to call pharmacy.   - Monitor BP    Mood / Cognition  - Neuropsychology consult PRN  - Lexapro 5 mg QD    Sleep  - Maintain quiet hours and a low stim environment.   - Xanax 0.25 mg PRN    GI / Bowel  - Senna 1 cap qHS  - Miralax PRN Daily     / Bladder  - Continue bladder scans Q8 hours with straight cath for >400cc    Skin / Pressure injury  - Skin assessment on admission performed: intact  - Monitor Incisions:    - Pressure Injury/Skin: OOB to chair, PT/OT  - nursing to monitor skin qShift    Diet/Dysphagia:  - Diet Consistency: regular  - Nutrition consult    DVT prophylaxis:   - Lovenox      Outpatient Follow-up:  Domenic Newsome  Orthopaedic Surgery  [address], [address]  [place] [address]  Phone: [phone]  Fax: [phone]  Follow Up Time: 2 weeks Mrs. Ivis Grimm is a 53-year-old female patient with past medical history of osteogenesis imperfecta s/p upper & lower extremity hardware, aortic insufficiency, and HTN who is admitted for Acute Inpatient Rehabilitation with a multidisciplinary rehab program at [facility] with functional impairments in ADLs and mobility secondary to multiple fall related trauma.    Polytrauma       * S/P fall       * acute, comminuted periprosthetic fracture adjacent to the tip of the right femoral anabell which begins in the distal femoral metadiaphysis and continues into the lateral femoral condyle       * Age indeterminate fractures: R sacral ala, left scapular body, and right humeral head, status post pin placement       * Prior deformity of the right 3rd rib with likely a superimposed acute fracture, likely acute fractures of right 4th-6th ribs       * S/P fiberglass casting of R arm and R leg       * WB Status: NWB of RUE, NWB of RLE, JAMARIE WBAT       * f/u with orthopedics, Dr. Newsome, 1 week after discharge       * Used manual (self-propelled) wheelchair at baseline  - Impaired ADLs and mobility  - Need for assistance with personal care   - Continue comprehensive rehab program of PT/OT - 3 hours a day, 5 days a week. P&O as needed        * Pain control as below       * Precautions: Fall    Pain Control  - Tylenol 975 mg Q6H  - Flexeril 5 mg TID PRN muscle spasm  - gabapentin 100mg Hs. Has been denying it.   - Oxycodone 2.5 Q4H PRN mod pain, oxycodone 5 mg Q4H PRN severe pain    Anemia  - likely 2/2 right knee hemarthrosis  - s/p 1/2 unit PRBC 3/4  - monitor H/H    Aortic Insufficiency/HTN  - Losartan 50 mg BID  - Norvasc 2.5 mg QHS  - Eplerenone 25 mg QD  - Bisoprolol 5 mg/HCTZ 6.25 mg QD **Home med, please have pharmacy verify. It is not in a prescription bottle, may need to call pharmacy.   - Monitor BP    Mood / Cognition  - Neuropsychology consult PRN  - Lexapro 5 mg QD    Sleep  - Maintain quiet hours and a low stim environment.   - Xanax 0.5 mg PRN split in half. She will take 0.25mg as needed.     GI / Bowel  - Senna 1 cap qHS  - Miralax PRN Daily     / Bladder  - Continue bladder scans Q8 hours with straight cath for >400cc    Skin / Pressure injury  - Skin assessment on admission performed: intact  - Monitor Incisions:    - Pressure Injury/Skin: OOB to chair, PT/OT  - nursing to monitor skin qShift    Diet/Dysphagia:  - Diet Consistency: regular  - Nutrition consult    DVT prophylaxis:   - Lovenox      Outpatient Follow-up:  Domenic Newsome  Orthopaedic Surgery  [address] [place] [address], [address]  [place] [address]  Phone: [phone]  Fax: [phone]  Follow Up Time: 2 weeks Mrs. Ivis Grimm is a 53-year-old female patient with past medical history of osteogenesis imperfecta s/p upper & lower extremity hardware, aortic insufficiency, and HTN who is admitted for Acute Inpatient Rehabilitation with a multidisciplinary rehab program at [facility] with functional impairments in ADLs and mobility secondary to multiple fall related trauma.    Polytrauma       * S/P fall       * acute, comminuted periprosthetic fracture adjacent to the tip of the right femoral anabell which begins in the distal femoral metadiaphysis and continues into the lateral femoral condyle       * Age indeterminate fractures: R sacral ala, left scapular body, and right humeral head, status post pin placement       * Prior deformity of the right 3rd rib with likely a superimposed acute fracture, likely acute fractures of right 4th-6th ribs       * S/P fiberglass casting of R arm and R leg       * WB Status: NWB of RUE, NWB of RLE, LUE WBAT       * f/u with orthopedics, Dr. Newsome, 1 week after discharge       * Used manual (self-propelled) wheelchair at baseline  - Impaired ADLs and mobility  - Need for assistance with personal care   - Continue comprehensive rehab program of PT/OT - 3 hours a day, 5 days a week. P&O as needed        * Pain control as below       * Precautions: Fall    Pain Control  - Tylenol 975 mg Q6H  - Flexeril PRN muscle spasm. Increased to 10mg TID.   - gabapentin 100mg Hs. Has been denying it.   - Oxycodone 2.5 Q4H PRN mod pain, oxycodone 5 mg Q4H PRN severe pain    Anemia  - likely 2/2 right knee hemarthrosis  - s/p 1/2 unit PRBC 3/4  - monitor H/H    Aortic Insufficiency/HTN  - Losartan 50 mg BID  - Norvasc 2.5 mg QHS  - Eplerenone 25 mg QD  - Bisoprolol 5 mg/HCTZ 6.25 mg QD **Home med, please have pharmacy verify. It is not in a prescription bottle, may need to call pharmacy.   - Monitor BP    Mood / Cognition  - Neuropsychology consult PRN  - Lexapro 5 mg QD    Sleep  - Maintain quiet hours and a low stim environment.   - Xanax 0.5 mg PRN split in half. She will take 0.25mg as needed.     GI / Bowel  - Senna 1 cap qHS  - Miralax PRN Daily     / Bladder  - Continue bladder scans Q8 hours with straight cath for >400cc    Skin / Pressure injury  - Skin assessment on admission performed: intact  - Monitor Incisions:    - Pressure Injury/Skin: OOB to chair, PT/OT  - nursing to monitor skin qShift    Diet/Dysphagia:  - Diet Consistency: regular  - Nutrition consult    DVT prophylaxis:   - Lovenox      Outpatient Follow-up:  Domenic Newsome  Orthopaedic Surgery  [address] [place] [address], [address]  [place] [address]  Phone: [phone]  Fax: [phone]  Follow Up Time: 2 weeks Mrs. Ivis Grimm is a 53-year-old female patient with past medical history of osteogenesis imperfecta s/p upper & lower extremity hardware, aortic insufficiency, and HTN who is admitted for Acute Inpatient Rehabilitation with a multidisciplinary rehab program at [facility] with functional impairments in ADLs and mobility secondary to multiple fall related trauma.      Polytrauma       * S/P fall       * acute, comminuted periprosthetic fracture adjacent to the tip of the right femoral anabell which begins in the distal femoral metadiaphysis and continues into the lateral femoral condyle       * Age indeterminate fractures: R sacral ala, left scapular body, and right humeral head, status post pin placement       * Prior deformity of the right 3rd rib with likely a superimposed acute fracture, likely acute fractures of right 4th-6th ribs       * S/P fiberglass casting of R arm and R leg       * WB Status: NWB of RUE, NWB of RLE, LUE WBAT       * f/u with orthopedics, Dr. Newsome, 1 week after discharge       * Used manual (self-propelled) wheelchair at baseline  - Impaired ADLs and mobility  - Need for assistance with personal care   - Continue comprehensive rehab program of PT/OT - 3 hours a day, 5 days a week. P&O as needed        * Pain control as below       * Precautions: Fall    Pain Control  - Tylenol 975 mg Q6H  - Flexeril PRN muscle spasm. Increased to 10mg TID.   - gabapentin 100mg Hs. Has been denying it.   - Oxycodone 2.5 Q4H PRN mod pain, oxycodone 5 mg Q4H PRN severe pain    Anemia  - likely 2/2 right knee hemarthrosis  - s/p 1/2 unit PRBC 3/4  - monitor H/H    Aortic Insufficiency/HTN  - Losartan 50 mg BID  - Norvasc 2.5 mg QHS  - Eplerenone 25 mg QD  - Bisoprolol 5 mg/HCTZ 6.25 mg QD **Home med, please have pharmacy verify. It is not in a prescription bottle, may need to call pharmacy.   - Monitor BP    Mood / Cognition  - Neuropsychology consult PRN  - Lexapro 5 mg QD    Sleep  - Maintain quiet hours and a low stim environment.   - Xanax 0.5 mg PRN split in half. She will take 0.25mg as needed.     GI / Bowel  - Senna 1 cap qHS  - Miralax PRN Daily     / Bladder  - Continue bladder scans Q8 hours with straight cath for >400cc    Skin / Pressure injury  - Skin assessment on admission performed: intact  - Monitor Incisions:    - Pressure Injury/Skin: OOB to chair, PT/OT  - nursing to monitor skin qShift    Diet/Dysphagia:  - Diet Consistency: regular  - Nutrition consult    DVT prophylaxis:   - Lovenox      Outpatient Follow-up:  Domenic Newsome  Orthopaedic Surgery  [address] [place] [address], [address]  [place] [address]  Phone: [phone]  Fax: [phone]  Follow Up Time: 2 weeks

## 2024-03-21 NOTE — PROGRESS NOTE ADULT - SUBJECTIVE AND OBJECTIVE BOX
Patient is a 53y old  Female who presents with a chief complaint of Polytrauma (20 Mar 2024 13:15)      Subjective and overnight events:  Patient seen and examined at bedside. +reports some muscle spasms overnight that prevented pt from sleeping well. symptoms went away now. no fever, chills, sob, cp, abd pain.    ALLERGIES:  No Known Allergies    MEDICATIONS  (STANDING):  amLODIPine   Tablet 2.5 milliGRAM(s) Oral <User Schedule>  bisoprolol  5 mG/hydrochlorothiazide 6.25 mG 1 Tablet(s) Oral daily  enoxaparin Injectable 30 milliGRAM(s) SubCutaneous every 24 hours  eplerenone 25 milliGRAM(s) Oral <User Schedule>  escitalopram 5 milliGRAM(s) Oral <User Schedule>  gabapentin 100 milliGRAM(s) Oral at bedtime  losartan 50 milliGRAM(s) Oral two times a day  nystatin Powder 1 Application(s) Topical two times a day  senna 1 Tablet(s) Oral at bedtime    MEDICATIONS  (PRN):  acetaminophen     Tablet .. 975 milliGRAM(s) Oral every 6 hours PRN Mild Pain (1 - 3)  ALPRAZolam 0.25 milliGRAM(s) Oral at bedtime PRN Sleep  cyclobenzaprine 5 milliGRAM(s) Oral three times a day PRN Muscle Spasm  oxyCODONE    IR 5 milliGRAM(s) Oral every 4 hours PRN Severe Pain (7 - 10)  oxyCODONE    IR 2.5 milliGRAM(s) Oral every 4 hours PRN Moderate Pain (4 - 6)  polyethylene glycol 3350 17 Gram(s) Oral daily PRN Constipation    Vital Signs Last 24 Hrs  T(F): 97.5 (21 Mar 2024 08:02), Max: 98.4 (20 Mar 2024 19:50)  HR: 116 (21 Mar 2024 08:02) (90 - 116)  BP: 121/77 (21 Mar 2024 08:02) (102/70 - 125/64)  RR: 16 (21 Mar 2024 08:02) (16 - 16)  SpO2: 97% (21 Mar 2024 08:02) (95% - 97%)  I&O's Summary    PHYSICAL EXAM:  General: NAD, A/O x 3  ENT: MMM  Neck: Supple, No JVD  Lungs: Clear to auscultation bilaterally  Cardio: RRR, S1/S2, No murmurs  Abdomen: Soft, Nontender, Nondistended; Bowel sounds present  Extremities: No calf tenderness, No pitting edema    LABS:                        10.7   8.52  )-----------( 755      ( 21 Mar 2024 06:11 )             32.8     03-21    138  |  99  |  17  ----------------------------<  100  4.0   |  28  |  0.30    Ca    9.8      21 Mar 2024 06:11    TPro  7.4  /  Alb  3.4  /  TBili  0.7  /  DBili  x   /  AST  19  /  ALT  25  /  AlkPhos  177  03-21          Urinalysis Basic - ( 21 Mar 2024 06:11 )    Color: x / Appearance: x / SG: x / pH: x  Gluc: 100 mg/dL / Ketone: x  / Bili: x / Urobili: x   Blood: x / Protein: x / Nitrite: x   Leuk Esterase: x / RBC: x / WBC x   Sq Epi: x / Non Sq Epi: x / Bacteria: x            RADIOLOGY & ADDITIONAL TESTS:    Care Discussed with Consultants/Other Providers:

## 2024-03-21 NOTE — PROGRESS NOTE ADULT - SUBJECTIVE AND OBJECTIVE BOX
HPI:  Mrs. Ivis Grimm is a 53-year-old female patient with past medical history of osteogenesis imperfecta s/p upper & lower extremity hardware, aortic insufficiency, and HTN who presented to [facility] on 3/2 s/p fall from her wheelchair. She reports missing a step on the wheelchair, losing control, and falling face first from the wheelchair at a height of around 2-3 ft face first onto a concrete ground. She lost consciousness and regained her awareness of her surroundings some time later, likely seconds to minutes, with little recollection of the intervening time. Patient noted to have right temporal superficial abrasion, R upper lip laceration through the vermillion border, small ecchymosis on the R chin, tenderness to palpation in the L shoulder/R proximal arm/R wrist/ R thigh with medial rotation of the RLE.      Imaging performed reported acute, comminuted fracture adjacent to the tip of the right femoral anabell which begins in the distal femoral metadiaphysis and continues into the lateral femoral condyle, right knee hemarthrosis, age indeterminate R sacral ala fracture, age-indeterminate left scapular body fracture, age-indeterminate fracture of the right humeral head, status post pin placement, prior deformity of the right third rib with likely a superimposed acute fracture, likely acute fractures of right 4th-6th ribs. Orthopedics was consulted and recommended NWB of RUE; NWB of RLE, LUE can be WBAT given non-displaced scapula fracture and polytrauma. Patient now s/p fiberglass casting of R arm and R leg. No acute orthopedic surgical intervention planned. Patient evaluated by PT/OT and was recommended for acute inpatient rehab. Course complicated by anemia likely 2/2 right knee hemarthrosis, s/p 1/2 unit PRBC. Patient was discharged to [facility] on 3/12.   (12 Mar 2024 15:15)    ___________________________________________________________________________    SUBJECTIVE/ROS  Patient was seen and evaluated at bedside today alongside Occupational Therapy.  Reported no overnight events and is in no acute distress.  Is experiencing muscle spasms over her back.    She denies recreational therapy.   Follow-up imaging to take place on 3/23 at the three-week [facility] since her trauma.  She remains eager to participate on the recommended rehabilitation program.  Denies any CP, SOB, PEREZ, palpitations, fever, chills, body aches, cough, congestion, or any other symptoms at this time.   ___________________________________________________________________________    Vital Signs Last 24 Hrs  T(C): 36.4 (21 Mar 2024 08:02), Max: 36.9 (20 Mar 2024 19:50)  T(F): 97.5 (21 Mar 2024 08:02), Max: 98.4 (20 Mar 2024 19:50)  HR: 116 (21 Mar 2024 08:02) (90 - 116)  BP: 121/77 (21 Mar 2024 08:02) (102/70 - 125/64)  BP(mean): --  RR: 16 (21 Mar 2024 08:02) (16 - 16)  SpO2: 97% (21 Mar 2024 08:02) (95% - 97%)    Parameters below as of 21 Mar 2024 08:02  Patient On (Oxygen Delivery Method): room air        ___________________________________________________________________________    LAB                          10.6   6.86  )-----------( 767      ( 19 Mar 2024 05:30 )             32.1                             10.3   8.60  )-----------( 495      ( 13 Mar 2024 05:59 )             32.5         03-19    135  |  98  |  18  ----------------------------<  82  4.1   |  27  |  0.20<L>    Ca    9.5      19 Mar 2024 05:30    TPro  7.2  /  Alb  3.3  /  TBili  0.9  /  DBili  x   /  AST  22  /  ALT  25  /  AlkPhos  153<H>  03-19    LIVER FUNCTIONS - ( 19 Mar 2024 05:30 )  Alb: 3.3 g/dL / Pro: 7.2 g/dL / ALK PHOS: 153 U/L / ALT: 25 U/L / AST: 22 U/L / GGT: x           ___________________________________________________________________________  MEDICATIONS  (STANDING):  amLODIPine   Tablet 2.5 milliGRAM(s) Oral <User Schedule>  bisoprolol  5 mG/hydrochlorothiazide 6.25 mG 1 Tablet(s) Oral daily  enoxaparin Injectable 30 milliGRAM(s) SubCutaneous every 24 hours  eplerenone 25 milliGRAM(s) Oral <User Schedule>  escitalopram 5 milliGRAM(s) Oral <User Schedule>  gabapentin 100 milliGRAM(s) Oral at bedtime  losartan 50 milliGRAM(s) Oral two times a day  nystatin Powder 1 Application(s) Topical two times a day  senna 1 Tablet(s) Oral at bedtime    MEDICATIONS  (PRN):  acetaminophen     Tablet .. 975 milliGRAM(s) Oral every 6 hours PRN Mild Pain (1 - 3)  ALPRAZolam 0.25 milliGRAM(s) Oral at bedtime PRN Sleep  cyclobenzaprine 5 milliGRAM(s) Oral three times a day PRN Muscle Spasm  oxyCODONE    IR 5 milliGRAM(s) Oral every 4 hours PRN Severe Pain (7 - 10)  oxyCODONE    IR 2.5 milliGRAM(s) Oral every 4 hours PRN Moderate Pain (4 - 6)  polyethylene glycol 3350 17 Gram(s) Oral daily PRN Constipation    ___________________________________________________________________________    PHYSICAL EXAM:    Gen - NAD, Comfortable  HEENT -EOMI, MMM. Ecchymosis of chin  Pulm - CTAB, No wheeze, No rhonchi, No crackles  Cardiovascular - RRR, S1S2, +murmur  Abdomen - Soft, NT/ND, +BS  Extremities - right cast on UE and LE.   Neuro-     Cognitive - AAOx3     Communication - Fluent, No dysarthria     Attention: Intact      Judgement: Good evidence of judgement     Memory: Recall 3 objects immediate and 3 min later         Cranial Nerves - CN 2-12 intact     Motor -                    LEFT    UE - ShAB limited 2/2 scap fracture, EF 5/5, EE 5/5, WE 5/5,  5/5                     RIGHT UE - n/a                    LEFT    LE - HF 4/5, KE 4/5, DF 4/5, PF 4/5                    RIGHT LE - n/a     Sensory - Intact to LT     Tone - normal  Psychiatric - Mood stable, Affect WNL  Skin: Intact  Wounds: None Present.     ___________________________________________________________________________ HPI:  Mrs. Ivis Grimm is a 53-year-old female patient with past medical history of osteogenesis imperfecta s/p upper & lower extremity hardware, aortic insufficiency, and HTN who presented to [facility] on 3/2 s/p fall from her wheelchair. She reports missing a step on the wheelchair, losing control, and falling face first from the wheelchair at a height of around 2-3 ft face first onto a concrete ground. She lost consciousness and regained her awareness of her surroundings some time later, likely seconds to minutes, with little recollection of the intervening time. Patient noted to have right temporal superficial abrasion, R upper lip laceration through the vermillion border, small ecchymosis on the R chin, tenderness to palpation in the L shoulder/R proximal arm/R wrist/ R thigh with medial rotation of the RLE.      Imaging performed reported acute, comminuted fracture adjacent to the tip of the right femoral anabell which begins in the distal femoral metadiaphysis and continues into the lateral femoral condyle, right knee hemarthrosis, age indeterminate R sacral ala fracture, age-indeterminate left scapular body fracture, age-indeterminate fracture of the right humeral head, status post pin placement, prior deformity of the right third rib with likely a superimposed acute fracture, likely acute fractures of right 4th-6th ribs. Orthopedics was consulted and recommended NWB of RUE; NWB of RLE, LUE can be WBAT given non-displaced scapula fracture and polytrauma. Patient now s/p fiberglass casting of R arm and R leg. No acute orthopedic surgical intervention planned. Patient evaluated by PT/OT and was recommended for acute inpatient rehab. Course complicated by anemia likely 2/2 right knee hemarthrosis, s/p 1/2 unit PRBC. Patient was discharged to [facility] on 3/12.   (12 Mar 2024 15:15)    ___________________________________________________________________________    SUBJECTIVE/ROS  Patient was seen and evaluated at bedside today alongside Occupational Therapy.  Reported poor sleep overnight and is in no acute distress.  States she has .5 xanax prescribed for overnight, but only takes 0.25mg as needed.   Also is experiencing muscle spasms over her back.  States she has not taken gabapentin or oxy.   She does not want to participate in recreational therapy.   Follow-up imaging to take place on 3/23 at the three-week [facility] since her trauma.  She remains eager to participate on the recommended rehabilitation program.  Denies any CP, SOB, PEREZ, palpitations, fever, chills, body aches, cough, congestion, or any other symptoms at this time.   ___________________________________________________________________________    Vital Signs Last 24 Hrs  T(C): 36.4 (21 Mar 2024 08:02), Max: 36.9 (20 Mar 2024 19:50)  T(F): 97.5 (21 Mar 2024 08:02), Max: 98.4 (20 Mar 2024 19:50)  HR: 116 (21 Mar 2024 08:02) (90 - 116)  BP: 121/77 (21 Mar 2024 08:02) (102/70 - 125/64)  BP(mean): --  RR: 16 (21 Mar 2024 08:02) (16 - 16)  SpO2: 97% (21 Mar 2024 08:02) (95% - 97%)    Parameters below as of 21 Mar 2024 08:02  Patient On (Oxygen Delivery Method): room air        ___________________________________________________________________________    LAB                          10.6   6.86  )-----------( 767      ( 19 Mar 2024 05:30 )             32.1                             10.3   8.60  )-----------( 495      ( 13 Mar 2024 05:59 )             32.5         03-19    135  |  98  |  18  ----------------------------<  82  4.1   |  27  |  0.20<L>    Ca    9.5      19 Mar 2024 05:30    TPro  7.2  /  Alb  3.3  /  TBili  0.9  /  DBili  x   /  AST  22  /  ALT  25  /  AlkPhos  153<H>  03-19    LIVER FUNCTIONS - ( 19 Mar 2024 05:30 )  Alb: 3.3 g/dL / Pro: 7.2 g/dL / ALK PHOS: 153 U/L / ALT: 25 U/L / AST: 22 U/L / GGT: x           ___________________________________________________________________________  MEDICATIONS  (STANDING):  amLODIPine   Tablet 2.5 milliGRAM(s) Oral <User Schedule>  bisoprolol  5 mG/hydrochlorothiazide 6.25 mG 1 Tablet(s) Oral daily  enoxaparin Injectable 30 milliGRAM(s) SubCutaneous every 24 hours  eplerenone 25 milliGRAM(s) Oral <User Schedule>  escitalopram 5 milliGRAM(s) Oral <User Schedule>  gabapentin 100 milliGRAM(s) Oral at bedtime  losartan 50 milliGRAM(s) Oral two times a day  nystatin Powder 1 Application(s) Topical two times a day  senna 1 Tablet(s) Oral at bedtime    MEDICATIONS  (PRN):  acetaminophen     Tablet .. 975 milliGRAM(s) Oral every 6 hours PRN Mild Pain (1 - 3)  ALPRAZolam 0.25 milliGRAM(s) Oral at bedtime PRN Sleep  cyclobenzaprine 5 milliGRAM(s) Oral three times a day PRN Muscle Spasm  oxyCODONE    IR 5 milliGRAM(s) Oral every 4 hours PRN Severe Pain (7 - 10)  oxyCODONE    IR 2.5 milliGRAM(s) Oral every 4 hours PRN Moderate Pain (4 - 6)  polyethylene glycol 3350 17 Gram(s) Oral daily PRN Constipation    ___________________________________________________________________________    PHYSICAL EXAM:    Gen - NAD, Comfortable  HEENT -EOMI, MMM. Ecchymosis of chin  Pulm - CTAB, No wheeze, No rhonchi, No crackles  Cardiovascular - RRR, S1S2, +murmur  Abdomen - Soft, NT/ND, +BS  Extremities - right cast on UE and LE.   Neuro-     Cognitive - AAOx3     Communication - Fluent, No dysarthria     Attention: Intact      Judgement: Good evidence of judgement     Memory: Recall 3 objects immediate and 3 min later         Cranial Nerves - CN 2-12 intact     Motor -                    LEFT    UE - ShAB limited 2/2 scap fracture, EF 5/5, EE 5/5, WE 5/5,  5/5                     RIGHT UE - n/a                    LEFT    LE - HF 4/5, KE 4/5, DF 4/5, PF 4/5                    RIGHT LE - n/a     Sensory - Intact to LT     Tone - normal  Psychiatric - Mood stable, Affect WNL  Skin: Intact  Wounds: None Present.     ___________________________________________________________________________ HPI:  Mrs. Ivis Grimm is a 53-year-old female patient with past medical history of osteogenesis imperfecta s/p upper & lower extremity hardware, aortic insufficiency, and HTN who presented to [facility] on 3/2 s/p fall from her wheelchair. She reports missing a step on the wheelchair, losing control, and falling face first from the wheelchair at a height of around 2-3 ft face first onto a concrete ground. She lost consciousness and regained her awareness of her surroundings some time later, likely seconds to minutes, with little recollection of the intervening time. Patient noted to have right temporal superficial abrasion, R upper lip laceration through the vermillion border, small ecchymosis on the R chin, tenderness to palpation in the L shoulder/R proximal arm/R wrist/ R thigh with medial rotation of the RLE.      Imaging performed reported acute, comminuted fracture adjacent to the tip of the right femoral anabell which begins in the distal femoral metadiaphysis and continues into the lateral femoral condyle, right knee hemarthrosis, age indeterminate R sacral ala fracture, age-indeterminate left scapular body fracture, age-indeterminate fracture of the right humeral head, status post pin placement, prior deformity of the right third rib with likely a superimposed acute fracture, likely acute fractures of right 4th-6th ribs. Orthopedics was consulted and recommended NWB of RUE; NWB of RLE, LUE can be WBAT given non-displaced scapula fracture and polytrauma. Patient now s/p fiberglass casting of R arm and R leg. No acute orthopedic surgical intervention planned. Patient evaluated by PT/OT and was recommended for acute inpatient rehab. Course complicated by anemia likely 2/2 right knee hemarthrosis, s/p 1/2 unit PRBC. Patient was discharged to [facility] on 3/12.   (12 Mar 2024 15:15)    TDD: 4/9/24 to Home  ___________________________________________________________________________    SUBJECTIVE/ROS  Patient was seen and evaluated at bedside today alongside Occupational Therapy.  Reported poor sleep overnight and is in no acute distress.  States she has .5 xanax prescribed for overnight, but only takes 0.25mg as needed.   Also is experiencing muscle spasms over her back.  States she has not taken gabapentin or oxy.   Cyclobenzaprine changed to methocarbamol.  She does not want to participate in recreational therapy.   Follow-up imaging to take place on 3/23 at the three-week [facility] since her trauma.  She remains eager to participate on the recommended rehabilitation program.  Denies any CP, SOB, PEREZ, palpitations, fever, chills, body aches, cough, congestion, or any other symptoms at this time.   ___________________________________________________________________________    Vital Signs Last 24 Hrs  T(C): 36.4 (21 Mar 2024 08:02), Max: 36.9 (20 Mar 2024 19:50)  T(F): 97.5 (21 Mar 2024 08:02), Max: 98.4 (20 Mar 2024 19:50)  HR: 116 (21 Mar 2024 08:02) (90 - 116)  BP: 121/77 (21 Mar 2024 08:02) (102/70 - 125/64)  RR: 16 (21 Mar 2024 08:02) (16 - 16)  SpO2: 97% (21 Mar 2024 08:02) (95% - 97%)    ___________________________________________________________________________    LAB                          10.6   6.86  )-----------( 767      ( 19 Mar 2024 05:30 )             32.1       03-21    138  |  99  |  17  ----------------------------<  100<H>  4.0   |  28  |  0.30<L>    Ca    9.8      21 Mar 2024 06:11    TPro  7.4  /  Alb  3.4  /  TBili  0.7  /  DBili  x   /  AST  19  /  ALT  25  /  AlkPhos  177<H>  03-21    LIVER FUNCTIONS - ( 21 Mar 2024 06:11 )  Alb: 3.4 g/dL / Pro: 7.4 g/dL / ALK PHOS: 177 U/L / ALT: 25 U/L / AST: 19 U/L / GGT: x           ___________________________________________________________________________    MEDICATIONS  (STANDING):  amLODIPine   Tablet 2.5 milliGRAM(s) Oral <User Schedule>  bisoprolol  5 mG/hydrochlorothiazide 6.25 mG 1 Tablet(s) Oral daily  enoxaparin Injectable 30 milliGRAM(s) SubCutaneous every 24 hours  eplerenone 25 milliGRAM(s) Oral <User Schedule>  escitalopram 5 milliGRAM(s) Oral <User Schedule>  gabapentin 100 milliGRAM(s) Oral at bedtime  losartan 50 milliGRAM(s) Oral two times a day  methocarbamol 500 milliGRAM(s) Oral three times a day  nystatin Powder 1 Application(s) Topical two times a day  senna 1 Tablet(s) Oral at bedtime    MEDICATIONS  (PRN):  acetaminophen     Tablet .. 975 milliGRAM(s) Oral every 6 hours PRN Mild Pain (1 - 3)  ALPRAZolam 0.25 milliGRAM(s) Oral at bedtime PRN Sleep  ALPRAZolam 0.25 milliGRAM(s) Oral at bedtime PRN Sleep  cyclobenzaprine 10 milliGRAM(s) Oral three times a day PRN Muscle Spasm  oxyCODONE    IR 5 milliGRAM(s) Oral every 4 hours PRN Severe Pain (7 - 10)  oxyCODONE    IR 2.5 milliGRAM(s) Oral every 4 hours PRN Moderate Pain (4 - 6)  polyethylene glycol 3350 17 Gram(s) Oral daily PRN Constipation    ___________________________________________________________________________    PHYSICAL EXAM:    Gen - NAD, Comfortable  HEENT -EOMI, MMM. Ecchymosis of chin  Pulm - CTAB, No wheeze, No rhonchi, No crackles  Cardiovascular - RRR, S1S2, +murmur  Abdomen - Soft, NT/ND, +BS  Extremities - right cast on UE and LE.   Neuro-     Cognitive - AAOx3     Communication - Fluent, No dysarthria     Attention: Intact      Judgement: Good evidence of judgement     Memory: Recall 3 objects immediate and 3 min later         Cranial Nerves - CN 2-12 intact     Motor -                    LEFT    UE - ShAB limited 2/2 scap fracture, EF 5/5, EE 5/5, WE 5/5,  5/5                     RIGHT UE - n/a                    LEFT    LE - HF 4/5, KE 4/5, DF 4/5, PF 4/5                    RIGHT LE - n/a     Sensory - Intact to LT     Tone - normal  Psychiatric - Mood stable, Affect WNL  Skin: Intact  Wounds: None Present.     ___________________________________________________________________________ HPI:  Mrs. Ivis Grimm is a 53-year-old female patient with past medical history of osteogenesis imperfecta s/p upper & lower extremity hardware, aortic insufficiency, and HTN who presented to [facility] on 3/2 s/p fall from her wheelchair. She reports missing a step on the wheelchair, losing control, and falling face first from the wheelchair at a height of around 2-3 ft face first onto a concrete ground. She lost consciousness and regained her awareness of her surroundings some time later, likely seconds to minutes, with little recollection of the intervening time. Patient noted to have right temporal superficial abrasion, R upper lip laceration through the vermillion border, small ecchymosis on the R chin, tenderness to palpation in the L shoulder/R proximal arm/R wrist/ R thigh with medial rotation of the RLE.      Imaging performed reported acute, comminuted fracture adjacent to the tip of the right femoral anabell which begins in the distal femoral metadiaphysis and continues into the lateral femoral condyle, right knee hemarthrosis, age indeterminate R sacral ala fracture, age-indeterminate left scapular body fracture, age-indeterminate fracture of the right humeral head, status post pin placement, prior deformity of the right third rib with likely a superimposed acute fracture, likely acute fractures of right 4th-6th ribs. Orthopedics was consulted and recommended NWB of RUE; NWB of RLE, LUE can be WBAT given non-displaced scapula fracture and polytrauma. Patient now s/p fiberglass casting of R arm and R leg. No acute orthopedic surgical intervention planned. Patient evaluated by PT/OT and was recommended for acute inpatient rehab. Course complicated by anemia likely 2/2 right knee hemarthrosis, s/p 1/2 unit PRBC. Patient was discharged to [facility] on 3/12.   (12 Mar 2024 15:15)    TDD: 4/9/24 to Home  ___________________________________________________________________________    SUBJECTIVE/ROS  Patient was seen and evaluated at bedside today alongside Occupational Therapy.  Reported poor sleep overnight and is in no acute distress.  States she has .5 xanax prescribed for overnight, but only takes 0.25mg as needed.   Also is experiencing muscle spasms over her back.  States she has not taken gabapentin or oxy.   Cyclobenzaprine changed to methocarbamol.  She does not want to participate in recreational therapy.   Follow-up imaging to take place on 3/23 at the three-week [facility] since her trauma.  Case was discussed at Interdisciplinary Team meeting today.  No changes to the tentative discharge date outlined above.  Patient remains eager to participate on the recommended rehabilitation program.  Denies any CP, SOB, PEREZ, palpitations, fever, chills, body aches, cough, congestion, or any other symptoms at this time.   ___________________________________________________________________________    Vital Signs Last 24 Hrs  T(C): 36.4 (21 Mar 2024 08:02), Max: 36.9 (20 Mar 2024 19:50)  T(F): 97.5 (21 Mar 2024 08:02), Max: 98.4 (20 Mar 2024 19:50)  HR: 116 (21 Mar 2024 08:02) (90 - 116)  BP: 121/77 (21 Mar 2024 08:02) (102/70 - 125/64)  RR: 16 (21 Mar 2024 08:02) (16 - 16)  SpO2: 97% (21 Mar 2024 08:02) (95% - 97%)    ___________________________________________________________________________    LAB                          10.6   6.86  )-----------( 767      ( 19 Mar 2024 05:30 )             32.1       03-21    138  |  99  |  17  ----------------------------<  100<H>  4.0   |  28  |  0.30<L>    Ca    9.8      21 Mar 2024 06:11    TPro  7.4  /  Alb  3.4  /  TBili  0.7  /  DBili  x   /  AST  19  /  ALT  25  /  AlkPhos  177<H>  03-21    LIVER FUNCTIONS - ( 21 Mar 2024 06:11 )  Alb: 3.4 g/dL / Pro: 7.4 g/dL / ALK PHOS: 177 U/L / ALT: 25 U/L / AST: 19 U/L / GGT: x           ___________________________________________________________________________    MEDICATIONS  (STANDING):  amLODIPine   Tablet 2.5 milliGRAM(s) Oral <User Schedule>  bisoprolol  5 mG/hydrochlorothiazide 6.25 mG 1 Tablet(s) Oral daily  enoxaparin Injectable 30 milliGRAM(s) SubCutaneous every 24 hours  eplerenone 25 milliGRAM(s) Oral <User Schedule>  escitalopram 5 milliGRAM(s) Oral <User Schedule>  gabapentin 100 milliGRAM(s) Oral at bedtime  losartan 50 milliGRAM(s) Oral two times a day  methocarbamol 500 milliGRAM(s) Oral three times a day  nystatin Powder 1 Application(s) Topical two times a day  senna 1 Tablet(s) Oral at bedtime    MEDICATIONS  (PRN):  acetaminophen     Tablet .. 975 milliGRAM(s) Oral every 6 hours PRN Mild Pain (1 - 3)  ALPRAZolam 0.25 milliGRAM(s) Oral at bedtime PRN Sleep  ALPRAZolam 0.25 milliGRAM(s) Oral at bedtime PRN Sleep  cyclobenzaprine 10 milliGRAM(s) Oral three times a day PRN Muscle Spasm  oxyCODONE    IR 5 milliGRAM(s) Oral every 4 hours PRN Severe Pain (7 - 10)  oxyCODONE    IR 2.5 milliGRAM(s) Oral every 4 hours PRN Moderate Pain (4 - 6)  polyethylene glycol 3350 17 Gram(s) Oral daily PRN Constipation    ___________________________________________________________________________    PHYSICAL EXAM:    Gen - NAD, Comfortable  HEENT -EOMI, MMM. Ecchymosis of chin  Pulm - CTAB, No wheeze, No rhonchi, No crackles  Cardiovascular - RRR, S1S2, +murmur  Abdomen - Soft, NT/ND, +BS  Extremities - right cast on UE and LE.   Neuro-     Cognitive - AAOx3     Communication - Fluent, No dysarthria     Attention: Intact      Judgement: Good evidence of judgement     Memory: Recall 3 objects immediate and 3 min later         Cranial Nerves - CN 2-12 intact     Motor -                    LEFT    UE - ShAB limited 2/2 scap fracture, EF 5/5, EE 5/5, WE 5/5,  5/5                     RIGHT UE - n/a                    LEFT    LE - HF 4/5, KE 4/5, DF 4/5, PF 4/5                    RIGHT LE - n/a     Sensory - Intact to LT     Tone - normal  Psychiatric - Mood stable, Affect WNL  Skin: Intact  Wounds: None Present.     ___________________________________________________________________________

## 2024-03-21 NOTE — PROGRESS NOTE ADULT - ASSESSMENT
53F w/ Osteogenesis imperfecta, Aortic Insufficiency HTN  Admit SP Fall from wheelchair/Polytrauma      Polytrauma Status Post Fall  acute comminuted fracture femur  Scaula, Humerus, Rt Rib  SP casting of R arm and R leg  orthopedics, Dr. Mercedez del angel in clinic one week post dc  manual (self-propelled) wheelchair at baseline    Anemia  attributed to right knee hemarthrosis  s/p 1/2 unit PRBC 3/4  ASA was held. appears to be taken for primary prevention. May resume outpt    Thrombocytosis - stable  - seen by heme/onc, suspect reactive process. continue to monitor cbc. if persistent, may need further workup.     Severe Aortic Insufficiency  -Grade IV per patient ; saw CT surgery in Nov 2023 with recommendation for medical management and routine monitoring  Echo 3/4 showed EF 55 to 60 %, grade 2 LV diastolic dysfunction. There's LVOT obstruction however gradients are LIMITED. Highest 20mmHg which may be underestimated, Severe aortic regurgitation, severe pulmonary hypertension.    HTN  -on amlodipine, losartan, eplerenone, bisoprolol  -BP controlled     Anxiety  - Continue Lexapro 5 mg QD  - Continue xanax 0.25mg PO QHS PRN     DVT PPx  Lovenox for dvt prox.

## 2024-03-22 PROCEDURE — 99232 SBSQ HOSP IP/OBS MODERATE 35: CPT

## 2024-03-22 RX ORDER — METHOCARBAMOL 500 MG/1
500 TABLET, FILM COATED ORAL
Refills: 0 | Status: DISCONTINUED | OUTPATIENT
Start: 2024-03-22 | End: 2024-03-30

## 2024-03-22 RX ORDER — OXYCODONE HYDROCHLORIDE 5 MG/1
5 TABLET ORAL EVERY 4 HOURS
Refills: 0 | Status: DISCONTINUED | OUTPATIENT
Start: 2024-03-22 | End: 2024-03-25

## 2024-03-22 RX ORDER — OXYCODONE HYDROCHLORIDE 5 MG/1
2.5 TABLET ORAL EVERY 4 HOURS
Refills: 0 | Status: DISCONTINUED | OUTPATIENT
Start: 2024-03-22 | End: 2024-03-25

## 2024-03-22 RX ADMIN — Medication 975 MILLIGRAM(S): at 06:31

## 2024-03-22 RX ADMIN — Medication 975 MILLIGRAM(S): at 15:35

## 2024-03-22 RX ADMIN — Medication 975 MILLIGRAM(S): at 22:17

## 2024-03-22 RX ADMIN — LOSARTAN POTASSIUM 50 MILLIGRAM(S): 100 TABLET, FILM COATED ORAL at 06:30

## 2024-03-22 RX ADMIN — NYSTATIN CREAM 1 APPLICATION(S): 100000 CREAM TOPICAL at 08:30

## 2024-03-22 RX ADMIN — BISOPROLOL FUMARATE AND HYDROCHLOROTHIAZIDE 1 TABLET(S): 5; 6.25 TABLET ORAL at 06:30

## 2024-03-22 RX ADMIN — METHOCARBAMOL 500 MILLIGRAM(S): 500 TABLET, FILM COATED ORAL at 22:17

## 2024-03-22 RX ADMIN — CYCLOBENZAPRINE HYDROCHLORIDE 10 MILLIGRAM(S): 10 TABLET, FILM COATED ORAL at 09:49

## 2024-03-22 RX ADMIN — METHOCARBAMOL 500 MILLIGRAM(S): 500 TABLET, FILM COATED ORAL at 14:35

## 2024-03-22 RX ADMIN — ESCITALOPRAM OXALATE 5 MILLIGRAM(S): 10 TABLET, FILM COATED ORAL at 06:29

## 2024-03-22 RX ADMIN — Medication 975 MILLIGRAM(S): at 07:30

## 2024-03-22 RX ADMIN — Medication 975 MILLIGRAM(S): at 14:36

## 2024-03-22 RX ADMIN — Medication 0.25 MILLIGRAM(S): at 01:36

## 2024-03-22 RX ADMIN — ENOXAPARIN SODIUM 30 MILLIGRAM(S): 100 INJECTION SUBCUTANEOUS at 06:30

## 2024-03-22 RX ADMIN — NYSTATIN CREAM 1 APPLICATION(S): 100000 CREAM TOPICAL at 17:50

## 2024-03-22 NOTE — PROGRESS NOTE ADULT - SUBJECTIVE AND OBJECTIVE BOX
HPI:  Mrs. Ivis Grimm is a 53-year-old female patient with past medical history of osteogenesis imperfecta s/p upper & lower extremity hardware, aortic insufficiency, and HTN who presented to [facility] on 3/2 s/p fall from her wheelchair. She reports missing a step on the wheelchair, losing control, and falling face first from the wheelchair at a height of around 2-3 ft face first onto a concrete ground. She lost consciousness and regained her awareness of her surroundings some time later, likely seconds to minutes, with little recollection of the intervening time. Patient noted to have right temporal superficial abrasion, R upper lip laceration through the vermillion border, small ecchymosis on the R chin, tenderness to palpation in the L shoulder/R proximal arm/R wrist/ R thigh with medial rotation of the RLE.      Imaging performed reported acute, comminuted fracture adjacent to the tip of the right femoral anabell which begins in the distal femoral metadiaphysis and continues into the lateral femoral condyle, right knee hemarthrosis, age indeterminate R sacral ala fracture, age-indeterminate left scapular body fracture, age-indeterminate fracture of the right humeral head, status post pin placement, prior deformity of the right third rib with likely a superimposed acute fracture, likely acute fractures of right 4th-6th ribs. Orthopedics was consulted and recommended NWB of RUE; NWB of RLE, LUE can be WBAT given non-displaced scapula fracture and polytrauma. Patient now s/p fiberglass casting of R arm and R leg. No acute orthopedic surgical intervention planned. Patient evaluated by PT/OT and was recommended for acute inpatient rehab. Course complicated by anemia likely 2/2 right knee hemarthrosis, s/p 1/2 unit PRBC. Patient was discharged to [facility] on 3/12.   (12 Mar 2024 15:15)    TDD: 4/9/24 to Home  ___________________________________________________________________________    SUBJECTIVE/ROS  Patient was seen and evaluated at bedside today. In NAD.   Reports improved sleep with 0.5mg xanax, and controlled spasms with robaxin.   Follow-up imaging to take place on 3/23 at the three-week [facility] since her trauma.  Weekend coverage explained to patient.   Patient remains eager to participate on the recommended rehabilitation program.  Denies any CP, SOB, PEREZ, palpitations, fever, chills, body aches, cough, congestion, or any other symptoms at this time.   ___________________________________________________________________________    Vital Signs Last 24 Hrs  T(C): 36.8 (22 Mar 2024 08:05), Max: 36.8 (22 Mar 2024 08:05)  T(F): 98.3 (22 Mar 2024 08:05), Max: 98.3 (22 Mar 2024 08:05)  HR: 97 (22 Mar 2024 08:05) (97 - 112)  BP: 110/70 (22 Mar 2024 08:05) (110/70 - 119/80)  BP(mean): --  RR: 17 (22 Mar 2024 08:05) (17 - 17)  SpO2: 96% (22 Mar 2024 08:05) (96% - 98%)    Parameters below as of 22 Mar 2024 08:05  Patient On (Oxygen Delivery Method): room air    ___________________________________________________________________________  LAB                          10.7   8.52  )-----------( 755      ( 21 Mar 2024 06:11 )             32.8     03-21    138  |  99  |  17  ----------------------------<  100<H>  4.0   |  28  |  0.30<L>    Ca    9.8      21 Mar 2024 06:11    TPro  7.4  /  Alb  3.4  /  TBili  0.7  /  DBili  x   /  AST  19  /  ALT  25  /  AlkPhos  177<H>  03-21    LIVER FUNCTIONS - ( 21 Mar 2024 06:11 )  Alb: 3.4 g/dL / Pro: 7.4 g/dL / ALK PHOS: 177 U/L / ALT: 25 U/L / AST: 19 U/L / GGT: x           ___________________________________________________________________________    MEDICATIONS  (STANDING):  amLODIPine   Tablet 2.5 milliGRAM(s) Oral <User Schedule>  bisoprolol  5 mG/hydrochlorothiazide 6.25 mG 1 Tablet(s) Oral daily  enoxaparin Injectable 30 milliGRAM(s) SubCutaneous every 24 hours  eplerenone 25 milliGRAM(s) Oral <User Schedule>  escitalopram 5 milliGRAM(s) Oral <User Schedule>  gabapentin 100 milliGRAM(s) Oral at bedtime  losartan 50 milliGRAM(s) Oral two times a day  methocarbamol 500 milliGRAM(s) Oral three times a day  nystatin Powder 1 Application(s) Topical two times a day  senna 1 Tablet(s) Oral at bedtime    MEDICATIONS  (PRN):  acetaminophen     Tablet .. 975 milliGRAM(s) Oral every 6 hours PRN Mild Pain (1 - 3)  ALPRAZolam 0.25 milliGRAM(s) Oral at bedtime PRN Sleep  ALPRAZolam 0.25 milliGRAM(s) Oral at bedtime PRN Sleep  cyclobenzaprine 10 milliGRAM(s) Oral three times a day PRN Muscle Spasm  oxyCODONE    IR 5 milliGRAM(s) Oral every 4 hours PRN Severe Pain (7 - 10)  oxyCODONE    IR 2.5 milliGRAM(s) Oral every 4 hours PRN Moderate Pain (4 - 6)  polyethylene glycol 3350 17 Gram(s) Oral daily PRN Constipation    ___________________________________________________________________________    PHYSICAL EXAM:    Gen - NAD, Comfortable  HEENT -EOMI, MMM. Ecchymosis of chin  Pulm - CTAB, No wheeze, No rhonchi, No crackles  Cardiovascular - RRR, S1S2, +murmur  Abdomen - Soft, NT/ND, +BS  Extremities - right cast on UE and LE.   Neuro-     Cognitive - AAOx3     Communication - Fluent, No dysarthria     Attention: Intact      Judgement: Good evidence of judgement     Memory: Recall 3 objects immediate and 3 min later         Cranial Nerves - CN 2-12 intact     Motor -                    LEFT    UE - ShAB limited 2/2 scap fracture, EF 5/5, EE 5/5, WE 5/5,  5/5                     RIGHT UE - n/a                    LEFT    LE - HF 4/5, KE 4/5, DF 4/5, PF 4/5                    RIGHT LE - n/a     Sensory - Intact to LT     Tone - normal  Psychiatric - Mood stable, Affect WNL  Skin: Intact  Wounds: None Present.     ___________________________________________________________________________ HPI:  Mrs. Ivis Grimm is a 53-year-old female patient with past medical history of osteogenesis imperfecta s/p upper & lower extremity hardware, aortic insufficiency, and HTN who presented to [facility] on 3/2 s/p fall from her wheelchair. She reports missing a step on the wheelchair, losing control, and falling face first from the wheelchair at a height of around 2-3 ft face first onto a concrete ground. She lost consciousness and regained her awareness of her surroundings some time later, likely seconds to minutes, with little recollection of the intervening time. Patient noted to have right temporal superficial abrasion, R upper lip laceration through the vermillion border, small ecchymosis on the R chin, tenderness to palpation in the L shoulder/R proximal arm/R wrist/ R thigh with medial rotation of the RLE.      Imaging performed reported acute, comminuted fracture adjacent to the tip of the right femoral anabell which begins in the distal femoral metadiaphysis and continues into the lateral femoral condyle, right knee hemarthrosis, age indeterminate R sacral ala fracture, age-indeterminate left scapular body fracture, age-indeterminate fracture of the right humeral head, status post pin placement, prior deformity of the right third rib with likely a superimposed acute fracture, likely acute fractures of right 4th-6th ribs. Orthopedics was consulted and recommended NWB of RUE; NWB of RLE, LUE can be WBAT given non-displaced scapula fracture and polytrauma. Patient now s/p fiberglass casting of R arm and R leg. No acute orthopedic surgical intervention planned. Patient evaluated by PT/OT and was recommended for acute inpatient rehab. Course complicated by anemia likely 2/2 right knee hemarthrosis, s/p 1/2 unit PRBC. Patient was discharged to [facility] on 3/12.   (12 Mar 2024 15:15)    TDD: 4/9/24 to Home  ___________________________________________________________________________    SUBJECTIVE/ROS  Patient was seen and evaluated at bedside today. In NAD.   Reports improved sleep with 0.5mg xanax, and controlled spasms with Robaxin.   Follow-up imaging to take place on 3/23 at the three-week [facility] since her trauma.  Weekend coverage explained to patient.   Patient remains eager to participate on the recommended rehabilitation program.  Denies any CP, SOB, PEREZ, palpitations, fever, chills, body aches, cough, congestion, or any other symptoms at this time.   ___________________________________________________________________________    Vital Signs Last 24 Hrs  T(C): 36.8 (22 Mar 2024 08:05), Max: 36.8 (22 Mar 2024 08:05)  T(F): 98.3 (22 Mar 2024 08:05), Max: 98.3 (22 Mar 2024 08:05)  HR: 97 (22 Mar 2024 08:05) (97 - 112)  BP: 110/70 (22 Mar 2024 08:05) (110/70 - 119/80)  RR: 17 (22 Mar 2024 08:05) (17 - 17)  SpO2: 96% (22 Mar 2024 08:05) (96% - 98%)    ___________________________________________________________________________    LAB                          10.7   8.52  )-----------( 755      ( 21 Mar 2024 06:11 )             32.8     03-21    138  |  99  |  17  ----------------------------<  100<H>  4.0   |  28  |  0.30<L>    Ca    9.8      21 Mar 2024 06:11    TPro  7.4  /  Alb  3.4  /  TBili  0.7  /  DBili  x   /  AST  19  /  ALT  25  /  AlkPhos  177<H>  03-21    LIVER FUNCTIONS - ( 21 Mar 2024 06:11 )  Alb: 3.4 g/dL / Pro: 7.4 g/dL / ALK PHOS: 177 U/L / ALT: 25 U/L / AST: 19 U/L / GGT: x           ___________________________________________________________________________    MEDICATIONS  (STANDING):  amLODIPine   Tablet 2.5 milliGRAM(s) Oral <User Schedule>  bisoprolol  5 mG/hydrochlorothiazide 6.25 mG 1 Tablet(s) Oral daily  enoxaparin Injectable 30 milliGRAM(s) SubCutaneous every 24 hours  eplerenone 25 milliGRAM(s) Oral <User Schedule>  escitalopram 5 milliGRAM(s) Oral <User Schedule>  gabapentin 100 milliGRAM(s) Oral at bedtime  losartan 50 milliGRAM(s) Oral two times a day  nystatin Powder 1 Application(s) Topical two times a day  senna 1 Tablet(s) Oral at bedtime    MEDICATIONS  (PRN):  acetaminophen     Tablet .. 975 milliGRAM(s) Oral every 6 hours PRN Mild Pain (1 - 3)  ALPRAZolam 0.25 milliGRAM(s) Oral at bedtime PRN Sleep  ALPRAZolam 0.25 milliGRAM(s) Oral at bedtime PRN Sleep  cyclobenzaprine 10 milliGRAM(s) Oral three times a day PRN Muscle Spasm  methocarbamol 500 milliGRAM(s) Oral four times a day PRN Muscle Spasm  oxyCODONE    IR 2.5 milliGRAM(s) Oral every 4 hours PRN Moderate Pain (4 - 6)  oxyCODONE    IR 5 milliGRAM(s) Oral every 4 hours PRN Severe Pain (7 - 10)  polyethylene glycol 3350 17 Gram(s) Oral daily PRN Constipation    ___________________________________________________________________________    PHYSICAL EXAM:    Gen - NAD, Comfortable  HEENT -EOMI, MMM. Ecchymosis of chin  Pulm - CTAB, No wheeze, No rhonchi, No crackles  Cardiovascular - RRR, S1S2, +murmur  Abdomen - Soft, NT/ND, +BS  Extremities - right cast on UE and LE.   Neuro-     Cognitive - AAOx3     Communication - Fluent, No dysarthria     Attention: Intact      Judgement: Good evidence of judgement     Memory: Recall 3 objects immediate and 3 min later         Cranial Nerves - CN 2-12 intact     Motor -                    LEFT    UE - ShAB limited 2/2 scap fracture, EF 5/5, EE 5/5, WE 5/5,  5/5                     RIGHT UE - n/a                    LEFT    LE - HF 4/5, KE 4/5, DF 4/5, PF 4/5                    RIGHT LE - n/a     Sensory - Intact to LT     Tone - normal  Psychiatric - Mood stable, Affect WNL  Skin: Intact  Wounds: None Present.     ___________________________________________________________________________

## 2024-03-22 NOTE — PROGRESS NOTE ADULT - ASSESSMENT
Mrs. Ivis Grimm is a 53-year-old female patient with past medical history of osteogenesis imperfecta s/p upper & lower extremity hardware, aortic insufficiency, and HTN who is admitted for Acute Inpatient Rehabilitation with a multidisciplinary rehab program at [facility] with functional impairments in ADLs and mobility secondary to multiple fall related trauma.      Polytrauma       * S/P fall       * acute, comminuted periprosthetic fracture adjacent to the tip of the right femoral anabell which begins in the distal femoral metadiaphysis and continues into the lateral femoral condyle       * Age indeterminate fractures: R sacral ala, left scapular body, and right humeral head, status post pin placement       * Prior deformity of the right 3rd rib with likely a superimposed acute fracture, likely acute fractures of right 4th-6th ribs       * S/P fiberglass casting of R arm and R leg       * WB Status: NWB of RUE, NWB of RLE, LUE WBAT       * f/u with orthopedics, Dr. Newsome, 1 week after discharge       * Used manual (self-propelled) wheelchair at baseline  - Impaired ADLs and mobility  - Need for assistance with personal care   - Continue comprehensive rehab program of PT/OT - 3 hours a day, 5 days a week. P&O as needed        * Pain control as below       * Precautions: Fall  - Follow-up imaging to take place on 3/23 at the three-week [facility] since her trauma.    Pain Control  - Tylenol 975 mg Q6H  - Flexeril changed to robaxin PRN muscle spasm on 3/21. Tolerating it well. Will increase it to QID.    - gabapentin 100mg Hs. Has been denying it.   - Oxycodone 2.5 Q4H PRN mod pain, oxycodone 5 mg Q4H PRN severe pain    Anemia  - likely 2/2 right knee hemarthrosis  - s/p 1/2 unit PRBC 3/4  - monitor H/H    Aortic Insufficiency/HTN  - Losartan 50 mg BID  - Norvasc 2.5 mg QHS  - Eplerenone 25 mg QD  - Bisoprolol 5 mg/HCTZ 6.25 mg QD **Home med, please have pharmacy verify. It is not in a prescription bottle, may need to call pharmacy.   - Monitor BP    Mood / Cognition  - Neuropsychology consult PRN  - Lexapro 5 mg QD    Sleep  - Maintain quiet hours and a low stim environment.   - Xanax 0.5 mg PRN split in half. She will take 0.25mg as needed.     GI / Bowel  - Senna 1 cap qHS  - Miralax PRN Daily     / Bladder  - Continue bladder scans Q8 hours with straight cath for >400cc    Skin / Pressure injury  - Skin assessment on admission performed: intact  - Monitor Incisions:    - Pressure Injury/Skin: OOB to chair, PT/OT  - nursing to monitor skin qShift    Diet/Dysphagia:  - Diet Consistency: regular  - Nutrition consult    DVT prophylaxis:   - Lovenox      Outpatient Follow-up:  Domenic Newsome  Orthopaedic Surgery  [address], [address]  [place] [address]  Phone: [phone]  Fax: [phone]  Follow Up Time: 2 weeks

## 2024-03-22 NOTE — PROGRESS NOTE ADULT - ASSESSMENT
Mrs. Ivis Grimm is a 53-year-old female patient with past medical history of osteogenesis imperfecta s/p upper & lower extremity hardware, aortic insufficiency, and HTN who presented to [facility] on 3/2 s/p fall from her wheelchair. She reports missing a step on the wheelchair, losing control, and falling face first from the wheelchair at a height of around 2-3 ft face first onto a concrete ground. She lost consciousness and regained her awareness of her surroundings some time later, likely seconds to minutes, with little recollection of the intervening time.     Imaging performed reported acute, comminuted fracture adjacent to the tip of the right femoral anabell which begins in the distal femoral metadiaphysis and continues into the lateral femoral condyle, right knee hemarthrosis, age indeterminate R sacral ala fracture, age-indeterminate left scapular body fracture, age-indeterminate fracture of the right humeral head, status post pin placement, prior deformity of the right third rib with likely a superimposed acute fracture, likely acute fractures of right 4th-6th ribs. Orthopedics was consulted and recommended NWB of RUE; NWB of RLE, LUE can be WBAT given non-displaced scapula fracture and polytrauma. Patient now s/p fiberglass casting of R arm and R leg. No acute orthopedic surgical intervention planned. Patient evaluated by PT/OT and was recommended for acute inpatient rehab. Course complicated by anemia likely 2/2 right knee hemarthrosis, s/p 1/2 unit PRBC. Patient was discharged to [facility] on 3/12.  Hematology consulted for thrombocytosis.

## 2024-03-22 NOTE — PROGRESS NOTE ADULT - SUBJECTIVE AND OBJECTIVE BOX
LO GRIMM   53y   Female    Admitting: [facility]  HPI:    Mrs. Lo Grimm is a 53-year-old female patient with past medical history of osteogenesis imperfecta s/p upper & lower extremity hardware, aortic insufficiency, and HTN who presented to [facility] on 3/2 s/p fall from her wheelchair. She reports missing a step on the wheelchair, losing control, and falling face first from the wheelchair at a height of around 2-3 ft face first onto a concrete ground. She lost consciousness and regained her awareness of her surroundings some time later, likely seconds to minutes, with little recollection of the intervening time.     Imaging performed reported acute, comminuted fracture adjacent to the tip of the right femoral anabell which begins in the distal femoral metadiaphysis and continues into the lateral femoral condyle, right knee hemarthrosis, age indeterminate R sacral ala fracture, age-indeterminate left scapular body fracture, age-indeterminate fracture of the right humeral head, status post pin placement, prior deformity of the right third rib with likely a superimposed acute fracture, likely acute fractures of right 4th-6th ribs. Orthopedics was consulted and recommended NWB of RUE; NWB of RLE, LUE can be WBAT given non-displaced scapula fracture and polytrauma. Patient now s/p fiberglass casting of R arm and R leg. No acute orthopedic surgical intervention planned. Patient evaluated by PT/OT and was recommended for acute inpatient rehab. Course complicated by anemia likely 2/2 right knee hemarthrosis, s/p 1/2 unit PRBC. Patient was discharged to [facility] on 3/12.  Hematology consulted for thrombocytosis.    PAST MEDICAL & SURGICAL HISTORY:  Osteogenesis imperfecta      Aortic insufficiency      HTN (hypertension)      Presence of internal fixation anabell in upper extremity      HEALTH ISSUES - PROBLEM Dx:  Thrombocytosis      MEDICATIONS  (STANDING):  amLODIPine   Tablet 2.5 milliGRAM(s) Oral <User Schedule>  bisoprolol  5 mG/hydrochlorothiazide 6.25 mG 1 Tablet(s) Oral daily  enoxaparin Injectable 30 milliGRAM(s) SubCutaneous every 24 hours  eplerenone 25 milliGRAM(s) Oral <User Schedule>  escitalopram 5 milliGRAM(s) Oral <User Schedule>  gabapentin 100 milliGRAM(s) Oral at bedtime  losartan 50 milliGRAM(s) Oral two times a day  methocarbamol 500 milliGRAM(s) Oral three times a day  nystatin Powder 1 Application(s) Topical two times a day  senna 1 Tablet(s) Oral at bedtime    MEDICATIONS  (PRN):  acetaminophen     Tablet .. 975 milliGRAM(s) Oral every 6 hours PRN Mild Pain (1 - 3)  ALPRAZolam 0.25 milliGRAM(s) Oral at bedtime PRN Sleep  ALPRAZolam 0.25 milliGRAM(s) Oral at bedtime PRN Sleep  cyclobenzaprine 10 milliGRAM(s) Oral three times a day PRN Muscle Spasm  oxyCODONE    IR 5 milliGRAM(s) Oral every 4 hours PRN Severe Pain (7 - 10)  oxyCODONE    IR 2.5 milliGRAM(s) Oral every 4 hours PRN Moderate Pain (4 - 6)  polyethylene glycol 3350 17 Gram(s) Oral daily PRN Constipation    Allergies    No Known Allergies    INTERVAL HPI/OVERNIGHT EVENTS:  Patient S&E at bedside. No c/o pain. No SOB.    VITAL SIGNS:  T(F): 98.3 (03-22-24 @ 08:05)  HR: 97 (03-22-24 @ 08:05)  BP: 110/70 (03-22-24 @ 08:05)  RR: 17 (03-22-24 @ 08:05)  SpO2: 96% (03-22-24 @ 08:05)      PHYSICAL EXAM:  Constitutional: NAD  Eyes: sclera non-icteric  Neck: no JVD  Respiratory: decreased BS ant.  Cardiovascular: RRR, no M/R/G  Gastrointestinal: soft, NTND  Extremities: no calf tenderness  Neurological: Awake, alert.    Labs:             10.7   8.52  )-----------( 755      ( 03-21 @ 06:11 )             32.8       03-21    138  |  99  |  17  ----------------------------<  100<H>  4.0   |  28  |  0.30<L>    Ca    9.8      21 Mar 2024 06:11    TPro  7.4  /  Alb  3.4  /  TBili  0.7  /  DBili  x   /  AST  19  /  ALT  25  /  AlkPhos  177<H>  03-21    Consultant notes reviewed : YES [x ] ; NO [ ]

## 2024-03-23 PROCEDURE — 99232 SBSQ HOSP IP/OBS MODERATE 35: CPT | Mod: GC

## 2024-03-23 PROCEDURE — 73700 CT LOWER EXTREMITY W/O DYE: CPT | Mod: 26,RT

## 2024-03-23 PROCEDURE — 73200 CT UPPER EXTREMITY W/O DYE: CPT | Mod: 26,LT,76

## 2024-03-23 RX ADMIN — Medication 975 MILLIGRAM(S): at 00:25

## 2024-03-23 RX ADMIN — Medication 975 MILLIGRAM(S): at 15:00

## 2024-03-23 RX ADMIN — SENNA PLUS 1 TABLET(S): 8.6 TABLET ORAL at 22:26

## 2024-03-23 RX ADMIN — METHOCARBAMOL 500 MILLIGRAM(S): 500 TABLET, FILM COATED ORAL at 22:26

## 2024-03-23 RX ADMIN — NYSTATIN CREAM 1 APPLICATION(S): 100000 CREAM TOPICAL at 17:29

## 2024-03-23 RX ADMIN — BISOPROLOL FUMARATE AND HYDROCHLOROTHIAZIDE 1 TABLET(S): 5; 6.25 TABLET ORAL at 06:23

## 2024-03-23 RX ADMIN — Medication 975 MILLIGRAM(S): at 22:27

## 2024-03-23 RX ADMIN — LOSARTAN POTASSIUM 50 MILLIGRAM(S): 100 TABLET, FILM COATED ORAL at 17:28

## 2024-03-23 RX ADMIN — Medication 975 MILLIGRAM(S): at 14:06

## 2024-03-23 RX ADMIN — ESCITALOPRAM OXALATE 5 MILLIGRAM(S): 10 TABLET, FILM COATED ORAL at 06:24

## 2024-03-23 RX ADMIN — LOSARTAN POTASSIUM 50 MILLIGRAM(S): 100 TABLET, FILM COATED ORAL at 06:24

## 2024-03-23 RX ADMIN — EPLERENONE 25 MILLIGRAM(S): 50 TABLET, FILM COATED ORAL at 17:27

## 2024-03-23 RX ADMIN — Medication 975 MILLIGRAM(S): at 09:00

## 2024-03-23 RX ADMIN — Medication 975 MILLIGRAM(S): at 08:18

## 2024-03-23 RX ADMIN — Medication 0.25 MILLIGRAM(S): at 22:26

## 2024-03-23 RX ADMIN — ENOXAPARIN SODIUM 30 MILLIGRAM(S): 100 INJECTION SUBCUTANEOUS at 06:23

## 2024-03-23 NOTE — PROGRESS NOTE ADULT - SUBJECTIVE AND OBJECTIVE BOX
PROGRESS NOTE:     Patient is a 53y old  Female who presents with a chief complaint of Polytrauma (22 Mar 2024 10:35)          SUBJECTIVE & OBJECTIVE:   Pt seen and examined at bedside in AM. back spasm improving with robaxin   no overnight events.     REVIEW OF SYSTEMS: remaining ROS negative     PHYSICAL EXAM:  VITALS:  Vital Signs Last 24 Hrs  T(C): 37.1 (23 Mar 2024 08:28), Max: 37.1 (23 Mar 2024 08:28)  T(F): 98.8 (23 Mar 2024 08:28), Max: 98.8 (23 Mar 2024 08:28)  HR: 84 (23 Mar 2024 08:28) (84 - 95)  BP: 113/68 (23 Mar 2024 08:28) (105/65 - 113/68)  BP(mean): --  RR: 16 (23 Mar 2024 08:28) (16 - 18)  SpO2: 96% (23 Mar 2024 08:28) (95% - 96%)    Parameters below as of 23 Mar 2024 08:28  Patient On (Oxygen Delivery Method): room air          GENERAL: NAD,  no increased WOB  HEAD:  Atraumatic, Normocephalic  EYES: EOMI, conjunctiva and sclera clear  ENMT: Moist mucous membranes  NECK: Supple, No JVD  NERVOUS SYSTEM:  Alert & Oriented   CHEST/LUNG: Clear to auscultation bilaterally; No rales, rhonchi, wheezing  HEART: Regular rate and rhythm; +murmur  ABDOMEN: Soft, Nontender, Nondistended; Bowel sounds present  EXTREMITIES:, No clubbing, cyanosis, calf tenderness or edema b/l      MEDICATIONS  (STANDING):  amLODIPine   Tablet 2.5 milliGRAM(s) Oral <User Schedule>  bisoprolol  5 mG/hydrochlorothiazide 6.25 mG 1 Tablet(s) Oral daily  enoxaparin Injectable 30 milliGRAM(s) SubCutaneous every 24 hours  eplerenone 25 milliGRAM(s) Oral <User Schedule>  escitalopram 5 milliGRAM(s) Oral <User Schedule>  gabapentin 100 milliGRAM(s) Oral at bedtime  losartan 50 milliGRAM(s) Oral two times a day  nystatin Powder 1 Application(s) Topical two times a day  senna 1 Tablet(s) Oral at bedtime    MEDICATIONS  (PRN):  acetaminophen     Tablet .. 975 milliGRAM(s) Oral every 6 hours PRN Mild Pain (1 - 3)  ALPRAZolam 0.25 milliGRAM(s) Oral at bedtime PRN Sleep  ALPRAZolam 0.25 milliGRAM(s) Oral at bedtime PRN Sleep  cyclobenzaprine 10 milliGRAM(s) Oral three times a day PRN Muscle Spasm  methocarbamol 500 milliGRAM(s) Oral four times a day PRN Muscle Spasm  oxyCODONE    IR 2.5 milliGRAM(s) Oral every 4 hours PRN Moderate Pain (4 - 6)  oxyCODONE    IR 5 milliGRAM(s) Oral every 4 hours PRN Severe Pain (7 - 10)  polyethylene glycol 3350 17 Gram(s) Oral daily PRN Constipation      Allergies    No Known Allergies    Intolerances              LABS:                 CAPILLARY BLOOD GLUCOSE                    RECENT CULTURES:            RADIOLOGY & ADDITIONAL TESTS:    Care Discussed with Consultants/Other Providers:

## 2024-03-23 NOTE — PROGRESS NOTE ADULT - SUBJECTIVE AND OBJECTIVE BOX
HPI:  Mrs. Ivis Grimm is a 53-year-old female patient with past medical history of osteogenesis imperfecta s/p upper & lower extremity hardware, aortic insufficiency, and HTN who presented to [facility] on 3/2 s/p fall from her wheelchair. She reports missing a step on the wheelchair, losing control, and falling face first from the wheelchair at a height of around 2-3 ft face first onto a concrete ground. She lost consciousness and regained her awareness of her surroundings some time later, likely seconds to minutes, with little recollection of the intervening time. Patient noted to have right temporal superficial abrasion, R upper lip laceration through the vermillion border, small ecchymosis on the R chin, tenderness to palpation in the L shoulder/R proximal arm/R wrist/ R thigh with medial rotation of the RLE.      Imaging performed reported acute, comminuted fracture adjacent to the tip of the right femoral anabell which begins in the distal femoral metadiaphysis and continues into the lateral femoral condyle, right knee hemarthrosis, age indeterminate R sacral ala fracture, age-indeterminate left scapular body fracture, age-indeterminate fracture of the right humeral head, status post pin placement, prior deformity of the right third rib with likely a superimposed acute fracture, likely acute fractures of right 4th-6th ribs. Orthopedics was consulted and recommended NWB of RUE; NWB of RLE, LUE can be WBAT given non-displaced scapula fracture and polytrauma. Patient now s/p fiberglass casting of R arm and R leg. No acute orthopedic surgical intervention planned. Patient evaluated by PT/OT and was recommended for acute inpatient rehab. Course complicated by anemia likely 2/2 right knee hemarthrosis, s/p 1/2 unit PRBC. Patient was discharged to [facility] on 3/12.   (12 Mar 2024 15:15)    TDD: 4/9/24 to Home  ___________________________________________________________________________    SUBJECTIVE/ROS  Patient was seen and evaluated at bedside today. In NAD.   Reports improved sleep with 0.5mg xanax, and controlled spasms with Robaxin.   Follow-up imaging, completed (03/23)     LBM (03/21), voiding   Patient remains eager to participate on the recommended rehabilitation program.  Denies any CP, SOB, PEREZ, palpitations, fever, chills, body aches, cough, congestion, or any other symptoms at this time.   ___________________________________________________________________________    Vital Signs Last 24 Hrs  T(C): 37.1 (23 Mar 2024 08:28), Max: 37.1 (23 Mar 2024 08:28)  T(F): 98.8 (23 Mar 2024 08:28), Max: 98.8 (23 Mar 2024 08:28)  HR: 84 (23 Mar 2024 08:28) (84 - 95)  BP: 113/68 (23 Mar 2024 08:28) (105/65 - 113/68)  RR: 16 (23 Mar 2024 08:28) (16 - 18)  SpO2: 96% (23 Mar 2024 08:28) (95% - 96%)  Parameters below as of 23 Mar 2024 08:28  Patient On (Oxygen Delivery Method): room air    ___________________________________________________________________________    LAB                          10.7   8.52  )-----------( 755      ( 21 Mar 2024 06:11 )             32.8     03-21    138  |  99  |  17  ----------------------------<  100<H>  4.0   |  28  |  0.30<L>    Ca    9.8      21 Mar 2024 06:11    TPro  7.4  /  Alb  3.4  /  TBili  0.7  /  DBili  x   /  AST  19  /  ALT  25  /  AlkPhos  177<H>  03-21    LIVER FUNCTIONS - ( 21 Mar 2024 06:11 )  Alb: 3.4 g/dL / Pro: 7.4 g/dL / ALK PHOS: 177 U/L / ALT: 25 U/L / AST: 19 U/L / GGT: x           ___________________________________________________________________________    MEDICATIONS  (STANDING):  amLODIPine   Tablet 2.5 milliGRAM(s) Oral <User Schedule>  bisoprolol  5 mG/hydrochlorothiazide 6.25 mG 1 Tablet(s) Oral daily  enoxaparin Injectable 30 milliGRAM(s) SubCutaneous every 24 hours  eplerenone 25 milliGRAM(s) Oral <User Schedule>  escitalopram 5 milliGRAM(s) Oral <User Schedule>  gabapentin 100 milliGRAM(s) Oral at bedtime  losartan 50 milliGRAM(s) Oral two times a day  nystatin Powder 1 Application(s) Topical two times a day  senna 1 Tablet(s) Oral at bedtime    MEDICATIONS  (PRN):  acetaminophen     Tablet .. 975 milliGRAM(s) Oral every 6 hours PRN Mild Pain (1 - 3)  ALPRAZolam 0.25 milliGRAM(s) Oral at bedtime PRN Sleep  ALPRAZolam 0.25 milliGRAM(s) Oral at bedtime PRN Sleep  cyclobenzaprine 10 milliGRAM(s) Oral three times a day PRN Muscle Spasm  methocarbamol 500 milliGRAM(s) Oral four times a day PRN Muscle Spasm  oxyCODONE    IR 2.5 milliGRAM(s) Oral every 4 hours PRN Moderate Pain (4 - 6)  oxyCODONE    IR 5 milliGRAM(s) Oral every 4 hours PRN Severe Pain (7 - 10)  polyethylene glycol 3350 17 Gram(s) Oral daily PRN Constipation    ___________________________________________________________________________    PHYSICAL EXAM:    Gen - NAD, Comfortable  HEENT -EOMI, MMM. Ecchymosis of chin  Pulm - CTAB, No crackles  Cardiovascular - RRR, S1S  Abdomen - Soft, NT, ND, +BS  Extremities - right cast on UE and LE.   Neuro-     Cognitive - AAOx3     Communication - Fluent, No dysarthria     Attention: Intact      Judgement: Good evidence of judgement     Memory: Recall 3 objects immediate and 3 min later         Cranial Nerves - CN 2-12 intact     Motor -                    LEFT    UE - ShAB limited 2/2 scap fracture, EF 5/5, EE 5/5, WE 5/5,  5/5                     RIGHT UE - n/a                    LEFT    LE - HF 4/5, KE 4/5, DF 4/5, PF 4/5                    RIGHT LE - n/a    Psychiatric - Mood stable, Affect WNL  Skin: Intact  Wounds: None Present.     ___________________________________________________________________________

## 2024-03-23 NOTE — PROGRESS NOTE ADULT - ASSESSMENT
Mrs. Ivis Grimm is a 53-year-old female patient with past medical history of osteogenesis imperfecta s/p upper & lower extremity hardware, aortic insufficiency, and HTN who is admitted for Acute Inpatient Rehabilitation with a multidisciplinary rehab program at [facility] with functional impairments in ADLs and mobility secondary to multiple fall related trauma.      Polytrauma       * S/P fall       * acute, comminuted periprosthetic fracture adjacent to the tip of the right femoral anabell which begins in the distal femoral metadiaphysis and continues into the lateral femoral condyle       * Age indeterminate fractures: R sacral ala, left scapular body, and right humeral head, status post pin placement       * Prior deformity of the right 3rd rib with likely a superimposed acute fracture, likely acute fractures of right 4th-6th ribs       * S/P fiberglass casting of R arm and R leg       * WB Status: NWB of RUE, NWB of RLE, LUE WBAT       * f/u with orthopedics, Dr. Newsome, 1 week after discharge       * Used manual (self-propelled) wheelchair at baseline  - Impaired ADLs and mobility  - Need for assistance with personal care   - Continue comprehensive rehab program of PT/OT - 3 hours a day, 5 days a week. P&O as needed        * Pain control as below       * Precautions: Fall  - Follow-up imaging to take place on 3/23completed with IMPRESSION:  Redemonstration of an impacted fracture at the surgical neck of the right humerus with extension to the greater tuberosity. Alignment and position of fracture fragments is grossly unchanged. Fracture lucency remains evident without definite osseous bridging.  Unchanged supracondylar fracture of the right distal humerus which is also unchanged in alignment and position. No definite osseous bridging.  Redemonstration of a left scapular body fracture with interval mild osseous ridging.  Unchanged subacute appearing sternal fracture. Alignment and position unchanged  Small focus of subcutaneous air within the posterior subcutaneous fat at the level the proximal left humerus. Correlate for recent injection at this site. In the absence of any known injection correlate for any underlying necrotizing infection    Pain Control  - Tylenol 975 mg Q6H  - Flexeril changed to robaxin PRN muscle spasm on 3/21. Tolerating it well. Will increase it to QID.    - gabapentin 100mg Hs. Has been denying it.   - Oxycodone 2.5 Q4H PRN mod pain, oxycodone 5 mg Q4H PRN severe pain    Anemia  - likely 2/2 right knee hemarthrosis  - s/p 1/2 unit PRBC 3/4  - monitor H/H    Aortic Insufficiency/HTN  - Losartan 50 mg BID  - Norvasc 2.5 mg QHS  - Eplerenone 25 mg QD  - Bisoprolol 5 mg/HCTZ 6.25 mg QD **Home med, please have pharmacy verify. It is not in a prescription bottle, may need to call pharmacy.   - Monitor BP    Mood / Cognition  - Neuropsychology consult PRN  - Lexapro 5 mg QD    Sleep  - Maintain quiet hours and a low stim environment.   - Xanax 0.5 mg PRN split in half. She will take 0.25mg as needed.     GI / Bowel  - Senna 1 cap qHS  - Miralax PRN Daily     / Bladder  - Continue bladder scans Q8 hours with straight cath for >400cc    Skin / Pressure injury  - Skin assessment on admission performed: intact  - Monitor Incisions:    - Pressure Injury/Skin: OOB to chair, PT/OT  - nursing to monitor skin qShift    Diet:  - Diet Consistency: regular  - Nutrition consult    DVT prophylaxis:   - Lovenox      Outpatient Follow-up:  Domenic Newsome  Orthopaedic Surgery  [address], [address]  [place] [address]  Phone: [phone]  Fax: [phone]  Follow Up Time: 2 weeks Mrs. Ivis Grimm is a 53-year-old female patient with past medical history of osteogenesis imperfecta s/p upper & lower extremity hardware, aortic insufficiency, and HTN who is admitted for Acute Inpatient Rehabilitation with a multidisciplinary rehab program at [facility] with functional impairments in ADLs and mobility secondary to multiple fall related trauma.      Polytrauma       * S/P fall       * acute, comminuted periprosthetic fracture adjacent to the tip of the right femoral anabell which begins in the distal femoral metadiaphysis and continues into the lateral femoral condyle       * Age indeterminate fractures: R sacral ala, left scapular body, and right humeral head, status post pin placement       * Prior deformity of the right 3rd rib with likely a superimposed acute fracture, likely acute fractures of right 4th-6th ribs       * S/P fiberglass casting of R arm and R leg       * WB Status: NWB of RUE, NWB of RLE, LUE WBAT       * f/u with orthopedics, Dr. Newsome, 1 week after discharge       * Used manual (self-propelled) wheelchair at baseline  - Impaired ADLs and mobility  - Need for assistance with personal care   - Continue comprehensive rehab program of PT/OT - 3 hours a day, 5 days a week. P&O as needed        * Pain control as below       * Precautions: Fall  - Follow-up imaging to take place on 3/23completed   B/L Humerus with IMPRESSION:  Redemonstration of an impacted fracture at the surgical neck of the right humerus with extension to the greater tuberosity. Alignment and position of fracture fragments is grossly unchanged. Fracture lucency remains evident without definite osseous bridging.  Unchanged supracondylar fracture of the right distal humerus which is also unchanged in alignment and position. No definite osseous bridging.  Redemonstration of a left scapular body fracture with interval mild osseous ridging.  Unchanged subacute appearing sternal fracture. Alignment and position unchanged  Small focus of subcutaneous air within the posterior subcutaneous fat at the level the proximal left humerus. Correlate for recent injection at this site. In the absence of any known injection correlate for any underlying necrotizing infection    B/L Femor with IMPRESSION:  Redemonstration of an acute periprosthetic fracture of the right distal femoral diametaphyseal region. Alignment and position of fracture fragments is unchanged. Nonbridging callus formation is seen laterally. No definite bridging callus formation.  Foci of subcutaneous air within the right lateral subcutaneous tissues at the level of the subtrochanteric femur. This is likely related to subcutaneous injection in this region. In the absence of any known injection in this region possibility of necrotizing infection is considered.  Interval osseous bridging across the previously seen right sacral ottoniel or fracture.  No evidence of acute fracture of the left femur    Pain Control  - Tylenol 975 mg Q6H  - Flexeril changed to robaxin PRN muscle spasm on 3/21. Tolerating it well. Will increase it to QID.    - gabapentin 100mg Hs. Has been denying it.   - Oxycodone 2.5 Q4H PRN mod pain, oxycodone 5 mg Q4H PRN severe pain    Anemia  - likely 2/2 right knee hemarthrosis  - s/p 1/2 unit PRBC 3/4  - monitor H/H    Aortic Insufficiency/HTN  - Losartan 50 mg BID  - Norvasc 2.5 mg QHS  - Eplerenone 25 mg QD  - Bisoprolol 5 mg/HCTZ 6.25 mg QD **Home med, please have pharmacy verify. It is not in a prescription bottle, may need to call pharmacy.   - Monitor BP    Mood / Cognition  - Neuropsychology consult PRN  - Lexapro 5 mg QD    Sleep  - Maintain quiet hours and a low stim environment.   - Xanax 0.5 mg PRN split in half. She will take 0.25mg as needed.     GI / Bowel  - Senna 1 cap qHS  - Miralax PRN Daily     / Bladder  - Continue bladder scans Q8 hours with straight cath for >400cc    Skin / Pressure injury  - Skin assessment on admission performed: intact  - Monitor Incisions:    - Pressure Injury/Skin: OOB to chair, PT/OT  - nursing to monitor skin qShift    Diet:  - Diet Consistency: regular  - Nutrition consult    DVT prophylaxis:   - Lovenox      Outpatient Follow-up:  Domenic Newsome  Orthopaedic Surgery  [address] [place] [address], [address]  [place] [address]  Phone: [phone]  Fax: [phone]  Follow Up Time: 2 weeks

## 2024-03-23 NOTE — PROGRESS NOTE ADULT - ASSESSMENT
53F w/ Osteogenesis imperfecta, Aortic Insufficiency HTN  Admit SP Fall from wheelchair/Polytrauma      Polytrauma Status Post Fall  acute comminuted fracture femur  Scaula, Humerus, Rt Rib  SP casting of R arm and R leg  orthopedics, Dr. Mercedez del angel in clinic one week post dc  manual (self-propelled) wheelchair at baseline    Anemia  attributed to right knee hemarthrosis  s/p 1/2 unit PRBC 3/4  ASA was held. appears to be taken for primary prevention. May resume outpt    Thrombocytosis   - seen by heme/onc, suspect reactive process. continue to monitor cbc. if persistent, may need further workup.     Severe Aortic Insufficiency  -Grade IV per patient ; saw CT surgery in Nov 2023 with recommendation for medical management and routine monitoring  Echo 3/4 showed EF 55 to 60 %, grade 2 LV diastolic dysfunction. There's LVOT obstruction however gradients are LIMITED. Highest 20mmHg which may be underestimated, Severe aortic regurgitation, severe pulmonary hypertension.    HTN  -on amlodipine, losartan, eplerenone, bisoprolol  -BP controlled     Anxiety  - Continue Lexapro 5 mg QD  - Continue xanax 0.25mg PO QHS PRN     DVT PPx  Lovenox for dvt prox.

## 2024-03-24 PROCEDURE — 99232 SBSQ HOSP IP/OBS MODERATE 35: CPT

## 2024-03-24 PROCEDURE — 99232 SBSQ HOSP IP/OBS MODERATE 35: CPT | Mod: GC

## 2024-03-24 RX ORDER — MULTIVIT-MIN/FERROUS GLUCONATE 9 MG/15 ML
1 LIQUID (ML) ORAL DAILY
Refills: 0 | Status: DISCONTINUED | OUTPATIENT
Start: 2024-03-24 | End: 2024-03-24

## 2024-03-24 RX ADMIN — Medication 975 MILLIGRAM(S): at 12:53

## 2024-03-24 RX ADMIN — ENOXAPARIN SODIUM 30 MILLIGRAM(S): 100 INJECTION SUBCUTANEOUS at 06:22

## 2024-03-24 RX ADMIN — BISOPROLOL FUMARATE AND HYDROCHLOROTHIAZIDE 1 TABLET(S): 5; 6.25 TABLET ORAL at 06:48

## 2024-03-24 RX ADMIN — Medication 975 MILLIGRAM(S): at 06:26

## 2024-03-24 RX ADMIN — Medication 975 MILLIGRAM(S): at 22:59

## 2024-03-24 RX ADMIN — NYSTATIN CREAM 1 APPLICATION(S): 100000 CREAM TOPICAL at 06:24

## 2024-03-24 RX ADMIN — Medication 975 MILLIGRAM(S): at 21:59

## 2024-03-24 RX ADMIN — AMLODIPINE BESYLATE 2.5 MILLIGRAM(S): 2.5 TABLET ORAL at 17:56

## 2024-03-24 RX ADMIN — EPLERENONE 25 MILLIGRAM(S): 50 TABLET, FILM COATED ORAL at 17:57

## 2024-03-24 RX ADMIN — LOSARTAN POTASSIUM 50 MILLIGRAM(S): 100 TABLET, FILM COATED ORAL at 17:56

## 2024-03-24 RX ADMIN — Medication 0.25 MILLIGRAM(S): at 21:58

## 2024-03-24 RX ADMIN — METHOCARBAMOL 500 MILLIGRAM(S): 500 TABLET, FILM COATED ORAL at 21:58

## 2024-03-24 RX ADMIN — NYSTATIN CREAM 1 APPLICATION(S): 100000 CREAM TOPICAL at 17:59

## 2024-03-24 RX ADMIN — Medication 975 MILLIGRAM(S): at 13:50

## 2024-03-24 RX ADMIN — SENNA PLUS 1 TABLET(S): 8.6 TABLET ORAL at 21:58

## 2024-03-24 RX ADMIN — ESCITALOPRAM OXALATE 5 MILLIGRAM(S): 10 TABLET, FILM COATED ORAL at 06:22

## 2024-03-24 RX ADMIN — LOSARTAN POTASSIUM 50 MILLIGRAM(S): 100 TABLET, FILM COATED ORAL at 06:47

## 2024-03-24 NOTE — CHART NOTE - NSCHARTNOTEFT_GEN_A_CORE
NUTRITION FOLLOW UP    SOURCE: Patient [X]   Family [ ]    Medical Record [X]    DIET: Diet, Regular (03-12-24 @ 16:12) [Active]  ALLERGIES: NKFA    IMPRESSION:  Met with pt this afternoon at bedside. Pt was seated upright and was able to articulate her nutrition hx well. Pt reported good appetite and taking adequate POs. Pt denied N/V/D/C, last BM 3/23 per RN flowsheets. RD encouraged adequate protein in order to regain strength and maintain muscle mass - pt was accepting to RD suggestion.     PATIENT REPORT: [X] other: no GI distress    PO INTAKE: % per RN flowsheets    CURRENT WEIGHT: 60.6 lbs (3/20)  WEIGHT HX: N/A    PERTINENT MEDS:   Pertinent Medications: MEDICATIONS  (STANDING):  amLODIPine   Tablet 2.5 milliGRAM(s) Oral <User Schedule>  bisoprolol  5 mG/hydrochlorothiazide 6.25 mG 1 Tablet(s) Oral daily  enoxaparin Injectable 30 milliGRAM(s) SubCutaneous every 24 hours  eplerenone 25 milliGRAM(s) Oral <User Schedule>  escitalopram 5 milliGRAM(s) Oral <User Schedule>  gabapentin 100 milliGRAM(s) Oral at bedtime  losartan 50 milliGRAM(s) Oral two times a day  nystatin Powder 1 Application(s) Topical two times a day  senna 1 Tablet(s) Oral at bedtime    MEDICATIONS  (PRN):  acetaminophen     Tablet .. 975 milliGRAM(s) Oral every 6 hours PRN Mild Pain (1 - 3)  ALPRAZolam 0.25 milliGRAM(s) Oral at bedtime PRN Sleep  ALPRAZolam 0.25 milliGRAM(s) Oral at bedtime PRN Sleep  cyclobenzaprine 10 milliGRAM(s) Oral three times a day PRN Muscle Spasm  methocarbamol 500 milliGRAM(s) Oral four times a day PRN Muscle Spasm  oxyCODONE    IR 2.5 milliGRAM(s) Oral every 4 hours PRN Moderate Pain (4 - 6)  oxyCODONE    IR 5 milliGRAM(s) Oral every 4 hours PRN Severe Pain (7 - 10)  polyethylene glycol 3350 17 Gram(s) Oral daily PRN Constipation    PERTINENT LABS:   03-21 Alb 3.4 g/dL    SKIN: no pressure injury per RN flowsheets    EDEMA: no edema per RN flowsheets    ESTIMATED NEEDS:   [X] no change since previous assessment     PREVIOUS NUTRITION DIAGNOSIS:  Severe Malnutrition    NUTRITION DIAGNOSIS is: [X] Ongoing - pt declined ONS    NEW NUTRITION DIAGNOSIS: N/A    MONITORING AND EVALUATION:   Current diet order is appropriate and is well tolerated, will continue to monitor:  - Food and nutrient intake/POs  - Nutrition related lab value  - Weight/weight trends  - GI functions    NUTRITION RECOMMENDATIONS:   1. Continue with Regular diet  - Continue to encourage adequate PO and protein intake  - Honor food preferences  - Appreciate continued PO intake % documentation in RN flowsheets  2. Recommend MVI for general nutrient coverage  3. Appreciate updated weight and weekly weight trends  4. Continue with current bowel regimen, prn    Karlene Charles RDN also available via TEAMS

## 2024-03-24 NOTE — PROGRESS NOTE ADULT - ASSESSMENT
Mrs. Ivis Grimm is a 53-year-old female patient with past medical history of osteogenesis imperfecta s/p upper & lower extremity hardware, aortic insufficiency, and HTN who is admitted for Acute Inpatient Rehabilitation with a multidisciplinary rehab program at [facility] with functional impairments in ADLs and mobility secondary to multiple fall related trauma.      Polytrauma       * S/P fall       * acute, comminuted periprosthetic fracture adjacent to the tip of the right femoral anabell which begins in the distal femoral metadiaphysis and continues into the lateral femoral condyle       * Age indeterminate fractures: R sacral ala, left scapular body, and right humeral head, status post pin placement       * Prior deformity of the right 3rd rib with likely a superimposed acute fracture, likely acute fractures of right 4th-6th ribs       * S/P fiberglass casting of R arm and R leg       * WB Status: NWB of RUE, NWB of RLE, LUE WBAT       * f/u with orthopedics, Dr. Newsome, 1 week after discharge       * Used manual (self-propelled) wheelchair at baseline  - Impaired ADLs and mobility  - Need for assistance with personal care   - Continue comprehensive rehab program of PT/OT - 3 hours a day, 5 days a week. P&O as needed        * Pain control as below       * Precautions: Fall  - Follow-up imaging to take place on 3/23 completed   B/L Humerus with IMPRESSION:  Redemonstration of an impacted fracture at the surgical neck of the right humerus with extension to the greater tuberosity. Alignment and position of fracture fragments is grossly unchanged. Fracture lucency remains evident without definite osseous bridging.  Unchanged supracondylar fracture of the right distal humerus which is also unchanged in alignment and position. No definite osseous bridging.  Redemonstration of a left scapular body fracture with interval mild osseous ridging.  Unchanged subacute appearing sternal fracture. Alignment and position unchanged  Small focus of subcutaneous air within the posterior subcutaneous fat at the level the proximal left humerus. Correlate for recent injection at this site. In the absence of any known injection correlate for any underlying necrotizing infection    B/L Femor with IMPRESSION:  Redemonstration of an acute periprosthetic fracture of the right distal femoral diametaphyseal region. Alignment and position of fracture fragments is unchanged. Nonbridging callus formation is seen laterally. No definite bridging callus formation.  Foci of subcutaneous air within the right lateral subcutaneous tissues at the level of the subtrochanteric femur. This is likely related to subcutaneous injection in this region. In the absence of any known injection in this region possibility of necrotizing infection is considered.  Interval osseous bridging across the previously seen right sacral ottoniel or fracture.  No evidence of acute fracture of the left femur    Pain Control  - Tylenol 975 mg Q6H  - Flexeril changed to robaxin PRN muscle spasm on 3/21. Tolerating it well. Will increase it to QID.    - gabapentin 100mg Hs. Has been denying it.   - Oxycodone 2.5 Q4H PRN mod pain, oxycodone 5 mg Q4H PRN severe pain    Anemia  - likely 2/2 right knee hemarthrosis  - s/p 1/2 unit PRBC 3/4  - monitor H/H    Aortic Insufficiency/HTN  - Losartan 50 mg BID  - Norvasc 2.5 mg QHS  - Eplerenone 25 mg QD  - Bisoprolol 5 mg/HCTZ 6.25 mg QD **Home med, please have pharmacy verify. It is not in a prescription bottle, may need to call pharmacy.   - Monitor BP    Mood / Cognition  - Neuropsychology consult PRN  - Lexapro 5 mg QD    Sleep  - Maintain quiet hours and a low stim environment.   - Xanax 0.5 mg PRN split in half. She will take 0.25mg as needed.     GI / Bowel  - Senna 1 cap qHS  - Miralax PRN Daily     / Bladder  - Continue bladder scans Q8 hours with straight cath for >400cc    Skin / Pressure injury  - Skin assessment on admission performed: intact  - Monitor Incisions:    - Pressure Injury/Skin: OOB to chair, PT/OT  - nursing to monitor skin qShift    Diet:  - Diet Consistency: regular  - Nutrition consult    DVT prophylaxis:   - Lovenox      Outpatient Follow-up:  Domenic Newsome  Orthopaedic Surgery  [address], [address]  [place] [address]  Phone: [phone]  Fax: [phone]  Follow Up Time: 2 weeks

## 2024-03-24 NOTE — PROGRESS NOTE ADULT - SUBJECTIVE AND OBJECTIVE BOX
HPI:  Mrs. Ivis Grimm is a 53-year-old female patient with past medical history of osteogenesis imperfecta s/p upper & lower extremity hardware, aortic insufficiency, and HTN who presented to [facility] on 3/2 s/p fall from her wheelchair. She reports missing a step on the wheelchair, losing control, and falling face first from the wheelchair at a height of around 2-3 ft face first onto a concrete ground. She lost consciousness and regained her awareness of her surroundings some time later, likely seconds to minutes, with little recollection of the intervening time. Patient noted to have right temporal superficial abrasion, R upper lip laceration through the vermillion border, small ecchymosis on the R chin, tenderness to palpation in the L shoulder/R proximal arm/R wrist/ R thigh with medial rotation of the RLE.      Imaging performed reported acute, comminuted fracture adjacent to the tip of the right femoral anabell which begins in the distal femoral metadiaphysis and continues into the lateral femoral condyle, right knee hemarthrosis, age indeterminate R sacral ala fracture, age-indeterminate left scapular body fracture, age-indeterminate fracture of the right humeral head, status post pin placement, prior deformity of the right third rib with likely a superimposed acute fracture, likely acute fractures of right 4th-6th ribs. Orthopedics was consulted and recommended NWB of RUE; NWB of RLE, LUE can be WBAT given non-displaced scapula fracture and polytrauma. Patient now s/p fiberglass casting of R arm and R leg. No acute orthopedic surgical intervention planned. Patient evaluated by PT/OT and was recommended for acute inpatient rehab. Course complicated by anemia likely 2/2 right knee hemarthrosis, s/p 1/2 unit PRBC. Patient was discharged to [facility] on 3/12.   (12 Mar 2024 15:15)    TDD: 4/9/24 to Home  ___________________________________________________________________________    SUBJECTIVE/ROS  Patient was seen and evaluated at bedside today. In NAD.   Reports slept well last night  Controlled spasms with Robaxin.   LBM (03/21), voiding   Patient remains eager to participate on the recommended rehabilitation program.  Denies any CP, SOB, PEREZ, palpitations, fever, chills, body aches, cough, congestion, or any other symptoms at this time.   ___________________________________________________________________________    Vital Signs Last 24 Hrs  T(C): 36.8 (24 Mar 2024 08:26), Max: 37.1 (23 Mar 2024 20:58)  T(F): 98.2 (24 Mar 2024 08:26), Max: 98.7 (23 Mar 2024 20:58)  HR: 85 (24 Mar 2024 08:26) (85 - 97)  BP: 100/65 (24 Mar 2024 08:26) (100/65 - 135/79)  RR: 16 (24 Mar 2024 08:26) (14 - 16)  SpO2: 95% (24 Mar 2024 08:26) (94% - 97%)  Parameters below as of 24 Mar 2024 08:26  Patient On (Oxygen Delivery Method): room air  ___________________________________________________________________________    LAB                          10.7   8.52  )-----------( 755      ( 21 Mar 2024 06:11 )             32.8     03-21    138  |  99  |  17  ----------------------------<  100<H>  4.0   |  28  |  0.30<L>    Ca    9.8      21 Mar 2024 06:11    TPro  7.4  /  Alb  3.4  /  TBili  0.7  /  DBili  x   /  AST  19  /  ALT  25  /  AlkPhos  177<H>  03-21    LIVER FUNCTIONS - ( 21 Mar 2024 06:11 )  Alb: 3.4 g/dL / Pro: 7.4 g/dL / ALK PHOS: 177 U/L / ALT: 25 U/L / AST: 19 U/L / GGT: x           ___________________________________________________________________________    MEDICATIONS  (STANDING):  amLODIPine   Tablet 2.5 milliGRAM(s) Oral <User Schedule>  bisoprolol  5 mG/hydrochlorothiazide 6.25 mG 1 Tablet(s) Oral daily  enoxaparin Injectable 30 milliGRAM(s) SubCutaneous every 24 hours  eplerenone 25 milliGRAM(s) Oral <User Schedule>  escitalopram 5 milliGRAM(s) Oral <User Schedule>  gabapentin 100 milliGRAM(s) Oral at bedtime  losartan 50 milliGRAM(s) Oral two times a day  nystatin Powder 1 Application(s) Topical two times a day  senna 1 Tablet(s) Oral at bedtime    MEDICATIONS  (PRN):  acetaminophen     Tablet .. 975 milliGRAM(s) Oral every 6 hours PRN Mild Pain (1 - 3)  ALPRAZolam 0.25 milliGRAM(s) Oral at bedtime PRN Sleep  ALPRAZolam 0.25 milliGRAM(s) Oral at bedtime PRN Sleep  cyclobenzaprine 10 milliGRAM(s) Oral three times a day PRN Muscle Spasm  methocarbamol 500 milliGRAM(s) Oral four times a day PRN Muscle Spasm  oxyCODONE    IR 2.5 milliGRAM(s) Oral every 4 hours PRN Moderate Pain (4 - 6)  oxyCODONE    IR 5 milliGRAM(s) Oral every 4 hours PRN Severe Pain (7 - 10)  polyethylene glycol 3350 17 Gram(s) Oral daily PRN Constipation    ___________________________________________________________________________    PHYSICAL EXAM:    Gen - NAD, Comfortable  HEENT - NCAT, EOMI, MMM   Pulm - CTAB, No crackles  Cardiovascular - RRR, S1S  Abdomen - Soft, NT, ND, +BS  Extremities - right cast on UE and LE.   Neuro-     Cognitive - AAOx3     Communication - Fluent, No dysarthria     Attention: Intact      Judgement: Good evidence of judgement     Memory: Recall 3 objects immediate and 3 min later         Cranial Nerves - CN 2-12 intact     Motor -                    LEFT    UE - ShAB limited 2/2 scap fracture, EF 5/5, EE 5/5, WE 5/5,  5/5                     RIGHT UE - n/a                    LEFT    LE - HF 4/5, KE 4/5, DF 4/5, PF 4/5                    RIGHT LE - n/a    Psychiatric - Mood stable, Affect WNL  Skin: Intact  Wounds: None Present.     ___________________________________________________________________________

## 2024-03-25 LAB
ALBUMIN SERPL ELPH-MCNC: 3.2 G/DL — LOW (ref 3.3–5)
ALP SERPL-CCNC: 189 U/L — HIGH (ref 40–120)
ALT FLD-CCNC: 23 U/L — SIGNIFICANT CHANGE UP (ref 10–45)
ANION GAP SERPL CALC-SCNC: 10 MMOL/L — SIGNIFICANT CHANGE UP (ref 5–17)
AST SERPL-CCNC: 20 U/L — SIGNIFICANT CHANGE UP (ref 10–40)
BILIRUB SERPL-MCNC: 0.5 MG/DL — SIGNIFICANT CHANGE UP (ref 0.2–1.2)
BUN SERPL-MCNC: 15 MG/DL — SIGNIFICANT CHANGE UP (ref 7–23)
CALCIUM SERPL-MCNC: 9.5 MG/DL — SIGNIFICANT CHANGE UP (ref 8.4–10.5)
CHLORIDE SERPL-SCNC: 100 MMOL/L — SIGNIFICANT CHANGE UP (ref 96–108)
CO2 SERPL-SCNC: 27 MMOL/L — SIGNIFICANT CHANGE UP (ref 22–31)
CREAT SERPL-MCNC: <0.2 MG/DL — LOW (ref 0.5–1.3)
EGFR: 153 ML/MIN/1.73M2 — SIGNIFICANT CHANGE UP
GLUCOSE SERPL-MCNC: 95 MG/DL — SIGNIFICANT CHANGE UP (ref 70–99)
HCT VFR BLD CALC: 31.5 % — LOW (ref 34.5–45)
HGB BLD-MCNC: 10 G/DL — LOW (ref 11.5–15.5)
MCHC RBC-ENTMCNC: 29.1 PG — SIGNIFICANT CHANGE UP (ref 27–34)
MCHC RBC-ENTMCNC: 31.7 GM/DL — LOW (ref 32–36)
MCV RBC AUTO: 91.6 FL — SIGNIFICANT CHANGE UP (ref 80–100)
NRBC # BLD: 0 /100 WBCS — SIGNIFICANT CHANGE UP (ref 0–0)
PLATELET # BLD AUTO: 561 K/UL — HIGH (ref 150–400)
POTASSIUM SERPL-MCNC: 4.3 MMOL/L — SIGNIFICANT CHANGE UP (ref 3.5–5.3)
POTASSIUM SERPL-SCNC: 4.3 MMOL/L — SIGNIFICANT CHANGE UP (ref 3.5–5.3)
PROT SERPL-MCNC: 6.7 G/DL — SIGNIFICANT CHANGE UP (ref 6–8.3)
RBC # BLD: 3.44 M/UL — LOW (ref 3.8–5.2)
RBC # FLD: 14.2 % — SIGNIFICANT CHANGE UP (ref 10.3–14.5)
SODIUM SERPL-SCNC: 137 MMOL/L — SIGNIFICANT CHANGE UP (ref 135–145)
WBC # BLD: 7.69 K/UL — SIGNIFICANT CHANGE UP (ref 3.8–10.5)
WBC # FLD AUTO: 7.69 K/UL — SIGNIFICANT CHANGE UP (ref 3.8–10.5)

## 2024-03-25 PROCEDURE — 99232 SBSQ HOSP IP/OBS MODERATE 35: CPT

## 2024-03-25 RX ORDER — ALPRAZOLAM 0.25 MG
0.25 TABLET ORAL AT BEDTIME
Refills: 0 | Status: DISCONTINUED | OUTPATIENT
Start: 2024-03-25 | End: 2024-03-30

## 2024-03-25 RX ORDER — OXYCODONE HYDROCHLORIDE 5 MG/1
5 TABLET ORAL EVERY 4 HOURS
Refills: 0 | Status: DISCONTINUED | OUTPATIENT
Start: 2024-03-25 | End: 2024-03-30

## 2024-03-25 RX ORDER — OXYCODONE HYDROCHLORIDE 5 MG/1
2.5 TABLET ORAL EVERY 4 HOURS
Refills: 0 | Status: DISCONTINUED | OUTPATIENT
Start: 2024-03-25 | End: 2024-03-30

## 2024-03-25 RX ADMIN — Medication 975 MILLIGRAM(S): at 23:20

## 2024-03-25 RX ADMIN — Medication 975 MILLIGRAM(S): at 07:02

## 2024-03-25 RX ADMIN — Medication 975 MILLIGRAM(S): at 06:02

## 2024-03-25 RX ADMIN — Medication 0.25 MILLIGRAM(S): at 23:53

## 2024-03-25 RX ADMIN — ESCITALOPRAM OXALATE 5 MILLIGRAM(S): 10 TABLET, FILM COATED ORAL at 06:02

## 2024-03-25 RX ADMIN — Medication 975 MILLIGRAM(S): at 12:09

## 2024-03-25 RX ADMIN — Medication 1 TABLET(S): at 12:09

## 2024-03-25 RX ADMIN — BISOPROLOL FUMARATE AND HYDROCHLOROTHIAZIDE 1 TABLET(S): 5; 6.25 TABLET ORAL at 08:07

## 2024-03-25 RX ADMIN — SENNA PLUS 1 TABLET(S): 8.6 TABLET ORAL at 22:25

## 2024-03-25 RX ADMIN — LOSARTAN POTASSIUM 50 MILLIGRAM(S): 100 TABLET, FILM COATED ORAL at 17:51

## 2024-03-25 RX ADMIN — NYSTATIN CREAM 1 APPLICATION(S): 100000 CREAM TOPICAL at 18:15

## 2024-03-25 RX ADMIN — Medication 0.25 MILLIGRAM(S): at 22:24

## 2024-03-25 RX ADMIN — LOSARTAN POTASSIUM 50 MILLIGRAM(S): 100 TABLET, FILM COATED ORAL at 08:07

## 2024-03-25 RX ADMIN — EPLERENONE 25 MILLIGRAM(S): 50 TABLET, FILM COATED ORAL at 17:51

## 2024-03-25 RX ADMIN — Medication 975 MILLIGRAM(S): at 13:00

## 2024-03-25 RX ADMIN — NYSTATIN CREAM 1 APPLICATION(S): 100000 CREAM TOPICAL at 06:05

## 2024-03-25 RX ADMIN — AMLODIPINE BESYLATE 2.5 MILLIGRAM(S): 2.5 TABLET ORAL at 17:51

## 2024-03-25 RX ADMIN — Medication 975 MILLIGRAM(S): at 22:24

## 2024-03-25 RX ADMIN — Medication 0.25 MILLIGRAM(S): at 00:21

## 2024-03-25 RX ADMIN — ENOXAPARIN SODIUM 30 MILLIGRAM(S): 100 INJECTION SUBCUTANEOUS at 06:02

## 2024-03-25 RX ADMIN — METHOCARBAMOL 500 MILLIGRAM(S): 500 TABLET, FILM COATED ORAL at 22:24

## 2024-03-25 NOTE — PROGRESS NOTE ADULT - SUBJECTIVE AND OBJECTIVE BOX
HPI:  Mrs. Ivis Grimm is a 53-year-old female patient with past medical history of osteogenesis imperfecta s/p upper & lower extremity hardware, aortic insufficiency, and HTN who presented to [facility] on 3/2 s/p fall from her wheelchair. She reports missing a step on the wheelchair, losing control, and falling face first from the wheelchair at a height of around 2-3 ft face first onto a concrete ground. She lost consciousness and regained her awareness of her surroundings some time later, likely seconds to minutes, with little recollection of the intervening time. Patient noted to have right temporal superficial abrasion, R upper lip laceration through the vermillion border, small ecchymosis on the R chin, tenderness to palpation in the L shoulder/R proximal arm/R wrist/ R thigh with medial rotation of the RLE.      Imaging performed reported acute, comminuted fracture adjacent to the tip of the right femoral anabell which begins in the distal femoral metadiaphysis and continues into the lateral femoral condyle, right knee hemarthrosis, age indeterminate R sacral ala fracture, age-indeterminate left scapular body fracture, age-indeterminate fracture of the right humeral head, status post pin placement, prior deformity of the right third rib with likely a superimposed acute fracture, likely acute fractures of right 4th-6th ribs. Orthopedics was consulted and recommended NWB of RUE; NWB of RLE, LUE can be WBAT given non-displaced scapula fracture and polytrauma. Patient now s/p fiberglass casting of R arm and R leg. No acute orthopedic surgical intervention planned. Patient evaluated by PT/OT and was recommended for acute inpatient rehab. Course complicated by anemia likely 2/2 right knee hemarthrosis, s/p 1/2 unit PRBC. Patient was discharged to [facility] on 3/12.   (12 Mar 2024 15:15)    TDD: 4/9/24 to Home  ___________________________________________________________________________    SUBJECTIVE/ROS  Patient was seen and evaluated at bedside today. In NAD.   Reports adequate sleep with recent medication changes  Follow-up imaging as below and discussed with patient  Patient remains eager to participate on the recommended rehabilitation program.  Denies any CP, SOB, PEREZ, palpitations, fever, chills, body aches, cough, congestion, or any other symptoms at this time.   ___________________________________________________________________________    IMAGING (per official Radiology reports)    CT FEMUR BI (03/23/2024)      FINDINGS:    There is diffuse osseous demineralization and osseous deformity consistent with history of osteogenesis imperfecta. There is interval osseous bridging across the previously seen right sacral ottoniel fracture.    Right femur: There is interval application of a fiberglass splint about the right femur and partially imaged knee. Patient is again noted to be status post fixation of the right femur with an intramedullary anabell extending from the level of the greater trochanter to the level of the distal diaphysis. There is redemonstration of an acute comminuted periprosthetic fracture of the right femoral diametaphyseal region with extension to the lateral femoral condyle. The alignment and position of fracture fragments appears grossly unchanged compared to the prior examination with impaction centrally. Nonbridging callus formation is seen along the lateral aspect which is new since the prior examination. Fracture lucency remains diffusely evident without definite bridging callus formation. No new fracture of the right lower extremity is seen. There is nonspecific subcutaneous air within the right lateral subcutaneous fat at the level of the subtrochanteric femur. Correlate for any recent subcutaneous injection in this region. In the absence of any intervention in this region possibility of underlying necrotizing infection is a consideration. There is a small right knee joint effusion.     Left femur: Patient is again noted to be status post fixation of the left femur with a femoral intramedullary anabell extending from the level of the greater trochanter to the level of the distal diametaphysis. No evidence of acute fracture of the left femur.    There is atrophy of the thigh musculature bilaterally. There is dysplastic remodeling of the femoral head neck junctions bilaterally and widening and remodeling of the acetabuli bilaterally. This appearance is unchanged.    There is a fibroid uterus.    IMPRESSION:    Redemonstration of an acute periprosthetic fracture of the right distal femoral diametaphyseal region. Alignment and position of fracture fragments is unchanged. Nonbridging callus formation is seen laterally. No definite bridging callus formation. Foci of subcutaneous air within the right lateral subcutaneous tissues at the level of the subtrochanteric femur. This is likely related to subcutaneous injection in this region. In the absence of any known injection in this region possibility of necrotizing infection is considered. Interval osseous bridging across the previously seen right sacral ottoniel or fracture. No evidence of acute fracture of the left femur.      CT HUMERUS BI (03/23/2024)  IMPRESSION: Redemonstration of an impacted fracture at the surgical neck of the right humerus with extension to the greater tuberosity. Alignment and position of fracture fragments is grossly unchanged. Fracture lucency remains evident without definite osseous bridging. Unchanged supracondylar fracture of the right distal humerus which is also unchanged in alignment and position. No definite osseous bridging. Redemonstration of a left scapular body fracture with interval mild osseous ridging. Unchanged subacute appearing sternal fracture. Alignment and position unchanged. Small focus of subcutaneous air within the posterior subcutaneous fat at the level the proximal left humerus. Correlate for recent injection at this site. In the absence of any known injection correlate for any underlying necrotizing infection.    ___________________________________________________________________________    Vital Signs Last 24 Hrs  T(C): 36.8 (25 Mar 2024 08:00), Max: 36.9 (24 Mar 2024 21:00)  T(F): 98.3 (25 Mar 2024 08:00), Max: 98.5 (24 Mar 2024 21:00)  HR: 93 (25 Mar 2024 08:00) (83 - 95)  BP: 114/74 (25 Mar 2024 08:00) (106/64 - 130/76)  RR: 16 (25 Mar 2024 08:00) (16 - 16)  SpO2: 99% (25 Mar 2024 08:00) (96% - 99%)    ___________________________________________________________________________    LAB                        10.0   7.69  )-----------( 561      ( 25 Mar 2024 06:10 )             31.5     03-25    137  |  100  |  15  ----------------------------<  95  4.3   |  27  |  <0.20<L>    Ca    9.5      25 Mar 2024 06:10    TPro  6.7  /  Alb  3.2<L>  /  TBili  0.5  /  DBili  x   /  AST  20  /  ALT  23  /  AlkPhos  189<H>  03-25    LIVER FUNCTIONS - ( 25 Mar 2024 06:10 )  Alb: 3.2 g/dL / Pro: 6.7 g/dL / ALK PHOS: 189 U/L / ALT: 23 U/L / AST: 20 U/L / GGT: x           ___________________________________________________________________________    MEDICATIONS  (STANDING):  amLODIPine   Tablet 2.5 milliGRAM(s) Oral <User Schedule>  bisoprolol  5 mG/hydrochlorothiazide 6.25 mG 1 Tablet(s) Oral daily  enoxaparin Injectable 30 milliGRAM(s) SubCutaneous every 24 hours  eplerenone 25 milliGRAM(s) Oral <User Schedule>  escitalopram 5 milliGRAM(s) Oral <User Schedule>  gabapentin 100 milliGRAM(s) Oral at bedtime  losartan 50 milliGRAM(s) Oral two times a day  multivitamin 1 Tablet(s) Oral daily  nystatin Powder 1 Application(s) Topical two times a day  senna 1 Tablet(s) Oral at bedtime    MEDICATIONS  (PRN):  acetaminophen     Tablet .. 975 milliGRAM(s) Oral every 6 hours PRN Mild Pain (1 - 3)  ALPRAZolam 0.25 milliGRAM(s) Oral at bedtime PRN Sleep  ALPRAZolam 0.25 milliGRAM(s) Oral at bedtime PRN Sleep  cyclobenzaprine 10 milliGRAM(s) Oral three times a day PRN Muscle Spasm  methocarbamol 500 milliGRAM(s) Oral four times a day PRN Muscle Spasm  oxyCODONE    IR 2.5 milliGRAM(s) Oral every 4 hours PRN Moderate Pain (4 - 6)  oxyCODONE    IR 5 milliGRAM(s) Oral every 4 hours PRN Severe Pain (7 - 10)  polyethylene glycol 3350 17 Gram(s) Oral daily PRN Constipation    ___________________________________________________________________________    PHYSICAL EXAM:    Gen - NAD, Comfortable  HEENT -EOMI, MMM. Ecchymosis of chin  Pulm - CTAB, No wheeze, No rhonchi, No crackles  Cardiovascular - RRR, S1S2, +murmur  Abdomen - Soft, NT/ND, +BS  Extremities - right cast on UE and LE.   Neuro-     Cognitive - AAOx3     Communication - Fluent, No dysarthria     Attention: Intact      Judgement: Good evidence of judgement     Memory: Recall 3 objects immediate and 3 min later         Cranial Nerves - CN 2-12 intact     Motor -                    LEFT    UE - ShAB limited 2/2 scap fracture, EF 5/5, EE 5/5, WE 5/5,  5/5                     RIGHT UE - n/a                    LEFT    LE - HF 4/5, KE 4/5, DF 4/5, PF 4/5                    RIGHT LE - n/a     Sensory - Intact to LT     Tone - normal  Psychiatric - Mood stable, Affect WNL  Skin: Intact  Wounds: None Present.     ___________________________________________________________________________

## 2024-03-25 NOTE — PROGRESS NOTE ADULT - SUBJECTIVE AND OBJECTIVE BOX
Patient is a 53y old  Female who presents with a chief complaint of Polytrauma (24 Mar 2024 13:56)    Patient seen and examined at bedside, stable, NAD. denies headache, fever, chills, cp, sob, n/v, abd pain.      ALLERGIES:  No Known Allergies    MEDICATIONS  (STANDING):  amLODIPine   Tablet 2.5 milliGRAM(s) Oral <User Schedule>  bisoprolol  5 mG/hydrochlorothiazide 6.25 mG 1 Tablet(s) Oral daily  enoxaparin Injectable 30 milliGRAM(s) SubCutaneous every 24 hours  eplerenone 25 milliGRAM(s) Oral <User Schedule>  escitalopram 5 milliGRAM(s) Oral <User Schedule>  gabapentin 100 milliGRAM(s) Oral at bedtime  losartan 50 milliGRAM(s) Oral two times a day  multivitamin 1 Tablet(s) Oral daily  nystatin Powder 1 Application(s) Topical two times a day  senna 1 Tablet(s) Oral at bedtime    MEDICATIONS  (PRN):  acetaminophen     Tablet .. 975 milliGRAM(s) Oral every 6 hours PRN Mild Pain (1 - 3)  ALPRAZolam 0.25 milliGRAM(s) Oral at bedtime PRN Sleep  ALPRAZolam 0.25 milliGRAM(s) Oral at bedtime PRN Sleep  cyclobenzaprine 10 milliGRAM(s) Oral three times a day PRN Muscle Spasm  methocarbamol 500 milliGRAM(s) Oral four times a day PRN Muscle Spasm  oxyCODONE    IR 2.5 milliGRAM(s) Oral every 4 hours PRN Moderate Pain (4 - 6)  oxyCODONE    IR 5 milliGRAM(s) Oral every 4 hours PRN Severe Pain (7 - 10)  polyethylene glycol 3350 17 Gram(s) Oral daily PRN Constipation    Vital Signs Last 24 Hrs  T(F): 98.3 (25 Mar 2024 08:00), Max: 98.5 (24 Mar 2024 21:00)  HR: 93 (25 Mar 2024 08:00) (83 - 95)  BP: 114/74 (25 Mar 2024 08:00) (106/64 - 130/76)  RR: 16 (25 Mar 2024 08:00) (16 - 16)  SpO2: 99% (25 Mar 2024 08:00) (96% - 99%)  I&O's Summary      PHYSICAL EXAM:  General: NAD  ENT: MMM, no scleral icterus  Neck: Supple, No JVD  Lungs: Clear to auscultation bilaterally, no wheezes, rales, rhonchi  Cardio: RRR, S1/S2, +murmur  Abdomen: Soft, Nontender, Nondistended; Bowel sounds present  Extremities: No calf tenderness, No pitting edema. +RUE, LE cast    LABS:                        10.0   7.69  )-----------( 561      ( 25 Mar 2024 06:10 )             31.5       03-25    137  |  100  |  15  ----------------------------<  95  4.3   |  27  |  <0.20    Ca    9.5      25 Mar 2024 06:10    TPro  6.7  /  Alb  3.2  /  TBili  0.5  /  DBili  x   /  AST  20  /  ALT  23  /  AlkPhos  189  03-25         Urinalysis Basic - ( 25 Mar 2024 06:10 )    Color: x / Appearance: x / SG: x / pH: x  Gluc: 95 mg/dL / Ketone: x  / Bili: x / Urobili: x   Blood: x / Protein: x / Nitrite: x   Leuk Esterase: x / RBC: x / WBC x   Sq Epi: x / Non Sq Epi: x / Bacteria: x            RADIOLOGY & ADDITIONAL TESTS:    < from: CT Femur No Cont, Bilateral (03.23.24 @ 09:41) >  FINDINGS:  There is diffuse osseous demineralization and osseous deformity   consistent with history of osteogenesis imperfecta. There is interval   osseous bridging across the previously seen right sacral ottoniel fracture.    Right femur: There is interval application of a fiberglass splint about   the right femur and partially imaged knee. Patient is again noted to be   status post fixation of the right femur with an intramedullary anabell   extending from the level of the greater trochanter to the level of the   distal diaphysis. There is redemonstration of an acute comminuted   periprosthetic fracture of the right femoral diametaphyseal region with   extension to the lateral femoral condyle. The alignment and position of   fracture fragments appears grossly unchanged compared to the prior   examination with impaction centrally. Nonbridging callus formation is   seen along the lateral aspect which is new since the prior examination.   Fracture lucency remains diffusely evident without definite bridging   callus formation. No new fracture of the right lower extremity is seen.   There is nonspecific subcutaneous air within the right lateral   subcutaneous fat at the level of the subtrochanteric femur. Correlate for  any recent subcutaneous injection in this region. In the absence of any   intervention in this region possibility of underlying necrotizing   infection is a consideration. There is a small right knee joint effusion.    Left femur: Patient is again noted to be status post fixation of the left   femur with a femoral intramedullary anabell extending from the level of the   greater trochanter to the level of the distal diametaphysis. No evidence   of acute fracture of the left femur.    There is atrophy of the thigh musculature bilaterally. There is   dysplastic remodeling of the femoral head neck junctions bilaterally and   widening and remodeling of the acetabuli bilaterally. This appearance is   unchanged.    There is a fibroid uterus.    IMPRESSION:    Redemonstration of an acute periprosthetic fracture of the right distal   femoral diametaphyseal region. Alignment and position of fracture   fragments is unchanged. Nonbridging callus formation is seen laterally.   No definite bridging callus formation.    Foci of subcutaneous air within the right lateral subcutaneous tissues at   the level of the subtrochanteric femur. This is likely related to   subcutaneous injection in this region. In the absence of any known   injection in this region possibility of necrotizing infection is   considered.    Interval osseous bridging across the previously seen right sacral ottoniel or   fracture.    No evidence of acute fracture of the left femur.    Findings discussed with Dr. Johansen.  --- End of Report ---  < end of copied text >    Care Discussed with Consultants/Other Providers: yes, rehab   Patient is a 53y old  Female who presents with a chief complaint of Polytrauma (24 Mar 2024 13:56)    Patient seen and examined at bedside, stable, NAD. denies headache, fever, chills, cp, sob, n/v, abd pain.      ALLERGIES:  No Known Allergies    MEDICATIONS  (STANDING):  amLODIPine   Tablet 2.5 milliGRAM(s) Oral <User Schedule>  bisoprolol  5 mG/hydrochlorothiazide 6.25 mG 1 Tablet(s) Oral daily  enoxaparin Injectable 30 milliGRAM(s) SubCutaneous every 24 hours  eplerenone 25 milliGRAM(s) Oral <User Schedule>  escitalopram 5 milliGRAM(s) Oral <User Schedule>  gabapentin 100 milliGRAM(s) Oral at bedtime  losartan 50 milliGRAM(s) Oral two times a day  multivitamin 1 Tablet(s) Oral daily  nystatin Powder 1 Application(s) Topical two times a day  senna 1 Tablet(s) Oral at bedtime    MEDICATIONS  (PRN):  acetaminophen     Tablet .. 975 milliGRAM(s) Oral every 6 hours PRN Mild Pain (1 - 3)  ALPRAZolam 0.25 milliGRAM(s) Oral at bedtime PRN Sleep  ALPRAZolam 0.25 milliGRAM(s) Oral at bedtime PRN Sleep  cyclobenzaprine 10 milliGRAM(s) Oral three times a day PRN Muscle Spasm  methocarbamol 500 milliGRAM(s) Oral four times a day PRN Muscle Spasm  oxyCODONE    IR 2.5 milliGRAM(s) Oral every 4 hours PRN Moderate Pain (4 - 6)  oxyCODONE    IR 5 milliGRAM(s) Oral every 4 hours PRN Severe Pain (7 - 10)  polyethylene glycol 3350 17 Gram(s) Oral daily PRN Constipation    Vital Signs Last 24 Hrs  T(F): 98.3 (25 Mar 2024 08:00), Max: 98.5 (24 Mar 2024 21:00)  HR: 93 (25 Mar 2024 08:00) (83 - 95)  BP: 114/74 (25 Mar 2024 08:00) (106/64 - 130/76)  RR: 16 (25 Mar 2024 08:00) (16 - 16)  SpO2: 99% (25 Mar 2024 08:00) (96% - 99%)  I&O's Summary      PHYSICAL EXAM:  General: NAD, short stature  ENT: MMM, no scleral icterus  Neck: Supple, No JVD  Lungs: +pectus carinatum, CTA b/l  Cardio: RRR, S1/S2, +murmur  Abdomen: Soft, Nontender, Nondistended; Bowel sounds present  Extremities: No calf tenderness, No pitting edema. +RUE, LE cast. LUE +bowing     LABS:                        10.0   7.69  )-----------( 561      ( 25 Mar 2024 06:10 )             31.5       03-25    137  |  100  |  15  ----------------------------<  95  4.3   |  27  |  <0.20    Ca    9.5      25 Mar 2024 06:10    TPro  6.7  /  Alb  3.2  /  TBili  0.5  /  DBili  x   /  AST  20  /  ALT  23  /  AlkPhos  189  03-25         Urinalysis Basic - ( 25 Mar 2024 06:10 )    Color: x / Appearance: x / SG: x / pH: x  Gluc: 95 mg/dL / Ketone: x  / Bili: x / Urobili: x   Blood: x / Protein: x / Nitrite: x   Leuk Esterase: x / RBC: x / WBC x   Sq Epi: x / Non Sq Epi: x / Bacteria: x            RADIOLOGY & ADDITIONAL TESTS:    < from: CT Femur No Cont, Bilateral (03.23.24 @ 09:41) >  FINDINGS:  There is diffuse osseous demineralization and osseous deformity   consistent with history of osteogenesis imperfecta. There is interval   osseous bridging across the previously seen right sacral ottoniel fracture.    Right femur: There is interval application of a fiberglass splint about   the right femur and partially imaged knee. Patient is again noted to be   status post fixation of the right femur with an intramedullary anabell   extending from the level of the greater trochanter to the level of the   distal diaphysis. There is redemonstration of an acute comminuted   periprosthetic fracture of the right femoral diametaphyseal region with   extension to the lateral femoral condyle. The alignment and position of   fracture fragments appears grossly unchanged compared to the prior   examination with impaction centrally. Nonbridging callus formation is   seen along the lateral aspect which is new since the prior examination.   Fracture lucency remains diffusely evident without definite bridging   callus formation. No new fracture of the right lower extremity is seen.   There is nonspecific subcutaneous air within the right lateral   subcutaneous fat at the level of the subtrochanteric femur. Correlate for  any recent subcutaneous injection in this region. In the absence of any   intervention in this region possibility of underlying necrotizing   infection is a consideration. There is a small right knee joint effusion.    Left femur: Patient is again noted to be status post fixation of the left   femur with a femoral intramedullary anabell extending from the level of the   greater trochanter to the level of the distal diametaphysis. No evidence   of acute fracture of the left femur.    There is atrophy of the thigh musculature bilaterally. There is   dysplastic remodeling of the femoral head neck junctions bilaterally and   widening and remodeling of the acetabuli bilaterally. This appearance is   unchanged.    There is a fibroid uterus.    IMPRESSION:    Redemonstration of an acute periprosthetic fracture of the right distal   femoral diametaphyseal region. Alignment and position of fracture   fragments is unchanged. Nonbridging callus formation is seen laterally.   No definite bridging callus formation.    Foci of subcutaneous air within the right lateral subcutaneous tissues at   the level of the subtrochanteric femur. This is likely related to   subcutaneous injection in this region. In the absence of any known   injection in this region possibility of necrotizing infection is   considered.    Interval osseous bridging across the previously seen right sacral ottoniel or   fracture.    No evidence of acute fracture of the left femur.    Findings discussed with Dr. Johansen.  --- End of Report ---  < end of copied text >    Care Discussed with Consultants/Other Providers: yes, rehab

## 2024-03-25 NOTE — PROGRESS NOTE ADULT - ASSESSMENT
Mrs. Ivis Grimm is a 53-year-old female patient with past medical history of osteogenesis imperfecta s/p upper & lower extremity hardware, aortic insufficiency, and HTN who is admitted for Acute Inpatient Rehabilitation with a multidisciplinary rehab program at [facility] with functional impairments in ADLs and mobility secondary to multiple fall related trauma.      Polytrauma       * S/P fall       * acute, comminuted periprosthetic fracture adjacent to the tip of the right femoral anabell which begins in the distal femoral metadiaphysis and continues into the lateral femoral condyle       * Age indeterminate fractures: R sacral ala, left scapular body, and right humeral head, status post pin placement       * Prior deformity of the right 3rd rib with likely a superimposed acute fracture, likely acute fractures of right 4th-6th ribs       * S/P fiberglass casting of R arm and R leg       * WB Status: NWB of RUE, NWB of RLE, LUE WBAT       * f/u with orthopedics, Dr. Newsome, 1 week after discharge       * Used manual (self-propelled) wheelchair at baseline  - Impaired ADLs and mobility  - Need for assistance with personal care   - Continue comprehensive rehab program of PT/OT - 3 hours a day, 5 days a week. P&O as needed        * Pain control as below       * Precautions: Fall  - Follow-up imaging to take place on 3/23 at the three-week [place] since her trauma.    Pain Control  - Tylenol 975 mg Q6H  - Flexeril changed to robaxin PRN muscle spasm on 3/21. Tolerating it well. Will increase it to QID.    - gabapentin 100mg Hs. Has been denying it.   - Oxycodone 2.5 Q4H PRN mod pain, oxycodone 5 mg Q4H PRN severe pain    Anemia  - likely 2/2 right knee hemarthrosis  - s/p 1/2 unit PRBC 3/4  - monitor H/H    Aortic Insufficiency/HTN  - Losartan 50 mg BID  - Norvasc 2.5 mg QHS  - Eplerenone 25 mg QD  - Bisoprolol 5 mg/HCTZ 6.25 mg QD **Home med, please have pharmacy verify. It is not in a prescription bottle, may need to call pharmacy.   - Monitor BP    Mood / Cognition  - Neuropsychology consult PRN  - Lexapro 5 mg QD    Sleep  - Maintain quiet hours and a low stim environment.   - Xanax 0.5 mg PRN split in half. She will take 0.25mg as needed.     GI / Bowel  - Senna 1 cap qHS  - Miralax PRN Daily     / Bladder  - Continue bladder scans Q8 hours with straight cath for >400cc    Skin / Pressure injury  - Skin assessment on admission performed: intact  - Monitor Incisions:    - Pressure Injury/Skin: OOB to chair, PT/OT  - nursing to monitor skin qShift    Diet/Dysphagia:  - Diet Consistency: regular  - Nutrition consult    DVT prophylaxis:   - Lovenox      Outpatient Follow-up:  Domenic Newsome  Orthopaedic Surgery  [address], [address]  [place] [address]  Phone: [phone]  Fax: [phone]  Follow Up Time: 2 weeks

## 2024-03-25 NOTE — PROGRESS NOTE ADULT - ASSESSMENT
54 y/o F with PMH osteogenesis imperfecta s/p upper & lower extremity hardware, aortic insufficiency, and HTN who is admitted for Acute Inpatient Rehabilitation with a multidisciplinary rehab program at [facility] with functional impairments in ADLs and mobility secondary to multiple fall related trauma.    #History of Osteogenesis Imperfecta  #Polytrauma s/p Fall  #Gait and Functional Impairment  -Repeat imaging reviewed  -Seen by orthopedics, s/p fiberglass casting of R arm and R leg  -Fall precaution  -Comprehensive rehab program with PT/OT  -Pain control and bowel regimen per rehab team   -F/u with orthopedics, Dr. Newsome, 1 week after discharge  -Used manual (self-propelled) wheelchair at baseline    #Anemia - likely 2/2 right knee hemarthrosis  -s/p 1/2 unit PRBC 3/4  -H/H stable, monitor    #Severe Aortic Insufficiency  #HTN  -Grade IV per patient ; saw CT surgery in Nov 2023 with recommendation for medical management and routine monitoring  -Echo 3/4 showed EF 55 to 60 %, grade 2 LV diastolic dysfunction. There's LVOT obstruction however gradients are LIMITED. Highest 20mmHg which may be underestimated, Severe aortic regurgitation, severe pulmonary hypertension.  -Continue amlodipine, losartan, eplerenone, bisoprolol/HCTZ (home med)  -Monitor respiratory/volume status   -Outpatient cardiology follow up ( Prosper Hinojosa , [facility] ).    #Anxiety  -Continue Lexapro 5mg daily  -Continue xanax qhs prn    DVT PPx - lovenox

## 2024-03-26 LAB
CRP SERPL-MCNC: 8 MG/L — HIGH
ERYTHROCYTE [SEDIMENTATION RATE] IN BLOOD: 42 MM/HR — HIGH (ref 0–20)

## 2024-03-26 PROCEDURE — 99232 SBSQ HOSP IP/OBS MODERATE 35: CPT

## 2024-03-26 RX ADMIN — Medication 975 MILLIGRAM(S): at 06:36

## 2024-03-26 RX ADMIN — ESCITALOPRAM OXALATE 5 MILLIGRAM(S): 10 TABLET, FILM COATED ORAL at 06:36

## 2024-03-26 RX ADMIN — AMLODIPINE BESYLATE 2.5 MILLIGRAM(S): 2.5 TABLET ORAL at 17:54

## 2024-03-26 RX ADMIN — BISOPROLOL FUMARATE AND HYDROCHLOROTHIAZIDE 1 TABLET(S): 5; 6.25 TABLET ORAL at 06:36

## 2024-03-26 RX ADMIN — Medication 975 MILLIGRAM(S): at 13:17

## 2024-03-26 RX ADMIN — Medication 0.25 MILLIGRAM(S): at 22:07

## 2024-03-26 RX ADMIN — EPLERENONE 25 MILLIGRAM(S): 50 TABLET, FILM COATED ORAL at 17:53

## 2024-03-26 RX ADMIN — Medication 975 MILLIGRAM(S): at 22:02

## 2024-03-26 RX ADMIN — ENOXAPARIN SODIUM 30 MILLIGRAM(S): 100 INJECTION SUBCUTANEOUS at 06:36

## 2024-03-26 RX ADMIN — Medication 1 TABLET(S): at 11:33

## 2024-03-26 RX ADMIN — LOSARTAN POTASSIUM 50 MILLIGRAM(S): 100 TABLET, FILM COATED ORAL at 06:36

## 2024-03-26 RX ADMIN — LOSARTAN POTASSIUM 50 MILLIGRAM(S): 100 TABLET, FILM COATED ORAL at 17:53

## 2024-03-26 RX ADMIN — Medication 975 MILLIGRAM(S): at 23:30

## 2024-03-26 RX ADMIN — METHOCARBAMOL 500 MILLIGRAM(S): 500 TABLET, FILM COATED ORAL at 22:03

## 2024-03-26 RX ADMIN — Medication 0.25 MILLIGRAM(S): at 22:03

## 2024-03-26 RX ADMIN — SENNA PLUS 1 TABLET(S): 8.6 TABLET ORAL at 22:02

## 2024-03-26 RX ADMIN — NYSTATIN CREAM 1 APPLICATION(S): 100000 CREAM TOPICAL at 18:30

## 2024-03-26 NOTE — PROGRESS NOTE ADULT - SUBJECTIVE AND OBJECTIVE BOX
HPI:  Mrs. Ivis Grimm is a 53-year-old female patient with past medical history of osteogenesis imperfecta s/p upper & lower extremity hardware, aortic insufficiency, and HTN who presented to [facility] on 3/2 s/p fall from her wheelchair. She reports missing a step on the wheelchair, losing control, and falling face first from the wheelchair at a height of around 2-3 ft face first onto a concrete ground. She lost consciousness and regained her awareness of her surroundings some time later, likely seconds to minutes, with little recollection of the intervening time. Patient noted to have right temporal superficial abrasion, R upper lip laceration through the vermillion border, small ecchymosis on the R chin, tenderness to palpation in the L shoulder/R proximal arm/R wrist/ R thigh with medial rotation of the RLE.      Imaging performed reported acute, comminuted fracture adjacent to the tip of the right femoral anabell which begins in the distal femoral metadiaphysis and continues into the lateral femoral condyle, right knee hemarthrosis, age indeterminate R sacral ala fracture, age-indeterminate left scapular body fracture, age-indeterminate fracture of the right humeral head, status post pin placement, prior deformity of the right third rib with likely a superimposed acute fracture, likely acute fractures of right 4th-6th ribs. Orthopedics was consulted and recommended NWB of RUE; NWB of RLE, LUE can be WBAT given non-displaced scapula fracture and polytrauma. Patient now s/p fiberglass casting of R arm and R leg. No acute orthopedic surgical intervention planned. Patient evaluated by PT/OT and was recommended for acute inpatient rehab. Course complicated by anemia likely 2/2 right knee hemarthrosis, s/p 1/2 unit PRBC. Patient was discharged to [facility] on 3/12.   (12 Mar 2024 15:15)    TDD: 4/9/24 to Home  ___________________________________________________________________________    SUBJECTIVE/ROS  Patient was seen and evaluated at bedside today. In NAD.   Reports adequate sleep with recent medication changes.  She received education on the mechanism of propelling power wheelchair today.  Was provided reassurance.  Patient remains motivated to participate on the recommended rehabilitation program.  Denies any CP, SOB, PEREZ, palpitations, fever, chills, body aches, cough, congestion, or any other symptoms at this time.   ___________________________________________________________________________    IMAGING (per official Radiology reports)    CT FEMUR BI (03/23/2024)      FINDINGS:    There is diffuse osseous demineralization and osseous deformity consistent with history of osteogenesis imperfecta. There is interval osseous bridging across the previously seen right sacral ottoniel fracture.    Right femur: There is interval application of a fiberglass splint about the right femur and partially imaged knee. Patient is again noted to be status post fixation of the right femur with an intramedullary anabell extending from the level of the greater trochanter to the level of the distal diaphysis. There is redemonstration of an acute comminuted periprosthetic fracture of the right femoral diametaphyseal region with extension to the lateral femoral condyle. The alignment and position of fracture fragments appears grossly unchanged compared to the prior examination with impaction centrally. Nonbridging callus formation is seen along the lateral aspect which is new since the prior examination. Fracture lucency remains diffusely evident without definite bridging callus formation. No new fracture of the right lower extremity is seen. There is nonspecific subcutaneous air within the right lateral subcutaneous fat at the level of the subtrochanteric femur. Correlate for any recent subcutaneous injection in this region. In the absence of any intervention in this region possibility of underlying necrotizing infection is a consideration. There is a small right knee joint effusion.     Left femur: Patient is again noted to be status post fixation of the left femur with a femoral intramedullary anabell extending from the level of the greater trochanter to the level of the distal diametaphysis. No evidence of acute fracture of the left femur.    There is atrophy of the thigh musculature bilaterally. There is dysplastic remodeling of the femoral head neck junctions bilaterally and widening and remodeling of the acetabuli bilaterally. This appearance is unchanged.    There is a fibroid uterus.    IMPRESSION:    Redemonstration of an acute periprosthetic fracture of the right distal femoral diametaphyseal region. Alignment and position of fracture fragments is unchanged. Nonbridging callus formation is seen laterally. No definite bridging callus formation. Foci of subcutaneous air within the right lateral subcutaneous tissues at the level of the subtrochanteric femur. This is likely related to subcutaneous injection in this region. In the absence of any known injection in this region possibility of necrotizing infection is considered. Interval osseous bridging across the previously seen right sacral ottoniel or fracture. No evidence of acute fracture of the left femur.      CT HUMERUS BI (03/23/2024)  IMPRESSION: Redemonstration of an impacted fracture at the surgical neck of the right humerus with extension to the greater tuberosity. Alignment and position of fracture fragments is grossly unchanged. Fracture lucency remains evident without definite osseous bridging. Unchanged supracondylar fracture of the right distal humerus which is also unchanged in alignment and position. No definite osseous bridging. Redemonstration of a left scapular body fracture with interval mild osseous ridging. Unchanged subacute appearing sternal fracture. Alignment and position unchanged. Small focus of subcutaneous air within the posterior subcutaneous fat at the level the proximal left humerus. Correlate for recent injection at this site. In the absence of any known injection correlate for any underlying necrotizing infection.    ___________________________________________________________________________    Vital Signs Last 24 Hrs  T(C): 36.8 (26 Mar 2024 07:36), Max: 36.8 (26 Mar 2024 07:36)  T(F): 98.3 (26 Mar 2024 07:36), Max: 98.3 (26 Mar 2024 07:36)  HR: 94 (26 Mar 2024 07:36) (84 - 94)  BP: 132/81 (26 Mar 2024 07:36) (118/73 - 132/81)  RR: 16 (26 Mar 2024 07:36) (16 - 16)  SpO2: 94% (26 Mar 2024 07:36) (94% - 95%)    ___________________________________________________________________________    LAB                        10.0   7.69  )-----------( 561      ( 25 Mar 2024 06:10 )             31.5     03-25    137  |  100  |  15  ----------------------------<  95  4.3   |  27  |  <0.20<L>    Ca    9.5      25 Mar 2024 06:10    TPro  6.7  /  Alb  3.2<L>  /  TBili  0.5  /  DBili  x   /  AST  20  /  ALT  23  /  AlkPhos  189<H>  03-25    LIVER FUNCTIONS - ( 25 Mar 2024 06:10 )  Alb: 3.2 g/dL / Pro: 6.7 g/dL / ALK PHOS: 189 U/L / ALT: 23 U/L / AST: 20 U/L / GGT: x           ___________________________________________________________________________    MEDICATIONS  (STANDING):  amLODIPine   Tablet 2.5 milliGRAM(s) Oral <User Schedule>  bisoprolol  5 mG/hydrochlorothiazide 6.25 mG 1 Tablet(s) Oral daily  enoxaparin Injectable 30 milliGRAM(s) SubCutaneous every 24 hours  eplerenone 25 milliGRAM(s) Oral <User Schedule>  escitalopram 5 milliGRAM(s) Oral <User Schedule>  gabapentin 100 milliGRAM(s) Oral at bedtime  losartan 50 milliGRAM(s) Oral two times a day  multivitamin 1 Tablet(s) Oral daily  nystatin Powder 1 Application(s) Topical two times a day  senna 1 Tablet(s) Oral at bedtime    MEDICATIONS  (PRN):  acetaminophen     Tablet .. 975 milliGRAM(s) Oral every 6 hours PRN Mild Pain (1 - 3)  ALPRAZolam 0.25 milliGRAM(s) Oral at bedtime PRN Sleep  ALPRAZolam 0.25 milliGRAM(s) Oral at bedtime PRN Sleep  cyclobenzaprine 10 milliGRAM(s) Oral three times a day PRN Muscle Spasm  methocarbamol 500 milliGRAM(s) Oral four times a day PRN Muscle Spasm  oxyCODONE    IR 2.5 milliGRAM(s) Oral every 4 hours PRN Moderate Pain (4 - 6)  oxyCODONE    IR 5 milliGRAM(s) Oral every 4 hours PRN Severe Pain (7 - 10)  polyethylene glycol 3350 17 Gram(s) Oral daily PRN Constipation    ___________________________________________________________________________    PHYSICAL EXAM:    Gen - NAD, Comfortable  HEENT -EOMI, MMM. Ecchymosis of chin  Pulm - CTAB, No wheeze, No rhonchi, No crackles  Cardiovascular - RRR, S1S2, +murmur  Abdomen - Soft, NT/ND, +BS  Extremities - right cast on UE and LE.   Neuro-     Cognitive - AAOx3     Communication - Fluent, No dysarthria     Attention: Intact      Judgement: Good evidence of judgement     Memory: Recall 3 objects immediate and 3 min later         Cranial Nerves - CN 2-12 intact     Motor -                    LEFT    UE - ShAB limited 2/2 scap fracture, EF 5/5, EE 5/5, WE 5/5,  5/5                     RIGHT UE - n/a                    LEFT    LE - HF 4/5, KE 4/5, DF 4/5, PF 4/5                    RIGHT LE - n/a     Sensory - Intact to LT     Tone - normal  Psychiatric - Mood stable, Affect WNL  Skin: Intact  Wounds: None Present.     ___________________________________________________________________________

## 2024-03-26 NOTE — PROGRESS NOTE ADULT - ASSESSMENT
Mrs. Ivis Grimm is a 53-year-old female patient with past medical history of osteogenesis imperfecta s/p upper & lower extremity hardware, aortic insufficiency, and HTN who presented to [facility] on 3/2 s/p fall from her wheelchair. She reports missing a step on the wheelchair, losing control, and falling face first from the wheelchair at a height of around 2-3 ft face first onto a concrete ground. She lost consciousness and regained her awareness of her surroundings some time later, likely seconds to minutes, with little recollection of the intervening time.     Imaging performed reported acute, comminuted fracture adjacent to the tip of the right femoral anabell which begins in the distal femoral metadiaphysis and continues into the lateral femoral condyle, right knee hemarthrosis, age indeterminate R sacral ala fracture, age-indeterminate left scapular body fracture, age-indeterminate fracture of the right humeral head, status post pin placement, prior deformity of the right third rib with likely a superimposed acute fracture, likely acute fractures of right 4th-6th ribs. Orthopedics was consulted and recommended NWB of RUE; NWB of RLE, LUE can be WBAT given non-displaced scapula fracture and polytrauma. Patient now s/p fiberglass casting of R arm and R leg. No acute orthopedic surgical intervention planned. Patient evaluated by PT/OT and was recommended for acute inpatient rehab. Course complicated by anemia likely 2/2 right knee hemarthrosis, s/p 1/2 unit PRBC. Patient was discharged to [facility] on 3/12.  Hematology consulted for thrombocytosis.

## 2024-03-26 NOTE — PROGRESS NOTE ADULT - SUBJECTIVE AND OBJECTIVE BOX
LO GRIMM   53y   Female    Admitting: [facility]  HPI:    Mrs. Lo Grimm is a 53-year-old female patient with past medical history of osteogenesis imperfecta s/p upper & lower extremity hardware, aortic insufficiency, and HTN who presented to [facility] on 3/2 s/p fall from her wheelchair. She reports missing a step on the wheelchair, losing control, and falling face first from the wheelchair at a height of around 2-3 ft face first onto a concrete ground. She lost consciousness and regained her awareness of her surroundings some time later, likely seconds to minutes, with little recollection of the intervening time.     Imaging performed reported acute, comminuted fracture adjacent to the tip of the right femoral anabell which begins in the distal femoral metadiaphysis and continues into the lateral femoral condyle, right knee hemarthrosis, age indeterminate R sacral ala fracture, age-indeterminate left scapular body fracture, age-indeterminate fracture of the right humeral head, status post pin placement, prior deformity of the right third rib with likely a superimposed acute fracture, likely acute fractures of right 4th-6th ribs. Orthopedics was consulted and recommended NWB of RUE; NWB of RLE, LUE can be WBAT given non-displaced scapula fracture and polytrauma. Patient now s/p fiberglass casting of R arm and R leg. No acute orthopedic surgical intervention planned. Patient evaluated by PT/OT and was recommended for acute inpatient rehab. Course complicated by anemia likely 2/2 right knee hemarthrosis, s/p 1/2 unit PRBC. Patient was discharged to [facility] on 3/12.  Hematology consulted for thrombocytosis.    PAST MEDICAL & SURGICAL HISTORY:  Osteogenesis imperfecta      Aortic insufficiency      HTN (hypertension)      Presence of internal fixation anabell in upper extremity        HEALTH ISSUES - PROBLEM Dx:  Thrombocytosis      MEDICATIONS  (STANDING):  amLODIPine   Tablet 2.5 milliGRAM(s) Oral <User Schedule>  bisoprolol  5 mG/hydrochlorothiazide 6.25 mG 1 Tablet(s) Oral daily  enoxaparin Injectable 30 milliGRAM(s) SubCutaneous every 24 hours  eplerenone 25 milliGRAM(s) Oral <User Schedule>  escitalopram 5 milliGRAM(s) Oral <User Schedule>  gabapentin 100 milliGRAM(s) Oral at bedtime  losartan 50 milliGRAM(s) Oral two times a day  multivitamin 1 Tablet(s) Oral daily  nystatin Powder 1 Application(s) Topical two times a day  senna 1 Tablet(s) Oral at bedtime    MEDICATIONS  (PRN):  acetaminophen     Tablet .. 975 milliGRAM(s) Oral every 6 hours PRN Mild Pain (1 - 3)  ALPRAZolam 0.25 milliGRAM(s) Oral at bedtime PRN Sleep  ALPRAZolam 0.25 milliGRAM(s) Oral at bedtime PRN Sleep  cyclobenzaprine 10 milliGRAM(s) Oral three times a day PRN Muscle Spasm  methocarbamol 500 milliGRAM(s) Oral four times a day PRN Muscle Spasm  oxyCODONE    IR 2.5 milliGRAM(s) Oral every 4 hours PRN Moderate Pain (4 - 6)  oxyCODONE    IR 5 milliGRAM(s) Oral every 4 hours PRN Severe Pain (7 - 10)  polyethylene glycol 3350 17 Gram(s) Oral daily PRN Constipation    Allergies    No Known Allergies    INTERVAL HPI/OVERNIGHT EVENTS:  Patient S&E at bedside. No c/o CP or SOB.     VITAL SIGNS:  T(F): 98.3 (03-26-24 @ 07:36)  HR: 94 (03-26-24 @ 07:36)  BP: 132/81 (03-26-24 @ 07:36)  RR: 16 (03-26-24 @ 07:36)  SpO2: 94% (03-26-24 @ 07:36)      PHYSICAL EXAM:  Constitutional: NAD  Eyes: sclera non-icteric  Neck: no JVD  Respiratory: decreased BS bases ant.  Cardiovascular: RRR, no M/R/G  Gastrointestinal: soft, NTND    Neurological: Awake, alert.    Labs:             10.0   7.69  )-----------( 561      ( 03-25 @ 06:10 )             31.5       03-25    137  |  100  |  15  ----------------------------<  95  4.3   |  27  |  <0.20<L>    Ca    9.5      25 Mar 2024 06:10    TPro  6.7  /  Alb  3.2<L>  /  TBili  0.5  /  DBili  x   /  AST  20  /  ALT  23  /  AlkPhos  189<H>  03-25                              Urinalysis Basic - ( 25 Mar 2024 06:10 )    Color: x / Appearance: x / SG: x / pH: x  Gluc: 95 mg/dL / Ketone: x  / Bili: x / Urobili: x   Blood: x / Protein: x / Nitrite: x   Leuk Esterase: x / RBC: x / WBC x   Sq Epi: x / Non Sq Epi: x / Bacteria: x          RADIOLOGY & ADDITIONAL TESTS:  Consultant notes reviewed : YES [ ] ; NO [ ]

## 2024-03-26 NOTE — PROGRESS NOTE ADULT - ASSESSMENT
Mrs. Ivis Grimm is a 53-year-old female patient with past medical history of osteogenesis imperfecta s/p upper & lower extremity hardware, aortic insufficiency, and HTN who is admitted for Acute Inpatient Rehabilitation with a multidisciplinary rehab program at [facility] with functional impairments in ADLs and mobility secondary to multiple fall related trauma.      Polytrauma       * S/P fall       * acute, comminuted periprosthetic fracture adjacent to the tip of the right femoral anabell which begins in the distal femoral metadiaphysis and continues into the lateral femoral condyle       * Age indeterminate fractures: R sacral ala, left scapular body, and right humeral head, status post pin placement       * Prior deformity of the right 3rd rib with likely a superimposed acute fracture, likely acute fractures of right 4th-6th ribs       * S/P fiberglass casting of R arm and R leg       * WB Status: NWB of RUE, NWB of RLE, LUE WBAT       * f/u with orthopedics, Dr. Newsome, 1 week after discharge       * Used manual (self-propelled) wheelchair at baseline  - Impaired ADLs and mobility  - Need for assistance with personal care   - Continue comprehensive rehab program of PT/OT - 3 hours a day, 5 days a week. P&O as needed        * Pain control as below       * Precautions: Fall  - Follow-up imaging to take place on 3/23 at the three-week [facility] since her trauma.    Pain Control  - Tylenol 975 mg Q6H  - Flexeril changed to robaxin PRN muscle spasm on 3/21. Tolerating it well. Will increase it to QID.    - gabapentin 100mg Hs. Has been denying it.   - Oxycodone 2.5 Q4H PRN mod pain, oxycodone 5 mg Q4H PRN severe pain    Anemia  - likely 2/2 right knee hemarthrosis  - s/p 1/2 unit PRBC 3/4  - monitor H/H    Aortic Insufficiency/HTN  - Losartan 50 mg BID  - Norvasc 2.5 mg QHS  - Eplerenone 25 mg QD  - Bisoprolol 5 mg/HCTZ 6.25 mg QD **Home med, please have pharmacy verify. It is not in a prescription bottle, may need to call pharmacy.   - Monitor BP    Mood / Cognition  - Neuropsychology consult PRN  - Lexapro 5 mg QD    Sleep  - Maintain quiet hours and a low stim environment.   - Xanax 0.5 mg PRN split in half. She will take 0.25mg as needed.     GI / Bowel  - Senna 1 cap qHS  - Miralax PRN Daily     / Bladder  - Continue bladder scans Q8 hours with straight cath for >400cc    Skin / Pressure injury  - Skin assessment on admission performed: intact  - Monitor Incisions:    - Pressure Injury/Skin: OOB to chair, PT/OT  - nursing to monitor skin qShift    Diet/Dysphagia:  - Diet Consistency: regular  - Nutrition consult    DVT prophylaxis:   - Lovenox      Outpatient Follow-up:  Domenic Newsome  Orthopaedic Surgery  [address], [address]  [place] [address]  Phone: [phone]  Fax: [phone]  Follow Up Time: 2 weeks

## 2024-03-27 PROCEDURE — 99232 SBSQ HOSP IP/OBS MODERATE 35: CPT

## 2024-03-27 RX ADMIN — ESCITALOPRAM OXALATE 5 MILLIGRAM(S): 10 TABLET, FILM COATED ORAL at 05:57

## 2024-03-27 RX ADMIN — Medication 975 MILLIGRAM(S): at 11:56

## 2024-03-27 RX ADMIN — Medication 975 MILLIGRAM(S): at 12:26

## 2024-03-27 RX ADMIN — Medication 975 MILLIGRAM(S): at 23:36

## 2024-03-27 RX ADMIN — LOSARTAN POTASSIUM 50 MILLIGRAM(S): 100 TABLET, FILM COATED ORAL at 17:46

## 2024-03-27 RX ADMIN — Medication 975 MILLIGRAM(S): at 22:01

## 2024-03-27 RX ADMIN — ENOXAPARIN SODIUM 30 MILLIGRAM(S): 100 INJECTION SUBCUTANEOUS at 05:56

## 2024-03-27 RX ADMIN — Medication 0.25 MILLIGRAM(S): at 22:01

## 2024-03-27 RX ADMIN — EPLERENONE 25 MILLIGRAM(S): 50 TABLET, FILM COATED ORAL at 17:47

## 2024-03-27 RX ADMIN — NYSTATIN CREAM 1 APPLICATION(S): 100000 CREAM TOPICAL at 06:03

## 2024-03-27 RX ADMIN — AMLODIPINE BESYLATE 2.5 MILLIGRAM(S): 2.5 TABLET ORAL at 17:47

## 2024-03-27 RX ADMIN — LOSARTAN POTASSIUM 50 MILLIGRAM(S): 100 TABLET, FILM COATED ORAL at 05:56

## 2024-03-27 RX ADMIN — BISOPROLOL FUMARATE AND HYDROCHLOROTHIAZIDE 1 TABLET(S): 5; 6.25 TABLET ORAL at 05:57

## 2024-03-27 RX ADMIN — Medication 1 TABLET(S): at 11:56

## 2024-03-27 RX ADMIN — METHOCARBAMOL 500 MILLIGRAM(S): 500 TABLET, FILM COATED ORAL at 22:01

## 2024-03-27 RX ADMIN — Medication 0.25 MILLIGRAM(S): at 22:02

## 2024-03-27 RX ADMIN — Medication 975 MILLIGRAM(S): at 05:58

## 2024-03-27 RX ADMIN — Medication 975 MILLIGRAM(S): at 06:51

## 2024-03-27 NOTE — PROGRESS NOTE ADULT - ASSESSMENT
Mrs. Ivis Grimm is a 53-year-old female patient with past medical history of osteogenesis imperfecta s/p upper & lower extremity hardware, aortic insufficiency, and HTN who is admitted for Acute Inpatient Rehabilitation with a multidisciplinary rehab program at [facility] with functional impairments in ADLs and mobility secondary to multiple fall related trauma.      Polytrauma       * S/P fall       * acute, comminuted periprosthetic fracture adjacent to the tip of the right femoral anabell which begins in the distal femoral metadiaphysis and continues into the lateral femoral condyle       * Age indeterminate fractures: R sacral ala, left scapular body, and right humeral head, status post pin placement       * Prior deformity of the right 3rd rib with likely a superimposed acute fracture, likely acute fractures of right 4th-6th ribs       * S/P fiberglass casting of R arm and R leg       * WB Status: NWB of RUE, NWB of RLE, LUE WBAT       * f/u with orthopedics, Dr. Newsome, 1 week after discharge       * Used manual (self-propelled) wheelchair at baseline  - Impaired ADLs and mobility  - Need for assistance with personal care   - Continue comprehensive rehab program of PT/OT - 3 hours a day, 5 days a week. P&O as needed        * Pain control as below       * Precautions: Fall  - Follow-up imaging to take place on 3/23 at the three-[facility] since her trauma.    Pain Control  - Tylenol 975 mg Q6H  - Flexeril changed to robaxin PRN muscle spasm on 3/21. Tolerating it well. Will increase it to QID.    - gabapentin 100mg Hs. Has been denying it.   - Oxycodone 2.5 Q4H PRN mod pain, oxycodone 5 mg Q4H PRN severe pain    Anemia  - likely 2/2 right knee hemarthrosis  - s/p 1/2 unit PRBC 3/4  - monitor H/H    Aortic Insufficiency/HTN  - Losartan 50 mg BID  - Norvasc 2.5 mg QHS  - Eplerenone 25 mg QD  - Bisoprolol 5 mg/HCTZ 6.25 mg QD **Home med, please have pharmacy verify. It is not in a prescription bottle, may need to call pharmacy.   - Monitor BP    Mood / Cognition  - Neuropsychology consult PRN  - Lexapro 5 mg QD    Sleep  - Maintain quiet hours and a low stim environment.   - Xanax 0.5 mg PRN split in half. She will take 0.25mg as needed.     GI / Bowel  - Senna 1 cap qHS  - Miralax PRN Daily     / Bladder  - Continue bladder scans Q8 hours with straight cath for >400cc    Skin / Pressure injury  - Skin assessment on admission performed: intact  - Monitor Incisions:    - Pressure Injury/Skin: OOB to chair, PT/OT  - nursing to monitor skin qShift    Diet/Dysphagia:  - Diet Consistency: regular  - Nutrition consult    DVT prophylaxis:   - Lovenox      Outpatient Follow-up:  Domenic Newsome  Orthopaedic Surgery  [address], [address]  [place] [address]  Phone: [phone]  Fax: [phone]  Follow Up Time: 2 weeks

## 2024-03-27 NOTE — PROGRESS NOTE ADULT - SUBJECTIVE AND OBJECTIVE BOX
Patient is a 53y old  Female who presents with a chief complaint of Polytrauma (26 Mar 2024 12:19)      SUBJECTIVE / OVERNIGHT EVENTS:  Pt seen and examined at bedside. No acute events overnight.  Pt denies cp, palpitations, sob, abd pain, N/V, fever, chills.    ROS:  All other review of systems negative    Allergies    No Known Allergies    Intolerances        MEDICATIONS  (STANDING):  amLODIPine   Tablet 2.5 milliGRAM(s) Oral <User Schedule>  bisoprolol  5 mG/hydrochlorothiazide 6.25 mG 1 Tablet(s) Oral daily  enoxaparin Injectable 30 milliGRAM(s) SubCutaneous every 24 hours  eplerenone 25 milliGRAM(s) Oral <User Schedule>  escitalopram 5 milliGRAM(s) Oral <User Schedule>  gabapentin 100 milliGRAM(s) Oral at bedtime  losartan 50 milliGRAM(s) Oral two times a day  multivitamin 1 Tablet(s) Oral daily  nystatin Powder 1 Application(s) Topical two times a day  senna 1 Tablet(s) Oral at bedtime    MEDICATIONS  (PRN):  acetaminophen     Tablet .. 975 milliGRAM(s) Oral every 6 hours PRN Mild Pain (1 - 3)  ALPRAZolam 0.25 milliGRAM(s) Oral at bedtime PRN Sleep  ALPRAZolam 0.25 milliGRAM(s) Oral at bedtime PRN Sleep  cyclobenzaprine 10 milliGRAM(s) Oral three times a day PRN Muscle Spasm  methocarbamol 500 milliGRAM(s) Oral four times a day PRN Muscle Spasm  oxyCODONE    IR 2.5 milliGRAM(s) Oral every 4 hours PRN Moderate Pain (4 - 6)  oxyCODONE    IR 5 milliGRAM(s) Oral every 4 hours PRN Severe Pain (7 - 10)  polyethylene glycol 3350 17 Gram(s) Oral daily PRN Constipation      Vital Signs Last 24 Hrs  T(C): 36.7 (26 Mar 2024 21:59), Max: 36.7 (26 Mar 2024 21:59)  T(F): 98.1 (26 Mar 2024 21:59), Max: 98.1 (26 Mar 2024 21:59)  HR: 77 (27 Mar 2024 08:20) (77 - 102)  BP: 129/62 (27 Mar 2024 08:20) (115/64 - 129/62)  BP(mean): --  RR: 18 (27 Mar 2024 08:20) (17 - 18)  SpO2: 96% (27 Mar 2024 08:20) (96% - 96%)    Parameters below as of 27 Mar 2024 08:20  Patient On (Oxygen Delivery Method): room air      CAPILLARY BLOOD GLUCOSE        I&O's Summary    26 Mar 2024 07:01  -  27 Mar 2024 07:00  --------------------------------------------------------  IN: 0 mL / OUT: 1 mL / NET: -1 mL        PHYSICAL EXAM:  GENERAL: NAD, short stature  HEAD:  Atraumatic, Normocephalic  NECK: Supple, No JVD  CHEST/LUNG: Clear to auscultation bilaterally; No wheeze, nonlabored breathing  HEART: Regular rate and rhythm; + murmurs  ABDOMEN: Soft, Nontender, Nondistended; Bowel sounds present  EXTREMITIES: No clubbing, cyanosis, or edema, +RUE, LE cast. LUE +bowing   PSYCH: calm, appropriate mood    LABS:                    RADIOLOGY & ADDITIONAL TESTS:  Results Reviewed:   Imaging Personally Reviewed:  Electrocardiogram Personally Reviewed:    COORDINATION OF CARE:  Care Discussed with Consultants/Other Providers [Y/N]:  Prior or Outpatient Records Reviewed [Y/N]:

## 2024-03-27 NOTE — PROGRESS NOTE ADULT - SUBJECTIVE AND OBJECTIVE BOX
HPI:  Mrs. Ivis Grimm is a 53-year-old female patient with past medical history of osteogenesis imperfecta s/p upper & lower extremity hardware, aortic insufficiency, and HTN who presented to [facility] on 3/2 s/p fall from her wheelchair. She reports missing a step on the wheelchair, losing control, and falling face first from the wheelchair at a height of around 2-3 ft face first onto a concrete ground. She lost consciousness and regained her awareness of her surroundings some time later, likely seconds to minutes, with little recollection of the intervening time. Patient noted to have right temporal superficial abrasion, R upper lip laceration through the vermillion border, small ecchymosis on the R chin, tenderness to palpation in the L shoulder/R proximal arm/R wrist/ R thigh with medial rotation of the RLE.      Imaging performed reported acute, comminuted fracture adjacent to the tip of the right femoral anabell which begins in the distal femoral metadiaphysis and continues into the lateral femoral condyle, right knee hemarthrosis, age indeterminate R sacral ala fracture, age-indeterminate left scapular body fracture, age-indeterminate fracture of the right humeral head, status post pin placement, prior deformity of the right third rib with likely a superimposed acute fracture, likely acute fractures of right 4th-6th ribs. Orthopedics was consulted and recommended NWB of RUE; NWB of RLE, LUE can be WBAT given non-displaced scapula fracture and polytrauma. Patient now s/p fiberglass casting of R arm and R leg. No acute orthopedic surgical intervention planned. Patient evaluated by PT/OT and was recommended for acute inpatient rehab. Course complicated by anemia likely 2/2 right knee hemarthrosis, s/p 1/2 unit PRBC. Patient was discharged to [facility] on 3/12.   (12 Mar 2024 15:15)    TDD: 3/30/24 to Home  ___________________________________________________________________________    SUBJECTIVE/ROS  Patient was seen and evaluated at bedside today. In NAD.   Reports adequate sleep with recent medication changes.  Continues training on propelling power wheelchair .  Was provided reassurance and expressed feeling great added comfort.  Requested discharge on 3/30 and will coordinate..  Patient remains motivated to participate on the recommended rehabilitation program.  Denies any CP, SOB, PEREZ, palpitations, fever, chills, body aches, cough, congestion, or any other symptoms at this time.   ___________________________________________________________________________    IMAGING (per official Radiology reports)    CT FEMUR BI (03/23/2024)      FINDINGS:    There is diffuse osseous demineralization and osseous deformity consistent with history of osteogenesis imperfecta. There is interval osseous bridging across the previously seen right sacral ottoniel fracture.    Right femur: There is interval application of a fiberglass splint about the right femur and partially imaged knee. Patient is again noted to be status post fixation of the right femur with an intramedullary anabell extending from the level of the greater trochanter to the level of the distal diaphysis. There is redemonstration of an acute comminuted periprosthetic fracture of the right femoral diametaphyseal region with extension to the lateral femoral condyle. The alignment and position of fracture fragments appears grossly unchanged compared to the prior examination with impaction centrally. Nonbridging callus formation is seen along the lateral aspect which is new since the prior examination. Fracture lucency remains diffusely evident without definite bridging callus formation. No new fracture of the right lower extremity is seen. There is nonspecific subcutaneous air within the right lateral subcutaneous fat at the level of the subtrochanteric femur. Correlate for any recent subcutaneous injection in this region. In the absence of any intervention in this region possibility of underlying necrotizing infection is a consideration. There is a small right knee joint effusion.     Left femur: Patient is again noted to be status post fixation of the left femur with a femoral intramedullary anabell extending from the level of the greater trochanter to the level of the distal diametaphysis. No evidence of acute fracture of the left femur.    There is atrophy of the thigh musculature bilaterally. There is dysplastic remodeling of the femoral head neck junctions bilaterally and widening and remodeling of the acetabuli bilaterally. This appearance is unchanged.    There is a fibroid uterus.    IMPRESSION:    Redemonstration of an acute periprosthetic fracture of the right distal femoral diametaphyseal region. Alignment and position of fracture fragments is unchanged. Nonbridging callus formation is seen laterally. No definite bridging callus formation. Foci of subcutaneous air within the right lateral subcutaneous tissues at the level of the subtrochanteric femur. This is likely related to subcutaneous injection in this region. In the absence of any known injection in this region possibility of necrotizing infection is considered. Interval osseous bridging across the previously seen right sacral ottoniel or fracture. No evidence of acute fracture of the left femur.      CT HUMERUS BI (03/23/2024)  IMPRESSION: Redemonstration of an impacted fracture at the surgical neck of the right humerus with extension to the greater tuberosity. Alignment and position of fracture fragments is grossly unchanged. Fracture lucency remains evident without definite osseous bridging. Unchanged supracondylar fracture of the right distal humerus which is also unchanged in alignment and position. No definite osseous bridging. Redemonstration of a left scapular body fracture with interval mild osseous ridging. Unchanged subacute appearing sternal fracture. Alignment and position unchanged. Small focus of subcutaneous air within the posterior subcutaneous fat at the level the proximal left humerus. Correlate for recent injection at this site. In the absence of any known injection correlate for any underlying necrotizing infection.    ___________________________________________________________________________    Vital Signs Last 24 Hrs  T(C): 36.7 (26 Mar 2024 21:59), Max: 36.7 (26 Mar 2024 21:59)  T(F): 98.1 (26 Mar 2024 21:59), Max: 98.1 (26 Mar 2024 21:59)  HR: 77 (27 Mar 2024 08:20) (77 - 102)  BP: 129/62 (27 Mar 2024 08:20) (115/64 - 129/62)  RR: 18 (27 Mar 2024 08:20) (17 - 18)  SpO2: 96% (27 Mar 2024 08:20) (96% - 96%)    ___________________________________________________________________________    LAB                        10.0   7.69  )-----------( 561      ( 25 Mar 2024 06:10 )             31.5     03-25    137  |  100  |  15  ----------------------------<  95  4.3   |  27  |  <0.20<L>    Ca    9.5      25 Mar 2024 06:10    TPro  6.7  /  Alb  3.2<L>  /  TBili  0.5  /  DBili  x   /  AST  20  /  ALT  23  /  AlkPhos  189<H>  03-25    LIVER FUNCTIONS - ( 25 Mar 2024 06:10 )  Alb: 3.2 g/dL / Pro: 6.7 g/dL / ALK PHOS: 189 U/L / ALT: 23 U/L / AST: 20 U/L / GGT: x           C-Reactive Protein, Serum (03.26.24 @ 05:51)   C-Reactive Protein, Serum: 8 mg/L    Sedimentation Rate, Erythrocyte (03.26.24 @ 05:51)   Sedimentation Rate, Erythrocyte: 42 mm/hr    ___________________________________________________________________________    MEDICATIONS  (STANDING):  amLODIPine   Tablet 2.5 milliGRAM(s) Oral <User Schedule>  bisoprolol  5 mG/hydrochlorothiazide 6.25 mG 1 Tablet(s) Oral daily  enoxaparin Injectable 30 milliGRAM(s) SubCutaneous every 24 hours  eplerenone 25 milliGRAM(s) Oral <User Schedule>  escitalopram 5 milliGRAM(s) Oral <User Schedule>  gabapentin 100 milliGRAM(s) Oral at bedtime  losartan 50 milliGRAM(s) Oral two times a day  multivitamin 1 Tablet(s) Oral daily  nystatin Powder 1 Application(s) Topical two times a day  senna 1 Tablet(s) Oral at bedtime    MEDICATIONS  (PRN):  acetaminophen     Tablet .. 975 milliGRAM(s) Oral every 6 hours PRN Mild Pain (1 - 3)  ALPRAZolam 0.25 milliGRAM(s) Oral at bedtime PRN Sleep  ALPRAZolam 0.25 milliGRAM(s) Oral at bedtime PRN Sleep  cyclobenzaprine 10 milliGRAM(s) Oral three times a day PRN Muscle Spasm  methocarbamol 500 milliGRAM(s) Oral four times a day PRN Muscle Spasm  oxyCODONE    IR 2.5 milliGRAM(s) Oral every 4 hours PRN Moderate Pain (4 - 6)  oxyCODONE    IR 5 milliGRAM(s) Oral every 4 hours PRN Severe Pain (7 - 10)  polyethylene glycol 3350 17 Gram(s) Oral daily PRN Constipation    ___________________________________________________________________________    PHYSICAL EXAM:    Gen - NAD, Comfortable  HEENT -EOMI, MMM. Ecchymosis of chin  Pulm - CTAB, No wheeze, No rhonchi, No crackles  Cardiovascular - RRR, S1S2, +murmur  Abdomen - Soft, NT/ND, +BS  Extremities - right cast on UE and LE.   Neuro-     Cognitive - AAOx3     Communication - Fluent, No dysarthria     Attention: Intact      Judgement: Good evidence of judgement     Memory: Recall 3 objects immediate and 3 min later         Cranial Nerves - CN 2-12 intact     Motor -                    LEFT    UE - ShAB limited 2/2 scap fracture, EF 5/5, EE 5/5, WE 5/5,  5/5                     RIGHT UE - n/a                    LEFT    LE - HF 4/5, KE 4/5, DF 4/5, PF 4/5                    RIGHT LE - n/a     Sensory - Intact to LT     Tone - normal  Psychiatric - Mood stable, Affect WNL  Skin: Intact  Wounds: None Present.     ___________________________________________________________________________

## 2024-03-27 NOTE — PROGRESS NOTE ADULT - ASSESSMENT
54 y/o F with PMH osteogenesis imperfecta s/p upper & lower extremity hardware, aortic insufficiency, and HTN who is admitted for Acute Inpatient Rehabilitation with a multidisciplinary rehab program at [facility] with functional impairments in ADLs and mobility secondary to multiple fall related trauma.    #History of Osteogenesis Imperfecta  #Polytrauma s/p Fall  #Gait and Functional Impairment  -Repeat imaging reviewed  -Seen by orthopedics, s/p fiberglass casting of R arm and R leg  -Fall precaution  -F/u with orthopedics, Dr. Newsome, 1 week after discharge  -Used manual (self-propelled) wheelchair at baseline    #Anemia - likely 2/2 right knee hemarthrosis  -s/p 1/2 unit PRBC 3/4  -H/H stable, monitor    #Severe Aortic Insufficiency  #HTN  -Grade IV per patient ; saw CT surgery in Nov 2023 with recommendation for medical management and routine monitoring  -Echo 3/4 showed EF 55 to 60 %, grade 2 LV diastolic dysfunction. There's LVOT obstruction however gradients are LIMITED. Highest 20mmHg which may be underestimated, Severe aortic regurgitation, severe pulmonary hypertension.  -Continue amlodipine, losartan, eplerenone, bisoprolol/HCTZ (home med)  -Monitor respiratory/volume status   -Outpatient cardiology follow up ( Prosper Hinojosa , [facility] ).    #Anxiety  -Continue Lexapro 5mg daily  -Continue xanax qhs prn    DVT PPx - lovenox

## 2024-03-28 ENCOUNTER — TRANSCRIPTION ENCOUNTER (OUTPATIENT)
Age: 54
End: 2024-03-28

## 2024-03-28 LAB
ALBUMIN SERPL ELPH-MCNC: 3.2 G/DL — LOW (ref 3.3–5)
ALP SERPL-CCNC: 162 U/L — HIGH (ref 40–120)
ALT FLD-CCNC: 36 U/L — SIGNIFICANT CHANGE UP (ref 10–45)
ANION GAP SERPL CALC-SCNC: 7 MMOL/L — SIGNIFICANT CHANGE UP (ref 5–17)
AST SERPL-CCNC: 26 U/L — SIGNIFICANT CHANGE UP (ref 10–40)
BILIRUB SERPL-MCNC: 0.5 MG/DL — SIGNIFICANT CHANGE UP (ref 0.2–1.2)
BUN SERPL-MCNC: 13 MG/DL — SIGNIFICANT CHANGE UP (ref 7–23)
CALCIUM SERPL-MCNC: 9.4 MG/DL — SIGNIFICANT CHANGE UP (ref 8.4–10.5)
CHLORIDE SERPL-SCNC: 103 MMOL/L — SIGNIFICANT CHANGE UP (ref 96–108)
CO2 SERPL-SCNC: 30 MMOL/L — SIGNIFICANT CHANGE UP (ref 22–31)
CREAT SERPL-MCNC: 0.26 MG/DL — LOW (ref 0.5–1.3)
EGFR: 130 ML/MIN/1.73M2 — SIGNIFICANT CHANGE UP
GLUCOSE SERPL-MCNC: 87 MG/DL — SIGNIFICANT CHANGE UP (ref 70–99)
HCT VFR BLD CALC: 32.7 % — LOW (ref 34.5–45)
HGB BLD-MCNC: 10.6 G/DL — LOW (ref 11.5–15.5)
MCHC RBC-ENTMCNC: 29.4 PG — SIGNIFICANT CHANGE UP (ref 27–34)
MCHC RBC-ENTMCNC: 32.4 GM/DL — SIGNIFICANT CHANGE UP (ref 32–36)
MCV RBC AUTO: 90.6 FL — SIGNIFICANT CHANGE UP (ref 80–100)
NRBC # BLD: 0 /100 WBCS — SIGNIFICANT CHANGE UP (ref 0–0)
PLATELET # BLD AUTO: 471 K/UL — HIGH (ref 150–400)
POTASSIUM SERPL-MCNC: 3.5 MMOL/L — SIGNIFICANT CHANGE UP (ref 3.5–5.3)
POTASSIUM SERPL-SCNC: 3.5 MMOL/L — SIGNIFICANT CHANGE UP (ref 3.5–5.3)
PROT SERPL-MCNC: 6.9 G/DL — SIGNIFICANT CHANGE UP (ref 6–8.3)
RBC # BLD: 3.61 M/UL — LOW (ref 3.8–5.2)
RBC # FLD: 13.9 % — SIGNIFICANT CHANGE UP (ref 10.3–14.5)
SODIUM SERPL-SCNC: 140 MMOL/L — SIGNIFICANT CHANGE UP (ref 135–145)
WBC # BLD: 7.08 K/UL — SIGNIFICANT CHANGE UP (ref 3.8–10.5)
WBC # FLD AUTO: 7.08 K/UL — SIGNIFICANT CHANGE UP (ref 3.8–10.5)

## 2024-03-28 PROCEDURE — 99232 SBSQ HOSP IP/OBS MODERATE 35: CPT

## 2024-03-28 RX ORDER — AMLODIPINE BESYLATE 2.5 MG/1
1 TABLET ORAL
Qty: 30 | Refills: 0
Start: 2024-03-28 | End: 2024-04-26

## 2024-03-28 RX ORDER — GABAPENTIN 400 MG/1
1 CAPSULE ORAL
Qty: 30 | Refills: 0
Start: 2024-03-28 | End: 2024-04-26

## 2024-03-28 RX ORDER — LOSARTAN POTASSIUM 100 MG/1
1 TABLET, FILM COATED ORAL
Qty: 60 | Refills: 0
Start: 2024-03-28 | End: 2024-04-26

## 2024-03-28 RX ORDER — EPLERENONE 50 MG/1
1 TABLET, FILM COATED ORAL
Qty: 30 | Refills: 0
Start: 2024-03-28 | End: 2024-04-26

## 2024-03-28 RX ORDER — METHOCARBAMOL 500 MG/1
1 TABLET, FILM COATED ORAL
Qty: 120 | Refills: 0
Start: 2024-03-28 | End: 2024-04-26

## 2024-03-28 RX ORDER — BISOPROLOL FUMARATE AND HYDROCHLOROTHIAZIDE 5; 6.25 MG/1; MG/1
1 TABLET ORAL
Qty: 30 | Refills: 0
Start: 2024-03-28 | End: 2024-04-26

## 2024-03-28 RX ORDER — SENNA PLUS 8.6 MG/1
1 TABLET ORAL
Qty: 0 | Refills: 0 | DISCHARGE
Start: 2024-03-28

## 2024-03-28 RX ORDER — ESCITALOPRAM OXALATE 10 MG/1
1 TABLET, FILM COATED ORAL
Qty: 30 | Refills: 0
Start: 2024-03-28 | End: 2024-04-26

## 2024-03-28 RX ORDER — POLYETHYLENE GLYCOL 3350 17 G/17G
17 POWDER, FOR SOLUTION ORAL
Qty: 0 | Refills: 0 | DISCHARGE
Start: 2024-03-28

## 2024-03-28 RX ORDER — ACETAMINOPHEN 500 MG
3 TABLET ORAL
Qty: 0 | Refills: 0 | DISCHARGE
Start: 2024-03-28

## 2024-03-28 RX ADMIN — Medication 1 TABLET(S): at 12:34

## 2024-03-28 RX ADMIN — ENOXAPARIN SODIUM 30 MILLIGRAM(S): 100 INJECTION SUBCUTANEOUS at 06:44

## 2024-03-28 RX ADMIN — Medication 975 MILLIGRAM(S): at 23:49

## 2024-03-28 RX ADMIN — METHOCARBAMOL 500 MILLIGRAM(S): 500 TABLET, FILM COATED ORAL at 22:09

## 2024-03-28 RX ADMIN — AMLODIPINE BESYLATE 2.5 MILLIGRAM(S): 2.5 TABLET ORAL at 17:18

## 2024-03-28 RX ADMIN — Medication 975 MILLIGRAM(S): at 13:15

## 2024-03-28 RX ADMIN — SENNA PLUS 1 TABLET(S): 8.6 TABLET ORAL at 22:08

## 2024-03-28 RX ADMIN — Medication 975 MILLIGRAM(S): at 06:45

## 2024-03-28 RX ADMIN — ESCITALOPRAM OXALATE 5 MILLIGRAM(S): 10 TABLET, FILM COATED ORAL at 06:44

## 2024-03-28 RX ADMIN — Medication 975 MILLIGRAM(S): at 12:33

## 2024-03-28 RX ADMIN — LOSARTAN POTASSIUM 50 MILLIGRAM(S): 100 TABLET, FILM COATED ORAL at 17:18

## 2024-03-28 RX ADMIN — LOSARTAN POTASSIUM 50 MILLIGRAM(S): 100 TABLET, FILM COATED ORAL at 06:43

## 2024-03-28 RX ADMIN — NYSTATIN CREAM 1 APPLICATION(S): 100000 CREAM TOPICAL at 06:49

## 2024-03-28 RX ADMIN — Medication 975 MILLIGRAM(S): at 22:08

## 2024-03-28 RX ADMIN — Medication 975 MILLIGRAM(S): at 07:22

## 2024-03-28 RX ADMIN — BISOPROLOL FUMARATE AND HYDROCHLOROTHIAZIDE 1 TABLET(S): 5; 6.25 TABLET ORAL at 06:43

## 2024-03-28 RX ADMIN — EPLERENONE 25 MILLIGRAM(S): 50 TABLET, FILM COATED ORAL at 17:18

## 2024-03-28 RX ADMIN — Medication 0.25 MILLIGRAM(S): at 22:08

## 2024-03-28 NOTE — DISCHARGE NOTE PROVIDER - NSDCCPCAREPLAN_GEN_ALL_CORE_FT
PRINCIPAL DISCHARGE DIAGNOSIS  Diagnosis: Traumatic fracture of bone  Assessment and Plan of Treatment: You have injuries to the right humeral head, left scapular, right sacral ala and underwent pin placement and fiberglass casting of right arm and leg.  You had 3-week CT imaging of your humerus and femur, while you were in acute rehab.  Please follow up with Dr. Domenic Newsome.      SECONDARY DISCHARGE DIAGNOSES  Diagnosis: Anemia  Assessment and Plan of Treatment: you were given 1/2 unit pRBC on 3/4 at [facility]    Diagnosis: History of aortic insufficiency  Assessment and Plan of Treatment: Please continue losartan, norvasc, eplerenone and bisoprolol/HCTZ as prescribed. Please follow up with PCP doctor to follow up on restarting aspirin and to monitor your BP.    Diagnosis: Anxiety  Assessment and Plan of Treatment: continue taking lexapro daily. Your xanax was increased to 0.5mg but split in half for as needed basis.

## 2024-03-28 NOTE — CHART NOTE - NSCHARTNOTEFT_GEN_A_CORE
Nutrition Follow Up Note  Hospital Course   (Per Electronic Medical Record)    Source:  Patient [X]  Medical Record [X]      Diet:   Regular Diet (IDDSI Level 7) w/ Thin Liquids (IDDSI Level 0)  Tolerates Diet Consistency Well  No Chewing/Swallowing Difficulties  No Recent Nausea, Vomiting, Diarrhea or Constipation (as Per Patient)  Consumes % of Meals (as Per Documentation) - States Good PO Intake/Appetite (Per Patient)  Declines Nutrition Supplementation    Enteral/Parenteral Nutrition: Not Applicable    Current Weight: 60.6lb on 3/26  Obtain New Weight  Obtain Weights Weekly     Pertinent Medications: MEDICATIONS  (STANDING):  amLODIPine   Tablet 2.5 milliGRAM(s) Oral <User Schedule>  bisoprolol  5 mG/hydrochlorothiazide 6.25 mG 1 Tablet(s) Oral daily  enoxaparin Injectable 30 milliGRAM(s) SubCutaneous every 24 hours  eplerenone 25 milliGRAM(s) Oral <User Schedule>  escitalopram 5 milliGRAM(s) Oral <User Schedule>  gabapentin 100 milliGRAM(s) Oral at bedtime  losartan 50 milliGRAM(s) Oral two times a day  multivitamin 1 Tablet(s) Oral daily  nystatin Powder 1 Application(s) Topical two times a day  senna 1 Tablet(s) Oral at bedtime    MEDICATIONS  (PRN):  acetaminophen     Tablet .. 975 milliGRAM(s) Oral every 6 hours PRN Mild Pain (1 - 3)  ALPRAZolam 0.25 milliGRAM(s) Oral at bedtime PRN Sleep  ALPRAZolam 0.25 milliGRAM(s) Oral at bedtime PRN Sleep  cyclobenzaprine 10 milliGRAM(s) Oral three times a day PRN Muscle Spasm  methocarbamol 500 milliGRAM(s) Oral four times a day PRN Muscle Spasm  oxyCODONE    IR 2.5 milliGRAM(s) Oral every 4 hours PRN Moderate Pain (4 - 6)  oxyCODONE    IR 5 milliGRAM(s) Oral every 4 hours PRN Severe Pain (7 - 10)  polyethylene glycol 3350 17 Gram(s) Oral daily PRN Constipation    Pertinent Labs:  03-28 Na140 mmol/L Glu 87 mg/dL K+ 3.5 mmol/L Cr  0.26 mg/dL<L> BUN 13 mg/dL 03-28 Alb 3.2 g/dL<L>    Skin: No Pressure Ulcers     Edema: None Noted (as Per Documentation)     Last Bowel Movement: on 3/26    Estimated Needs:   [X] No Change Since Previous Assessment    Previous Nutrition Diagnosis:   Severe Malnutrition     Nutrition Diagnosis is [X] Ongoing - Declines Nutrition Supplementation     New Nutrition Diagnosis: [X] Not Applicable    Interventions:   1. Recommend Continue Nutrition Plan of Care     Monitoring & Evaluation:   [X] Weights   [X] PO Intake   [X] Skin Integrity   [X] Follow Up (Per Protocol)  [X] Tolerance to Diet Prescription   [X] Other: Labs     Registered Dietitian/Nutritionist Remains Available.  Armando Cheung, YRNN, CDN    Phone# [phone]

## 2024-03-28 NOTE — DISCHARGE NOTE PROVIDER - PROVIDER TOKENS
PROVIDER:[TOKEN:[[id number]],FOLLOWUP:[1 week]],PROVIDER:[TOKEN:[[id number]],FOLLOWUP:[1 week]] PROVIDER:[TOKEN:[[id number]],FOLLOWUP:[1 week]],PROVIDER:[TOKEN:[[id number]],FOLLOWUP:[1 week]],PROVIDER:[TOKEN:[[id number]]]

## 2024-03-28 NOTE — PROGRESS NOTE ADULT - ASSESSMENT
Mrs. Ivis Grimm is a 53-year-old female patient with past medical history of osteogenesis imperfecta s/p upper & lower extremity hardware, aortic insufficiency, and HTN who presented to [facility] on 3/2 s/p fall from her wheelchair. She reports missing a step on the wheelchair, losing control, and falling face first from the wheelchair at a height of around 2-3 ft face first onto a concrete ground. She lost consciousness and regained her awareness of her surroundings some time later, likely seconds to minutes, with little recollection of the intervening time.     Imaging performed reported acute, comminuted fracture adjacent to the tip of the right femoral anabell which begins in the distal femoral metadiaphysis and continues into the lateral femoral condyle, right knee hemarthrosis, age indeterminate R sacral ala fracture, age-indeterminate left scapular body fracture, age-indeterminate fracture of the right humeral head, status post pin placement, prior deformity of the right third rib with likely a superimposed acute fracture, likely acute fractures of right 4th-6th ribs. Orthopedics was consulted and recommended NWB of RUE; NWB of RLE, LUE can be WBAT given non-displaced scapula fracture and polytrauma. Patient now s/p fiberglass casting of R arm and R leg. No acute orthopedic surgical intervention planned. Patient evaluated by PT/OT and was recommended for acute inpatient rehab. Course complicated by anemia likely 2/2 right knee hemarthrosis, s/p 1/2 unit PRBC. Patient was discharged to [facility] on 3/12.  Hematology consulted for thrombocytosis.

## 2024-03-28 NOTE — PROGRESS NOTE ADULT - SUBJECTIVE AND OBJECTIVE BOX
Patient is a 53y old  Female who presents with a chief complaint of Polytrauma (28 Mar 2024 08:59)      SUBJECTIVE / OVERNIGHT EVENTS:  Pt seen and examined at bedside. No acute events overnight.  Pt denies cp, palpitations, sob, abd pain, N/V, fever, chills.    ROS:  All other review of systems negative    Allergies    No Known Allergies    Intolerances        MEDICATIONS  (STANDING):  amLODIPine   Tablet 2.5 milliGRAM(s) Oral <User Schedule>  bisoprolol  5 mG/hydrochlorothiazide 6.25 mG 1 Tablet(s) Oral daily  enoxaparin Injectable 30 milliGRAM(s) SubCutaneous every 24 hours  eplerenone 25 milliGRAM(s) Oral <User Schedule>  escitalopram 5 milliGRAM(s) Oral <User Schedule>  gabapentin 100 milliGRAM(s) Oral at bedtime  losartan 50 milliGRAM(s) Oral two times a day  multivitamin 1 Tablet(s) Oral daily  nystatin Powder 1 Application(s) Topical two times a day  senna 1 Tablet(s) Oral at bedtime    MEDICATIONS  (PRN):  acetaminophen     Tablet .. 975 milliGRAM(s) Oral every 6 hours PRN Mild Pain (1 - 3)  ALPRAZolam 0.25 milliGRAM(s) Oral at bedtime PRN Sleep  ALPRAZolam 0.25 milliGRAM(s) Oral at bedtime PRN Sleep  cyclobenzaprine 10 milliGRAM(s) Oral three times a day PRN Muscle Spasm  methocarbamol 500 milliGRAM(s) Oral four times a day PRN Muscle Spasm  oxyCODONE    IR 2.5 milliGRAM(s) Oral every 4 hours PRN Moderate Pain (4 - 6)  oxyCODONE    IR 5 milliGRAM(s) Oral every 4 hours PRN Severe Pain (7 - 10)  polyethylene glycol 3350 17 Gram(s) Oral daily PRN Constipation      Vital Signs Last 24 Hrs  T(C): 36.6 (28 Mar 2024 09:16), Max: 36.6 (27 Mar 2024 20:47)  T(F): 97.9 (28 Mar 2024 09:16), Max: 97.9 (27 Mar 2024 20:47)  HR: 71 (28 Mar 2024 09:16) (71 - 95)  BP: 136/63 (28 Mar 2024 09:16) (116/59 - 137/71)  BP(mean): --  RR: 18 (28 Mar 2024 09:16) (17 - 18)  SpO2: 96% (28 Mar 2024 09:16) (96% - 97%)    Parameters below as of 28 Mar 2024 09:16  Patient On (Oxygen Delivery Method): room air      CAPILLARY BLOOD GLUCOSE        I&O's Summary      PHYSICAL EXAM:  GENERAL: NAD, short stature  HEAD:  Atraumatic, Normocephalic  NECK: Supple, No JVD  CHEST/LUNG: Clear to auscultation bilaterally; No wheeze, nonlabored breathing  HEART: Regular rate and rhythm; + murmurs  ABDOMEN: Soft, Nontender, Nondistended; Bowel sounds present  EXTREMITIES: No clubbing, cyanosis, or edema, +RUE, LE cast. LUE +bowing   PSYCH: calm, appropriate mood    LABS:                        10.6   7.08  )-----------( 471      ( 28 Mar 2024 06:12 )             32.7     03-28    140  |  103  |  13  ----------------------------<  87  3.5   |  30  |  0.26<L>    Ca    9.4      28 Mar 2024 06:12    TPro  6.9  /  Alb  3.2<L>  /  TBili  0.5  /  DBili  x   /  AST  26  /  ALT  36  /  AlkPhos  162<H>  03-28          Urinalysis Basic - ( 28 Mar 2024 06:12 )    Color: x / Appearance: x / SG: x / pH: x  Gluc: 87 mg/dL / Ketone: x  / Bili: x / Urobili: x   Blood: x / Protein: x / Nitrite: x   Leuk Esterase: x / RBC: x / WBC x   Sq Epi: x / Non Sq Epi: x / Bacteria: x        RADIOLOGY & ADDITIONAL TESTS:  Results Reviewed:   Imaging Personally Reviewed:  Electrocardiogram Personally Reviewed:    COORDINATION OF CARE:  Care Discussed with Consultants/Other Providers [Y/N]:  Prior or Outpatient Records Reviewed [Y/N]:

## 2024-03-28 NOTE — PROGRESS NOTE ADULT - ASSESSMENT
Mrs. Ivis Grimm is a 53-year-old female patient with past medical history of osteogenesis imperfecta s/p upper & lower extremity hardware, aortic insufficiency, and HTN who is admitted for Acute Inpatient Rehabilitation with a multidisciplinary rehab program at [facility] with functional impairments in ADLs and mobility secondary to multiple fall related trauma.      Polytrauma       * S/P fall       * acute, comminuted periprosthetic fracture adjacent to the tip of the right femoral anabell which begins in the distal femoral metadiaphysis and continues into the lateral femoral condyle       * Age indeterminate fractures: R sacral ala, left scapular body, and right humeral head, status post pin placement       * Prior deformity of the right 3rd rib with likely a superimposed acute fracture, likely acute fractures of right 4th-6th ribs       * S/P fiberglass casting of R arm and R leg       * WB Status: NWB of RUE, NWB of RLE, LUE WBAT       * f/u with orthopedics, Dr. Newsome, 1 week after discharge       * Used manual (self-propelled) wheelchair at baseline  - Impaired ADLs and mobility  - Need for assistance with personal care   - Continue comprehensive rehab program of PT/OT - 3 hours a day, 5 days a week. P&O as needed        * Pain control as below       * Precautions: Fall  - Follow-up imaging to take place on 3/23 at the three-week [place] since her trauma.    Pain Control  - Tylenol 975 mg Q6H  - Flexeril changed to robaxin PRN muscle spasm on 3/21. Tolerating it well. Will increase it to QID.    - gabapentin 100mg Hs. Has been denying it.   - Oxycodone 2.5 Q4H PRN mod pain, oxycodone 5 mg Q4H PRN severe pain    Anemia  - likely 2/2 right knee hemarthrosis  - s/p 1/2 unit PRBC 3/4  - monitor H/H    Aortic Insufficiency/HTN  - Losartan 50 mg BID  - Norvasc 2.5 mg QHS  - Eplerenone 25 mg QD  - Bisoprolol 5 mg/HCTZ 6.25 mg QD **Home med, please have pharmacy verify. It is not in a prescription bottle, may need to call pharmacy.   - Monitor BP    Mood / Cognition  - Neuropsychology consult PRN  - Lexapro 5 mg QD    Sleep  - Maintain quiet hours and a low stim environment.   - Xanax 0.5 mg PRN split in half. She will take 0.25mg as needed.     GI / Bowel  - Senna 1 cap qHS  - Miralax PRN Daily     / Bladder  - Continue bladder scans Q8 hours with straight cath for >400cc    Skin / Pressure injury  - Skin assessment on admission performed: intact  - Monitor Incisions:    - Pressure Injury/Skin: OOB to chair, PT/OT  - nursing to monitor skin qShift    Diet/Dysphagia:  - Diet Consistency: regular  - Nutrition consult    DVT prophylaxis:   - Lovenox      Outpatient Follow-up:  Domenic Newsome  Orthopaedic Surgery  [address], [address]  [place] [address]  Phone: [phone]  Fax: [phone]  Follow Up Time: 2 weeks

## 2024-03-28 NOTE — PROGRESS NOTE ADULT - ASSESSMENT
54 y/o F with PMH osteogenesis imperfecta s/p upper & lower extremity hardware, aortic insufficiency, and HTN who is admitted for Acute Inpatient Rehabilitation with a multidisciplinary rehab program at [facility] with functional impairments in ADLs and mobility secondary to multiple fall related trauma.    #History of Osteogenesis Imperfecta  #Polytrauma s/p Fall  #Gait and Functional Impairment  -Repeat imaging reviewed  -Seen by orthopedics, s/p fiberglass casting of R arm and R leg  -Fall precaution  -F/u with orthopedics, Dr. Newsome, 1 week after discharge  -Used manual (self-propelled) wheelchair at baseline    #Anemia - likely 2/2 right knee hemarthrosis  -s/p 1/2 unit PRBC 3/4  -H/H stable, monitor    #Severe Aortic Insufficiency  #HTN  -Grade IV per patient ; saw CT surgery in Nov 2023 with recommendation for medical management and routine monitoring  -Echo 3/4 showed EF 55 to 60 %, grade 2 LV diastolic dysfunction. There's LVOT obstruction however gradients are LIMITED. Highest 20mmHg which may be underestimated, Severe aortic regurgitation, severe pulmonary hypertension.  -Continue amlodipine, losartan, eplerenone, bisoprolol/HCTZ (home med)  -Monitor respiratory/volume status   -Outpatient cardiology follow up ( Prosper Hinojosa , [facility] ).    #Anxiety  -Continue Lexapro 5mg daily  -Continue xanax qhs prn    DVT PPx - lovenox

## 2024-03-28 NOTE — PROGRESS NOTE ADULT - PROBLEM SELECTOR PLAN 1
Patient with normal platelet count of 282,000 3/7/24. Patient reports platelet count has gone up in the past. Suspect current thrombocytosis represents a reactive process (required PRBC transfusion for anemia/hemarthrosis). Platelet count trending downward. Monitor CBC for now-if refractory, can consider further evaluation. DVT prophylaxis.  Patient continues with rehab.
Patient with normal platelet count of 282,000 3/7/24. Patient reports platelet count has gone up in the past. Suspect current thrombocytosis represents a reactive process (required PRBC transfusion recently for anemia/hemarthrosis). Platelet count currently trending downward. Would continue to monitor CBC for now-if refractory, can consider further evaluation. DVT prophylaxis.
Patient with normal platelet count of 282,000 3/7/24. Patient reports platelet count has gone up in the past. Suspect current thrombocytosis represents a reactive process (required PRBC transfusion for anemia/hemarthrosis). Platelet count continues to improve/trend downward. Patient updated regarding CBC, and her questions answered at this time. She stated she will f/u with her PCP post discharge for f/u lab work. Patient appreciative of care.  Please re-call if further questions for us.  Thank you.

## 2024-03-28 NOTE — PROGRESS NOTE ADULT - SUBJECTIVE AND OBJECTIVE BOX
HPI:  Mrs. Ivis Grimm is a 53-year-old female patient with past medical history of osteogenesis imperfecta s/p upper & lower extremity hardware, aortic insufficiency, and HTN who presented to [facility] on 3/2 s/p fall from her wheelchair. She reports missing a step on the wheelchair, losing control, and falling face first from the wheelchair at a height of around 2-3 ft face first onto a concrete ground. She lost consciousness and regained her awareness of her surroundings some time later, likely seconds to minutes, with little recollection of the intervening time. Patient noted to have right temporal superficial abrasion, R upper lip laceration through the vermillion border, small ecchymosis on the R chin, tenderness to palpation in the L shoulder/R proximal arm/R wrist/ R thigh with medial rotation of the RLE.      Imaging performed reported acute, comminuted fracture adjacent to the tip of the right femoral anabell which begins in the distal femoral metadiaphysis and continues into the lateral femoral condyle, right knee hemarthrosis, age indeterminate R sacral ala fracture, age-indeterminate left scapular body fracture, age-indeterminate fracture of the right humeral head, status post pin placement, prior deformity of the right third rib with likely a superimposed acute fracture, likely acute fractures of right 4th-6th ribs. Orthopedics was consulted and recommended NWB of RUE; NWB of RLE, LUE can be WBAT given non-displaced scapula fracture and polytrauma. Patient now s/p fiberglass casting of R arm and R leg. No acute orthopedic surgical intervention planned. Patient evaluated by PT/OT and was recommended for acute inpatient rehab. Course complicated by anemia likely 2/2 right knee hemarthrosis, s/p 1/2 unit PRBC. Patient was discharged to [facility] on 3/12.   (12 Mar 2024 15:15)    TDD: 3/30/24 to Home  ___________________________________________________________________________    SUBJECTIVE/ROS  Patient was seen and evaluated at bedside today. In NAD.   Reports adequate sleep with recent medication changes.  Case discussed at Interdisciplinary Team meeting today.  Changes to the tentative discharge date are outlined above.  Patient remains motivated to participate on the recommended rehabilitation program.  Denies any CP, SOB, PEREZ, palpitations, fever, chills, body aches, cough, congestion, or any other symptoms at this time.   ___________________________________________________________________________    IMAGING (per official Radiology reports)    CT FEMUR BI (03/23/2024)      FINDINGS:    There is diffuse osseous demineralization and osseous deformity consistent with history of osteogenesis imperfecta. There is interval osseous bridging across the previously seen right sacral ottoniel fracture.    Right femur: There is interval application of a fiberglass splint about the right femur and partially imaged knee. Patient is again noted to be status post fixation of the right femur with an intramedullary anabell extending from the level of the greater trochanter to the level of the distal diaphysis. There is redemonstration of an acute comminuted periprosthetic fracture of the right femoral diametaphyseal region with extension to the lateral femoral condyle. The alignment and position of fracture fragments appears grossly unchanged compared to the prior examination with impaction centrally. Nonbridging callus formation is seen along the lateral aspect which is new since the prior examination. Fracture lucency remains diffusely evident without definite bridging callus formation. No new fracture of the right lower extremity is seen. There is nonspecific subcutaneous air within the right lateral subcutaneous fat at the level of the subtrochanteric femur. Correlate for any recent subcutaneous injection in this region. In the absence of any intervention in this region possibility of underlying necrotizing infection is a consideration. There is a small right knee joint effusion.     Left femur: Patient is again noted to be status post fixation of the left femur with a femoral intramedullary anabell extending from the level of the greater trochanter to the level of the distal diametaphysis. No evidence of acute fracture of the left femur.    There is atrophy of the thigh musculature bilaterally. There is dysplastic remodeling of the femoral head neck junctions bilaterally and widening and remodeling of the acetabuli bilaterally. This appearance is unchanged.    There is a fibroid uterus.    IMPRESSION:    Redemonstration of an acute periprosthetic fracture of the right distal femoral diametaphyseal region. Alignment and position of fracture fragments is unchanged. Nonbridging callus formation is seen laterally. No definite bridging callus formation. Foci of subcutaneous air within the right lateral subcutaneous tissues at the level of the subtrochanteric femur. This is likely related to subcutaneous injection in this region. In the absence of any known injection in this region possibility of necrotizing infection is considered. Interval osseous bridging across the previously seen right sacral ottoniel or fracture. No evidence of acute fracture of the left femur.      CT HUMERUS BI (03/23/2024)  IMPRESSION: Redemonstration of an impacted fracture at the surgical neck of the right humerus with extension to the greater tuberosity. Alignment and position of fracture fragments is grossly unchanged. Fracture lucency remains evident without definite osseous bridging. Unchanged supracondylar fracture of the right distal humerus which is also unchanged in alignment and position. No definite osseous bridging. Redemonstration of a left scapular body fracture with interval mild osseous ridging. Unchanged subacute appearing sternal fracture. Alignment and position unchanged. Small focus of subcutaneous air within the posterior subcutaneous fat at the level the proximal left humerus. Correlate for recent injection at this site. In the absence of any known injection correlate for any underlying necrotizing infection.    ___________________________________________________________________________    Vital Signs Last 24 Hrs  T(C): 36.6 (28 Mar 2024 09:16), Max: 36.6 (27 Mar 2024 20:47)  T(F): 97.9 (28 Mar 2024 09:16), Max: 97.9 (27 Mar 2024 20:47)  HR: 71 (28 Mar 2024 09:16) (71 - 95)  BP: 136/63 (28 Mar 2024 09:16) (116/59 - 137/71)  RR: 18 (28 Mar 2024 09:16) (17 - 18)  SpO2: 96% (28 Mar 2024 09:16) (96% - 97%)    ___________________________________________________________________________    LAB                        10.0   7.69  )-----------( 561      ( 25 Mar 2024 06:10 )             31.5     03-25    137  |  100  |  15  ----------------------------<  95  4.3   |  27  |  <0.20<L>    Ca    9.5      25 Mar 2024 06:10    TPro  6.7  /  Alb  3.2<L>  /  TBili  0.5  /  DBili  x   /  AST  20  /  ALT  23  /  AlkPhos  189<H>  03-25    LIVER FUNCTIONS - ( 25 Mar 2024 06:10 )  Alb: 3.2 g/dL / Pro: 6.7 g/dL / ALK PHOS: 189 U/L / ALT: 23 U/L / AST: 20 U/L / GGT: x           C-Reactive Protein, Serum (03.26.24 @ 05:51)   C-Reactive Protein, Serum: 8 mg/L    Sedimentation Rate, Erythrocyte (03.26.24 @ 05:51)   Sedimentation Rate, Erythrocyte: 42 mm/hr    ___________________________________________________________________________    MEDICATIONS  (STANDING):  amLODIPine   Tablet 2.5 milliGRAM(s) Oral <User Schedule>  bisoprolol  5 mG/hydrochlorothiazide 6.25 mG 1 Tablet(s) Oral daily  enoxaparin Injectable 30 milliGRAM(s) SubCutaneous every 24 hours  eplerenone 25 milliGRAM(s) Oral <User Schedule>  escitalopram 5 milliGRAM(s) Oral <User Schedule>  gabapentin 100 milliGRAM(s) Oral at bedtime  losartan 50 milliGRAM(s) Oral two times a day  multivitamin 1 Tablet(s) Oral daily  nystatin Powder 1 Application(s) Topical two times a day  senna 1 Tablet(s) Oral at bedtime    MEDICATIONS  (PRN):  acetaminophen     Tablet .. 975 milliGRAM(s) Oral every 6 hours PRN Mild Pain (1 - 3)  ALPRAZolam 0.25 milliGRAM(s) Oral at bedtime PRN Sleep  ALPRAZolam 0.25 milliGRAM(s) Oral at bedtime PRN Sleep  cyclobenzaprine 10 milliGRAM(s) Oral three times a day PRN Muscle Spasm  methocarbamol 500 milliGRAM(s) Oral four times a day PRN Muscle Spasm  oxyCODONE    IR 2.5 milliGRAM(s) Oral every 4 hours PRN Moderate Pain (4 - 6)  oxyCODONE    IR 5 milliGRAM(s) Oral every 4 hours PRN Severe Pain (7 - 10)  polyethylene glycol 3350 17 Gram(s) Oral daily PRN Constipation    ___________________________________________________________________________    PHYSICAL EXAM:    Gen - NAD, Comfortable  HEENT -EOMI, MMM. Ecchymosis of chin  Pulm - CTAB, No wheeze, No rhonchi, No crackles  Cardiovascular - RRR, S1S2, +murmur  Abdomen - Soft, NT/ND, +BS  Extremities - right cast on UE and LE.   Neuro-     Cognitive - AAOx3     Communication - Fluent, No dysarthria     Attention: Intact      Judgement: Good evidence of judgement     Memory: Recall 3 objects immediate and 3 min later         Cranial Nerves - CN 2-12 intact     Motor -                    LEFT    UE - ShAB limited 2/2 scap fracture, EF 5/5, EE 5/5, WE 5/5,  5/5                     RIGHT UE - n/a                    LEFT    LE - HF 4/5, KE 4/5, DF 4/5, PF 4/5                    RIGHT LE - n/a     Sensory - Intact to LT     Tone - normal  Psychiatric - Mood stable, Affect WNL  Skin: Intact  Wounds: None Present.     ___________________________________________________________________________

## 2024-03-28 NOTE — DISCHARGE NOTE PROVIDER - NSDCMRMEDTOKEN_GEN_ALL_CORE_FT
acetaminophen 325 mg oral tablet: 3 tab(s) orally every 6 hours As needed Mild Pain (1 - 3)  ALPRAZolam 0.25 mg oral tablet: 1 tab(s) orally once a day (at bedtime) As needed Sleep  amLODIPine 2.5 mg oral tablet: 1 tab(s) orally once a day  aspirin 81 mg oral delayed release tablet: 1 tab(s) orally once a day  bisoprolol-hydrochlorothiazide 5 mg-6.25 mg oral tablet: 1 tab(s) orally once a day  Cozaar 50 mg oral tablet: 1 tab(s) orally 2 times a day  eplerenone 25 mg oral tablet: 1 tab(s) orally once a day  gabapentin 100 mg oral capsule: 1 cap(s) orally once a day (at bedtime)  Inspra 25 mg oral tablet: 1 tab(s) orally once a day  Lexapro 5 mg oral tablet: 1 tab(s) orally once a day  Lexapro 5 mg oral tablet: 1 tab(s) orally once a day  losartan 50 mg oral tablet: 1 tab(s) orally 2 times a day  methocarbamol 500 mg oral tablet: 1 tab(s) orally 4 times a day as needed for Muscle Spasm  Multiple Vitamins oral tablet: 1 tab(s) orally once a day  Norvasc 2.5 mg oral tablet: 1 tab(s) orally once a day  oxyCODONE 5 mg oral tablet: 1 tab(s) orally every 4 hours As needed Severe Pain (7 - 10)  polyethylene glycol 3350 oral powder for reconstitution: 17 gram(s) orally once a day As needed Constipation  senna leaf extract oral tablet: 1 tab(s) orally once a day (at bedtime)  Ziac 5 mg-6.25 mg oral tablet: 1 tab(s) orally once a day   acetaminophen 325 mg oral tablet: 3 tab(s) orally every 6 hours As needed Mild Pain (1 - 3)  Cozaar 50 mg oral tablet: 1 tab(s) orally 2 times a day  gabapentin 100 mg oral capsule: 1 cap(s) orally once a day (at bedtime)  Inspra 25 mg oral tablet: 1 tab(s) orally once a day  Lexapro 5 mg oral tablet: 1 tab(s) orally once a day  methocarbamol 500 mg oral tablet: 1 tab(s) orally 4 times a day as needed for Muscle Spasm  Multiple Vitamins oral tablet: 1 tab(s) orally once a day  Norvasc 2.5 mg oral tablet: 1 tab(s) orally once a day  polyethylene glycol 3350 oral powder for reconstitution: 17 gram(s) orally once a day As needed Constipation  senna leaf extract oral tablet: 1 tab(s) orally once a day (at bedtime)  Ziac 5 mg-6.25 mg oral tablet: 1 tab(s) orally once a day

## 2024-03-28 NOTE — DISCHARGE NOTE PROVIDER - CARE PROVIDERS DIRECT ADDRESSES
,[email or web address]

## 2024-03-28 NOTE — DISCHARGE NOTE PROVIDER - HOSPITAL COURSE
HPI:  Mrs. Ivis Grimm is a 53-year-old female patient with past medical history of osteogenesis imperfecta s/p upper & lower extremity hardware, aortic insufficiency, and HTN who presented to [facility] on 3/2 s/p fall from her wheelchair. She reports missing a step on the wheelchair, losing control, and falling face first from the wheelchair at a height of around 2-3 ft face first onto a concrete ground. She lost consciousness and regained her awareness of her surroundings some time later, likely seconds to minutes, with little recollection of the intervening time. Patient noted to have right temporal superficial abrasion, R upper lip laceration through the vermillion border, small ecchymosis on the R chin, tenderness to palpation in the L shoulder/R proximal arm/R wrist/ R thigh with medial rotation of the RLE.      Imaging performed reported acute, comminuted fracture adjacent to the tip of the right femoral anabell which begins in the distal femoral metadiaphysis and continues into the lateral femoral condyle, right knee hemarthrosis, age indeterminate R sacral ala fracture, age-indeterminate left scapular body fracture, age-indeterminate fracture of the right humeral head, status post pin placement, prior deformity of the right third rib with likely a superimposed acute fracture, likely acute fractures of right 4th-6th ribs. Orthopedics was consulted and recommended NWB of RUE; NWB of RLE, LUE can be WBAT given non-displaced scapula fracture and polytrauma. Patient now s/p fiberglass casting of R arm and R leg. No acute orthopedic surgical intervention planned. Patient evaluated by PT/OT and was recommended for acute inpatient rehab. Course complicated by anemia likely 2/2 right knee hemarthrosis, s/p 1/2 unit PRBC. Patient was discharged to [facility] on 3/12.      Pt was stable upon rehab admission to  Inpatient [facility] Facility. Admitted with gait instability, ADL, and functional impairments.     Rehab Course significant for muscle spasm. Flexeril changed to robaxin which helped manage. She was started on gabapentin 100mg Hs but she has been denying it. Her xanax was increased to 0.5, which was split in half and taken 0.25mg as needed for sleep. Heme/onc followed for thrombocytosis, which was stable. She completed her 3-week post injury CT imaging, which is found in chart. Her orthopedic surgeon and his office was contacted to review. All other medical co-morbidities were stable.     Pt tolerated course of inpatient PT/OT/SLP rehab with significant functional improvements and met rehab goals prior to discharge.  Pt was medically cleared on 3/28 for discharge to home.      Pt will follow up with the following:  Domenic Newsome  Orthopaedic Surgery  [address] [place] [address], [address]  [place] [address]  Phone: [phone]  Fax: [phone]  Follow Up Time: 2 weeks

## 2024-03-28 NOTE — PROGRESS NOTE ADULT - SUBJECTIVE AND OBJECTIVE BOX
LO GRIMM   53y   Female    Admitting: [facility] Watts  HPI:    Mrs. Lo Grimm is a 53-year-old female patient with past medical history of osteogenesis imperfecta s/p upper & lower extremity hardware, aortic insufficiency, and HTN who presented to [facility] on 3/2 s/p fall from her wheelchair. She reports missing a step on the wheelchair, losing control, and falling face first from the wheelchair at a height of around 2-3 ft face first onto a concrete ground. She lost consciousness and regained her awareness of her surroundings some time later, likely seconds to minutes, with little recollection of the intervening time.     Imaging performed reported acute, comminuted fracture adjacent to the tip of the right femoral anabell which begins in the distal femoral metadiaphysis and continues into the lateral femoral condyle, right knee hemarthrosis, age indeterminate R sacral ala fracture, age-indeterminate left scapular body fracture, age-indeterminate fracture of the right humeral head, status post pin placement, prior deformity of the right third rib with likely a superimposed acute fracture, likely acute fractures of right 4th-6th ribs. Orthopedics was consulted and recommended NWB of RUE; NWB of RLE, LUE can be WBAT given non-displaced scapula fracture and polytrauma. Patient now s/p fiberglass casting of R arm and R leg. No acute orthopedic surgical intervention planned. Patient evaluated by PT/OT and was recommended for acute inpatient rehab. Course complicated by anemia likely 2/2 right knee hemarthrosis, s/p 1/2 unit PRBC. Patient was discharged to [facility] on 3/12.  Hematology consulted for thrombocytosis.    PAST MEDICAL & SURGICAL HISTORY:  Osteogenesis imperfecta      Aortic insufficiency      HTN (hypertension)      Presence of internal fixation anabell in upper extremity        HEALTH ISSUES - PROBLEM Dx:  Thrombocytosis      MEDICATIONS  (STANDING):  amLODIPine   Tablet 2.5 milliGRAM(s) Oral <User Schedule>  bisoprolol  5 mG/hydrochlorothiazide 6.25 mG 1 Tablet(s) Oral daily  enoxaparin Injectable 30 milliGRAM(s) SubCutaneous every 24 hours  eplerenone 25 milliGRAM(s) Oral <User Schedule>  escitalopram 5 milliGRAM(s) Oral <User Schedule>  gabapentin 100 milliGRAM(s) Oral at bedtime  losartan 50 milliGRAM(s) Oral two times a day  multivitamin 1 Tablet(s) Oral daily  nystatin Powder 1 Application(s) Topical two times a day  senna 1 Tablet(s) Oral at bedtime    MEDICATIONS  (PRN):  acetaminophen     Tablet .. 975 milliGRAM(s) Oral every 6 hours PRN Mild Pain (1 - 3)  ALPRAZolam 0.25 milliGRAM(s) Oral at bedtime PRN Sleep  ALPRAZolam 0.25 milliGRAM(s) Oral at bedtime PRN Sleep  cyclobenzaprine 10 milliGRAM(s) Oral three times a day PRN Muscle Spasm  methocarbamol 500 milliGRAM(s) Oral four times a day PRN Muscle Spasm  oxyCODONE    IR 2.5 milliGRAM(s) Oral every 4 hours PRN Moderate Pain (4 - 6)  oxyCODONE    IR 5 milliGRAM(s) Oral every 4 hours PRN Severe Pain (7 - 10)  polyethylene glycol 3350 17 Gram(s) Oral daily PRN Constipation    Allergies    No Known Allergies    INTERVAL HPI/OVERNIGHT EVENTS:  Patient S&E at bedside. No c/o CP, SOB.     VITAL SIGNS:  T(F): 97.9 (03-27-24 @ 20:47)  HR: 95 (03-28-24 @ 06:41)  BP: 137/71 (03-28-24 @ 06:41)  RR: 17 (03-27-24 @ 20:47)  SpO2: 97% (03-27-24 @ 20:47)      PHYSICAL EXAM:  Constitutional: NAD  Eyes: sclera non-icteric  Neck: no JVD  Respiratory: decreased BS bases ant.; no wheezes  Cardiovascular: RRR, no M/R/G  Gastrointestinal: soft, NT  Neurological: Awake, alert.    Labs:             10.6   7.08  )-----------( 471      ( 03-28 @ 06:12 )             32.7       03-28    140  |  103  |  13  ----------------------------<  87  3.5   |  30  |  0.26<L>    Ca    9.4      28 Mar 2024 06:12    TPro  6.9  /  Alb  3.2<L>  /  TBili  0.5  /  DBili  x   /  AST  26  /  ALT  36  /  AlkPhos  162<H>  03-28    Consultant notes reviewed : YES [x ] ; NO [ ]

## 2024-03-28 NOTE — DISCHARGE NOTE PROVIDER - CARE PROVIDER_API CALL
Domenic Newsome  Orthopaedic Surgery  [address], [address]  [place] [address]  Phone: [phone]  Fax: [phone]  Follow Up Time: 1 week    Anibal Watts  Physical/Rehab Medicine  [address] [place] [address]  Phone: [phone]  Fax: [phone]  Follow Up Time: 1 week   Domenic Newsome  Orthopaedic Surgery  [address]  [place] [address]  Phone: [phone]  Fax: [phone]  Follow Up Time: 1 week    Anibal Watts  Physical/Rehab Medicine  [address] [place] [address]  Phone: [phone]  Fax: [phone]  Follow Up Time: 1 week    Adama Guillory  Orthopaedic Surgery  [address]  [place] [address]  Phone: [phone]  Fax: [phone]  Follow Up Time:

## 2024-03-29 ENCOUNTER — TRANSCRIPTION ENCOUNTER (OUTPATIENT)
Age: 54
End: 2024-03-29

## 2024-03-29 PROCEDURE — 99232 SBSQ HOSP IP/OBS MODERATE 35: CPT

## 2024-03-29 RX ADMIN — Medication 0.25 MILLIGRAM(S): at 22:40

## 2024-03-29 RX ADMIN — AMLODIPINE BESYLATE 2.5 MILLIGRAM(S): 2.5 TABLET ORAL at 17:47

## 2024-03-29 RX ADMIN — EPLERENONE 25 MILLIGRAM(S): 50 TABLET, FILM COATED ORAL at 17:46

## 2024-03-29 RX ADMIN — Medication 975 MILLIGRAM(S): at 15:31

## 2024-03-29 RX ADMIN — Medication 1 TABLET(S): at 11:24

## 2024-03-29 RX ADMIN — ENOXAPARIN SODIUM 30 MILLIGRAM(S): 100 INJECTION SUBCUTANEOUS at 06:38

## 2024-03-29 RX ADMIN — Medication 975 MILLIGRAM(S): at 14:39

## 2024-03-29 RX ADMIN — ESCITALOPRAM OXALATE 5 MILLIGRAM(S): 10 TABLET, FILM COATED ORAL at 06:38

## 2024-03-29 RX ADMIN — LOSARTAN POTASSIUM 50 MILLIGRAM(S): 100 TABLET, FILM COATED ORAL at 06:38

## 2024-03-29 RX ADMIN — BISOPROLOL FUMARATE AND HYDROCHLOROTHIAZIDE 1 TABLET(S): 5; 6.25 TABLET ORAL at 06:39

## 2024-03-29 RX ADMIN — Medication 0.25 MILLIGRAM(S): at 22:37

## 2024-03-29 RX ADMIN — LOSARTAN POTASSIUM 50 MILLIGRAM(S): 100 TABLET, FILM COATED ORAL at 17:47

## 2024-03-29 RX ADMIN — Medication 975 MILLIGRAM(S): at 22:38

## 2024-03-29 RX ADMIN — Medication 975 MILLIGRAM(S): at 06:40

## 2024-03-29 RX ADMIN — METHOCARBAMOL 500 MILLIGRAM(S): 500 TABLET, FILM COATED ORAL at 22:37

## 2024-03-29 NOTE — PROGRESS NOTE ADULT - SUBJECTIVE AND OBJECTIVE BOX
Patient is a 53y old  Female who presents with a chief complaint of Polytrauma (29 Mar 2024 08:55)      SUBJECTIVE / OVERNIGHT EVENTS:  Pt seen and examined at bedside. No acute events overnight.  Pt denies cp, palpitations, sob, abd pain, N/V, fever, chills.    ROS:  All other review of systems negative    Allergies    No Known Allergies    Intolerances        MEDICATIONS  (STANDING):  amLODIPine   Tablet 2.5 milliGRAM(s) Oral <User Schedule>  bisoprolol  5 mG/hydrochlorothiazide 6.25 mG 1 Tablet(s) Oral daily  enoxaparin Injectable 30 milliGRAM(s) SubCutaneous every 24 hours  eplerenone 25 milliGRAM(s) Oral <User Schedule>  escitalopram 5 milliGRAM(s) Oral <User Schedule>  gabapentin 100 milliGRAM(s) Oral at bedtime  losartan 50 milliGRAM(s) Oral two times a day  multivitamin 1 Tablet(s) Oral daily  nystatin Powder 1 Application(s) Topical two times a day  senna 1 Tablet(s) Oral at bedtime    MEDICATIONS  (PRN):  acetaminophen     Tablet .. 975 milliGRAM(s) Oral every 6 hours PRN Mild Pain (1 - 3)  ALPRAZolam 0.25 milliGRAM(s) Oral at bedtime PRN Sleep  ALPRAZolam 0.25 milliGRAM(s) Oral at bedtime PRN Sleep  cyclobenzaprine 10 milliGRAM(s) Oral three times a day PRN Muscle Spasm  methocarbamol 500 milliGRAM(s) Oral four times a day PRN Muscle Spasm  oxyCODONE    IR 5 milliGRAM(s) Oral every 4 hours PRN Severe Pain (7 - 10)  oxyCODONE    IR 2.5 milliGRAM(s) Oral every 4 hours PRN Moderate Pain (4 - 6)  polyethylene glycol 3350 17 Gram(s) Oral daily PRN Constipation      Vital Signs Last 24 Hrs  T(C): 36.4 (29 Mar 2024 08:16), Max: 36.8 (28 Mar 2024 21:20)  T(F): 97.5 (29 Mar 2024 08:16), Max: 98.3 (28 Mar 2024 21:20)  HR: 70 (29 Mar 2024 08:16) (70 - 87)  BP: 128/70 (29 Mar 2024 08:16) (126/73 - 132/61)  BP(mean): --  RR: 18 (29 Mar 2024 08:16) (16 - 18)  SpO2: 97% (29 Mar 2024 08:16) (95% - 97%)    Parameters below as of 29 Mar 2024 08:16  Patient On (Oxygen Delivery Method): room air      CAPILLARY BLOOD GLUCOSE        I&O's Summary      PHYSICAL EXAM:  GENERAL: NAD, short stature  HEAD:  Atraumatic, Normocephalic  NECK: Supple, No JVD  CHEST/LUNG: Clear to auscultation bilaterally; No wheeze, nonlabored breathing  HEART: Regular rate and rhythm; + murmurs  ABDOMEN: Soft, Nontender, Nondistended; Bowel sounds present  EXTREMITIES: No clubbing, cyanosis, or edema, +RUE, LE cast. LUE +bowing   PSYCH: calm, appropriate mood    LABS:                        10.6   7.08  )-----------( 471      ( 28 Mar 2024 06:12 )             32.7     03-28    140  |  103  |  13  ----------------------------<  87  3.5   |  30  |  0.26<L>    Ca    9.4      28 Mar 2024 06:12    TPro  6.9  /  Alb  3.2<L>  /  TBili  0.5  /  DBili  x   /  AST  26  /  ALT  36  /  AlkPhos  162<H>  03-28          Urinalysis Basic - ( 28 Mar 2024 06:12 )    Color: x / Appearance: x / SG: x / pH: x  Gluc: 87 mg/dL / Ketone: x  / Bili: x / Urobili: x   Blood: x / Protein: x / Nitrite: x   Leuk Esterase: x / RBC: x / WBC x   Sq Epi: x / Non Sq Epi: x / Bacteria: x        RADIOLOGY & ADDITIONAL TESTS:  Results Reviewed:   Imaging Personally Reviewed:  Electrocardiogram Personally Reviewed:    COORDINATION OF CARE:  Care Discussed with Consultants/Other Providers [Y/N]:  Prior or Outpatient Records Reviewed [Y/N]:

## 2024-03-29 NOTE — PROGRESS NOTE ADULT - SUBJECTIVE AND OBJECTIVE BOX
HPI:  Mrs. Ivis Grimm is a 53-year-old female patient with past medical history of osteogenesis imperfecta s/p upper & lower extremity hardware, aortic insufficiency, and HTN who presented to [facility] on 3/2 s/p fall from her wheelchair. She reports missing a step on the wheelchair, losing control, and falling face first from the wheelchair at a height of around 2-3 ft face first onto a concrete ground. She lost consciousness and regained her awareness of her surroundings some time later, likely seconds to minutes, with little recollection of the intervening time. Patient noted to have right temporal superficial abrasion, R upper lip laceration through the vermillion border, small ecchymosis on the R chin, tenderness to palpation in the L shoulder/R proximal arm/R wrist/ R thigh with medial rotation of the RLE.      Imaging performed reported acute, comminuted fracture adjacent to the tip of the right femoral anabell which begins in the distal femoral metadiaphysis and continues into the lateral femoral condyle, right knee hemarthrosis, age indeterminate R sacral ala fracture, age-indeterminate left scapular body fracture, age-indeterminate fracture of the right humeral head, status post pin placement, prior deformity of the right third rib with likely a superimposed acute fracture, likely acute fractures of right 4th-6th ribs. Orthopedics was consulted and recommended NWB of RUE; NWB of RLE, LUE can be WBAT given non-displaced scapula fracture and polytrauma. Patient now s/p fiberglass casting of R arm and R leg. No acute orthopedic surgical intervention planned. Patient evaluated by PT/OT and was recommended for acute inpatient rehab. Course complicated by anemia likely 2/2 right knee hemarthrosis, s/p 1/2 unit PRBC. Patient was discharged to [facility] on 3/12.   (12 Mar 2024 15:15)    TDD: 3/30/24 to Home  ___________________________________________________________________________    SUBJECTIVE/ROS  Patient was seen and evaluated at bedside today. In NAD.   Reports adequate sleep with recent medication changes.  Case discussed at Interdisciplinary Team meeting today.  Changes to the tentative discharge date are outlined above.  Patient remains motivated to participate on the recommended rehabilitation program.  Denies any CP, SOB, PEREZ, palpitations, fever, chills, body aches, cough, congestion, or any other symptoms at this time.   ___________________________________________________________________________    IMAGING (per official Radiology reports)    CT FEMUR BI (03/23/2024)      FINDINGS:    There is diffuse osseous demineralization and osseous deformity consistent with history of osteogenesis imperfecta. There is interval osseous bridging across the previously seen right sacral ottoniel fracture.    Right femur: There is interval application of a fiberglass splint about the right femur and partially imaged knee. Patient is again noted to be status post fixation of the right femur with an intramedullary anabell extending from the level of the greater trochanter to the level of the distal diaphysis. There is redemonstration of an acute comminuted periprosthetic fracture of the right femoral diametaphyseal region with extension to the lateral femoral condyle. The alignment and position of fracture fragments appears grossly unchanged compared to the prior examination with impaction centrally. Nonbridging callus formation is seen along the lateral aspect which is new since the prior examination. Fracture lucency remains diffusely evident without definite bridging callus formation. No new fracture of the right lower extremity is seen. There is nonspecific subcutaneous air within the right lateral subcutaneous fat at the level of the subtrochanteric femur. Correlate for any recent subcutaneous injection in this region. In the absence of any intervention in this region possibility of underlying necrotizing infection is a consideration. There is a small right knee joint effusion.     Left femur: Patient is again noted to be status post fixation of the left femur with a femoral intramedullary anabell extending from the level of the greater trochanter to the level of the distal diametaphysis. No evidence of acute fracture of the left femur.    There is atrophy of the thigh musculature bilaterally. There is dysplastic remodeling of the femoral head neck junctions bilaterally and widening and remodeling of the acetabuli bilaterally. This appearance is unchanged.    There is a fibroid uterus.    IMPRESSION:    Redemonstration of an acute periprosthetic fracture of the right distal femoral diametaphyseal region. Alignment and position of fracture fragments is unchanged. Nonbridging callus formation is seen laterally. No definite bridging callus formation. Foci of subcutaneous air within the right lateral subcutaneous tissues at the level of the subtrochanteric femur. This is likely related to subcutaneous injection in this region. In the absence of any known injection in this region possibility of necrotizing infection is considered. Interval osseous bridging across the previously seen right sacral ottoniel or fracture. No evidence of acute fracture of the left femur.      CT HUMERUS BI (03/23/2024)  IMPRESSION: Redemonstration of an impacted fracture at the surgical neck of the right humerus with extension to the greater tuberosity. Alignment and position of fracture fragments is grossly unchanged. Fracture lucency remains evident without definite osseous bridging. Unchanged supracondylar fracture of the right distal humerus which is also unchanged in alignment and position. No definite osseous bridging. Redemonstration of a left scapular body fracture with interval mild osseous ridging. Unchanged subacute appearing sternal fracture. Alignment and position unchanged. Small focus of subcutaneous air within the posterior subcutaneous fat at the level the proximal left humerus. Correlate for recent injection at this site. In the absence of any known injection correlate for any underlying necrotizing infection.    ___________________________________________________________________________    Vital Signs Last 24 Hrs  T(C): 36.4 (29 Mar 2024 08:16), Max: 36.8 (28 Mar 2024 21:20)  T(F): 97.5 (29 Mar 2024 08:16), Max: 98.3 (28 Mar 2024 21:20)  HR: 70 (29 Mar 2024 08:16) (70 - 87)  BP: 128/70 (29 Mar 2024 08:16) (126/73 - 136/63)  RR: 18 (29 Mar 2024 08:16) (16 - 18)  SpO2: 97% (29 Mar 2024 08:16) (95% - 97%)    ___________________________________________________________________________    LAB                        10.0   7.69  )-----------( 561      ( 25 Mar 2024 06:10 )             31.5     03-25    137  |  100  |  15  ----------------------------<  95  4.3   |  27  |  <0.20<L>    Ca    9.5      25 Mar 2024 06:10    TPro  6.7  /  Alb  3.2<L>  /  TBili  0.5  /  DBili  x   /  AST  20  /  ALT  23  /  AlkPhos  189<H>  03-25    LIVER FUNCTIONS - ( 25 Mar 2024 06:10 )  Alb: 3.2 g/dL / Pro: 6.7 g/dL / ALK PHOS: 189 U/L / ALT: 23 U/L / AST: 20 U/L / GGT: x           C-Reactive Protein, Serum (03.26.24 @ 05:51)   C-Reactive Protein, Serum: 8 mg/L    Sedimentation Rate, Erythrocyte (03.26.24 @ 05:51)   Sedimentation Rate, Erythrocyte: 42 mm/hr    ___________________________________________________________________________    MEDICATIONS  (STANDING):  amLODIPine   Tablet 2.5 milliGRAM(s) Oral <User Schedule>  bisoprolol  5 mG/hydrochlorothiazide 6.25 mG 1 Tablet(s) Oral daily  enoxaparin Injectable 30 milliGRAM(s) SubCutaneous every 24 hours  eplerenone 25 milliGRAM(s) Oral <User Schedule>  escitalopram 5 milliGRAM(s) Oral <User Schedule>  gabapentin 100 milliGRAM(s) Oral at bedtime  losartan 50 milliGRAM(s) Oral two times a day  multivitamin 1 Tablet(s) Oral daily  nystatin Powder 1 Application(s) Topical two times a day  senna 1 Tablet(s) Oral at bedtime    MEDICATIONS  (PRN):  acetaminophen     Tablet .. 975 milliGRAM(s) Oral every 6 hours PRN Mild Pain (1 - 3)  ALPRAZolam 0.25 milliGRAM(s) Oral at bedtime PRN Sleep  ALPRAZolam 0.25 milliGRAM(s) Oral at bedtime PRN Sleep  cyclobenzaprine 10 milliGRAM(s) Oral three times a day PRN Muscle Spasm  methocarbamol 500 milliGRAM(s) Oral four times a day PRN Muscle Spasm  oxyCODONE    IR 5 milliGRAM(s) Oral every 4 hours PRN Severe Pain (7 - 10)  oxyCODONE    IR 2.5 milliGRAM(s) Oral every 4 hours PRN Moderate Pain (4 - 6)  polyethylene glycol 3350 17 Gram(s) Oral daily PRN Constipation    ___________________________________________________________________________    PHYSICAL EXAM:    Gen - NAD, Comfortable  HEENT -EOMI, MMM. Ecchymosis of chin  Pulm - CTAB, No wheeze, No rhonchi, No crackles  Cardiovascular - RRR, S1S2, +murmur  Abdomen - Soft, NT/ND, +BS  Extremities - right cast on UE and LE.   Neuro-     Cognitive - AAOx3     Communication - Fluent, No dysarthria     Attention: Intact      Judgement: Good evidence of judgement     Memory: Recall 3 objects immediate and 3 min later         Cranial Nerves - CN 2-12 intact     Motor -                    LEFT    UE - ShAB limited 2/2 scap fracture, EF 5/5, EE 5/5, WE 5/5,  5/5                     RIGHT UE - n/a                    LEFT    LE - HF 4/5, KE 4/5, DF 4/5, PF 4/5                    RIGHT LE - n/a     Sensory - Intact to LT     Tone - normal  Psychiatric - Mood stable, Affect WNL  Skin: Intact  Wounds: None Present.     ___________________________________________________________________________ HPI:  Mrs. Ivis Grimm is a 53-year-old female patient with past medical history of osteogenesis imperfecta s/p upper & lower extremity hardware, aortic insufficiency, and HTN who presented to [facility] on 3/2 s/p fall from her wheelchair. She reports missing a step on the wheelchair, losing control, and falling face first from the wheelchair at a height of around 2-3 ft face first onto a concrete ground. She lost consciousness and regained her awareness of her surroundings some time later, likely seconds to minutes, with little recollection of the intervening time. Patient noted to have right temporal superficial abrasion, R upper lip laceration through the vermillion border, small ecchymosis on the R chin, tenderness to palpation in the L shoulder/R proximal arm/R wrist/ R thigh with medial rotation of the RLE.      Imaging performed reported acute, comminuted fracture adjacent to the tip of the right femoral anabell which begins in the distal femoral metadiaphysis and continues into the lateral femoral condyle, right knee hemarthrosis, age indeterminate R sacral ala fracture, age-indeterminate left scapular body fracture, age-indeterminate fracture of the right humeral head, status post pin placement, prior deformity of the right third rib with likely a superimposed acute fracture, likely acute fractures of right 4th-6th ribs. Orthopedics was consulted and recommended NWB of RUE; NWB of RLE, LUE can be WBAT given non-displaced scapula fracture and polytrauma. Patient now s/p fiberglass casting of R arm and R leg. No acute orthopedic surgical intervention planned. Patient evaluated by PT/OT and was recommended for acute inpatient rehab. Course complicated by anemia likely 2/2 right knee hemarthrosis, s/p 1/2 unit PRBC. Patient was discharged to [facility] on 3/12.   (12 Mar 2024 15:15)    TDD: 3/30/24 to Home  ___________________________________________________________________________    SUBJECTIVE/ROS  Patient was seen and evaluated at bedside today. In NAD.   Reports adequate sleep with recent medication changes.  Case discussed at Interdisciplinary Team meeting yesterday.  An informal discussion took place with patient and her spouse.  All questions were answered and they expressed understanding and agreement.   Contacted Orthopedic Surgeon and currently awaiting communication from follow-up imaging re-assessment.  Patient remains motivated to participate on the recommended rehabilitation program as outpatient.  Denies any CP, SOB, PEREZ, palpitations, fever, chills, body aches, cough, congestion, or any other symptoms at this time.   ___________________________________________________________________________    IMAGING (per official Radiology reports)    CT FEMUR BI (03/23/2024)      FINDINGS:    There is diffuse osseous demineralization and osseous deformity consistent with history of osteogenesis imperfecta. There is interval osseous bridging across the previously seen right sacral ottoniel fracture.    Right femur: There is interval application of a fiberglass splint about the right femur and partially imaged knee. Patient is again noted to be status post fixation of the right femur with an intramedullary anabell extending from the level of the greater trochanter to the level of the distal diaphysis. There is redemonstration of an acute comminuted periprosthetic fracture of the right femoral diametaphyseal region with extension to the lateral femoral condyle. The alignment and position of fracture fragments appears grossly unchanged compared to the prior examination with impaction centrally. Nonbridging callus formation is seen along the lateral aspect which is new since the prior examination. Fracture lucency remains diffusely evident without definite bridging callus formation. No new fracture of the right lower extremity is seen. There is nonspecific subcutaneous air within the right lateral subcutaneous fat at the level of the subtrochanteric femur. Correlate for any recent subcutaneous injection in this region. In the absence of any intervention in this region possibility of underlying necrotizing infection is a consideration. There is a small right knee joint effusion.     Left femur: Patient is again noted to be status post fixation of the left femur with a femoral intramedullary anabell extending from the level of the greater trochanter to the level of the distal diametaphysis. No evidence of acute fracture of the left femur.    There is atrophy of the thigh musculature bilaterally. There is dysplastic remodeling of the femoral head neck junctions bilaterally and widening and remodeling of the acetabuli bilaterally. This appearance is unchanged.    There is a fibroid uterus.    IMPRESSION:    Redemonstration of an acute periprosthetic fracture of the right distal femoral diametaphyseal region. Alignment and position of fracture fragments is unchanged. Nonbridging callus formation is seen laterally. No definite bridging callus formation. Foci of subcutaneous air within the right lateral subcutaneous tissues at the level of the subtrochanteric femur. This is likely related to subcutaneous injection in this region. In the absence of any known injection in this region possibility of necrotizing infection is considered. Interval osseous bridging across the previously seen right sacral ottoniel or fracture. No evidence of acute fracture of the left femur.      CT HUMERUS BI (03/23/2024)  IMPRESSION: Redemonstration of an impacted fracture at the surgical neck of the right humerus with extension to the greater tuberosity. Alignment and position of fracture fragments is grossly unchanged. Fracture lucency remains evident without definite osseous bridging. Unchanged supracondylar fracture of the right distal humerus which is also unchanged in alignment and position. No definite osseous bridging. Redemonstration of a left scapular body fracture with interval mild osseous ridging. Unchanged subacute appearing sternal fracture. Alignment and position unchanged. Small focus of subcutaneous air within the posterior subcutaneous fat at the level the proximal left humerus. Correlate for recent injection at this site. In the absence of any known injection correlate for any underlying necrotizing infection.    ___________________________________________________________________________    Vital Signs Last 24 Hrs  T(C): 36.4 (29 Mar 2024 08:16), Max: 36.8 (28 Mar 2024 21:20)  T(F): 97.5 (29 Mar 2024 08:16), Max: 98.3 (28 Mar 2024 21:20)  HR: 70 (29 Mar 2024 08:16) (70 - 87)  BP: 128/70 (29 Mar 2024 08:16) (126/73 - 136/63)  RR: 18 (29 Mar 2024 08:16) (16 - 18)  SpO2: 97% (29 Mar 2024 08:16) (95% - 97%)    ___________________________________________________________________________    LAB                        10.0   7.69  )-----------( 561      ( 25 Mar 2024 06:10 )             31.5     03-25    137  |  100  |  15  ----------------------------<  95  4.3   |  27  |  <0.20<L>    Ca    9.5      25 Mar 2024 06:10    TPro  6.7  /  Alb  3.2<L>  /  TBili  0.5  /  DBili  x   /  AST  20  /  ALT  23  /  AlkPhos  189<H>  03-25    LIVER FUNCTIONS - ( 25 Mar 2024 06:10 )  Alb: 3.2 g/dL / Pro: 6.7 g/dL / ALK PHOS: 189 U/L / ALT: 23 U/L / AST: 20 U/L / GGT: x           C-Reactive Protein, Serum (03.26.24 @ 05:51)   C-Reactive Protein, Serum: 8 mg/L    Sedimentation Rate, Erythrocyte (03.26.24 @ 05:51)   Sedimentation Rate, Erythrocyte: 42 mm/hr    ___________________________________________________________________________    MEDICATIONS  (STANDING):  amLODIPine   Tablet 2.5 milliGRAM(s) Oral <User Schedule>  bisoprolol  5 mG/hydrochlorothiazide 6.25 mG 1 Tablet(s) Oral daily  enoxaparin Injectable 30 milliGRAM(s) SubCutaneous every 24 hours  eplerenone 25 milliGRAM(s) Oral <User Schedule>  escitalopram 5 milliGRAM(s) Oral <User Schedule>  gabapentin 100 milliGRAM(s) Oral at bedtime  losartan 50 milliGRAM(s) Oral two times a day  multivitamin 1 Tablet(s) Oral daily  nystatin Powder 1 Application(s) Topical two times a day  senna 1 Tablet(s) Oral at bedtime    MEDICATIONS  (PRN):  acetaminophen     Tablet .. 975 milliGRAM(s) Oral every 6 hours PRN Mild Pain (1 - 3)  ALPRAZolam 0.25 milliGRAM(s) Oral at bedtime PRN Sleep  ALPRAZolam 0.25 milliGRAM(s) Oral at bedtime PRN Sleep  cyclobenzaprine 10 milliGRAM(s) Oral three times a day PRN Muscle Spasm  methocarbamol 500 milliGRAM(s) Oral four times a day PRN Muscle Spasm  oxyCODONE    IR 5 milliGRAM(s) Oral every 4 hours PRN Severe Pain (7 - 10)  oxyCODONE    IR 2.5 milliGRAM(s) Oral every 4 hours PRN Moderate Pain (4 - 6)  polyethylene glycol 3350 17 Gram(s) Oral daily PRN Constipation    ___________________________________________________________________________    PHYSICAL EXAM:    Gen - NAD, Comfortable  HEENT -EOMI, MMM. Ecchymosis of chin  Pulm - CTAB, No wheeze, No rhonchi, No crackles  Cardiovascular - RRR, S1S2, +murmur  Abdomen - Soft, NT/ND, +BS  Extremities - right cast on UE and LE.   Neuro-     Cognitive - AAOx3     Communication - Fluent, No dysarthria     Attention: Intact      Judgement: Good evidence of judgement     Memory: Recall 3 objects immediate and 3 min later         Cranial Nerves - CN 2-12 intact     Motor -                    LEFT    UE - ShAB limited 2/2 scap fracture, EF 5/5, EE 5/5, WE 5/5,  5/5                     RIGHT UE - n/a                    LEFT    LE - HF 4/5, KE 4/5, DF 4/5, PF 4/5                    RIGHT LE - n/a     Sensory - Intact to LT     Tone - normal  Psychiatric - Mood stable, Affect WNL  Skin: Intact  Wounds: None Present.     ___________________________________________________________________________

## 2024-03-29 NOTE — PROGRESS NOTE ADULT - ASSESSMENT
52 y/o F with PMH osteogenesis imperfecta s/p upper & lower extremity hardware, aortic insufficiency, and HTN who is admitted for Acute Inpatient Rehabilitation with a multidisciplinary rehab program at [facility] with functional impairments in ADLs and mobility secondary to multiple fall related trauma.    #History of Osteogenesis Imperfecta  #Polytrauma s/p Fall  #Gait and Functional Impairment  -Repeat imaging reviewed  -Seen by orthopedics, s/p fiberglass casting of R arm and R leg  -Fall precaution  -F/u with orthopedics, Dr. Newsome, 1 week after discharge  -Used manual (self-propelled) wheelchair at baseline    #Anemia - likely 2/2 right knee hemarthrosis  -s/p 1/2 unit PRBC 3/4  -H/H stable, monitor    #Severe Aortic Insufficiency  #HTN  -Grade IV per patient ; saw CT surgery in Nov 2023 with recommendation for medical management and routine monitoring  -Echo 3/4 showed EF 55 to 60 %, grade 2 LV diastolic dysfunction. There's LVOT obstruction however gradients are LIMITED. Highest 20mmHg which may be underestimated, Severe aortic regurgitation, severe pulmonary hypertension.  -Continue amlodipine, losartan, eplerenone, bisoprolol/HCTZ (home med)  -Monitor respiratory/volume status   -Outpatient cardiology follow up ( Prosper Hinojosa , [facility] ).    #Anxiety  -Continue Lexapro 5mg daily  -Continue xanax qhs prn    DVT PPx - lovenox

## 2024-03-29 NOTE — PROGRESS NOTE ADULT - ASSESSMENT
Mrs. Ivis Grimm is a 53-year-old female patient with past medical history of osteogenesis imperfecta s/p upper & lower extremity hardware, aortic insufficiency, and HTN who is admitted for Acute Inpatient Rehabilitation with a multidisciplinary rehab program at [facility] with functional impairments in ADLs and mobility secondary to multiple fall related trauma.      Polytrauma       * S/P fall       * acute, comminuted periprosthetic fracture adjacent to the tip of the right femoral anabell which begins in the distal femoral metadiaphysis and continues into the lateral femoral condyle       * Age indeterminate fractures: R sacral ala, left scapular body, and right humeral head, status post pin placement       * Prior deformity of the right 3rd rib with likely a superimposed acute fracture, likely acute fractures of right 4th-6th ribs       * S/P fiberglass casting of R arm and R leg       * WB Status: NWB of RUE, NWB of RLE, LUE WBAT       * f/u with orthopedics, Dr. Newsome, 1 week after discharge       * Used manual (self-propelled) wheelchair at baseline  - Impaired ADLs and mobility  - Need for assistance with personal care   - Continue comprehensive rehab program of PT/OT - 3 hours a day, 5 days a week. P&O as needed        * Pain control as below       * Precautions: Fall  - Follow-up imaging to take place on 3/23 at the three-week [facility] since her trauma.    Pain Control  - Tylenol 975 mg Q6H  - Flexeril changed to robaxin PRN muscle spasm on 3/21. Tolerating it well. Will increase it to QID.    - gabapentin 100mg Hs. Has been denying it.   - Oxycodone 2.5 Q4H PRN mod pain, oxycodone 5 mg Q4H PRN severe pain    Anemia  - likely 2/2 right knee hemarthrosis  - s/p 1/2 unit PRBC 3/4  - monitor H/H    Aortic Insufficiency/HTN  - Losartan 50 mg BID  - Norvasc 2.5 mg QHS  - Eplerenone 25 mg QD  - Bisoprolol 5 mg/HCTZ 6.25 mg QD **Home med, please have pharmacy verify. It is not in a prescription bottle, may need to call pharmacy.   - Monitor BP    Mood / Cognition  - Neuropsychology consult PRN  - Lexapro 5 mg QD    Sleep  - Maintain quiet hours and a low stim environment.   - Xanax 0.5 mg PRN split in half. She will take 0.25mg as needed.     GI / Bowel  - Senna 1 cap qHS  - Miralax PRN Daily     / Bladder  - Continue bladder scans Q8 hours with straight cath for >400cc    Skin / Pressure injury  - Skin assessment on admission performed: intact  - Monitor Incisions:    - Pressure Injury/Skin: OOB to chair, PT/OT  - nursing to monitor skin qShift    Diet/Dysphagia:  - Diet Consistency: regular  - Nutrition consult    DVT prophylaxis:   - Lovenox      Outpatient Follow-up:  Domenic Newsome  Orthopaedic Surgery  [address], [address]  [place] [address]  Phone: [phone]  Fax: [phone]  Follow Up Time: 2 weeks Mrs. Ivis Grimm is a 53-year-old female patient with past medical history of osteogenesis imperfecta s/p upper & lower extremity hardware, aortic insufficiency, and HTN who is admitted for Acute Inpatient Rehabilitation with a multidisciplinary rehab program at [facility] with functional impairments in ADLs and mobility secondary to multiple fall related trauma.      Polytrauma       * S/P fall       * acute, comminuted periprosthetic fracture adjacent to the tip of the right femoral anabell which begins in the distal femoral metadiaphysis and continues into the lateral femoral condyle       * Age indeterminate fractures: R sacral ala, left scapular body, and right humeral head       * Prior deformity of the right 3rd rib with likely a superimposed acute fracture, likely acute fractures of right 4th-6th ribs       * S/P fiberglass casting of R arm and R leg       * WB Status: NWB of RUE, NWB of RLE, LUE WBAT       * f/u with orthopedics, Dr. Newsome, 1 week after discharge       * Used manual (self-propelled) wheelchair at baseline  - Impaired ADLs and mobility  - Need for assistance with personal care   - Continue comprehensive rehab program of PT/OT - 3 hours a day, 5 days a week. P&O as needed        * Pain control as below       * Precautions: Fall  - Follow-up imaging to take place on 3/23 at the three-week [place] since her trauma.    Pain Control  - Tylenol 975 mg Q6H  - Flexeril changed to robaxin PRN muscle spasm on 3/21. Tolerating it well. Will increase it to QID.    - gabapentin 100mg Hs. Has been denying it.   - Oxycodone 2.5 Q4H PRN mod pain, oxycodone 5 mg Q4H PRN severe pain    Anemia  - likely 2/2 right knee hemarthrosis  - s/p 1/2 unit PRBC 3/4  - monitor H/H    Aortic Insufficiency/HTN  - Losartan 50 mg BID  - Norvasc 2.5 mg QHS  - Eplerenone 25 mg QD  - Bisoprolol 5 mg/HCTZ 6.25 mg QD **Home med, please have pharmacy verify. It is not in a prescription bottle, may need to call pharmacy.   - Monitor BP    Mood / Cognition  - Neuropsychology consult PRN  - Lexapro 5 mg QD    Sleep  - Maintain quiet hours and a low stim environment.   - Xanax 0.5 mg PRN split in half. She will take 0.25mg as needed.     GI / Bowel  - Senna 1 cap qHS  - Miralax PRN Daily     / Bladder  - Continue bladder scans Q8 hours with straight cath for >400cc    Skin / Pressure injury  - Skin assessment on admission performed: intact  - Monitor Incisions:    - Pressure Injury/Skin: OOB to chair, PT/OT  - nursing to monitor skin qShift    Diet/Dysphagia:  - Diet Consistency: regular  - Nutrition consult    DVT prophylaxis:   - Lovenox      Outpatient Follow-up:  Domenic Newsome  Orthopaedic Surgery  [address], [address]  [place] [address]  Phone: [phone]  Fax: [phone]  Follow Up Time: 2 weeks

## 2024-03-29 NOTE — DISCHARGE NOTE NURSING/CASE MANAGEMENT/SOCIAL WORK - NSDCPEFALRISK_GEN_ALL_CORE
For information on Fall & Injury Prevention, visit: [email or web address] OR  [email or web address] OR  https://www.cdc.gov/steadi/patient.html

## 2024-03-30 VITALS
SYSTOLIC BLOOD PRESSURE: 132 MMHG | HEART RATE: 75 BPM | OXYGEN SATURATION: 97 % | TEMPERATURE: 98 F | DIASTOLIC BLOOD PRESSURE: 74 MMHG | RESPIRATION RATE: 16 BRPM

## 2024-03-30 PROCEDURE — 86140 C-REACTIVE PROTEIN: CPT

## 2024-03-30 PROCEDURE — 97161 PT EVAL LOW COMPLEX 20 MIN: CPT | Mod: GP

## 2024-03-30 PROCEDURE — 97542 WHEELCHAIR MNGMENT TRAINING: CPT | Mod: GP

## 2024-03-30 PROCEDURE — 85652 RBC SED RATE AUTOMATED: CPT

## 2024-03-30 PROCEDURE — 73700 CT LOWER EXTREMITY W/O DYE: CPT | Mod: MC

## 2024-03-30 PROCEDURE — 99232 SBSQ HOSP IP/OBS MODERATE 35: CPT

## 2024-03-30 PROCEDURE — 99239 HOSP IP/OBS DSCHRG MGMT >30: CPT

## 2024-03-30 PROCEDURE — 97140 MANUAL THERAPY 1/> REGIONS: CPT | Mod: GO

## 2024-03-30 PROCEDURE — 85027 COMPLETE CBC AUTOMATED: CPT

## 2024-03-30 PROCEDURE — 85025 COMPLETE CBC W/AUTO DIFF WBC: CPT

## 2024-03-30 PROCEDURE — 97110 THERAPEUTIC EXERCISES: CPT | Mod: GO

## 2024-03-30 PROCEDURE — 80053 COMPREHEN METABOLIC PANEL: CPT

## 2024-03-30 PROCEDURE — 97535 SELF CARE MNGMENT TRAINING: CPT | Mod: GO

## 2024-03-30 PROCEDURE — 87637 SARSCOV2&INF A&B&RSV AMP PRB: CPT

## 2024-03-30 PROCEDURE — 97165 OT EVAL LOW COMPLEX 30 MIN: CPT | Mod: GO

## 2024-03-30 PROCEDURE — 36415 COLL VENOUS BLD VENIPUNCTURE: CPT

## 2024-03-30 PROCEDURE — 73200 CT UPPER EXTREMITY W/O DYE: CPT | Mod: MC

## 2024-03-30 PROCEDURE — 97530 THERAPEUTIC ACTIVITIES: CPT | Mod: GO

## 2024-03-30 RX ORDER — AMLODIPINE BESYLATE 2.5 MG/1
1 TABLET ORAL
Refills: 0 | DISCHARGE

## 2024-03-30 RX ORDER — ESCITALOPRAM OXALATE 10 MG/1
1 TABLET, FILM COATED ORAL
Refills: 0 | DISCHARGE

## 2024-03-30 RX ORDER — EPLERENONE 50 MG/1
1 TABLET, FILM COATED ORAL
Refills: 0 | DISCHARGE

## 2024-03-30 RX ORDER — ASPIRIN/CALCIUM CARB/MAGNESIUM 324 MG
1 TABLET ORAL
Refills: 0 | DISCHARGE

## 2024-03-30 RX ORDER — LOSARTAN POTASSIUM 100 MG/1
1 TABLET, FILM COATED ORAL
Refills: 0 | DISCHARGE

## 2024-03-30 RX ORDER — BISOPROLOL FUMARATE AND HYDROCHLOROTHIAZIDE 5; 6.25 MG/1; MG/1
1 TABLET ORAL
Refills: 0 | DISCHARGE

## 2024-03-30 RX ADMIN — ENOXAPARIN SODIUM 30 MILLIGRAM(S): 100 INJECTION SUBCUTANEOUS at 06:46

## 2024-03-30 RX ADMIN — LOSARTAN POTASSIUM 50 MILLIGRAM(S): 100 TABLET, FILM COATED ORAL at 06:42

## 2024-03-30 RX ADMIN — Medication 975 MILLIGRAM(S): at 06:42

## 2024-03-30 RX ADMIN — ESCITALOPRAM OXALATE 5 MILLIGRAM(S): 10 TABLET, FILM COATED ORAL at 06:42

## 2024-03-30 RX ADMIN — BISOPROLOL FUMARATE AND HYDROCHLOROTHIAZIDE 1 TABLET(S): 5; 6.25 TABLET ORAL at 06:42

## 2024-03-30 NOTE — PROGRESS NOTE ADULT - ASSESSMENT
Mrs. Ivis Grimm is a 53-year-old female patient with past medical history of osteogenesis imperfecta s/p upper & lower extremity hardware, aortic insufficiency, and HTN who is admitted for Acute Inpatient Rehabilitation with a multidisciplinary rehab program at [facility] with functional impairments in ADLs and mobility secondary to multiple fall related trauma.      Polytrauma       * S/P fall       * acute, comminuted periprosthetic fracture adjacent to the tip of the right femoral anabell which begins in the distal femoral metadiaphysis and continues into the lateral femoral condyle       * Age indeterminate fractures: R sacral ala, left scapular body, and right humeral head       * Prior deformity of the right 3rd rib with likely a superimposed acute fracture, likely acute fractures of right 4th-6th ribs       * S/P fiberglass casting of R arm and R leg       * WB Status: NWB of RUE, NWB of RLE, LUE WBAT       * f/u with orthopedics, Dr. Newsome, 1 week after discharge       * Used manual (self-propelled) wheelchair at baseline  - Impaired ADLs and mobility  - Need for assistance with personal care   - Continue comprehensive rehab program of PT/OT - 3 hours a day, 5 days a week. P&O as needed        * Pain control as below       * Precautions: Fall  - Discharge home today    Pain Control  - Tylenol 975 mg Q6H  - Flexeril changed to robaxin PRN muscle spasm on 3/21. Tolerating it well. Will increase it to QID.    - gabapentin 100mg Hs. Has been denying it.   - Oxycodone 2.5 Q4H PRN mod pain, oxycodone 5 mg Q4H PRN severe pain    Anemia  - likely 2/2 right knee hemarthrosis  - s/p 1/2 unit PRBC 3/4  - monitor H/H    Aortic Insufficiency/HTN  - Losartan 50 mg BID  - Norvasc 2.5 mg QHS  - Eplerenone 25 mg QD  - Bisoprolol 5 mg/HCTZ 6.25 mg QD **Home med, please have pharmacy verify. It is not in a prescription bottle, may need to call pharmacy.   - Monitor BP    Mood / Cognition  - Neuropsychology consult PRN  - Lexapro 5 mg QD    Sleep  - Maintain quiet hours and a low stim environment.   - Xanax 0.5 mg PRN split in half. She will take 0.25mg as needed.     GI / Bowel  - Senna 1 cap qHS  - Miralax PRN Daily     / Bladder  - Continue bladder scans Q8 hours with straight cath for >400cc    Skin / Pressure injury  - Skin assessment on admission performed: intact  - Monitor Incisions:    - Pressure Injury/Skin: OOB to chair, PT/OT  - nursing to monitor skin qShift    Diet/Dysphagia:  - Diet Consistency: regular  - Nutrition consult    DVT prophylaxis:   - Lovenox      Outpatient Follow-up:  Domenic Newsome  Orthopaedic Surgery  [address] [place] [address], [address]  [place] [address]  Phone: [phone]  Fax: [phone]  Follow Up Time: 2 weeks

## 2024-03-30 NOTE — PROGRESS NOTE ADULT - ASSESSMENT
52 y/o F with PMH osteogenesis imperfecta s/p upper & lower extremity hardware, aortic insufficiency, and HTN who is admitted for Acute Inpatient Rehabilitation with a multidisciplinary rehab program at [facility] with functional impairments in ADLs and mobility secondary to multiple fall related trauma.    #History of Osteogenesis Imperfecta  #Polytrauma s/p Fall  #Gait and Functional Impairment  -Repeat imaging reviewed  -Seen by orthopedics, s/p fiberglass casting of R arm and R leg  -Fall precaution  -F/u with orthopedics, Dr. Newsome, 1 week after discharge  -Used manual (self-propelled) wheelchair at baseline    #Anemia - likely 2/2 right knee hemarthrosis  -s/p 1/2 unit PRBC 3/4  -H/H stable, monitor    #Severe Aortic Insufficiency  #HTN  -Grade IV per patient ; saw CT surgery in Nov 2023 with recommendation for medical management and routine monitoring  -Echo 3/4 showed EF 55 to 60 %, grade 2 LV diastolic dysfunction. There's LVOT obstruction however gradients are LIMITED. Highest 20mmHg which may be underestimated, Severe aortic regurgitation, severe pulmonary hypertension.  -Continue amlodipine, losartan, eplerenone, bisoprolol/HCTZ (home med)  -Monitor respiratory/volume status   -Outpatient cardiology follow up ( [facility]inoj[facility] , [facility] ).    #Anxiety  -Continue Lexapro 5mg daily  -Continue xanax qhs prn    DVT PPx - lovenox

## 2024-03-30 NOTE — PROGRESS NOTE ADULT - REASON FOR ADMISSION
Polytrauma

## 2024-03-30 NOTE — PROGRESS NOTE ADULT - PROVIDER SPECIALTY LIST ADULT
Heme/Onc
Hospitalist
Hospitalist
Internal Medicine
Physiatry
Physiatry
Rehab Medicine
Hospitalist
Internal Medicine
Physiatry
Heme/Onc
Hospitalist
Hospitalist
Physiatry
Rehab Medicine
Heme/Onc
Hospitalist
Hospitalist
Physiatry

## 2024-03-30 NOTE — PROGRESS NOTE ADULT - SUBJECTIVE AND OBJECTIVE BOX
Patient is a 53y old  Female who presents with a chief complaint of Polytrauma (29 Mar 2024 10:07)      SUBJECTIVE / OVERNIGHT EVENTS:  Pt seen and examined at bedside. No acute events overnight.  Pt denies cp, palpitations, sob, abd pain, N/V, fever, chills.    ROS:  All other review of systems negative    Allergies    No Known Allergies    Intolerances        MEDICATIONS  (STANDING):  amLODIPine   Tablet 2.5 milliGRAM(s) Oral <User Schedule>  bisoprolol  5 mG/hydrochlorothiazide 6.25 mG 1 Tablet(s) Oral daily  enoxaparin Injectable 30 milliGRAM(s) SubCutaneous every 24 hours  eplerenone 25 milliGRAM(s) Oral <User Schedule>  escitalopram 5 milliGRAM(s) Oral <User Schedule>  gabapentin 100 milliGRAM(s) Oral at bedtime  losartan 50 milliGRAM(s) Oral two times a day  multivitamin 1 Tablet(s) Oral daily  nystatin Powder 1 Application(s) Topical two times a day  senna 1 Tablet(s) Oral at bedtime    MEDICATIONS  (PRN):  acetaminophen     Tablet .. 975 milliGRAM(s) Oral every 6 hours PRN Mild Pain (1 - 3)  ALPRAZolam 0.25 milliGRAM(s) Oral at bedtime PRN Sleep  ALPRAZolam 0.25 milliGRAM(s) Oral at bedtime PRN Sleep  cyclobenzaprine 10 milliGRAM(s) Oral three times a day PRN Muscle Spasm  methocarbamol 500 milliGRAM(s) Oral four times a day PRN Muscle Spasm  oxyCODONE    IR 2.5 milliGRAM(s) Oral every 4 hours PRN Moderate Pain (4 - 6)  oxyCODONE    IR 5 milliGRAM(s) Oral every 4 hours PRN Severe Pain (7 - 10)  polyethylene glycol 3350 17 Gram(s) Oral daily PRN Constipation      Vital Signs Last 24 Hrs  T(C): 36.7 (29 Mar 2024 20:12), Max: 36.7 (29 Mar 2024 20:12)  T(F): 98.1 (29 Mar 2024 20:12), Max: 98.1 (29 Mar 2024 20:12)  HR: 99 (30 Mar 2024 06:35) (84 - 99)  BP: 130/62 (30 Mar 2024 06:35) (130/62 - 140/62)  BP(mean): --  RR: 17 (29 Mar 2024 20:12) (17 - 17)  SpO2: 97% (29 Mar 2024 20:12) (97% - 97%)    Parameters below as of 30 Mar 2024 06:35  Patient On (Oxygen Delivery Method): room air      CAPILLARY BLOOD GLUCOSE        I&O's Summary      PHYSICAL EXAM:  GENERAL: NAD, short stature  HEAD:  Atraumatic, Normocephalic  NECK: Supple, No JVD  CHEST/LUNG: Clear to auscultation bilaterally; No wheeze, nonlabored breathing  HEART: Regular rate and rhythm; + murmurs  ABDOMEN: Soft, Nontender, Nondistended; Bowel sounds present  EXTREMITIES: No clubbing, cyanosis, or edema, +RUE, LE cast. LUE +bowing   PSYCH: calm, appropriate mood    LABS:                    RADIOLOGY & ADDITIONAL TESTS:  Results Reviewed:   Imaging Personally Reviewed:  Electrocardiogram Personally Reviewed:    COORDINATION OF CARE:  Care Discussed with Consultants/Other Providers [Y/N]:  Prior or Outpatient Records Reviewed [Y/N]:

## 2024-03-30 NOTE — PROGRESS NOTE ADULT - NUTRITIONAL ASSESSMENT
This patient has been assessed with a concern for Malnutrition and has been determined to have a diagnosis/diagnoses of Severe protein-calorie malnutrition.    This patient is being managed with:   Diet Regular-  Entered: Mar 12 2024 10:01PM  
This patient has been assessed with a concern for Malnutrition and has been determined to have a diagnosis/diagnoses of Severe protein-calorie malnutrition.    This patient is being managed with:   Diet Regular-  Entered: Mar 12 2024 10:01PM    This patient has been assessed with a concern for Malnutrition and has been determined to have a diagnosis/diagnoses of Severe protein-calorie malnutrition.    This patient is being managed with:   Diet Regular-  Entered: Mar 12 2024 10:01PM  
This patient has been assessed with a concern for Malnutrition and has been determined to have a diagnosis/diagnoses of Severe protein-calorie malnutrition.    This patient is being managed with:   Diet Regular-  Entered: Mar 12 2024 10:01PM  

## 2024-03-30 NOTE — PROGRESS NOTE ADULT - SUBJECTIVE AND OBJECTIVE BOX
HPI:  Mrs. Ivis Grimm is a 53-year-old female patient with past medical history of osteogenesis imperfecta s/p upper & lower extremity hardware, aortic insufficiency, and HTN who presented to [facility] on 3/2 s/p fall from her wheelchair. She reports missing a step on the wheelchair, losing control, and falling face first from the wheelchair at a height of around 2-3 ft face first onto a concrete ground. She lost consciousness and regained her awareness of her surroundings some time later, likely seconds to minutes, with little recollection of the intervening time. Patient noted to have right temporal superficial abrasion, R upper lip laceration through the vermillion border, small ecchymosis on the R chin, tenderness to palpation in the L shoulder/R proximal arm/R wrist/ R thigh with medial rotation of the RLE.      Imaging performed reported acute, comminuted fracture adjacent to the tip of the right femoral anabell which begins in the distal femoral metadiaphysis and continues into the lateral femoral condyle, right knee hemarthrosis, age indeterminate R sacral ala fracture, age-indeterminate left scapular body fracture, age-indeterminate fracture of the right humeral head, status post pin placement, prior deformity of the right third rib with likely a superimposed acute fracture, likely acute fractures of right 4th-6th ribs. Orthopedics was consulted and recommended NWB of RUE; NWB of RLE, LUE can be WBAT given non-displaced scapula fracture and polytrauma. Patient now s/p fiberglass casting of R arm and R leg. No acute orthopedic surgical intervention planned. Patient evaluated by PT/OT and was recommended for acute inpatient rehab. Course complicated by anemia likely 2/2 right knee hemarthrosis, s/p 1/2 unit PRBC. Patient was discharged to [facility] on 3/12.   (12 Mar 2024 15:15)    TDD: 3/30/24 to Home  ___________________________________________________________________________    SUBJECTIVE/ROS  Patient was seen and evaluated at bedside today.  Reported no overnight events and is in no acute distress.  Patient remains motivated to participate on the recommended rehabilitation program as outpatient.  Discharge home today.  Denies any CP, SOB, PEREZ, palpitations, fever, chills, body aches, cough, congestion, or any other symptoms at this time.   ___________________________________________________________________________    IMAGING (per official Radiology reports)    CT FEMUR BI (03/23/2024)      FINDINGS:    There is diffuse osseous demineralization and osseous deformity consistent with history of osteogenesis imperfecta. There is interval osseous bridging across the previously seen right sacral ottoniel fracture.    Right femur: There is interval application of a fiberglass splint about the right femur and partially imaged knee. Patient is again noted to be status post fixation of the right femur with an intramedullary anabell extending from the level of the greater trochanter to the level of the distal diaphysis. There is redemonstration of an acute comminuted periprosthetic fracture of the right femoral diametaphyseal region with extension to the lateral femoral condyle. The alignment and position of fracture fragments appears grossly unchanged compared to the prior examination with impaction centrally. Nonbridging callus formation is seen along the lateral aspect which is new since the prior examination. Fracture lucency remains diffusely evident without definite bridging callus formation. No new fracture of the right lower extremity is seen. There is nonspecific subcutaneous air within the right lateral subcutaneous fat at the level of the subtrochanteric femur. Correlate for any recent subcutaneous injection in this region. In the absence of any intervention in this region possibility of underlying necrotizing infection is a consideration. There is a small right knee joint effusion.     Left femur: Patient is again noted to be status post fixation of the left femur with a femoral intramedullary anabell extending from the level of the greater trochanter to the level of the distal diametaphysis. No evidence of acute fracture of the left femur.    There is atrophy of the thigh musculature bilaterally. There is dysplastic remodeling of the femoral head neck junctions bilaterally and widening and remodeling of the acetabuli bilaterally. This appearance is unchanged.    There is a fibroid uterus.    IMPRESSION:    Redemonstration of an acute periprosthetic fracture of the right distal femoral diametaphyseal region. Alignment and position of fracture fragments is unchanged. Nonbridging callus formation is seen laterally. No definite bridging callus formation. Foci of subcutaneous air within the right lateral subcutaneous tissues at the level of the subtrochanteric femur. This is likely related to subcutaneous injection in this region. In the absence of any known injection in this region possibility of necrotizing infection is considered. Interval osseous bridging across the previously seen right sacral ottoniel or fracture. No evidence of acute fracture of the left femur.      CT HUMERUS BI (03/23/2024)  IMPRESSION: Redemonstration of an impacted fracture at the surgical neck of the right humerus with extension to the greater tuberosity. Alignment and position of fracture fragments is grossly unchanged. Fracture lucency remains evident without definite osseous bridging. Unchanged supracondylar fracture of the right distal humerus which is also unchanged in alignment and position. No definite osseous bridging. Redemonstration of a left scapular body fracture with interval mild osseous ridging. Unchanged subacute appearing sternal fracture. Alignment and position unchanged. Small focus of subcutaneous air within the posterior subcutaneous fat at the level the proximal left humerus. Correlate for recent injection at this site. In the absence of any known injection correlate for any underlying necrotizing infection.    ___________________________________________________________________________    Vital Signs Last 24 Hrs  T(C): 36.9 (30 Mar 2024 09:38), Max: 36.9 (30 Mar 2024 09:38)  T(F): 98.5 (30 Mar 2024 09:38), Max: 98.5 (30 Mar 2024 09:38)  HR: 77 (30 Mar 2024 09:38) (77 - 99)  BP: 119/53 (30 Mar 2024 09:38) (119/53 - 140/62)  RR: 16 (30 Mar 2024 09:38) (16 - 17)  SpO2: 96% (30 Mar 2024 09:38) (96% - 97%)    ___________________________________________________________________________    LAB                        10.0   7.69  )-----------( 561      ( 25 Mar 2024 06:10 )             31.5     03-25    137  |  100  |  15  ----------------------------<  95  4.3   |  27  |  <0.20<L>    Ca    9.5      25 Mar 2024 06:10    TPro  6.7  /  Alb  3.2<L>  /  TBili  0.5  /  DBili  x   /  AST  20  /  ALT  23  /  AlkPhos  189<H>  03-25    LIVER FUNCTIONS - ( 25 Mar 2024 06:10 )  Alb: 3.2 g/dL / Pro: 6.7 g/dL / ALK PHOS: 189 U/L / ALT: 23 U/L / AST: 20 U/L / GGT: x           C-Reactive Protein, Serum (03.26.24 @ 05:51)   C-Reactive Protein, Serum: 8 mg/L    Sedimentation Rate, Erythrocyte (03.26.24 @ 05:51)   Sedimentation Rate, Erythrocyte: 42 mm/hr    ___________________________________________________________________________    MEDICATIONS  (STANDING):  amLODIPine   Tablet 2.5 milliGRAM(s) Oral <User Schedule>  bisoprolol  5 mG/hydrochlorothiazide 6.25 mG 1 Tablet(s) Oral daily  enoxaparin Injectable 30 milliGRAM(s) SubCutaneous every 24 hours  eplerenone 25 milliGRAM(s) Oral <User Schedule>  escitalopram 5 milliGRAM(s) Oral <User Schedule>  gabapentin 100 milliGRAM(s) Oral at bedtime  losartan 50 milliGRAM(s) Oral two times a day  multivitamin 1 Tablet(s) Oral daily  nystatin Powder 1 Application(s) Topical two times a day  senna 1 Tablet(s) Oral at bedtime    MEDICATIONS  (PRN):  acetaminophen     Tablet .. 975 milliGRAM(s) Oral every 6 hours PRN Mild Pain (1 - 3)  ALPRAZolam 0.25 milliGRAM(s) Oral at bedtime PRN Sleep  ALPRAZolam 0.25 milliGRAM(s) Oral at bedtime PRN Sleep  cyclobenzaprine 10 milliGRAM(s) Oral three times a day PRN Muscle Spasm  methocarbamol 500 milliGRAM(s) Oral four times a day PRN Muscle Spasm  oxyCODONE    IR 5 milliGRAM(s) Oral every 4 hours PRN Severe Pain (7 - 10)  oxyCODONE    IR 2.5 milliGRAM(s) Oral every 4 hours PRN Moderate Pain (4 - 6)  polyethylene glycol 3350 17 Gram(s) Oral daily PRN Constipation    ___________________________________________________________________________    PHYSICAL EXAM:    Gen - NAD, Comfortable  HEENT -EOMI, MMM. Ecchymosis of chin  Pulm - CTAB, No wheeze, No rhonchi, No crackles  Cardiovascular - RRR, S1S2, +murmur  Abdomen - Soft, NT/ND, +BS  Extremities - right cast on UE and LE.   Neuro-     Cognitive - AAOx3     Communication - Fluent, No dysarthria     Attention: Intact      Judgement: Good evidence of judgement     Memory: Recall 3 objects immediate and 3 min later         Cranial Nerves - CN 2-12 intact     Motor -                    LEFT    UE - ShAB limited 2/2 scap fracture, EF 5/5, EE 5/5, WE 5/5,  5/5                     RIGHT UE - n/a                    LEFT    LE - HF 4/5, KE 4/5, DF 4/5, PF 4/5                    RIGHT LE - n/a     Sensory - Intact to LT     Tone - normal  Psychiatric - Mood stable, Affect WNL  Skin: Intact  Wounds: None Present.     ___________________________________________________________________________

## 2024-04-02 ENCOUNTER — NON-APPOINTMENT (OUTPATIENT)
Age: 54
End: 2024-04-02

## 2024-04-04 ENCOUNTER — APPOINTMENT (OUTPATIENT)
Dept: ORTHOPEDIC SURGERY | Facility: CLINIC | Age: 54
End: 2024-04-04
Payer: COMMERCIAL

## 2024-04-04 ENCOUNTER — NON-APPOINTMENT (OUTPATIENT)
Age: 54
End: 2024-04-04

## 2024-04-04 VITALS — WEIGHT: 54 LBS | BODY MASS INDEX: 12.49 KG/M2 | HEIGHT: 55 IN

## 2024-04-04 PROCEDURE — 73060 X-RAY EXAM OF HUMERUS: CPT | Mod: RT

## 2024-04-04 PROCEDURE — 73010 X-RAY EXAM OF SHOULDER BLADE: CPT | Mod: LT

## 2024-04-04 PROCEDURE — 99204 OFFICE O/P NEW MOD 45 MIN: CPT | Mod: 25

## 2024-04-04 PROCEDURE — 73552 X-RAY EXAM OF FEMUR 2/>: CPT | Mod: RT
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
## 2024-04-04 NOTE — END OF VISIT
[FreeTextEntry3] :  All medical record entries made by the Scribe were at my,  Dr. Domenci Newsome MD., direction and personally dictated by me on 04/04/2024. I have personally reviewed the chart and agree that the record accurately reflects my personal performance of the history, physical exam, assessment and plan.

## 2024-04-04 NOTE — ADDENDUM
[FreeTextEntry1] :  I, Fabienne Melendez wrote this note acting as a scribe for Dr. Doemnic Newsome on Apr 04, 2024.

## 2024-04-04 NOTE — DISCUSSION/SUMMARY
[de-identified] :  The underlying pathophysiology was reviewed with the patient. XR films were reviewed with the patient. Discussed at length the nature of the patient's condition.   Patient was advised to take OTC medications and topical analgesic for pain management.  She was informed that the fractures are healing well. Since, she has had the cast on already for 5 weeks she was advise to have the cast on for one more week to give it full 6 weeks for the fracture is fully heal.   She was informed that the pain will decrease, and she was informed that her ROM will improve. She was informed that she may go to PT after the cast removal to improve her ROM.   All questions answered, understanding verbalized. Patient in agreement with plan of care.   Patient was advised to follow up in a week.

## 2024-04-04 NOTE — PHYSICAL EXAM
[de-identified] : Patient is WDWN, alert, and in no acute distress. Breathing is unlabored. She is grossly oriented to person, place, and time.  Patient ambulates in a wheelchair.   Right Shoulder:   Inspection/ Palpation: there is tenderness present.   Range of Motion: active flexion, passive flexion, abduction, external rotation is limited with pain.   Strength: forward elevation, internal rotation, external rotation, adduction, and abduction are limited.   Stability: no joint instability on provocative testing. [de-identified] :  AP and lateral views of the Right femur were obtained today and revealed acute periprosthetic fracture of the right distal femoral diametaphyseal region.    AP and lateral views of the Right humerus were obtained today and revealed a nondisplaced humeral neck fracture and distal humerus fracture.    AP, lateral and oblique views of the Clavicle were obtained today and revealed no abnormalities with her given osteogenesis imperfecta condition. No acute fracture. No dislocation. Cartilage spaces are maintained.

## 2024-04-11 ENCOUNTER — APPOINTMENT (OUTPATIENT)
Dept: ORTHOPEDIC SURGERY | Facility: CLINIC | Age: 54
End: 2024-04-11
Payer: COMMERCIAL

## 2024-04-11 PROCEDURE — 73552 X-RAY EXAM OF FEMUR 2/>: CPT | Mod: RT

## 2024-04-11 PROCEDURE — 73060 X-RAY EXAM OF HUMERUS: CPT | Mod: RT

## 2024-04-11 PROCEDURE — 99213 OFFICE O/P EST LOW 20 MIN: CPT

## 2024-04-15 ENCOUNTER — TRANSCRIPTION ENCOUNTER (OUTPATIENT)
Age: 54
End: 2024-04-15

## 2024-05-06 ENCOUNTER — OUTPATIENT (OUTPATIENT)
Dept: OUTPATIENT SERVICES | Facility: HOSPITAL | Age: 54
LOS: 1 days | End: 2024-05-06
Payer: COMMERCIAL

## 2024-05-06 ENCOUNTER — APPOINTMENT (OUTPATIENT)
Dept: PHYSICAL MEDICINE AND REHAB | Facility: CLINIC | Age: 54
End: 2024-05-06
Payer: COMMERCIAL

## 2024-05-06 ENCOUNTER — RESULT REVIEW (OUTPATIENT)
Age: 54
End: 2024-05-06

## 2024-05-06 VITALS
HEIGHT: 55 IN | OXYGEN SATURATION: 96 % | SYSTOLIC BLOOD PRESSURE: 121 MMHG | WEIGHT: 50 LBS | HEART RATE: 74 BPM | DIASTOLIC BLOOD PRESSURE: 50 MMHG | BODY MASS INDEX: 11.57 KG/M2 | TEMPERATURE: 97.7 F

## 2024-05-06 DIAGNOSIS — S22.31XA FRACTURE OF ONE RIB, RIGHT SIDE, INITIAL ENCOUNTER FOR CLOSED FRACTURE: ICD-10-CM

## 2024-05-06 DIAGNOSIS — Z98.890 OTHER SPECIFIED POSTPROCEDURAL STATES: Chronic | ICD-10-CM

## 2024-05-06 DIAGNOSIS — M25.531 PAIN IN RIGHT WRIST: ICD-10-CM

## 2024-05-06 DIAGNOSIS — M79.641 PAIN IN RIGHT HAND: ICD-10-CM

## 2024-05-06 DIAGNOSIS — S42.109A FRACTURE OF UNSPECIFIED PART OF SCAPULA, UNSPECIFIED SHOULDER, INITIAL ENCOUNTER FOR CLOSED FRACTURE: ICD-10-CM

## 2024-05-06 DIAGNOSIS — T07.XXXA UNSPECIFIED MULTIPLE INJURIES, INITIAL ENCOUNTER: ICD-10-CM

## 2024-05-06 DIAGNOSIS — S72.91XA UNSPECIFIED FRACTURE OF RIGHT FEMUR, INITIAL ENCOUNTER FOR CLOSED FRACTURE: ICD-10-CM

## 2024-05-06 DIAGNOSIS — M25.532 PAIN IN RIGHT WRIST: ICD-10-CM

## 2024-05-06 DIAGNOSIS — M79.642 PAIN IN LEFT HAND: ICD-10-CM

## 2024-05-06 DIAGNOSIS — Q78.0 OSTEOGENESIS IMPERFECTA: ICD-10-CM

## 2024-05-06 PROCEDURE — 73110 X-RAY EXAM OF WRIST: CPT

## 2024-05-06 PROCEDURE — 73130 X-RAY EXAM OF HAND: CPT

## 2024-05-06 PROCEDURE — 73110 X-RAY EXAM OF WRIST: CPT | Mod: 26,RT

## 2024-05-06 PROCEDURE — 99213 OFFICE O/P EST LOW 20 MIN: CPT

## 2024-05-06 PROCEDURE — 73130 X-RAY EXAM OF HAND: CPT | Mod: 26,RT

## 2024-05-06 NOTE — REVIEW OF SYSTEMS
[FreeTextEntry1] : Constitutional: No fever, No Chills HEENT: No eye pain, No visual disturbances  Pulm: No cough,  No shortness of breath Cardio: No chest pain, No palpitations GI:  No abdominal pain, No nausea, No vomiting Neuro: No headaches, No memory loss

## 2024-05-06 NOTE — HISTORY OF PRESENT ILLNESS
[FreeTextEntry1] : Mrs. Ivis Grimm is a 53-year-old female patient with past medical history of osteogenesis imperfecta s/p upper & lower extremity hardware, aortic insufficiency, and HTN who is admitted for Acute Inpatient Rehabilitation with a multidisciplinary rehab program at [facility] on 3/12/24 with functional impairments in ADLs and mobility secondary to multiple fall related trauma. She was discharged on 3/30/24 and arrives today for reevaluation as outpatient. Notably, she no longer has previous cast immobilizer and has been participating in therapy as outpatient with additional functional gains. She does report bilateral hand and wrist pain that is more prominent on the right.
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
## 2024-05-06 NOTE — ASSESSMENT
[FreeTextEntry1] : Mrs. Ivis Grimm is a 53-year-old female patient with past medical history of osteogenesis imperfecta s/p upper & lower extremity hardware, aortic insufficiency, and HTN who is admitted for Acute Inpatient Rehabilitation with a multidisciplinary rehab program at [facility] on 3/12/24 with functional impairments in ADLs and mobility secondary to multiple fall related trauma and was discharged on 3/30/24.   Plan: 1. Continue PT for 8 weeks 2. X-ray imaging of right hand and wrist 3. Follow-up with PM&R in 8 weeks

## 2024-05-23 ENCOUNTER — APPOINTMENT (OUTPATIENT)
Dept: ORTHOPEDIC SURGERY | Facility: CLINIC | Age: 54
End: 2024-05-23
Payer: COMMERCIAL

## 2024-05-23 DIAGNOSIS — S42.309A UNSPECIFIED FRACTURE OF SHAFT OF HUMERUS, UNSPECIFIED ARM, INITIAL ENCOUNTER FOR CLOSED FRACTURE: ICD-10-CM

## 2024-05-23 PROCEDURE — 99213 OFFICE O/P EST LOW 20 MIN: CPT

## 2024-05-23 PROCEDURE — 73060 X-RAY EXAM OF HUMERUS: CPT | Mod: RT

## 2024-05-23 PROCEDURE — 73552 X-RAY EXAM OF FEMUR 2/>: CPT | Mod: RT

## 2024-09-16 ENCOUNTER — APPOINTMENT (OUTPATIENT)
Dept: PHYSICAL MEDICINE AND REHAB | Facility: CLINIC | Age: 54
End: 2024-09-16
Payer: COMMERCIAL

## 2024-09-16 VITALS
SYSTOLIC BLOOD PRESSURE: 138 MMHG | BODY MASS INDEX: 12.27 KG/M2 | HEART RATE: 63 BPM | DIASTOLIC BLOOD PRESSURE: 66 MMHG | WEIGHT: 53 LBS | OXYGEN SATURATION: 96 % | HEIGHT: 55 IN | TEMPERATURE: 99 F

## 2024-09-16 DIAGNOSIS — T07.XXXA UNSPECIFIED MULTIPLE INJURIES, INITIAL ENCOUNTER: ICD-10-CM

## 2024-09-16 DIAGNOSIS — M25.50 PAIN IN UNSPECIFIED JOINT: ICD-10-CM

## 2024-09-16 DIAGNOSIS — S72.91XA UNSPECIFIED FRACTURE OF RIGHT FEMUR, INITIAL ENCOUNTER FOR CLOSED FRACTURE: ICD-10-CM

## 2024-09-16 DIAGNOSIS — S42.309A UNSPECIFIED FRACTURE OF SHAFT OF HUMERUS, UNSPECIFIED ARM, INITIAL ENCOUNTER FOR CLOSED FRACTURE: ICD-10-CM

## 2024-09-16 DIAGNOSIS — M79.671 PAIN IN RIGHT FOOT: ICD-10-CM

## 2024-09-16 PROCEDURE — 99213 OFFICE O/P EST LOW 20 MIN: CPT

## 2024-09-16 RX ORDER — DICLOFENAC SODIUM 10 MG/G
1 GEL TOPICAL DAILY
Qty: 2 | Refills: 0 | Status: ACTIVE | COMMUNITY
Start: 2024-09-16 | End: 1900-01-01

## 2024-09-16 NOTE — HISTORY OF PRESENT ILLNESS
[FreeTextEntry1] : Mrs. Ivis Grmim is a 54-year-old female patient with past medical history of osteogenesis imperfecta s/p upper & lower extremity hardware, aortic insufficiency, and HTN who is admitted for Acute Inpatient Rehabilitation with a multidisciplinary rehab program at [facility] on 3/12/24 with functional impairments in ADLs and mobility secondary to multiple fall related trauma. She was discharged on 3/30/24 and arrives today once again for a follow-up reevaluation as outpatient. She remains with no immobilizers and is no longer participating in therapy. She underwent steroid injection procedures by Dr. Prosper Leong, orthopedic surgeon, with improvement of her pain. She notes remaining with right elbow, right knee, and right foot (instep) pain an intermittent swelling.

## 2024-09-16 NOTE — PHYSICAL EXAM
[FreeTextEntry1] : Gen - NAD, Comfortable HEENT -EOMI, MMM. Ecchymosis of chin Pulm - CTAB, No wheeze, No rhonchi, No crackles Cardiovascular - RRR, S1S2, +murmur Abdomen - Soft, NT/ND, +BS Extremities -      - Right elbow posterior TTP     - Right distal medial femur TTP with knee flexed      - Right dorsal TTP over 2nd through 3rd MTPs with no exacerbation with persussion Neuro: Cognitive - AAOx3 Motor -  BILAT UE - ShAB 3/5, EF 5/5, EE 5/5, WE 5/5,  5/5  BILAT LE - HF 4/5, KE 4/5, DF 4/5, PF 4/5  Psychiatric - Mood stable, Affect WNL Skin: Intact Wounds: None Present

## 2024-09-16 NOTE — ASSESSMENT
[FreeTextEntry1] : Mrs. Ivis Grimm is a 53-year-old female patient with past medical history of osteogenesis imperfecta s/p upper & lower extremity hardware, aortic insufficiency, and HTN who is admitted for Acute Inpatient Rehabilitation with a multidisciplinary rehab program at [facility] on 3/12/24 with functional impairments in ADLs and mobility secondary to multiple fall related trauma and was discharged on 3/30/24.  Plan: 1. Diclofenac gel to right elbow, knee, and foot 3. Follow-up with PM&R on an as-needed basis

## 2024-12-03 ENCOUNTER — INPATIENT (INPATIENT)
Facility: HOSPITAL | Age: 54
LOS: 11 days | Discharge: HOME | DRG: 998 | End: 2024-12-15
Attending: STUDENT IN AN ORGANIZED HEALTH CARE EDUCATION/TRAINING PROGRAM | Admitting: STUDENT IN AN ORGANIZED HEALTH CARE EDUCATION/TRAINING PROGRAM
Payer: COMMERCIAL

## 2024-12-03 VITALS
RESPIRATION RATE: 15 BRPM | OXYGEN SATURATION: 95 % | TEMPERATURE: 98 F | DIASTOLIC BLOOD PRESSURE: 36 MMHG | HEART RATE: 71 BPM | SYSTOLIC BLOOD PRESSURE: 91 MMHG

## 2024-12-03 DIAGNOSIS — T07.XXXA UNSPECIFIED MULTIPLE INJURIES, INITIAL ENCOUNTER: ICD-10-CM

## 2024-12-03 DIAGNOSIS — I10 ESSENTIAL (PRIMARY) HYPERTENSION: ICD-10-CM

## 2024-12-03 DIAGNOSIS — R52 PAIN, UNSPECIFIED: ICD-10-CM

## 2024-12-03 DIAGNOSIS — S22.41XA MULTIPLE FRACTURES OF RIBS, RIGHT SIDE, INITIAL ENCOUNTER FOR CLOSED FRACTURE: ICD-10-CM

## 2024-12-03 DIAGNOSIS — J93.9 PNEUMOTHORAX, UNSPECIFIED: ICD-10-CM

## 2024-12-03 DIAGNOSIS — I35.1 NONRHEUMATIC AORTIC (VALVE) INSUFFICIENCY: ICD-10-CM
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
## 2024-12-03 DIAGNOSIS — Z98.890 OTHER SPECIFIED POSTPROCEDURAL STATES: Chronic | ICD-10-CM

## 2024-12-03 LAB
ALBUMIN SERPL ELPH-MCNC: 3.6 G/DL — SIGNIFICANT CHANGE UP (ref 3.3–5)
ALBUMIN SERPL ELPH-MCNC: 3.8 G/DL — SIGNIFICANT CHANGE UP (ref 3.3–5)
ALP SERPL-CCNC: 100 U/L — SIGNIFICANT CHANGE UP (ref 40–120)
ALP SERPL-CCNC: 82 U/L — SIGNIFICANT CHANGE UP (ref 40–120)
ALT FLD-CCNC: 23 U/L — SIGNIFICANT CHANGE UP (ref 10–45)
ALT FLD-CCNC: 25 U/L — SIGNIFICANT CHANGE UP (ref 10–45)
ANION GAP SERPL CALC-SCNC: 15 MMOL/L — SIGNIFICANT CHANGE UP (ref 5–17)
ANION GAP SERPL CALC-SCNC: 15 MMOL/L — SIGNIFICANT CHANGE UP (ref 5–17)
APTT BLD: 30.7 SEC — SIGNIFICANT CHANGE UP (ref 24.5–35.6)
AST SERPL-CCNC: 48 U/L — HIGH (ref 10–40)
AST SERPL-CCNC: 51 U/L — HIGH (ref 10–40)
BASOPHILS # BLD AUTO: 0.11 K/UL — SIGNIFICANT CHANGE UP (ref 0–0.2)
BASOPHILS NFR BLD AUTO: 0.5 % — SIGNIFICANT CHANGE UP (ref 0–2)
BILIRUB SERPL-MCNC: 0.6 MG/DL — SIGNIFICANT CHANGE UP (ref 0.2–1.2)
BILIRUB SERPL-MCNC: 1.1 MG/DL — SIGNIFICANT CHANGE UP (ref 0.2–1.2)
BUN SERPL-MCNC: 17 MG/DL — SIGNIFICANT CHANGE UP (ref 7–23)
BUN SERPL-MCNC: 21 MG/DL — SIGNIFICANT CHANGE UP (ref 7–23)
CALCIUM SERPL-MCNC: 8.4 MG/DL — SIGNIFICANT CHANGE UP (ref 8.4–10.5)
CALCIUM SERPL-MCNC: 9.1 MG/DL — SIGNIFICANT CHANGE UP (ref 8.4–10.5)
CHLORIDE SERPL-SCNC: 100 MMOL/L — SIGNIFICANT CHANGE UP (ref 96–108)
CHLORIDE SERPL-SCNC: 100 MMOL/L — SIGNIFICANT CHANGE UP (ref 96–108)
CO2 SERPL-SCNC: 21 MMOL/L — LOW (ref 22–31)
CO2 SERPL-SCNC: 22 MMOL/L — SIGNIFICANT CHANGE UP (ref 22–31)
CREAT SERPL-MCNC: 0.31 MG/DL — LOW (ref 0.5–1.3)
CREAT SERPL-MCNC: 0.36 MG/DL — LOW (ref 0.5–1.3)
EGFR: 119 ML/MIN/1.73M2 — SIGNIFICANT CHANGE UP
EGFR: 123 ML/MIN/1.73M2 — SIGNIFICANT CHANGE UP
EOSINOPHIL # BLD AUTO: 0.16 K/UL — SIGNIFICANT CHANGE UP (ref 0–0.5)
EOSINOPHIL NFR BLD AUTO: 0.7 % — SIGNIFICANT CHANGE UP (ref 0–6)
ETHANOL SERPL-MCNC: <10 MG/DL — SIGNIFICANT CHANGE UP (ref 0–10)
GAS PNL BLDV: SIGNIFICANT CHANGE UP
GLUCOSE SERPL-MCNC: 121 MG/DL — HIGH (ref 70–99)
GLUCOSE SERPL-MCNC: 150 MG/DL — HIGH (ref 70–99)
HCT VFR BLD CALC: 30.4 % — LOW (ref 34.5–45)
HCT VFR BLD CALC: 31.7 % — LOW (ref 34.5–45)
HGB BLD-MCNC: 9.8 G/DL — LOW (ref 11.5–15.5)
HGB BLD-MCNC: 9.9 G/DL — LOW (ref 11.5–15.5)
IMM GRANULOCYTES NFR BLD AUTO: 1.5 % — HIGH (ref 0–0.9)
INR BLD: 0.99 RATIO — SIGNIFICANT CHANGE UP (ref 0.85–1.16)
LACTATE SERPL-SCNC: 3.7 MMOL/L — HIGH (ref 0.5–2)
LIDOCAIN IGE QN: 94 U/L — HIGH (ref 7–60)
LYMPHOCYTES # BLD AUTO: 10.8 % — LOW (ref 13–44)
LYMPHOCYTES # BLD AUTO: 2.39 K/UL — SIGNIFICANT CHANGE UP (ref 1–3.3)
MAGNESIUM SERPL-MCNC: 1.9 MG/DL — SIGNIFICANT CHANGE UP (ref 1.6–2.6)
MCHC RBC-ENTMCNC: 28.5 PG — SIGNIFICANT CHANGE UP (ref 27–34)
MCHC RBC-ENTMCNC: 28.7 PG — SIGNIFICANT CHANGE UP (ref 27–34)
MCHC RBC-ENTMCNC: 31.2 G/DL — LOW (ref 32–36)
MCHC RBC-ENTMCNC: 32.2 G/DL — SIGNIFICANT CHANGE UP (ref 32–36)
MCV RBC AUTO: 89.1 FL — SIGNIFICANT CHANGE UP (ref 80–100)
MCV RBC AUTO: 91.4 FL — SIGNIFICANT CHANGE UP (ref 80–100)
MONOCYTES # BLD AUTO: 1.36 K/UL — HIGH (ref 0–0.9)
MONOCYTES NFR BLD AUTO: 6.2 % — SIGNIFICANT CHANGE UP (ref 2–14)
NEUTROPHILS # BLD AUTO: 17.74 K/UL — HIGH (ref 1.8–7.4)
NEUTROPHILS NFR BLD AUTO: 80.3 % — HIGH (ref 43–77)
NRBC # BLD: 0 /100 WBCS — SIGNIFICANT CHANGE UP (ref 0–0)
NRBC # BLD: 0 /100 WBCS — SIGNIFICANT CHANGE UP (ref 0–0)
PHOSPHATE SERPL-MCNC: 5.1 MG/DL — HIGH (ref 2.5–4.5)
PLATELET # BLD AUTO: 242 K/UL — SIGNIFICANT CHANGE UP (ref 150–400)
PLATELET # BLD AUTO: 408 K/UL — HIGH (ref 150–400)
POTASSIUM SERPL-MCNC: 4.8 MMOL/L — SIGNIFICANT CHANGE UP (ref 3.5–5.3)
POTASSIUM SERPL-MCNC: 4.9 MMOL/L — SIGNIFICANT CHANGE UP (ref 3.5–5.3)
POTASSIUM SERPL-SCNC: 4.8 MMOL/L — SIGNIFICANT CHANGE UP (ref 3.5–5.3)
POTASSIUM SERPL-SCNC: 4.9 MMOL/L — SIGNIFICANT CHANGE UP (ref 3.5–5.3)
PROT SERPL-MCNC: 6.2 G/DL — SIGNIFICANT CHANGE UP (ref 6–8.3)
PROT SERPL-MCNC: 6.6 G/DL — SIGNIFICANT CHANGE UP (ref 6–8.3)
PROTHROM AB SERPL-ACNC: 11.4 SEC — SIGNIFICANT CHANGE UP (ref 9.9–13.4)
RBC # BLD: 3.41 M/UL — LOW (ref 3.8–5.2)
RBC # BLD: 3.47 M/UL — LOW (ref 3.8–5.2)
RBC # FLD: 13.8 % — SIGNIFICANT CHANGE UP (ref 10.3–14.5)
RBC # FLD: 13.8 % — SIGNIFICANT CHANGE UP (ref 10.3–14.5)
SODIUM SERPL-SCNC: 136 MMOL/L — SIGNIFICANT CHANGE UP (ref 135–145)
SODIUM SERPL-SCNC: 137 MMOL/L — SIGNIFICANT CHANGE UP (ref 135–145)
WBC # BLD: 21.06 K/UL — HIGH (ref 3.8–10.5)
WBC # BLD: 22.1 K/UL — HIGH (ref 3.8–10.5)
WBC # FLD AUTO: 21.06 K/UL — HIGH (ref 3.8–10.5)
WBC # FLD AUTO: 22.1 K/UL — HIGH (ref 3.8–10.5)

## 2024-12-03 PROCEDURE — 73700 CT LOWER EXTREMITY W/O DYE: CPT | Mod: 26,50,MC

## 2024-12-03 PROCEDURE — 73060 X-RAY EXAM OF HUMERUS: CPT | Mod: 26

## 2024-12-03 PROCEDURE — 74177 CT ABD & PELVIS W/CONTRAST: CPT | Mod: 26,MC

## 2024-12-03 PROCEDURE — 99254 IP/OBS CNSLTJ NEW/EST MOD 60: CPT | Mod: 1L,57,GC

## 2024-12-03 PROCEDURE — 76937 US GUIDE VASCULAR ACCESS: CPT | Mod: 26

## 2024-12-03 PROCEDURE — 73000 X-RAY EXAM OF COLLAR BONE: CPT | Mod: 26,RT

## 2024-12-03 PROCEDURE — 99291 CRITICAL CARE FIRST HOUR: CPT | Mod: 1L

## 2024-12-03 PROCEDURE — 36000 PLACE NEEDLE IN VEIN: CPT | Mod: 59

## 2024-12-03 PROCEDURE — 71045 X-RAY EXAM CHEST 1 VIEW: CPT | Mod: 26

## 2024-12-03 PROCEDURE — 72193 CT PELVIS W/DYE: CPT | Mod: 26,59,MC

## 2024-12-03 PROCEDURE — 72125 CT NECK SPINE W/O DYE: CPT | Mod: 26,MC

## 2024-12-03 PROCEDURE — 73560 X-RAY EXAM OF KNEE 1 OR 2: CPT | Mod: 26,50

## 2024-12-03 PROCEDURE — 70450 CT HEAD/BRAIN W/O DYE: CPT | Mod: 26,MC

## 2024-12-03 PROCEDURE — 76377 3D RENDER W/INTRP POSTPROCES: CPT | Mod: 26

## 2024-12-03 PROCEDURE — 73110 X-RAY EXAM OF WRIST: CPT | Mod: 26,50

## 2024-12-03 PROCEDURE — 72170 X-RAY EXAM OF PELVIS: CPT | Mod: 26,76

## 2024-12-03 PROCEDURE — 73590 X-RAY EXAM OF LOWER LEG: CPT | Mod: 26,50

## 2024-12-03 PROCEDURE — 99291 CRITICAL CARE FIRST HOUR: CPT

## 2024-12-03 PROCEDURE — 73200 CT UPPER EXTREMITY W/O DYE: CPT | Mod: 26,50,MC

## 2024-12-03 PROCEDURE — 73610 X-RAY EXAM OF ANKLE: CPT | Mod: 26,50

## 2024-12-03 PROCEDURE — 73090 X-RAY EXAM OF FOREARM: CPT | Mod: 26,50

## 2024-12-03 PROCEDURE — 73552 X-RAY EXAM OF FEMUR 2/>: CPT | Mod: 26,50

## 2024-12-03 PROCEDURE — 71260 CT THORAX DX C+: CPT | Mod: 26,MC

## 2024-12-03 PROCEDURE — 73070 X-RAY EXAM OF ELBOW: CPT | Mod: 26,50

## 2024-12-03 PROCEDURE — 73120 X-RAY EXAM OF HAND: CPT | Mod: 26,LT

## 2024-12-03 PROCEDURE — 71045 X-RAY EXAM CHEST 1 VIEW: CPT | Mod: 26,77

## 2024-12-03 PROCEDURE — 99285 EMERGENCY DEPT VISIT HI MDM: CPT

## 2024-12-03 RX ORDER — FENTANYL 12 UG/H
25 PATCH, EXTENDED RELEASE TRANSDERMAL ONCE
Refills: 0 | Status: DISCONTINUED | OUTPATIENT
Start: 2024-12-03 | End: 2024-12-03

## 2024-12-03 RX ORDER — CEFAZOLIN SODIUM 10 G
2000 VIAL (EA) INJECTION ONCE
Refills: 0 | Status: COMPLETED | OUTPATIENT
Start: 2024-12-03 | End: 2024-12-03

## 2024-12-03 RX ORDER — LIDOCAINE 40 MG/G
1 CREAM TOPICAL DAILY
Refills: 0 | Status: DISCONTINUED | OUTPATIENT
Start: 2024-12-03 | End: 2024-12-04

## 2024-12-03 RX ORDER — CHLORHEXIDINE GLUCONATE 1.2 MG/ML
1 RINSE ORAL DAILY
Refills: 0 | Status: DISCONTINUED | OUTPATIENT
Start: 2024-12-03 | End: 2024-12-04

## 2024-12-03 RX ORDER — 0.9 % SODIUM CHLORIDE 0.9 %
1000 INTRAVENOUS SOLUTION INTRAVENOUS
Refills: 0 | Status: DISCONTINUED | OUTPATIENT
Start: 2024-12-03 | End: 2024-12-04

## 2024-12-03 RX ORDER — ESCITALOPRAM OXALATE 10 MG/1
5 TABLET, FILM COATED ORAL DAILY
Refills: 0 | Status: DISCONTINUED | OUTPATIENT
Start: 2024-12-03 | End: 2024-12-04

## 2024-12-03 RX ORDER — SODIUM CHLORIDE 9 MG/ML
250 INJECTION, SOLUTION INTRAMUSCULAR; INTRAVENOUS; SUBCUTANEOUS ONCE
Refills: 0 | Status: COMPLETED | OUTPATIENT
Start: 2024-12-03 | End: 2024-12-03

## 2024-12-03 RX ORDER — 0.9 % SODIUM CHLORIDE 0.9 %
1000 INTRAVENOUS SOLUTION INTRAVENOUS
Refills: 0 | Status: DISCONTINUED | OUTPATIENT
Start: 2024-12-03 | End: 2024-12-03

## 2024-12-03 RX ORDER — DIAZEPAM 10 MG/1
2.5 TABLET ORAL EVERY 6 HOURS
Refills: 0 | Status: DISCONTINUED | OUTPATIENT
Start: 2024-12-03 | End: 2024-12-04

## 2024-12-03 RX ORDER — ONDANSETRON HYDROCHLORIDE 4 MG/1
4 TABLET, FILM COATED ORAL ONCE
Refills: 0 | Status: COMPLETED | OUTPATIENT
Start: 2024-12-03 | End: 2024-12-03

## 2024-12-03 RX ORDER — OXYCODONE HYDROCHLORIDE 30 MG/1
5 TABLET ORAL EVERY 6 HOURS
Refills: 0 | Status: DISCONTINUED | OUTPATIENT
Start: 2024-12-03 | End: 2024-12-04

## 2024-12-03 RX ORDER — CEFAZOLIN SODIUM 10 G
2000 VIAL (EA) INJECTION EVERY 8 HOURS
Refills: 0 | Status: DISCONTINUED | OUTPATIENT
Start: 2024-12-03 | End: 2024-12-04

## 2024-12-03 RX ORDER — OXYCODONE HYDROCHLORIDE 30 MG/1
2.5 TABLET ORAL EVERY 4 HOURS
Refills: 0 | Status: DISCONTINUED | OUTPATIENT
Start: 2024-12-03 | End: 2024-12-04

## 2024-12-03 RX ORDER — GABAPENTIN 300 MG/1
100 CAPSULE ORAL AT BEDTIME
Refills: 0 | Status: DISCONTINUED | OUTPATIENT
Start: 2024-12-03 | End: 2024-12-04

## 2024-12-03 RX ORDER — SENNOSIDES 8.6 MG
2 TABLET ORAL AT BEDTIME
Refills: 0 | Status: DISCONTINUED | OUTPATIENT
Start: 2024-12-03 | End: 2024-12-04

## 2024-12-03 RX ORDER — DIAZEPAM 10 MG/1
5 TABLET ORAL ONCE
Refills: 0 | Status: DISCONTINUED | OUTPATIENT
Start: 2024-12-03 | End: 2024-12-03

## 2024-12-03 RX ORDER — POLYETHYLENE GLYCOL 3350 17 G/17G
17 POWDER, FOR SOLUTION ORAL DAILY
Refills: 0 | Status: DISCONTINUED | OUTPATIENT
Start: 2024-12-03 | End: 2024-12-04

## 2024-12-03 RX ORDER — DIAZEPAM 10 MG/1
2.5 TABLET ORAL ONCE
Refills: 0 | Status: DISCONTINUED | OUTPATIENT
Start: 2024-12-03 | End: 2024-12-03

## 2024-12-03 RX ADMIN — OXYCODONE HYDROCHLORIDE 2.5 MILLIGRAM(S): 30 TABLET ORAL at 19:40

## 2024-12-03 RX ADMIN — DIAZEPAM 5 MILLIGRAM(S): 10 TABLET ORAL at 14:44

## 2024-12-03 RX ADMIN — FENTANYL 25 MICROGRAM(S): 12 PATCH, EXTENDED RELEASE TRANSDERMAL at 15:11

## 2024-12-03 RX ADMIN — Medication 2 TABLET(S): at 23:39

## 2024-12-03 RX ADMIN — ONDANSETRON HYDROCHLORIDE 4 MILLIGRAM(S): 4 TABLET, FILM COATED ORAL at 13:36

## 2024-12-03 RX ADMIN — DIAZEPAM 2.5 MILLIGRAM(S): 10 TABLET ORAL at 16:50

## 2024-12-03 RX ADMIN — SODIUM CHLORIDE 250 MILLILITER(S): 9 INJECTION, SOLUTION INTRAMUSCULAR; INTRAVENOUS; SUBCUTANEOUS at 13:36

## 2024-12-03 RX ADMIN — CHLORHEXIDINE GLUCONATE 1 APPLICATION(S): 1.2 RINSE ORAL at 19:40

## 2024-12-03 RX ADMIN — Medication 30 MILLILITER(S): at 21:01

## 2024-12-03 RX ADMIN — FENTANYL 25 MICROGRAM(S): 12 PATCH, EXTENDED RELEASE TRANSDERMAL at 15:22

## 2024-12-03 RX ADMIN — OXYCODONE HYDROCHLORIDE 5 MILLIGRAM(S): 30 TABLET ORAL at 17:21

## 2024-12-03 RX ADMIN — FENTANYL 25 MICROGRAM(S): 12 PATCH, EXTENDED RELEASE TRANSDERMAL at 14:41

## 2024-12-03 RX ADMIN — OXYCODONE HYDROCHLORIDE 5 MILLIGRAM(S): 30 TABLET ORAL at 23:25

## 2024-12-03 RX ADMIN — OXYCODONE HYDROCHLORIDE 5 MILLIGRAM(S): 30 TABLET ORAL at 17:51

## 2024-12-03 RX ADMIN — Medication 100 MILLIGRAM(S): at 23:27

## 2024-12-03 RX ADMIN — OXYCODONE HYDROCHLORIDE 5 MILLIGRAM(S): 30 TABLET ORAL at 23:55

## 2024-12-03 RX ADMIN — OXYCODONE HYDROCHLORIDE 2.5 MILLIGRAM(S): 30 TABLET ORAL at 20:10

## 2024-12-03 RX ADMIN — FENTANYL 25 MICROGRAM(S): 12 PATCH, EXTENDED RELEASE TRANSDERMAL at 13:37

## 2024-12-03 RX ADMIN — DIAZEPAM 2.5 MILLIGRAM(S): 10 TABLET ORAL at 19:39

## 2024-12-03 RX ADMIN — Medication 100 MILLIGRAM(S): at 15:43
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
## 2024-12-03 NOTE — PATIENT PROFILE ADULT - NSPROPOAURINARYCATHETER_GEN_A_NUR
<<-----Click on this checkbox to enter Procedure Melanoma excision  09/20/2018  Left shoulder  Active  MBEG no
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
## 2024-12-03 NOTE — ED ADULT NURSE NOTE - NSFALLRISKINTERV_ED_ALL_ED

## 2024-12-03 NOTE — ED PROVIDER NOTE - NS ED MD DISPO ISOLATION TYPES
Pt called because his handicap placard is expiring and he needs to go get a new one.   I can call him when it is ready to be picked up  [place] [phone]  
None
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
## 2024-12-03 NOTE — PROCEDURE NOTE - NSICDXPROCEDURE_GEN_ALL_CORE_FT
Plan of care reviewed with patient.  Verbalizes understanding.   PROCEDURES:  Insertion, pigtail catheter, pleural 03-Dec-2024 16:26:09  Vielka Cowan
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
## 2024-12-03 NOTE — CONSULT NOTE ADULT - PROBLEM SELECTOR RECOMMENDATION 2
Patient with a hx of aortic insufficiency  Patient has been asymptomatic  As per the Patient has a recent echocardiogram  Cardiology following

## 2024-12-03 NOTE — ED PROVIDER NOTE - HOW PATIENT ADDRESSED, PROFILE
Review pended refill request as it does not fall under a protocol.     Last Rx: 1/22/20  LOV: 2 weeks ago Ivis

## 2024-12-03 NOTE — CONSULT NOTE ADULT - PROBLEM SELECTOR RECOMMENDATION 9
Patient scheduled for surgical intervention with Dr Newsome  awaiting plan as per ortho  continue supportive care
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
## 2024-12-03 NOTE — CONSULT NOTE ADULT - ASSESSMENT
ASSESSMENT & PLAN  54yFemale w/ osteogenesis imperfecta w R BBFA Fx, L Clavicle Fx, L Distal Radius Fx, R Distal Femur Periprosthetic fx, R tibial plateau fx, L GA1 open tibial plateau fx     -NWB bilateral UE and LE  -LUE in sling  -Standing Ancef q8hr  -q4 lactates will goal <2 for resuscitation  -Plan for 12/4  -f/u preop: CBC, BMP, coags, T&S x2  -NPO past midnight, IVF  -hold chemical DVT ppx for OR; SCDs OK  -please document medical and cardiac clearance ASAP for OR  -pain control  -ice/cold compress, elevation  -chest tube per trauma surgery    Rubin Antunez MD  Orthopaedic Surgery Resident    For all questions, please reach out via the following numbers for the on-call resident; do not reach out via Teams.  [facility] [id number]  J        [id number]  [facility]  [id number]/ [phone]
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
## 2024-12-03 NOTE — PATIENT PROFILE ADULT - FALL HARM RISK - HARM RISK INTERVENTIONS
Assistance with ambulation/Assistance OOB with selected safe patient handling equipment/Communicate Risk of Fall with Harm to all staff/Discuss with provider need for PT consult/Monitor gait and stability/Provide patient with walking aids - walker, cane, crutches/Reinforce activity limits and safety measures with patient and family/Sit up slowly, dangle for a short time, stand at bedside before walking/Tailored Fall Risk Interventions/Use of alarms - bed, chair and/or voice tab/Visual Cue: Yellow wristband and red socks/Bed in lowest position, wheels locked, appropriate side rails in place/Call bell, personal items and telephone in reach/Instruct patient to call for assistance before getting out of bed or chair/Non-slip footwear when patient is out of bed/[place] to call system/Physically safe environment - no spills, clutter or unnecessary equipment/Purposeful Proactive Rounding/Room/bathroom lighting operational, light cord in reach

## 2024-12-03 NOTE — H&P ADULT - NSHPLABSRESULTS_GEN_ALL_CORE
PROCEDURE:  CT of the Chest, Abdomen and Pelvis was performed.  Imaging was performed through the chest in the arterial phase followed by   imaging of the abdomen and pelvis in the portal venous phase.  Sagittal and coronal reformats were performed.    FINDINGS:  CHEST:  LUNGS AND LARGE AIRWAYS: Patent central airways. Hypoventilatory change  PLEURA: Moderate size right pneumothorax. Trace volume right pleural   effusion dependently in keeping with hemothorax  VESSELS: Within normal limits.  HEART: Heart size is normal. No pericardial effusion.  MEDIASTINUM AND LEI: No lymphadenopathy.  CHEST WALL AND LOWER NECK: Osteopenia. Bilateral humeral pins  Deformity of the anterior right fourth and fifth ribs which may represent   acute nondisplaced fractures  Nondisplaced fracture of the lateral aspect of the right clavicle and the   right acromion  CT of the upper extremities revealing acute distal right radial angulated   fracture left radial and ulnar proximal to mid shaft fractures will be   separately reported.    ABDOMEN AND PELVIS:  LIVER: Subcentimeter hypodensity too small for definitive   characterization by CT  BILE DUCTS: Normal caliber.  GALLBLADDER: Nondistended  SPLEEN: Within normal limits.  PANCREAS: Within normal limits.  ADRENALS: Within normal limits.  KIDNEYS/URETERS: Symmetric renal enhancement without hydronephrosis.    BLADDER: Minimally distended.  REPRODUCTIVE ORGANS: Enlarged leiomyomatous uterus.    BOWEL: No bowel obstruction. Periampullary duodenal diverticulum.  Liquid density stool throughout the descending and rectosigmoid colon   where there is mild wall thickening and mucosal hyperenhancement   suggesting colitis and diarrhea illness  PERITONEUM/RETROPERITONEUM: No free air or fluid  VESSELS: Mild atherosclerosis  LYMPH NODES: No lymphadenopathy.  ABDOMINAL WALL: Within normal limits.  BONES: Osteopenia. Degree of osteopenia decreases the sensitivity for   detection of nondisplaced acute fractures. Scoliosis. Bilateral femoral   and tibial pins. Comminuted acute fracture of the distal left femur  Likely chronic deformity of the distal right femur.  Acute intra-articular fracture of the proximal left tibia which appears   to extend from the spine to the proximal shaft. Minimally displaced   fracture of the proximal left fibula.  Acute comminuted fracture of the proximal right tibia which also likely   extends to the tibial plateau  Bilateral knee joint lipohemarthroses. Comminuted fracture of the right   mid fibular shaft.  No acute vascular injury involving the lower extremities      IMPRESSION:  Moderate size right pneumothorax with trace volume of right hemothorax   yielding ahydropneumothorax.  Initial preliminary results of moderate-sized right pneumothorax and   acute right tibia and fibula fractures were discussed with Dr. KEVIN RUBIO [phone] 12/3/2024 2:11 PM by Dr. Carter with receipt   confirmation.    No imaging evidence of acute traumatic injury involving the   intra-abdominal or pelvic organs.    Deformity of the anterior right fourth and fifth ribs which may represent   acute nondisplaced fractures  Nondisplaced fracture of the lateral aspect of the right clavicle and the   right acromion  CT of the upper extremities revealing acute distal right radial angulated   fracture along with left radial and ulnar proximal to mid shaft fractures   will be separately reported.    Acute intra-articular fractures of the proximal tibias with   lipohemarthroses and bilateral fibular fractures as described. Comminuted   acute fracture of the distal left femur    Liquid density stool throughout the descending and rectosigmoid colon   where there is mild wall thickening and mucosal hyperenhancement   suggesting colitis and diarrhea illness

## 2024-12-03 NOTE — ED PROCEDURE NOTE - PROCEDURE ADDITIONAL DETAILS
18 g right upper extremity   Peripheral IV access in the Emergency Department obtained under dynamic ultrasound guidance with dark nonpulsatile blood return.  Catheter was flushed afterwards without any resistance or resultant extravasation.  IV catheter confirmed in compressible vein after insertion.   POCUS: Emergency Department Focused Ultrasound performed at patient's bedside.  The complete report may be available in PACS.

## 2024-12-03 NOTE — ED ADULT NURSE REASSESSMENT NOTE - NS ED NURSE REASSESS COMMENT FT1
Pt mentating well, 1 unit of PRBC to be given emergently per MD Castaneda. 1 unit of PRBC started with 2nd RN Ti.

## 2024-12-03 NOTE — H&P ADULT - NSHPPHYSICALEXAM_GEN_ALL_CORE
General: Uncomfortable appearing, GCS 15 on arrival  HEENT: NC/AT, EOMI  Pulmonary: Normal respiratory effort on 2L NC  Cardiovascular: Normal rate, normotensive  Abdominal: Soft, nondistended, nontender  Neuro: A/O x 3, CNs II-XII grossly intact,   Extremities/Vascular: Warm, well perfused, palpable distal pulses        - RUE: Well healed incision over upper arm, deformity and tenderness to palpation of wrist, palpable radial pulse        - LUE: Tenderness to palpation over the proximal radius w/ tenting of the skin        - RLE: Internally rotated, well healed surgical incision, small ecchymosis and point of oozing over medial shin        - LLE: Nontender to palpation, well healed surgical incision over femur
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
## 2024-12-04 ENCOUNTER — RESULT REVIEW (OUTPATIENT)
Age: 54
End: 2024-12-04

## 2024-12-04 ENCOUNTER — TRANSCRIPTION ENCOUNTER (OUTPATIENT)
Age: 54
End: 2024-12-04

## 2024-12-04 DIAGNOSIS — F43.20 ADJUSTMENT DISORDER, UNSPECIFIED: ICD-10-CM

## 2024-12-04 LAB
ADD ON TEST-SPECIMEN IN LAB: SIGNIFICANT CHANGE UP
ADD ON TEST-SPECIMEN IN LAB: SIGNIFICANT CHANGE UP
ALBUMIN SERPL ELPH-MCNC: 3.2 G/DL — LOW (ref 3.3–5)
ALBUMIN SERPL ELPH-MCNC: 3.5 G/DL — SIGNIFICANT CHANGE UP (ref 3.3–5)
ALBUMIN SERPL ELPH-MCNC: 3.6 G/DL — SIGNIFICANT CHANGE UP (ref 3.3–5)
ALP SERPL-CCNC: 65 U/L — SIGNIFICANT CHANGE UP (ref 40–120)
ALP SERPL-CCNC: 72 U/L — SIGNIFICANT CHANGE UP (ref 40–120)
ALP SERPL-CCNC: 75 U/L — SIGNIFICANT CHANGE UP (ref 40–120)
ALT FLD-CCNC: 11 U/L — SIGNIFICANT CHANGE UP (ref 10–45)
ALT FLD-CCNC: 22 U/L — SIGNIFICANT CHANGE UP (ref 10–45)
ALT FLD-CCNC: 22 U/L — SIGNIFICANT CHANGE UP (ref 10–45)
ANION GAP SERPL CALC-SCNC: 14 MMOL/L — SIGNIFICANT CHANGE UP (ref 5–17)
ANION GAP SERPL CALC-SCNC: 14 MMOL/L — SIGNIFICANT CHANGE UP (ref 5–17)
ANION GAP SERPL CALC-SCNC: 15 MMOL/L — SIGNIFICANT CHANGE UP (ref 5–17)
APPEARANCE UR: CLEAR — SIGNIFICANT CHANGE UP
APTT BLD: 29.2 SEC — SIGNIFICANT CHANGE UP (ref 24.5–35.6)
AST SERPL-CCNC: 54 U/L — HIGH (ref 10–40)
AST SERPL-CCNC: 57 U/L — HIGH (ref 10–40)
AST SERPL-CCNC: 58 U/L — HIGH (ref 10–40)
BACTERIA # UR AUTO: NEGATIVE /HPF — SIGNIFICANT CHANGE UP
BILIRUB SERPL-MCNC: 0.7 MG/DL — SIGNIFICANT CHANGE UP (ref 0.2–1.2)
BILIRUB SERPL-MCNC: 0.7 MG/DL — SIGNIFICANT CHANGE UP (ref 0.2–1.2)
BILIRUB SERPL-MCNC: 1 MG/DL — SIGNIFICANT CHANGE UP (ref 0.2–1.2)
BILIRUB UR-MCNC: NEGATIVE — SIGNIFICANT CHANGE UP
BLD GP AB SCN SERPL QL: NEGATIVE — SIGNIFICANT CHANGE UP
BUN SERPL-MCNC: 24 MG/DL — HIGH (ref 7–23)
BUN SERPL-MCNC: 24 MG/DL — HIGH (ref 7–23)
BUN SERPL-MCNC: 29 MG/DL — HIGH (ref 7–23)
CALCIUM SERPL-MCNC: 8.1 MG/DL — LOW (ref 8.4–10.5)
CALCIUM SERPL-MCNC: 8.6 MG/DL — SIGNIFICANT CHANGE UP (ref 8.4–10.5)
CALCIUM SERPL-MCNC: 9.5 MG/DL — SIGNIFICANT CHANGE UP (ref 8.4–10.5)
CAST: 0 /LPF — SIGNIFICANT CHANGE UP (ref 0–4)
CHLORIDE SERPL-SCNC: 91 MMOL/L — LOW (ref 96–108)
CHLORIDE SERPL-SCNC: 95 MMOL/L — LOW (ref 96–108)
CHLORIDE SERPL-SCNC: 95 MMOL/L — LOW (ref 96–108)
CO2 SERPL-SCNC: 17 MMOL/L — LOW (ref 22–31)
CO2 SERPL-SCNC: 19 MMOL/L — LOW (ref 22–31)
CO2 SERPL-SCNC: 19 MMOL/L — LOW (ref 22–31)
COLOR SPEC: SIGNIFICANT CHANGE UP
CREAT ?TM UR-MCNC: 47 MG/DL — SIGNIFICANT CHANGE UP
CREAT SERPL-MCNC: 0.45 MG/DL — LOW (ref 0.5–1.3)
CREAT SERPL-MCNC: 0.49 MG/DL — LOW (ref 0.5–1.3)
CREAT SERPL-MCNC: 0.52 MG/DL — SIGNIFICANT CHANGE UP (ref 0.5–1.3)
DIFF PNL FLD: NEGATIVE — SIGNIFICANT CHANGE UP
EGFR: 109 ML/MIN/1.73M2 — SIGNIFICANT CHANGE UP
EGFR: 111 ML/MIN/1.73M2 — SIGNIFICANT CHANGE UP
EGFR: 113 ML/MIN/1.73M2 — SIGNIFICANT CHANGE UP
GAS PNL BLDA: SIGNIFICANT CHANGE UP
GAS PNL BLDA: SIGNIFICANT CHANGE UP
GAS PNL BLDV: SIGNIFICANT CHANGE UP
GLUCOSE SERPL-MCNC: 117 MG/DL — HIGH (ref 70–99)
GLUCOSE SERPL-MCNC: 119 MG/DL — HIGH (ref 70–99)
GLUCOSE SERPL-MCNC: 119 MG/DL — HIGH (ref 70–99)
GLUCOSE UR QL: NEGATIVE MG/DL — SIGNIFICANT CHANGE UP
HCG SERPL-ACNC: <2 MIU/ML — SIGNIFICANT CHANGE UP
HCT VFR BLD CALC: 26 % — LOW (ref 34.5–45)
HCT VFR BLD CALC: 26.5 % — LOW (ref 34.5–45)
HCT VFR BLD CALC: 34.2 % — LOW (ref 34.5–45)
HGB BLD-MCNC: 11 G/DL — LOW (ref 11.5–15.5)
HGB BLD-MCNC: 8.5 G/DL — LOW (ref 11.5–15.5)
HGB BLD-MCNC: 8.8 G/DL — LOW (ref 11.5–15.5)
INR BLD: 1.01 RATIO — SIGNIFICANT CHANGE UP (ref 0.85–1.16)
KETONES UR-MCNC: NEGATIVE MG/DL — SIGNIFICANT CHANGE UP
LEUKOCYTE ESTERASE UR-ACNC: NEGATIVE — SIGNIFICANT CHANGE UP
MAGNESIUM SERPL-MCNC: 1.8 MG/DL — SIGNIFICANT CHANGE UP (ref 1.6–2.6)
MAGNESIUM SERPL-MCNC: 1.9 MG/DL — SIGNIFICANT CHANGE UP (ref 1.6–2.6)
MAGNESIUM SERPL-MCNC: 1.9 MG/DL — SIGNIFICANT CHANGE UP (ref 1.6–2.6)
MCHC RBC-ENTMCNC: 28.2 PG — SIGNIFICANT CHANGE UP (ref 27–34)
MCHC RBC-ENTMCNC: 28.2 PG — SIGNIFICANT CHANGE UP (ref 27–34)
MCHC RBC-ENTMCNC: 29.8 PG — SIGNIFICANT CHANGE UP (ref 27–34)
MCHC RBC-ENTMCNC: 32.2 G/DL — SIGNIFICANT CHANGE UP (ref 32–36)
MCHC RBC-ENTMCNC: 32.7 G/DL — SIGNIFICANT CHANGE UP (ref 32–36)
MCHC RBC-ENTMCNC: 33.2 G/DL — SIGNIFICANT CHANGE UP (ref 32–36)
MCV RBC AUTO: 86.4 FL — SIGNIFICANT CHANGE UP (ref 80–100)
MCV RBC AUTO: 87.7 FL — SIGNIFICANT CHANGE UP (ref 80–100)
MCV RBC AUTO: 89.8 FL — SIGNIFICANT CHANGE UP (ref 80–100)
NITRITE UR-MCNC: NEGATIVE — SIGNIFICANT CHANGE UP
NRBC # BLD: 0 /100 WBCS — SIGNIFICANT CHANGE UP (ref 0–0)
OSMOLALITY SERPL: 281 MOSMOL/KG — SIGNIFICANT CHANGE UP (ref 275–300)
OSMOLALITY UR: 568 MOS/KG — SIGNIFICANT CHANGE UP (ref 300–900)
PH UR: 5 — SIGNIFICANT CHANGE UP (ref 5–8)
PHOSPHATE SERPL-MCNC: 4.8 MG/DL — HIGH (ref 2.5–4.5)
PHOSPHATE SERPL-MCNC: 5.6 MG/DL — HIGH (ref 2.5–4.5)
PHOSPHATE SERPL-MCNC: 6.2 MG/DL — HIGH (ref 2.5–4.5)
PLATELET # BLD AUTO: 133 K/UL — LOW (ref 150–400)
PLATELET # BLD AUTO: 160 K/UL — SIGNIFICANT CHANGE UP (ref 150–400)
PLATELET # BLD AUTO: 193 K/UL — SIGNIFICANT CHANGE UP (ref 150–400)
POTASSIUM SERPL-MCNC: 4.8 MMOL/L — SIGNIFICANT CHANGE UP (ref 3.5–5.3)
POTASSIUM SERPL-MCNC: 5.1 MMOL/L — SIGNIFICANT CHANGE UP (ref 3.5–5.3)
POTASSIUM SERPL-MCNC: 5.2 MMOL/L — SIGNIFICANT CHANGE UP (ref 3.5–5.3)
POTASSIUM SERPL-SCNC: 4.8 MMOL/L — SIGNIFICANT CHANGE UP (ref 3.5–5.3)
POTASSIUM SERPL-SCNC: 5.1 MMOL/L — SIGNIFICANT CHANGE UP (ref 3.5–5.3)
POTASSIUM SERPL-SCNC: 5.2 MMOL/L — SIGNIFICANT CHANGE UP (ref 3.5–5.3)
POTASSIUM UR-SCNC: 74 MMOL/L — SIGNIFICANT CHANGE UP
PROT ?TM UR-MCNC: 31 MG/DL — HIGH (ref 0–12)
PROT SERPL-MCNC: 5.3 G/DL — LOW (ref 6–8.3)
PROT SERPL-MCNC: 5.6 G/DL — LOW (ref 6–8.3)
PROT SERPL-MCNC: 5.7 G/DL — LOW (ref 6–8.3)
PROT UR-MCNC: 30 MG/DL
PROT/CREAT UR-RTO: 0.7 RATIO — HIGH (ref 0–0.2)
PROTHROM AB SERPL-ACNC: 11.6 SEC — SIGNIFICANT CHANGE UP (ref 9.9–13.4)
RBC # BLD: 2.95 M/UL — LOW (ref 3.8–5.2)
RBC # BLD: 3.01 M/UL — LOW (ref 3.8–5.2)
RBC # BLD: 3.9 M/UL — SIGNIFICANT CHANGE UP (ref 3.8–5.2)
RBC # FLD: 13.7 % — SIGNIFICANT CHANGE UP (ref 10.3–14.5)
RBC # FLD: 14 % — SIGNIFICANT CHANGE UP (ref 10.3–14.5)
RBC # FLD: 14.6 % — HIGH (ref 10.3–14.5)
RBC CASTS # UR COMP ASSIST: 2 /HPF — SIGNIFICANT CHANGE UP (ref 0–4)
RH IG SCN BLD-IMP: POSITIVE — SIGNIFICANT CHANGE UP
SODIUM SERPL-SCNC: 124 MMOL/L — LOW (ref 135–145)
SODIUM SERPL-SCNC: 127 MMOL/L — LOW (ref 135–145)
SODIUM SERPL-SCNC: 128 MMOL/L — LOW (ref 135–145)
SODIUM UR-SCNC: 22 MMOL/L — SIGNIFICANT CHANGE UP
SP GR SPEC: >1.03 — HIGH (ref 1–1.03)
SQUAMOUS # UR AUTO: 2 /HPF — SIGNIFICANT CHANGE UP (ref 0–5)
TRIGL SERPL-MCNC: 116 MG/DL — SIGNIFICANT CHANGE UP
UROBILINOGEN FLD QL: 0.2 MG/DL — SIGNIFICANT CHANGE UP (ref 0.2–1)
UUN UR-MCNC: 207 MG/DL — SIGNIFICANT CHANGE UP
WBC # BLD: 14.82 K/UL — HIGH (ref 3.8–10.5)
WBC # BLD: 15.88 K/UL — HIGH (ref 3.8–10.5)
WBC # BLD: 17.57 K/UL — HIGH (ref 3.8–10.5)
WBC # FLD AUTO: 14.82 K/UL — HIGH (ref 3.8–10.5)
WBC # FLD AUTO: 15.88 K/UL — HIGH (ref 3.8–10.5)
WBC # FLD AUTO: 17.57 K/UL — HIGH (ref 3.8–10.5)
WBC UR QL: 3 /HPF — SIGNIFICANT CHANGE UP (ref 0–5)

## 2024-12-04 PROCEDURE — 27503 TREATMENT OF THIGH FRACTURE: CPT | Mod: LT

## 2024-12-04 PROCEDURE — 27824 TREAT LOWER LEG FRACTURE: CPT | Mod: LT

## 2024-12-04 PROCEDURE — 99254 IP/OBS CNSLTJ NEW/EST MOD 60: CPT | Mod: GC

## 2024-12-04 PROCEDURE — 71045 X-RAY EXAM CHEST 1 VIEW: CPT | Mod: 26

## 2024-12-04 PROCEDURE — 93306 TTE W/DOPPLER COMPLETE: CPT | Mod: 26

## 2024-12-04 PROCEDURE — 27530 TREAT KNEE FRACTURE: CPT | Mod: LT

## 2024-12-04 PROCEDURE — 25565 CLTX RDL&ULN SHFT FX W/MNPJ: CPT | Mod: LT

## 2024-12-04 PROCEDURE — 26600 TREAT METACARPAL FRACTURE: CPT | Mod: LT

## 2024-12-04 PROCEDURE — 25605 CLTX DST RDL FX/EPHYS SEP W/: CPT | Mod: RT

## 2024-12-04 PROCEDURE — 99233 SBSQ HOSP IP/OBS HIGH 50: CPT

## 2024-12-04 PROCEDURE — 27502 TREATMENT OF THIGH FRACTURE: CPT | Mod: RT

## 2024-12-04 RX ORDER — OXYCODONE HYDROCHLORIDE 30 MG/1
2.5 TABLET ORAL EVERY 4 HOURS
Refills: 0 | Status: DISCONTINUED | OUTPATIENT
Start: 2024-12-04 | End: 2024-12-10

## 2024-12-04 RX ORDER — OXYCODONE HYDROCHLORIDE 30 MG/1
5 TABLET ORAL EVERY 4 HOURS
Refills: 0 | Status: DISCONTINUED | OUTPATIENT
Start: 2024-12-04 | End: 2024-12-04

## 2024-12-04 RX ORDER — ACETAMINOPHEN 500MG 500 MG/1
650 TABLET, COATED ORAL EVERY 6 HOURS
Refills: 0 | Status: DISCONTINUED | OUTPATIENT
Start: 2024-12-04 | End: 2024-12-04

## 2024-12-04 RX ORDER — OXYCODONE HYDROCHLORIDE 30 MG/1
5 TABLET ORAL EVERY 4 HOURS
Refills: 0 | Status: DISCONTINUED | OUTPATIENT
Start: 2024-12-04 | End: 2024-12-08

## 2024-12-04 RX ORDER — POLYETHYLENE GLYCOL 3350 17 G/17G
17 POWDER, FOR SOLUTION ORAL DAILY
Refills: 0 | Status: DISCONTINUED | OUTPATIENT
Start: 2024-12-04 | End: 2024-12-15

## 2024-12-04 RX ORDER — HYDROMORPHONE HYDROCHLORIDE 2 MG/1
0.25 TABLET ORAL ONCE
Refills: 0 | Status: DISCONTINUED | OUTPATIENT
Start: 2024-12-04 | End: 2024-12-04

## 2024-12-04 RX ORDER — GABAPENTIN 300 MG/1
100 CAPSULE ORAL AT BEDTIME
Refills: 0 | Status: DISCONTINUED | OUTPATIENT
Start: 2024-12-04 | End: 2024-12-04

## 2024-12-04 RX ORDER — SODIUM CHLORIDE 9 MG/ML
1 INJECTION, SOLUTION INTRAMUSCULAR; INTRAVENOUS; SUBCUTANEOUS THREE TIMES A DAY
Refills: 0 | Status: DISCONTINUED | OUTPATIENT
Start: 2024-12-04 | End: 2024-12-10

## 2024-12-04 RX ORDER — DIAZEPAM 10 MG/1
2.5 TABLET ORAL EVERY 6 HOURS
Refills: 0 | Status: DISCONTINUED | OUTPATIENT
Start: 2024-12-04 | End: 2024-12-09

## 2024-12-04 RX ORDER — ESCITALOPRAM OXALATE 10 MG/1
10 TABLET, FILM COATED ORAL DAILY
Refills: 0 | Status: DISCONTINUED | OUTPATIENT
Start: 2024-12-05 | End: 2024-12-06

## 2024-12-04 RX ORDER — ENOXAPARIN SODIUM 30 MG/.3ML
30 INJECTION SUBCUTANEOUS EVERY 24 HOURS
Refills: 0 | Status: DISCONTINUED | OUTPATIENT
Start: 2024-12-04 | End: 2024-12-15

## 2024-12-04 RX ORDER — CHLORHEXIDINE GLUCONATE 1.2 MG/ML
1 RINSE ORAL DAILY
Refills: 0 | Status: DISCONTINUED | OUTPATIENT
Start: 2024-12-04 | End: 2024-12-05

## 2024-12-04 RX ORDER — 0.9 % SODIUM CHLORIDE 0.9 %
1000 INTRAVENOUS SOLUTION INTRAVENOUS
Refills: 0 | Status: DISCONTINUED | OUTPATIENT
Start: 2024-12-04 | End: 2024-12-04

## 2024-12-04 RX ORDER — SENNOSIDES 8.6 MG
2 TABLET ORAL AT BEDTIME
Refills: 0 | Status: DISCONTINUED | OUTPATIENT
Start: 2024-12-04 | End: 2024-12-15

## 2024-12-04 RX ORDER — FUROSEMIDE 40 MG/1
10 TABLET ORAL ONCE
Refills: 0 | Status: COMPLETED | OUTPATIENT
Start: 2024-12-04 | End: 2024-12-04

## 2024-12-04 RX ORDER — LIDOCAINE 40 MG/G
1 CREAM TOPICAL DAILY
Refills: 0 | Status: DISCONTINUED | OUTPATIENT
Start: 2024-12-04 | End: 2024-12-04

## 2024-12-04 RX ORDER — ACETAMINOPHEN 500MG 500 MG/1
650 TABLET, COATED ORAL EVERY 6 HOURS
Refills: 0 | Status: DISCONTINUED | OUTPATIENT
Start: 2024-12-04 | End: 2024-12-15

## 2024-12-04 RX ORDER — CEFAZOLIN SODIUM 10 G
1000 VIAL (EA) INJECTION EVERY 8 HOURS
Refills: 0 | Status: COMPLETED | OUTPATIENT
Start: 2024-12-04 | End: 2024-12-04

## 2024-12-04 RX ORDER — ESCITALOPRAM OXALATE 10 MG/1
5 TABLET, FILM COATED ORAL DAILY
Refills: 0 | Status: DISCONTINUED | OUTPATIENT
Start: 2024-12-04 | End: 2024-12-04

## 2024-12-04 RX ORDER — HYDROMORPHONE HYDROCHLORIDE 2 MG/1
0.2 TABLET ORAL ONCE
Refills: 0 | Status: DISCONTINUED | OUTPATIENT
Start: 2024-12-04 | End: 2024-12-04

## 2024-12-04 RX ORDER — METOCLOPRAMIDE HYDROCHLORIDE 10 MG/1
5 TABLET ORAL ONCE
Refills: 0 | Status: COMPLETED | OUTPATIENT
Start: 2024-12-04 | End: 2024-12-04

## 2024-12-04 RX ORDER — LORAZEPAM 2 MG/1
0.25 TABLET ORAL EVERY 6 HOURS
Refills: 0 | Status: DISCONTINUED | OUTPATIENT
Start: 2024-12-04 | End: 2024-12-06

## 2024-12-04 RX ADMIN — HYDROMORPHONE HYDROCHLORIDE 0.25 MILLIGRAM(S): 2 TABLET ORAL at 03:49

## 2024-12-04 RX ADMIN — FUROSEMIDE 10 MILLIGRAM(S): 40 TABLET ORAL at 14:10

## 2024-12-04 RX ADMIN — OXYCODONE HYDROCHLORIDE 5 MILLIGRAM(S): 30 TABLET ORAL at 07:27

## 2024-12-04 RX ADMIN — OXYCODONE HYDROCHLORIDE 2.5 MILLIGRAM(S): 30 TABLET ORAL at 05:49

## 2024-12-04 RX ADMIN — Medication 100 MILLIGRAM(S): at 06:59

## 2024-12-04 RX ADMIN — DIAZEPAM 2.5 MILLIGRAM(S): 10 TABLET ORAL at 02:13

## 2024-12-04 RX ADMIN — OXYCODONE HYDROCHLORIDE 2.5 MILLIGRAM(S): 30 TABLET ORAL at 06:19

## 2024-12-04 RX ADMIN — CHLORHEXIDINE GLUCONATE 1 APPLICATION(S): 1.2 RINSE ORAL at 11:01

## 2024-12-04 RX ADMIN — ACETAMINOPHEN 500MG 650 MILLIGRAM(S): 500 TABLET, COATED ORAL at 01:09

## 2024-12-04 RX ADMIN — OXYCODONE HYDROCHLORIDE 5 MILLIGRAM(S): 30 TABLET ORAL at 18:22

## 2024-12-04 RX ADMIN — ACETAMINOPHEN 500MG 650 MILLIGRAM(S): 500 TABLET, COATED ORAL at 09:59

## 2024-12-04 RX ADMIN — HYDROMORPHONE HYDROCHLORIDE 0.25 MILLIGRAM(S): 2 TABLET ORAL at 04:19

## 2024-12-04 RX ADMIN — HYDROMORPHONE HYDROCHLORIDE 0.25 MILLIGRAM(S): 2 TABLET ORAL at 09:22

## 2024-12-04 RX ADMIN — OXYCODONE HYDROCHLORIDE 2.5 MILLIGRAM(S): 30 TABLET ORAL at 01:39

## 2024-12-04 RX ADMIN — OXYCODONE HYDROCHLORIDE 5 MILLIGRAM(S): 30 TABLET ORAL at 06:57

## 2024-12-04 RX ADMIN — OXYCODONE HYDROCHLORIDE 5 MILLIGRAM(S): 30 TABLET ORAL at 13:35

## 2024-12-04 RX ADMIN — ACETAMINOPHEN 500MG 650 MILLIGRAM(S): 500 TABLET, COATED ORAL at 16:52

## 2024-12-04 RX ADMIN — OXYCODONE HYDROCHLORIDE 5 MILLIGRAM(S): 30 TABLET ORAL at 03:14

## 2024-12-04 RX ADMIN — ENOXAPARIN SODIUM 30 MILLIGRAM(S): 30 INJECTION SUBCUTANEOUS at 14:06

## 2024-12-04 RX ADMIN — Medication 100 MILLIGRAM(S): at 22:26

## 2024-12-04 RX ADMIN — ESCITALOPRAM OXALATE 5 MILLIGRAM(S): 10 TABLET, FILM COATED ORAL at 11:05

## 2024-12-04 RX ADMIN — ACETAMINOPHEN 500MG 650 MILLIGRAM(S): 500 TABLET, COATED ORAL at 01:39

## 2024-12-04 RX ADMIN — ACETAMINOPHEN 500MG 650 MILLIGRAM(S): 500 TABLET, COATED ORAL at 10:30

## 2024-12-04 RX ADMIN — Medication 100 MILLIGRAM(S): at 14:06

## 2024-12-04 RX ADMIN — ACETAMINOPHEN 500MG 650 MILLIGRAM(S): 500 TABLET, COATED ORAL at 16:22

## 2024-12-04 RX ADMIN — OXYCODONE HYDROCHLORIDE 5 MILLIGRAM(S): 30 TABLET ORAL at 02:44

## 2024-12-04 RX ADMIN — HYDROMORPHONE HYDROCHLORIDE 0.25 MILLIGRAM(S): 2 TABLET ORAL at 09:07

## 2024-12-04 RX ADMIN — Medication 200 GRAM(S): at 03:49

## 2024-12-04 RX ADMIN — OXYCODONE HYDROCHLORIDE 5 MILLIGRAM(S): 30 TABLET ORAL at 13:03

## 2024-12-04 RX ADMIN — METOCLOPRAMIDE HYDROCHLORIDE 5 MILLIGRAM(S): 10 TABLET ORAL at 21:12

## 2024-12-04 RX ADMIN — LORAZEPAM 0.25 MILLIGRAM(S): 2 TABLET ORAL at 21:50

## 2024-12-04 RX ADMIN — OXYCODONE HYDROCHLORIDE 2.5 MILLIGRAM(S): 30 TABLET ORAL at 01:09

## 2024-12-04 RX ADMIN — OXYCODONE HYDROCHLORIDE 5 MILLIGRAM(S): 30 TABLET ORAL at 18:52

## 2024-12-04 RX ADMIN — SODIUM CHLORIDE 1 GRAM(S): 9 INJECTION, SOLUTION INTRAMUSCULAR; INTRAVENOUS; SUBCUTANEOUS at 21:45

## 2024-12-04 NOTE — BRIEF OPERATIVE NOTE - OPERATION/FINDINGS
right distal radius fracture splinted, left both bone forearm casted, Bilateral lower extremity sugartong splints

## 2024-12-04 NOTE — BH CONSULTATION LIAISON ASSESSMENT NOTE - RISK ASSESSMENT
Feels unmotivated by her job and defeated that her recovery period will be extended. No access to firearms. Feels like she has a loving family. No SI/HI.
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
## 2024-12-04 NOTE — BH CONSULTATION LIAISON ASSESSMENT NOTE - NSBHCHARTREVIEWVS_PSY_A_CORE FT
Vital Signs Last 24 Hrs  T(C): 36.9 (04 Dec 2024 11:00), Max: 37.9 (04 Dec 2024 07:00)  T(F): 98.4 (04 Dec 2024 11:00), Max: 100.2 (04 Dec 2024 07:00)  HR: 107 (04 Dec 2024 14:00) (60 - 107)  BP: 145/64 (04 Dec 2024 14:00) (70/54 - 165/68)  BP(mean): 92 (04 Dec 2024 14:00) (59 - 100)  RR: 23 (04 Dec 2024 14:00) (14 - 28)  SpO2: 96% (04 Dec 2024 14:00) (95% - 100%)    Parameters below as of 04 Dec 2024 09:05  Patient On (Oxygen Delivery Method): nasal cannula  O2 Flow (L/min): 1
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
## 2024-12-04 NOTE — BH CONSULTATION LIAISON ASSESSMENT NOTE - SOURCE OF INFORMATION
[facility]  Brief Operative Note    Surgery Date: 4/10/2020     Surgeon(s) and Role:     * Stephane Lopez MD - Primary      Assisting Surgeon:  Gertrude Ly MD - Fellow    Pre-op Diagnosis:    1. LUE AVF infection  2. ESRD on HD  3. HTN  4. HLD  5. DM  6. History of bleeding LUE AVF    Post-op Diagnosis:  Post-Op Diagnosis Codes:  1. same    Procedure(s) (LRB):  1. LUE AV fistula excision (Left)  2. LUE washout    Anesthesia: Regional    Description of the findings of the procedure(s): infected LUE mid and distal AVF with stent exposure, cultures and specimen obtained    Estimated Blood Loss: <5 cc    Specimens: LUE AVF     Patient

## 2024-12-04 NOTE — PHYSICAL THERAPY INITIAL EVALUATION ADULT - ACTIVE RANGE OF MOTION EXAMINATION, REHAB EVAL
except L wrist/elbow, R wrist and Cooper knees/ankles 2/2 splints/bilateral upper extremity Active ROM was WFL (within functional limits)/bilateral  lower extremity Active ROM was WFL (within functional limits)

## 2024-12-04 NOTE — PRE-ANESTHESIA EVALUATION ADULT - NSANTHPMHFT_GEN_ALL_CORE
chart and consultant notes reviewed. no hx or signs current cad/chf. known severe PAH and AI from 2024. hx OI, now s/p polytrauma. chest tube for ptx. hyponatremia. surgeon states that case is urgent and cannot wait for optimization of current medical condition

## 2024-12-04 NOTE — PROGRESS NOTE ADULT - SUBJECTIVE AND OBJECTIVE BOX
24 HOUR EVENTS:    NEURO  Exam: A&O x 4  Meds: diazepam  Injectable 2.5 milliGRAM(s) IV Push every 6 hours PRN back spasm  escitalopram 5 milliGRAM(s) Oral daily  gabapentin 100 milliGRAM(s) Oral at bedtime  oxyCODONE    IR 2.5 milliGRAM(s) Oral every 4 hours PRN Moderate Pain (4 - 6)  oxyCODONE    IR 5 milliGRAM(s) Oral every 6 hours PRN Severe Pain (7 - 10)      RESPIRATORY  RR: 20 (12-03-24 @ 22:00) (15 - 28)  SpO2: 99% (12-03-24 @ 22:00) (88% - 100%)  Exam: unlabored  ABG - ( 03 Dec 2024 13:13 )  pH: x     /  pCO2: x     /  pO2: x     / HCO3: x     / Base Excess: x     /  SaO2: x       Lactate: 3.7        CARDIOVASCULAR  HR: 74 (12-03-24 @ 22:00) (60 - 74)  BP: 102/55 (12-03-24 @ 22:00) (70/54 - 130/53)  BP(mean): 76 (12-03-24 @ 22:00) (59 - 83)  VBG - ( 03 Dec 2024 22:28 )  pH: 7.21  /  pCO2: 59    /  pO2: 45    / HCO3: 24    / Base Excess: -4.6  /  SaO2: 77.4   Lactate: 1.7        Exam:   Cardiac Rhythm:   Perfusion     [x ]Adequate   [ ]Inadequate  Mentation   [ x]Normal       [ ]Reduced  Extremities  [x ]Warm         [ ]Cool  Volume Status [ ]Hypervolemic [x ]Euvolemic [ ]Hypovolemic      GI/NUTRITION  Exam: soft, NT/ND  Diet: NPO  Meds: polyethylene glycol 3350 17 Gram(s) Oral daily  senna 2 Tablet(s) Oral at bedtime      GENITOURINARY  I&O's Detail    12-03 @ 07:01  -  12-04 @ 00:08  --------------------------------------------------------  IN:    Lactated Ringers: 30 mL    Oral Fluid: 480 mL  Total IN: 510 mL    OUT:    Chest Tube (mL): 10 mL    Voided (mL): 100 mL  Total OUT: 110 mL    Total NET: 400 mL        Weight (kg): 29 (12-03 @ 16:38)  12-03    137  |  100  |  21  ----------------------------<  121[H]  4.8   |  22  |  0.36[L]    Ca    8.4      03 Dec 2024 21:29  Phos  5.1     12-03  Mg     1.9     12-03    TPro  6.2  /  Alb  3.6  /  TBili  1.1  /  DBili  x   /  AST  51[H]  /  ALT  23  /  AlkPhos  82  12-03    Meds: lactated ringers. 1000 milliLiter(s) IV Continuous <Continuous>  magnesium sulfate  IVPB 2 Gram(s) IV Intermittent once      HEMATOLOGIC  Meds:                         9.8    21.06 )-----------( 242      ( 03 Dec 2024 21:29 )             30.4     PT/INR - ( 03 Dec 2024 13:13 )   PT: 11.4 sec;   INR: 0.99 ratio         PTT - ( 03 Dec 2024 13:13 )  PTT:30.7 sec    INFECTIOUS DISEASES  T(C): 36.7 (12-03-24 @ 19:00), Max: 36.9 (12-03-24 @ 12:02)  Wt(kg): --  WBC Count: 21.06 K/uL (12-03 @ 21:29)  WBC Count: 22.10 K/uL (12-03 @ 13:13)    Recent Cultures:    Meds: ceFAZolin   IVPB 2000 milliGRAM(s) IV Intermittent every 8 hours      ENDOCRINE  Capillary Blood Glucose    Meds:     ACCESS DEVICES:  [ x] Peripheral IV  [ ] Central Venous Line		[ ] R	[ ] L	[ ] IJ	[ ] Fem	[ ] SC	Placed:   [ ] Arterial Line			[ ] R	[ ] L	[ ] Fem	[ ] Rad	[ ] Ax	Placed:   [ ] PICC:					[ ] Mediport  [ ] Urinary Catheter, Date Placed:   [ ] Necessity of urinary, arterial, and venous catheters discussed    OTHER MEDICATIONS:  chlorhexidine 2% Cloths 1 Application(s) Topical daily  lidocaine   4% Patch 1 Patch Transdermal daily      IMAGING:
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
## 2024-12-04 NOTE — PROGRESS NOTE ADULT - ASSESSMENT
ASSESSMENT & PLAN  54yFemale w/ osteogenesis imperfecta w R BBFA Fx, L Clavicle Fx, L Distal Radius Fx, R Distal Femur Periprosthetic fx, R tibial plateau fx, L GA1 open tibial plateau fx     -NWB bilateral UE and LE  -LUE in sling  -Standing Ancef q8hr  -Plan for OT today  -NPO past midnight, IVF  -hold chemical DVT ppx for OR; SCDs OK  -please document medical and cardiac clearance ASAP for OR  -pain control  -ice/cold compress, elevation  -chest tube per trauma surgery    Rubin Antunez MD  Orthopaedic Surgery Resident    For all questions, please reach out via the following numbers for the on-call resident; do not reach out via Teams.  [facility] [id number]  [facility]        [id number]  [facility]  [id number]/ [phone]
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
## 2024-12-04 NOTE — PHYSICAL THERAPY INITIAL EVALUATION ADULT - TRANSFER TRAINING, PT EVAL
GOAL: Patient will perform sit to stand transfers independently at rolling walker with proper hand placement in 4 weeks.

## 2024-12-04 NOTE — PHYSICAL THERAPY INITIAL EVALUATION ADULT - DID THE PATIENT HAVE SURGERY?
Cooper Radius, ulnar closed reduction and casting, cooper Tibia and Fibula closed reduction and casting/yes

## 2024-12-04 NOTE — BH CONSULTATION LIAISON ASSESSMENT NOTE - NSBHCONSULTRECOMMENDOTHER_PSY_A_CORE FT
Continue home dose of Lexapro 10mg Qd   Monitor for signs of depression   Can follow up outpatient in [facility] walk in clinic  Would benefit from talk therapy upon discharge  Continue home dose of Lexapro 10mg Qd   Would benefit from talk therapy upon discharge. Please have CSW provide referral resources.  Alternatively, patient can follow up outpatient at [facility] [phone].

## 2024-12-04 NOTE — PRE PROCEDURE NOTE - PRE PROCEDURE EVALUATION
Patient Age: 54    Patient Gender: F    Procedure: LUE, RUE, LLE, RLE closed reduction and casting; I&D of R tibia    Attending Physician: Mercedez    Pertinent labs:                      8.8    17.57 )-----------( 193      ( 04 Dec 2024 02:16 )             26.5       12-04    128[L]  |  95[L]  |  24[H]  ----------------------------<  119[H]  5.1   |  19[L]  |  0.45[L]    Ca    8.1[L]      04 Dec 2024 02:16  Phos  5.6     12-04  Mg     1.9     12-04    TPro  5.7[L]  /  Alb  3.5  /  TBili  0.7  /  DBili  x   /  AST  54[H]  /  ALT  22  /  AlkPhos  75  12-04      PT/INR - ( 03 Dec 2024 13:13 )   PT: 11.4 sec;   INR: 0.99 ratio         PTT - ( 03 Dec 2024 13:13 )  PTT:30.7 sec        Patient and Family Aware ? Yes
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
## 2024-12-04 NOTE — PHYSICAL THERAPY INITIAL EVALUATION ADULT - STRENGTHENING, PT EVAL
GOAL: Patient will demonstrate a 1/3 increase in strength where deficient to assist with performing functional mobility and ADLs in 4 weeks.

## 2024-12-04 NOTE — BH CONSULTATION LIAISON ASSESSMENT NOTE - NSBHCONSULTMEDANXIETY_PSY_A_CORE FT
DISPLAY PLAN FREE TEXT DISPLAY PLAN FREE TEXT DISPLAY PLAN FREE TEXT DISPLAY PLAN FREE TEXT Ativan 0.5mg PO/IV/IM Qd  DISPLAY PLAN FREE TEXT Ativan 0.5mg PO/IV/IM BID

## 2024-12-04 NOTE — PROGRESS NOTE ADULT - SUBJECTIVE AND OBJECTIVE BOX
Orthopedic Surgery Progress Note     S: Patient seen and examined today. No acute events overnight. Pain is well controlled. Denies f/c, chest pain, shortness of breath, dizziness.    MEDICATIONS  (STANDING):  acetaminophen     Tablet .. 650 milliGRAM(s) Oral every 6 hours  ceFAZolin   IVPB 2000 milliGRAM(s) IV Intermittent every 8 hours  chlorhexidine 2% Cloths 1 Application(s) Topical daily  escitalopram 5 milliGRAM(s) Oral daily  gabapentin 100 milliGRAM(s) Oral at bedtime  lactated ringers. 1000 milliLiter(s) (30 mL/Hr) IV Continuous <Continuous>  lidocaine   4% Patch 1 Patch Transdermal daily  polyethylene glycol 3350 17 Gram(s) Oral daily  senna 2 Tablet(s) Oral at bedtime    MEDICATIONS  (PRN):  acetaminophen     Tablet .. 650 milliGRAM(s) Oral every 6 hours PRN Mild Pain (1 - 3)  diazepam  Injectable 2.5 milliGRAM(s) IV Push every 6 hours PRN back spasm  oxyCODONE    IR 2.5 milliGRAM(s) Oral every 4 hours PRN Moderate Pain (4 - 6)  oxyCODONE    IR 5 milliGRAM(s) Oral every 4 hours PRN Severe Pain (7 - 10)      Physical Exam:  UEs:  notable ecchymosis and deformity over mid-forearm; deformity over R distal radius w/o significant ecchymosis  compartments soft and compressible  Fires AIN/PIN/U  SILT M/U/R/A  +radial pulse bilaterally    LEs:  bleeding poke hole in R proximal tibia, ecchymosis over L distal femur and proximal tibia  Motor: TA/EHL/GS/FHL intact  Sensory: DP/SP/Tib/Jah/Saph SILT  compartments soft and compressible bilaterally  +DP pulse (symmetric relative to contralateral side), WWP    Vital Signs Last 24 Hrs  T(C): 36.9 (04 Dec 2024 03:00), Max: 36.9 (03 Dec 2024 12:02)  T(F): 98.5 (04 Dec 2024 03:00), Max: 98.5 (04 Dec 2024 03:00)  HR: 83 (04 Dec 2024 04:00) (60 - 87)  BP: 106/53 (04 Dec 2024 04:00) (70/54 - 132/56)  BP(mean): 76 (04 Dec 2024 04:00) (59 - 83)  RR: 14 (04 Dec 2024 04:00) (14 - 28)  SpO2: 97% (04 Dec 2024 04:00) (88% - 100%)    Parameters below as of 04 Dec 2024 03:00  Patient On (Oxygen Delivery Method): nasal cannula  O2 Flow (L/min): 2      12-03-24 @ 07:01  -  12-04-24 @ 04:21  --------------------------------------------------------  IN: 980 mL / OUT: 110 mL / NET: 870 mL                    LABS:                        8.8    17.57 )-----------( 193      ( 04 Dec 2024 02:16 )             26.5     12-04    128[L]  |  95[L]  |  24[H]  ----------------------------<  119[H]  5.1   |  19[L]  |  0.45[L]    Ca    8.1[L]      04 Dec 2024 02:16  Phos  5.6     12-04  Mg     1.9     12-04    TPro  5.7[L]  /  Alb  3.5  /  TBili  0.7  /  DBili  x   /  AST  54[H]  /  ALT  22  /  AlkPhos  75  12-04

## 2024-12-04 NOTE — PROCEDURE NOTE - NSINFORMCONSENT_GEN_A_CORE
verbal consent provided/This was an emergent procedure.
Benefits, risks, and possible complications of procedure explained to patient/caregiver who verbalized understanding and gave verbal consent.
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
## 2024-12-04 NOTE — BH CONSULTATION LIAISON ASSESSMENT NOTE - CURRENT MEDICATION
MEDICATIONS  (STANDING):  ceFAZolin   IVPB 1000 milliGRAM(s) IV Intermittent every 8 hours  chlorhexidine 2% Cloths 1 Application(s) Topical daily  enoxaparin Injectable 30 milliGRAM(s) SubCutaneous every 24 hours  escitalopram 5 milliGRAM(s) Oral daily  gabapentin 100 milliGRAM(s) Oral at bedtime  polyethylene glycol 3350 17 Gram(s) Oral daily  senna 2 Tablet(s) Oral at bedtime    MEDICATIONS  (PRN):  acetaminophen     Tablet .. 650 milliGRAM(s) Oral every 6 hours PRN Mild Pain (1 - 3)  diazepam  Injectable 2.5 milliGRAM(s) IV Push every 6 hours PRN back spasm  oxyCODONE    IR 5 milliGRAM(s) Oral every 4 hours PRN Severe Pain (7 - 10)  oxyCODONE    IR 2.5 milliGRAM(s) Oral every 4 hours PRN Moderate Pain (4 - 6)

## 2024-12-04 NOTE — PROGRESS NOTE ADULT - SUBJECTIVE AND OBJECTIVE BOX
Postop Check    Patient tolerated the procedure well. Patient seen and examined at bedside.  No acute complaints at this time. Pain well controlled. Denies chest pain, shortness of breath, nausea or vomiting. Patient was placed in a SAC on LUE, Sugar Tong Long Leg Splint for both LEs, Volar splint for RUE.     PE:  Vital Signs Last 24 Hrs  T(C): 36.9 (12-04-24 @ 11:00), Max: 37.9 (12-04-24 @ 07:00)  T(F): 98.4 (12-04-24 @ 11:00), Max: 100.2 (12-04-24 @ 07:00)  HR: 87 (12-04-24 @ 12:00) (60 - 107)  BP: 128/59 (12-04-24 @ 12:00) (70/54 - 165/68)  BP(mean): 85 (12-04-24 @ 12:00) (59 - 100)  RR: 16 (12-04-24 @ 12:00) (14 - 28)  SpO2: 96% (12-04-24 @ 12:00) (95% - 100%)    General: NAD, resting comfortably in bed  MSK:   LUE in SAC - grossly moving all digits, brisk capillary refill present, compartments soft and compressible  RUE in Volar Splint - grossly moving all digits, brisk capillary refill present, compartments soft and compressible  BLLE  in Long Leg Sugar Tong Splints - grossly moving all digits, brisk capillary refill present, compartments soft and compressible      A/P:  54y f s/p SAC LUE, Volar Splint RUE, BLLE Sugar Tong Splints on 12/4/2024 in OR.  -PT/OT  -NWB LUE, NWB BLLE, NWB LUE with elbow ROM allowed   -Pain Control  -DVT ppx: discussion had with outpatient cardiologist Dr. Hinojosa with Dr. Luna medicine attending, no contraindication to AC, OK for Lovenox based on dosing per SICU  -SICU management appreciated  -Continue perioperative abx x 24 hours  -FU AM Labs  -Rest, ice, compress and elevate the extremity as needed  -Incentive Spirometry  -Medical management appreciated  -Dispo pending PT eval  -Discussed above with Dr. Newsome, who agrees with plan
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
## 2024-12-04 NOTE — CHART NOTE - NSCHARTNOTEFT_GEN_A_CORE
Post-op Check    Procedure: S/P LUE, RUE, LLE, RLE closed reduction and casting; I&D of R tibia with orthopaedic surgery     Subjective: Patient resting comfortably in bed, NAD. Denies fever, chills, CP, SOB, n/v, abdominal pain, BM, void    Objective:  Vitals: T(F): 98.4 (12-04-24 @ 09:05), Max: 100.2 (12-04-24 @ 07:00)  HR: 85 (12-04-24 @ 09:05)  BP: 126/58 (12-04-24 @ 09:05) (70/54 - 165/68)  RR: 23 (12-04-24 @ 09:05)  SpO2: 97% (12-04-24 @ 09:05)  Vent Settings:     In:   12-03-24 @ 07:01  -  12-04-24 @ 07:00  --------------------------------------------------------  IN: 1470 mL    12-04-24 @ 07:01  -  12-04-24 @ 11:03  --------------------------------------------------------  IN: 90 mL      IV Fluids: lactated ringers. 1000 milliLiter(s) (30 mL/Hr) IV Continuous <Continuous>      Out:   12-03-24 @ 07:01  -  12-04-24 @ 07:00  --------------------------------------------------------  OUT: 370 mL    12-04-24 @ 07:01  -  12-04-24 @ 11:03  --------------------------------------------------------  OUT: 50 mL      EBL:   12-03-24 @ 07:01  -  12-04-24 @ 07:00  --------------------------------------------------------  OUT: 0 mL      Voided Urine:   12-03-24 @ 07:01  -  12-04-24 @ 07:00  --------------------------------------------------------  OUT: 370 mL    12-04-24 @ 07:01  -  12-04-24 @ 11:03  --------------------------------------------------------  OUT: 50 mL      Mora Catheter: yes no   Drains:   MANJIT:     Chest Tube:   12-03-24 @ 07:01  -  12-04-24 @ 07:00  --------------------------------------------------------  OUT: 30 mL       NG Tube:       Physical Examination:  General: NAD, resting comfortably in bed  Respiratory: Nonlabored respirations, normal CW expansion.  Cardio: S1S2, regular rate and rhythm.  Vascular: extremities are warm and well perfused.   MSK: LUE in cast, R forearm in splint, b/l LE in splints, dressings c/d/i, compartments soft, NVI     Imaging:  No post-op imaging studies    Assessment:  54yFemale S/P JAMARIE, RUE, LLE, RLE closed reduction and casting; I&D of R tibia with orthopaedic surgery       Plan:  -NWB b/l UE, NWB RLE  -f/u cardiology for AC recommendations per ortho  -SICU care
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
## 2024-12-04 NOTE — PHYSICAL THERAPY INITIAL EVALUATION ADULT - THERAPY FREQUENCY, PT EVAL
Herbert or Rosibel,    This is a patient of Dr. Prater. Pt left a VM this afternoon to report a HR in the 30s and 40s. I called and spoke to him. With movement or exertion his HR runs in the 50s. At rest, his HR drops to the 30s and 40s, but never stays in the 30s and 40s for long periods of time. He said he feels fine and is asymptomatic.     I asked him to go to ER this weekend if he starts having symptoms, but told him I would send you a message for your recommendations.    Thank you,    Rosibel Suarez, RN  Triage MG     3-5x/week

## 2024-12-04 NOTE — BH CONSULTATION LIAISON ASSESSMENT NOTE - NSBHCHARTREVIEWINVESTIGATE_PSY_A_CORE FT
< from: 12 Lead ECG (03.02.24 @ 17:26) >      Ventricular Rate 89 BPM    Atrial Rate 89 BPM    P-R Interval 176 ms    QRS Duration 84 ms    Q-T Interval 376 ms    QTC Calculation(Bazett) 457 ms      < end of copied text >

## 2024-12-04 NOTE — BRIEF OPERATIVE NOTE - NSICDXBRIEFPROCEDURE_GEN_ALL_CORE_FT
PROCEDURES:  Closed reduction of fracture of both tibia and fibula of both lower extremities 04-Dec-2024 11:54:17  Jesus Rich  Closed reduction of fracture of distal forearm 04-Dec-2024 11:54:51  Jesus Rich
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
## 2024-12-04 NOTE — BH CONSULTATION LIAISON ASSESSMENT NOTE - NSBHCHARTREVIEWLAB_PSY_A_CORE FT
11.0   15.88 )-----------( 160      ( 04 Dec 2024 05:34 )             34.2     12-04    127[L]  |  95[L]  |  24[H]  ----------------------------<  117[H]  5.2   |  17[L]  |  0.49[L]    Ca    9.5      04 Dec 2024 05:34  Phos  6.2     12-04  Mg     1.9     12-04    TPro  5.6[L]  /  Alb  3.6  /  TBili  0.7  /  DBili  x   /  AST  57[H]  /  ALT  22  /  AlkPhos  72  12-04    Urinalysis Basic - ( 04 Dec 2024 05:36 )    Color: Dark Yellow / Appearance: Clear / SG: >1.030 / pH: x  Gluc: x / Ketone: Negative mg/dL  / Bili: Negative / Urobili: 0.2 mg/dL   Blood: x / Protein: 30 mg/dL / Nitrite: Negative   Leuk Esterase: Negative / RBC: 2 /HPF / WBC 3 /HPF   Sq Epi: x / Non Sq Epi: 2 /HPF / Bacteria: Negative /HPF

## 2024-12-04 NOTE — CHART NOTE - NSCHARTNOTEFT_GEN_A_CORE
Outpatient Cardiologist Dr. Hinojosa was spoken to via phone with medicine attending Dr. Luna. Patient has no contraindications to anticoagulation. Orthopedic recommendation is weight based lovenox DVT ppx. Plan discussed with Dr. Newsome, who agrees with above.

## 2024-12-04 NOTE — CHART NOTE - NSCHARTNOTEFT_GEN_A_CORE
Patient seen and evaluated at bedside. Compartments in the LUE, RLE, and LLE soft and compressible. Pulses palpable and patient moving digits without significant pain w passive resistance.    Rubin Antunez MD  Orthopaedic Surgery Resident    For all questions, please reach out via the following numbers for the on-call resident; do not reach out via Teams.  [facility] [id number]  [facility]        [id number]  [facility]  [phone]/ [phone]

## 2024-12-04 NOTE — PROCEDURE NOTE - NSPROCDETAILS_GEN_ALL_CORE
P Quality Flow/Interdisciplinary Rounds Progress Note        Quality Flow Rounds held on March 29, 2023    Disciplines Attending:  Bedside Nurse, , , and Nursing Unit Leadership    Robi Jarquin was admitted on 3/25/2023  4:53 PM    Anticipated Discharge Date:       Disposition:    Luca Score:  Luca Scale Score: 17    Readmission Risk              Risk of Unplanned Readmission:  15           Discussed patient goal for the day, patient clinical progression, and barriers to discharge.   The following Goal(s) of the Day/Commitment(s) have been identified:  report labs/diagnostics       Ruben Mora RN  March 29, 2023 location identified, draped/prepped, sterile technique used/sterile dressing applied/sterile technique, catheter placed/ultrasound guidance

## 2024-12-04 NOTE — PHYSICAL THERAPY INITIAL EVALUATION ADULT - GENERAL OBSERVATIONS, REHAB EVAL
Pt rec'd supine in bed NAD, +UE IVL, +ICU monitoring, +LUE cast, +RUE forearm splint, +BLE leg splints, +S3pohLD.

## 2024-12-04 NOTE — PHYSICAL THERAPY INITIAL EVALUATION ADULT - BALANCE TRAINING, PT EVAL
GOAL: Patient will demonstrate an increase in static/dynamic balance in sitting/standing where deficient by at least 1 grade to facilitate greater independence during functional mobility and ADL's in 4 weeks.

## 2024-12-05 DIAGNOSIS — D62 ACUTE POSTHEMORRHAGIC ANEMIA: ICD-10-CM

## 2024-12-05 DIAGNOSIS — R00.0 TACHYCARDIA, UNSPECIFIED: ICD-10-CM

## 2024-12-05 LAB
ALBUMIN SERPL ELPH-MCNC: 3.2 G/DL — LOW (ref 3.3–5)
ALBUMIN SERPL ELPH-MCNC: 3.3 G/DL — SIGNIFICANT CHANGE UP (ref 3.3–5)
ALP SERPL-CCNC: 70 U/L — SIGNIFICANT CHANGE UP (ref 40–120)
ALP SERPL-CCNC: 76 U/L — SIGNIFICANT CHANGE UP (ref 40–120)
ALT FLD-CCNC: 7 U/L — LOW (ref 10–45)
ALT FLD-CCNC: 9 U/L — LOW (ref 10–45)
ANION GAP SERPL CALC-SCNC: 13 MMOL/L — SIGNIFICANT CHANGE UP (ref 5–17)
ANION GAP SERPL CALC-SCNC: 13 MMOL/L — SIGNIFICANT CHANGE UP (ref 5–17)
APPEARANCE UR: CLEAR — SIGNIFICANT CHANGE UP
APTT BLD: 32.4 SEC — SIGNIFICANT CHANGE UP (ref 24.5–35.6)
AST SERPL-CCNC: 55 U/L — HIGH (ref 10–40)
AST SERPL-CCNC: 56 U/L — HIGH (ref 10–40)
BACTERIA # UR AUTO: NEGATIVE /HPF — SIGNIFICANT CHANGE UP
BILIRUB SERPL-MCNC: 0.8 MG/DL — SIGNIFICANT CHANGE UP (ref 0.2–1.2)
BILIRUB SERPL-MCNC: 0.8 MG/DL — SIGNIFICANT CHANGE UP (ref 0.2–1.2)
BILIRUB UR-MCNC: NEGATIVE — SIGNIFICANT CHANGE UP
BUN SERPL-MCNC: 40 MG/DL — HIGH (ref 7–23)
BUN SERPL-MCNC: 41 MG/DL — HIGH (ref 7–23)
CALCIUM SERPL-MCNC: 8.5 MG/DL — SIGNIFICANT CHANGE UP (ref 8.4–10.5)
CALCIUM SERPL-MCNC: 8.8 MG/DL — SIGNIFICANT CHANGE UP (ref 8.4–10.5)
CAST: 17 /LPF — HIGH (ref 0–4)
CHLORIDE SERPL-SCNC: 90 MMOL/L — LOW (ref 96–108)
CHLORIDE SERPL-SCNC: 92 MMOL/L — LOW (ref 96–108)
CO2 SERPL-SCNC: 20 MMOL/L — LOW (ref 22–31)
CO2 SERPL-SCNC: 21 MMOL/L — LOW (ref 22–31)
COLOR SPEC: YELLOW — SIGNIFICANT CHANGE UP
CREAT ?TM UR-MCNC: 121 MG/DL — SIGNIFICANT CHANGE UP
CREAT SERPL-MCNC: 0.4 MG/DL — LOW (ref 0.5–1.3)
CREAT SERPL-MCNC: 0.61 MG/DL — SIGNIFICANT CHANGE UP (ref 0.5–1.3)
DIFF PNL FLD: NEGATIVE — SIGNIFICANT CHANGE UP
EGFR: 105 ML/MIN/1.73M2 — SIGNIFICANT CHANGE UP
EGFR: 116 ML/MIN/1.73M2 — SIGNIFICANT CHANGE UP
GAS PNL BLDV: SIGNIFICANT CHANGE UP
GAS PNL BLDV: SIGNIFICANT CHANGE UP
GLUCOSE SERPL-MCNC: 107 MG/DL — HIGH (ref 70–99)
GLUCOSE SERPL-MCNC: 116 MG/DL — HIGH (ref 70–99)
GLUCOSE UR QL: NEGATIVE MG/DL — SIGNIFICANT CHANGE UP
HCT VFR BLD CALC: 20.5 % — CRITICAL LOW (ref 34.5–45)
HCT VFR BLD CALC: 22.9 % — LOW (ref 34.5–45)
HCT VFR BLD CALC: 29.1 % — LOW (ref 34.5–45)
HGB BLD-MCNC: 10 G/DL — LOW (ref 11.5–15.5)
HGB BLD-MCNC: 6.9 G/DL — CRITICAL LOW (ref 11.5–15.5)
HGB BLD-MCNC: 7.7 G/DL — LOW (ref 11.5–15.5)
HYALINE CASTS # UR AUTO: PRESENT
INR BLD: 1.12 RATIO — SIGNIFICANT CHANGE UP (ref 0.85–1.16)
KETONES UR-MCNC: 15 MG/DL
LEUKOCYTE ESTERASE UR-ACNC: NEGATIVE — SIGNIFICANT CHANGE UP
MAGNESIUM SERPL-MCNC: 1.9 MG/DL — SIGNIFICANT CHANGE UP (ref 1.6–2.6)
MAGNESIUM SERPL-MCNC: 2.7 MG/DL — HIGH (ref 1.6–2.6)
MCHC RBC-ENTMCNC: 28.6 PG — SIGNIFICANT CHANGE UP (ref 27–34)
MCHC RBC-ENTMCNC: 28.8 PG — SIGNIFICANT CHANGE UP (ref 27–34)
MCHC RBC-ENTMCNC: 29.2 PG — SIGNIFICANT CHANGE UP (ref 27–34)
MCHC RBC-ENTMCNC: 33.6 G/DL — SIGNIFICANT CHANGE UP (ref 32–36)
MCHC RBC-ENTMCNC: 33.7 G/DL — SIGNIFICANT CHANGE UP (ref 32–36)
MCHC RBC-ENTMCNC: 34.4 G/DL — SIGNIFICANT CHANGE UP (ref 32–36)
MCV RBC AUTO: 85.1 FL — SIGNIFICANT CHANGE UP (ref 80–100)
MCV RBC AUTO: 85.1 FL — SIGNIFICANT CHANGE UP (ref 80–100)
MCV RBC AUTO: 85.8 FL — SIGNIFICANT CHANGE UP (ref 80–100)
NITRITE UR-MCNC: NEGATIVE — SIGNIFICANT CHANGE UP
NRBC # BLD: 0 /100 WBCS — SIGNIFICANT CHANGE UP (ref 0–0)
OSMOLALITY SERPL: 276 MOSMOL/KG — SIGNIFICANT CHANGE UP (ref 275–300)
OSMOLALITY UR: 1055 MOS/KG — HIGH (ref 300–900)
PH UR: 5 — SIGNIFICANT CHANGE UP (ref 5–8)
PHOSPHATE SERPL-MCNC: 3.3 MG/DL — SIGNIFICANT CHANGE UP (ref 2.5–4.5)
PHOSPHATE SERPL-MCNC: 4.8 MG/DL — HIGH (ref 2.5–4.5)
PLATELET # BLD AUTO: 111 K/UL — LOW (ref 150–400)
PLATELET # BLD AUTO: 123 K/UL — LOW (ref 150–400)
PLATELET # BLD AUTO: 82 K/UL — LOW (ref 150–400)
POTASSIUM SERPL-MCNC: 5.1 MMOL/L — SIGNIFICANT CHANGE UP (ref 3.5–5.3)
POTASSIUM SERPL-MCNC: 5.1 MMOL/L — SIGNIFICANT CHANGE UP (ref 3.5–5.3)
POTASSIUM SERPL-SCNC: 5.1 MMOL/L — SIGNIFICANT CHANGE UP (ref 3.5–5.3)
POTASSIUM SERPL-SCNC: 5.1 MMOL/L — SIGNIFICANT CHANGE UP (ref 3.5–5.3)
POTASSIUM UR-SCNC: >100 MMOL/L — SIGNIFICANT CHANGE UP
PROT ?TM UR-MCNC: 117 MG/DL — HIGH (ref 0–12)
PROT SERPL-MCNC: 5.4 G/DL — LOW (ref 6–8.3)
PROT SERPL-MCNC: 5.4 G/DL — LOW (ref 6–8.3)
PROT UR-MCNC: 30 MG/DL
PROT/CREAT UR-RTO: 1 RATIO — HIGH (ref 0–0.2)
PROTHROM AB SERPL-ACNC: 12.7 SEC — SIGNIFICANT CHANGE UP (ref 9.9–13.4)
RBC # BLD: 2.41 M/UL — LOW (ref 3.8–5.2)
RBC # BLD: 2.67 M/UL — LOW (ref 3.8–5.2)
RBC # BLD: 3.42 M/UL — LOW (ref 3.8–5.2)
RBC # FLD: 13.4 % — SIGNIFICANT CHANGE UP (ref 10.3–14.5)
RBC # FLD: 14.4 % — SIGNIFICANT CHANGE UP (ref 10.3–14.5)
RBC # FLD: 14.4 % — SIGNIFICANT CHANGE UP (ref 10.3–14.5)
RBC CASTS # UR COMP ASSIST: 4 /HPF — SIGNIFICANT CHANGE UP (ref 0–4)
REVIEW: SIGNIFICANT CHANGE UP
SODIUM SERPL-SCNC: 124 MMOL/L — LOW (ref 135–145)
SODIUM SERPL-SCNC: 125 MMOL/L — LOW (ref 135–145)
SODIUM UR-SCNC: 64 MMOL/L — SIGNIFICANT CHANGE UP
SP GR SPEC: >1.03 — HIGH (ref 1–1.03)
SQUAMOUS # UR AUTO: 7 /HPF — HIGH (ref 0–5)
T4 FREE+ TSH PNL SERPL: 1.34 UIU/ML — SIGNIFICANT CHANGE UP (ref 0.27–4.2)
UROBILINOGEN FLD QL: 0.2 MG/DL — SIGNIFICANT CHANGE UP (ref 0.2–1)
UUN UR-MCNC: 785 MG/DL — SIGNIFICANT CHANGE UP
WBC # BLD: 12.34 K/UL — HIGH (ref 3.8–10.5)
WBC # BLD: 12.86 K/UL — HIGH (ref 3.8–10.5)
WBC # BLD: 9.95 K/UL — SIGNIFICANT CHANGE UP (ref 3.8–10.5)
WBC # FLD AUTO: 12.34 K/UL — HIGH (ref 3.8–10.5)
WBC # FLD AUTO: 12.86 K/UL — HIGH (ref 3.8–10.5)
WBC # FLD AUTO: 9.95 K/UL — SIGNIFICANT CHANGE UP (ref 3.8–10.5)
WBC UR QL: 3 /HPF — SIGNIFICANT CHANGE UP (ref 0–5)

## 2024-12-05 PROCEDURE — 36556 INSERT NON-TUNNEL CV CATH: CPT

## 2024-12-05 PROCEDURE — 71045 X-RAY EXAM CHEST 1 VIEW: CPT | Mod: 26,76

## 2024-12-05 PROCEDURE — 76937 US GUIDE VASCULAR ACCESS: CPT | Mod: 26

## 2024-12-05 PROCEDURE — 99233 SBSQ HOSP IP/OBS HIGH 50: CPT

## 2024-12-05 RX ORDER — METOPROLOL TARTRATE 100 MG/1
12.5 TABLET, FILM COATED ORAL ONCE
Refills: 0 | Status: COMPLETED | OUTPATIENT
Start: 2024-12-05 | End: 2024-12-05

## 2024-12-05 RX ORDER — CYANOCOBALAMIN/FOLIC AC/VIT B6 1-2.2-25MG
1 TABLET ORAL DAILY
Refills: 0 | Status: DISCONTINUED | OUTPATIENT
Start: 2024-12-05 | End: 2024-12-15

## 2024-12-05 RX ORDER — METOPROLOL TARTRATE 100 MG/1
12.5 TABLET, FILM COATED ORAL EVERY 12 HOURS
Refills: 0 | Status: DISCONTINUED | OUTPATIENT
Start: 2024-12-05 | End: 2024-12-07

## 2024-12-05 RX ORDER — METOCLOPRAMIDE HYDROCHLORIDE 10 MG/1
5 TABLET ORAL ONCE
Refills: 0 | Status: COMPLETED | OUTPATIENT
Start: 2024-12-05 | End: 2024-12-05

## 2024-12-05 RX ORDER — FAMOTIDINE 20 MG/1
20 TABLET, FILM COATED ORAL ONCE
Refills: 0 | Status: COMPLETED | OUTPATIENT
Start: 2024-12-05 | End: 2024-12-05

## 2024-12-05 RX ORDER — CHLORHEXIDINE GLUCONATE 1.2 MG/ML
1 RINSE ORAL
Refills: 0 | Status: DISCONTINUED | OUTPATIENT
Start: 2024-12-05 | End: 2024-12-15

## 2024-12-05 RX ORDER — LABETALOL 100 MG/1
5 TABLET, FILM COATED ORAL ONCE
Refills: 0 | Status: COMPLETED | OUTPATIENT
Start: 2024-12-05 | End: 2024-12-05

## 2024-12-05 RX ORDER — METOCLOPRAMIDE HYDROCHLORIDE 10 MG/1
10 TABLET ORAL ONCE
Refills: 0 | Status: COMPLETED | OUTPATIENT
Start: 2024-12-05 | End: 2024-12-05

## 2024-12-05 RX ORDER — AMLODIPINE BESYLATE 10 MG/1
2.5 TABLET ORAL EVERY 24 HOURS
Refills: 0 | Status: DISCONTINUED | OUTPATIENT
Start: 2024-12-05 | End: 2024-12-10

## 2024-12-05 RX ADMIN — SODIUM CHLORIDE 1 GRAM(S): 9 INJECTION, SOLUTION INTRAMUSCULAR; INTRAVENOUS; SUBCUTANEOUS at 13:05

## 2024-12-05 RX ADMIN — OXYCODONE HYDROCHLORIDE 5 MILLIGRAM(S): 30 TABLET ORAL at 23:15

## 2024-12-05 RX ADMIN — Medication 100 GRAM(S): at 08:25

## 2024-12-05 RX ADMIN — METOPROLOL TARTRATE 12.5 MILLIGRAM(S): 100 TABLET, FILM COATED ORAL at 17:42

## 2024-12-05 RX ADMIN — Medication 1 TABLET(S): at 11:06

## 2024-12-05 RX ADMIN — METOPROLOL TARTRATE 12.5 MILLIGRAM(S): 100 TABLET, FILM COATED ORAL at 09:34

## 2024-12-05 RX ADMIN — SODIUM CHLORIDE 1 GRAM(S): 9 INJECTION, SOLUTION INTRAMUSCULAR; INTRAVENOUS; SUBCUTANEOUS at 06:03

## 2024-12-05 RX ADMIN — OXYCODONE HYDROCHLORIDE 5 MILLIGRAM(S): 30 TABLET ORAL at 22:45

## 2024-12-05 RX ADMIN — OXYCODONE HYDROCHLORIDE 5 MILLIGRAM(S): 30 TABLET ORAL at 12:16

## 2024-12-05 RX ADMIN — OXYCODONE HYDROCHLORIDE 5 MILLIGRAM(S): 30 TABLET ORAL at 13:16

## 2024-12-05 RX ADMIN — SODIUM CHLORIDE 1 GRAM(S): 9 INJECTION, SOLUTION INTRAMUSCULAR; INTRAVENOUS; SUBCUTANEOUS at 21:47

## 2024-12-05 RX ADMIN — POLYETHYLENE GLYCOL 3350 17 GRAM(S): 17 POWDER, FOR SOLUTION ORAL at 11:08

## 2024-12-05 RX ADMIN — ENOXAPARIN SODIUM 30 MILLIGRAM(S): 30 INJECTION SUBCUTANEOUS at 13:07

## 2024-12-05 RX ADMIN — LORAZEPAM 0.25 MILLIGRAM(S): 2 TABLET ORAL at 21:48

## 2024-12-05 RX ADMIN — ACETAMINOPHEN 500MG 650 MILLIGRAM(S): 500 TABLET, COATED ORAL at 17:42

## 2024-12-05 RX ADMIN — OXYCODONE HYDROCHLORIDE 5 MILLIGRAM(S): 30 TABLET ORAL at 04:30

## 2024-12-05 RX ADMIN — ACETAMINOPHEN 500MG 650 MILLIGRAM(S): 500 TABLET, COATED ORAL at 18:42

## 2024-12-05 RX ADMIN — Medication 2 TABLET(S): at 21:47

## 2024-12-05 RX ADMIN — LABETALOL 5 MILLIGRAM(S): 100 TABLET, FILM COATED ORAL at 22:19

## 2024-12-05 RX ADMIN — METOCLOPRAMIDE HYDROCHLORIDE 10 MILLIGRAM(S): 10 TABLET ORAL at 22:20

## 2024-12-05 RX ADMIN — OXYCODONE HYDROCHLORIDE 5 MILLIGRAM(S): 30 TABLET ORAL at 05:00

## 2024-12-05 RX ADMIN — ESCITALOPRAM OXALATE 10 MILLIGRAM(S): 10 TABLET, FILM COATED ORAL at 11:05

## 2024-12-05 RX ADMIN — FAMOTIDINE 20 MILLIGRAM(S): 20 TABLET, FILM COATED ORAL at 06:35

## 2024-12-05 RX ADMIN — METOCLOPRAMIDE HYDROCHLORIDE 5 MILLIGRAM(S): 10 TABLET ORAL at 06:03

## 2024-12-05 NOTE — PROGRESS NOTE ADULT - SUBJECTIVE AND OBJECTIVE BOX
SICU Daily Progress Note  =====================================================  Interval/Overnight Events:     Interval events:  - OR with ortho 12/4, closed reductions with casting/splinting all extremities  - NWB all extremities  - Ancef 24hr after OR - off 12/5  - DC'd lido patch  - pigtail to suction, plan for waterseal tmrw  - Na 127 (from 137)  - started lovenox (peds dosing)  - to get IV  - IV locked  - psych consulted (per pt request), increased home lexapro 10mg  - lasix 10 iv x1    PM:  - repeat urine lytes  - sent TSH  - 1g NaCl TID  - 1L fluid restriction      Allergies: No Known Allergies      MEDICATIONS:   --------------------------------------------------------------------------------------  Neurologic Medications  acetaminophen     Tablet .. 650 milliGRAM(s) Oral every 6 hours PRN Mild Pain (1 - 3)  diazepam  Injectable 2.5 milliGRAM(s) IV Push every 6 hours PRN back spasm  escitalopram 10 milliGRAM(s) Oral daily  LORazepam     Tablet 0.25 milliGRAM(s) Oral every 6 hours PRN Anxiety  oxyCODONE    IR 5 milliGRAM(s) Oral every 4 hours PRN Severe Pain (7 - 10)  oxyCODONE    IR 2.5 milliGRAM(s) Oral every 4 hours PRN Moderate Pain (4 - 6)    Respiratory Medications    Cardiovascular Medications    Gastrointestinal Medications  polyethylene glycol 3350 17 Gram(s) Oral daily  senna 2 Tablet(s) Oral at bedtime  sodium chloride 1 Gram(s) Oral three times a day    Genitourinary Medications    Hematologic/Oncologic Medications  enoxaparin Injectable 30 milliGRAM(s) SubCutaneous every 24 hours    Antimicrobial/Immunologic Medications    Endocrine/Metabolic Medications    Topical/Other Medications  chlorhexidine 2% Cloths 1 Application(s) Topical daily    --------------------------------------------------------------------------------------    VITAL SIGNS, INS/OUTS (last 24 hours):  --------------------------------------------------------------------------------------  T(C): 36.8 (12-04-24 @ 23:00), Max: 37.9 (12-04-24 @ 07:00)  HR: 97 (12-05-24 @ 01:00) (80 - 107)  BP: 124/55 (12-05-24 @ 01:00) (106/53 - 165/68)  RR: 18 (12-05-24 @ 01:00) (14 - 26)  SpO2: 94% (12-05-24 @ 01:00) (94% - 99%)    12-03-24 @ 07:01  -  12-04-24 @ 07:00  --------------------------------------------------------  IN: 1470 mL / OUT: 370 mL / NET: 1100 mL    12-04-24 @ 07:01  -  12-05-24 @ 01:21  --------------------------------------------------------  IN: 370 mL / OUT: 71 mL / NET: 299 mL      --------------------------------------------------------------------------------------    EXAM  NEUROLOGY  Exam: Normal, NAD, alert, oriented x3, no focal deficits.    HEENT  Exam: Normocephalic, atraumatic, EOMI.     RESPIRATORY  Exam: Normal expansion/effort.    CARDIOVASCULAR  Exam: Regular rate and rhythm.       GI/NUTRITION  Exam: Abdomen soft, Non-tender, Non-distended.     VASCULAR  Exam: Extremities warm, pink, well-perfused.     MUSCULOSKELETAL  Exam: Multiple extremities casted and splinted. Moving digits appropriately.     SKIN  Exam: Good skin turgor, no skin breakdown.       LABS  --------------------------------------------------------------------------------------                        8.5    14.82 )-----------( 133      ( 04 Dec 2024 20:26 )             26.0   12-04    124[L]  |  91[L]  |  29[H]  ----------------------------<  119[H]  4.8   |  19[L]  |  0.52    Ca    8.6      04 Dec 2024 20:26  Phos  4.8     12-04  Mg     1.8     12-04    TPro  5.3[L]  /  Alb  3.2[L]  /  TBili  1.0  /  DBili  x   /  AST  58[H]  /  ALT  11  /  AlkPhos  65  12-04  ABG - ( 04 Dec 2024 06:57 )  pH, Arterial: 7.27  pH, Blood: x     /  pCO2: 43    /  pO2: 88    / HCO3: 20    / Base Excess: -6.9  /  SaO2: 98.7            Urinalysis Basic - ( 04 Dec 2024 20:26 )    Color: x / Appearance: x / SG: x / pH: x  Gluc: 119 mg/dL / Ketone: x  / Bili: x / Urobili: x   Blood: x / Protein: x / Nitrite: x   Leuk Esterase: x / RBC: x / WBC x   Sq Epi: x / Non Sq Epi: x / Bacteria: x    PT/INR - ( 04 Dec 2024 05:34 )   PT: 11.6 sec;   INR: 1.01 ratio         PTT - ( 04 Dec 2024 05:34 )  PTT:29.2 sec  --------------------------------------------------------------------------------------

## 2024-12-05 NOTE — DIETITIAN INITIAL EVALUATION ADULT - PERTINENT MEDS FT
MEDICATIONS  (STANDING):  chlorhexidine 2% Cloths 1 Application(s) Topical <User Schedule>  enoxaparin Injectable 30 milliGRAM(s) SubCutaneous every 24 hours  escitalopram 10 milliGRAM(s) Oral daily  metoprolol tartrate 12.5 milliGRAM(s) Oral every 12 hours  multivitamin 1 Tablet(s) Oral daily  polyethylene glycol 3350 17 Gram(s) Oral daily  senna 2 Tablet(s) Oral at bedtime  sodium chloride 1 Gram(s) Oral three times a day    MEDICATIONS  (PRN):  acetaminophen     Tablet .. 650 milliGRAM(s) Oral every 6 hours PRN Mild Pain (1 - 3)  diazepam  Injectable 2.5 milliGRAM(s) IV Push every 6 hours PRN back spasm  LORazepam     Tablet 0.25 milliGRAM(s) Oral every 6 hours PRN Anxiety  oxyCODONE    IR 5 milliGRAM(s) Oral every 4 hours PRN Severe Pain (7 - 10)  oxyCODONE    IR 2.5 milliGRAM(s) Oral every 4 hours PRN Moderate Pain (4 - 6)
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
## 2024-12-05 NOTE — DIETITIAN INITIAL EVALUATION ADULT - ORAL INTAKE PTA/DIET HISTORY
PTA per pt  -Intake: good PO intake at baseline   -Chewing/Swallowing: denies difficulty   -Allergies/Intolerances: NKFA

## 2024-12-05 NOTE — PROGRESS NOTE ADULT - NS ATTEND AMEND GEN_ALL_CORE FT
Patient care and plan discussed and reviewed with Advanced Care Provider. Plan as outlined above edited by me to reflect our discussion. Forty five critical care minutes spent on encounter, of which more than fifty percent of the encounter was spent on counseling and/or coordinating care by the attending physician.

## 2024-12-05 NOTE — PROGRESS NOTE ADULT - SUBJECTIVE AND OBJECTIVE BOX
SURGERY DAILY PROGRESS NOTE    Interval/Overnight Events:     Interval events:  - OR with ortho 12/4, closed reductions with casting/splinting all extremities  - NWB all extremities  - Ancef 24hr after OR - off 12/5  - DC'd lido patch  - pigtail to suction, plan for waterseal tmrw  - Na 127 (from 137)  - started lovenox (peds dosing)  - to get IV  - IV locked  - psych consulted (per pt request), increased home lexapro 10mg  - lasix 10 iv x1    PM:  - repeat urine lytes  - sent TSH  - 1g NaCl TID  - 1L fluid restriction      ------------------------------------------------------------------------------------------------------------  OBJECTIVE:  Vital Signs Last 24 Hrs  T(C): 36.8 (05 Dec 2024 07:00), Max: 36.9 (04 Dec 2024 11:00)  T(F): 98.2 (05 Dec 2024 07:00), Max: 98.4 (04 Dec 2024 11:00)  HR: 115 (05 Dec 2024 09:00) (87 - 115)  BP: 122/60 (05 Dec 2024 09:00) (107/52 - 154/62)  BP(mean): 87 (05 Dec 2024 09:00) (75 - 93)  RR: 22 (05 Dec 2024 09:00) (16 - 26)  SpO2: 96% (05 Dec 2024 09:00) (92% - 97%)    Parameters below as of 05 Dec 2024 07:00  Patient On (Oxygen Delivery Method): nasal cannula  O2 Flow (L/min): 1    I&O's Detail    04 Dec 2024 07:01  -  05 Dec 2024 07:00  --------------------------------------------------------  IN:    IV PiggyBack: 100 mL    Lactated Ringers: 30 mL    Lactated Ringers: 120 mL    Oral Fluid: 240 mL  Total IN: 490 mL    OUT:    Chest Tube (mL): 55 mL    Voided (mL): 160 mL  Total OUT: 215 mL    Total NET: 275 mL      05 Dec 2024 07:01  -  05 Dec 2024 09:13  --------------------------------------------------------  IN:    IV PiggyBack: 100 mL  Total IN: 100 mL    OUT:  Total OUT: 0 mL    Total NET: 100 mL          LABS:                        7.7    12.86 )-----------( 123      ( 05 Dec 2024 06:05 )             22.9     12-05    125[L]  |  92[L]  |  40[H]  ----------------------------<  116[H]  5.1   |  20[L]  |  0.61    Ca    8.5      05 Dec 2024 06:05  Phos  4.8     12-05  Mg     1.9     12-05    TPro  5.4[L]  /  Alb  3.2[L]  /  TBili  0.8  /  DBili  x   /  AST  55[H]  /  ALT  9[L]  /  AlkPhos  70  12-05    LIVER FUNCTIONS - ( 05 Dec 2024 06:05 )  Alb: 3.2 g/dL / Pro: 5.4 g/dL / ALK PHOS: 70 U/L / ALT: 9 U/L / AST: 55 U/L / GGT: x           PT/INR - ( 05 Dec 2024 03:10 )   PT: 12.7 sec;   INR: 1.12 ratio         PTT - ( 05 Dec 2024 03:10 )  PTT:32.4 sec  Urinalysis Basic - ( 05 Dec 2024 06:05 )    Color: x / Appearance: x / SG: x / pH: x  Gluc: 116 mg/dL / Ketone: x  / Bili: x / Urobili: x   Blood: x / Protein: x / Nitrite: x   Leuk Esterase: x / RBC: x / WBC x   Sq Epi: x / Non Sq Epi: x / Bacteria: x    PE:  General: NAD, resting comfortably in bed  MSK:   LUE in SAC - grossly moving all digits, brisk capillary refill present, compartments soft and compressible  RUE in Volar Splint - grossly moving all digits, brisk capillary refill present, compartments soft and compressible  BLLE  in Long Leg Sugar Tong Splints - grossly moving all digits, brisk capillary refill present, compartments soft and compressible    A/P:  54y f s/p SAC LUE, Volar Splint RUE, BLLE Sugar Tong Splints on 12/4/2024 in OR.  -PT/OT  -Activity status recommendations: NWB LUE, NWB BLLE, NWB RUE with elbow ROM allowed, patient can sit up in chair per ortho    -Pain Control  -Lovenox based on dosing per SICU  -SICU management appreciated  -Rest, ice, compress and elevate the extremity as needed  -Incentive Spirometry  -Medical management appreciated  -Dispo pending PT tera   SURGERY DAILY PROGRESS NOTE    Interval/Overnight Events:     Interval events:  - OR with ortho 12/4, closed reductions with casting/splinting all extremities  - NWB all extremities  - Ancef 24hr after OR - off 12/5  - DC'd lido patch  - pigtail to suction, plan for waterseal today  - Na 127 (from 137)  - started lovenox (peds dosing)  - to get IV  - IV locked  - psych consulted (per pt request), increased home lexapro 10mg  - lasix 10 iv x1    PM:  - repeat urine lytes  - sent TSH  - 1g NaCl TID  - 1L fluid restriction      ------------------------------------------------------------------------------------------------------------  OBJECTIVE:  Vital Signs Last 24 Hrs  T(C): 36.8 (05 Dec 2024 07:00), Max: 36.9 (04 Dec 2024 11:00)  T(F): 98.2 (05 Dec 2024 07:00), Max: 98.4 (04 Dec 2024 11:00)  HR: 115 (05 Dec 2024 09:00) (87 - 115)  BP: 122/60 (05 Dec 2024 09:00) (107/52 - 154/62)  BP(mean): 87 (05 Dec 2024 09:00) (75 - 93)  RR: 22 (05 Dec 2024 09:00) (16 - 26)  SpO2: 96% (05 Dec 2024 09:00) (92% - 97%)    Parameters below as of 05 Dec 2024 07:00  Patient On (Oxygen Delivery Method): nasal cannula  O2 Flow (L/min): 1    I&O's Detail    04 Dec 2024 07:01  -  05 Dec 2024 07:00  --------------------------------------------------------  IN:    IV PiggyBack: 100 mL    Lactated Ringers: 30 mL    Lactated Ringers: 120 mL    Oral Fluid: 240 mL  Total IN: 490 mL    OUT:    Chest Tube (mL): 55 mL    Voided (mL): 160 mL  Total OUT: 215 mL    Total NET: 275 mL      05 Dec 2024 07:01  -  05 Dec 2024 09:13  --------------------------------------------------------  IN:    IV PiggyBack: 100 mL  Total IN: 100 mL    OUT:  Total OUT: 0 mL    Total NET: 100 mL          LABS:                        7.7    12.86 )-----------( 123      ( 05 Dec 2024 06:05 )             22.9     12-05    125[L]  |  92[L]  |  40[H]  ----------------------------<  116[H]  5.1   |  20[L]  |  0.61    Ca    8.5      05 Dec 2024 06:05  Phos  4.8     12-05  Mg     1.9     12-05    TPro  5.4[L]  /  Alb  3.2[L]  /  TBili  0.8  /  DBili  x   /  AST  55[H]  /  ALT  9[L]  /  AlkPhos  70  12-05    LIVER FUNCTIONS - ( 05 Dec 2024 06:05 )  Alb: 3.2 g/dL / Pro: 5.4 g/dL / ALK PHOS: 70 U/L / ALT: 9 U/L / AST: 55 U/L / GGT: x           PT/INR - ( 05 Dec 2024 03:10 )   PT: 12.7 sec;   INR: 1.12 ratio         PTT - ( 05 Dec 2024 03:10 )  PTT:32.4 sec  Urinalysis Basic - ( 05 Dec 2024 06:05 )    Color: x / Appearance: x / SG: x / pH: x  Gluc: 116 mg/dL / Ketone: x  / Bili: x / Urobili: x   Blood: x / Protein: x / Nitrite: x   Leuk Esterase: x / RBC: x / WBC x   Sq Epi: x / Non Sq Epi: x / Bacteria: x    PE:  General: NAD, resting comfortably in bed  MSK:   LUE in SAC - grossly moving all digits, brisk capillary refill present, compartments soft and compressible  RUE in Volar Splint - grossly moving all digits, brisk capillary refill present, compartments soft and compressible  BLLE  in Long Leg Sugar Tong Splints - grossly moving all digits, brisk capillary refill present, compartments soft and compressible    A/P:  54y f s/p SAC LUE, Volar Splint RUE, BLLE Sugar Tong Splints on 12/4/2024 in OR.  -PT/OT  -Activity status recommendations: NWB LUE, NWB BLLE, NWB RUE with elbow ROM allowed, patient can sit up in chair per ortho    -Pain Control  -Lovenox based on dosing per SICU  -SICU management appreciated  -Rest, ice, compress and elevate the extremity as needed  -Incentive Spirometry  -Medical management appreciated  -Dispo pending PT tera
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
## 2024-12-05 NOTE — OCCUPATIONAL THERAPY INITIAL EVALUATION ADULT - NSOTDISCHREC_GEN_A_CORE
TBD pending hospital course TBD pending hospital course. If home pt will need 24/7 assist with all ADLs/ transfers OOB  with mechanical lift+ hospital bed. Pt owns a w/c

## 2024-12-05 NOTE — OCCUPATIONAL THERAPY INITIAL EVALUATION ADULT - ADDITIONAL COMMENTS
PTA Pt reports living with family in a [place]. Pt used a w/c for mobility and required assist from  with ADLs as needed.

## 2024-12-05 NOTE — DIETITIAN INITIAL EVALUATION ADULT - NSFNSGIIOFT_GEN_A_CORE
12-04-24 @ 07:01  -  12-05-24 @ 07:00  --------------------------------------------------------  OUT:    Chest Tube (mL): 55 mL  Total OUT: 55 mL    Total NET: -55 mL
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
## 2024-12-05 NOTE — DIETITIAN INITIAL EVALUATION ADULT - PERTINENT LABORATORY DATA
12-05    125[L]  |  92[L]  |  40[H]  ----------------------------<  116[H]  5.1   |  20[L]  |  0.61    Ca    8.5      05 Dec 2024 06:05  Phos  4.8     12-05  Mg     1.9     12-05    TPro  5.4[L]  /  Alb  3.2[L]  /  TBili  0.8  /  DBili  x   /  AST  55[H]  /  ALT  9[L]  /  AlkPhos  70  12-05

## 2024-12-05 NOTE — DIETITIAN INITIAL EVALUATION ADULT - NS FNS DIET ORDER
Diet, Regular:   1000mL Fluid Restriction ([id number])  Supplement Feeding Modality:  Oral  Ensure Plus High Protein Cans or Servings Per Day:  1       Frequency:  Daily (12-05-24 @ 10:11) [Active]

## 2024-12-05 NOTE — DIETITIAN INITIAL EVALUATION ADULT - ADD RECOMMEND
1) Regular diet   2) Ensure Plus High Protein x1/day   3) multivitamin, vitamin C   4) Monitor PO intake, diet tolerance, weight trends, labs, GI function, and skin integrity    Lin Rosario MS, RDN, CDN ([employer])

## 2024-12-05 NOTE — PROGRESS NOTE ADULT - SUBJECTIVE AND OBJECTIVE BOX
Patient seen and examined at bedside.  No acute complaints at this time. Pain well controlled. Denies chest pain, shortness of breath, nausea or vomiting.     Vital Signs (24 Hrs):  T(C): 36.8 (12-04-24 @ 23:00), Max: 37.9 (12-04-24 @ 07:00)  HR: 97 (12-05-24 @ 01:00) (80 - 107)  BP: 124/55 (12-05-24 @ 01:00) (106/53 - 165/68)  RR: 18 (12-05-24 @ 01:00) (14 - 26)  SpO2: 94% (12-05-24 @ 01:00) (94% - 99%)  Wt(kg): --    LABS:                          8.5    14.82 )-----------( 133      ( 04 Dec 2024 20:26 )             26.0     12-04    124[L]  |  91[L]  |  29[H]  ----------------------------<  119[H]  4.8   |  19[L]  |  0.52    Ca    8.6      04 Dec 2024 20:26  Phos  4.8     12-04  Mg     1.8     12-04    TPro  5.3[L]  /  Alb  3.2[L]  /  TBili  1.0  /  DBili  x   /  AST  58[H]  /  ALT  11  /  AlkPhos  65  12-04    LIVER FUNCTIONS - ( 04 Dec 2024 20:26 )  Alb: 3.2 g/dL / Pro: 5.3 g/dL / ALK PHOS: 65 U/L / ALT: 11 U/L / AST: 58 U/L / GGT: x           PT/INR - ( 04 Dec 2024 05:34 )   PT: 11.6 sec;   INR: 1.01 ratio         PTT - ( 04 Dec 2024 05:34 )  PTT:29.2 sec      PE:  General: NAD, resting comfortably in bed  MSK:   LUE in SAC - grossly moving all digits, brisk capillary refill present, compartments soft and compressible  RUE in Volar Splint - grossly moving all digits, brisk capillary refill present, compartments soft and compressible  BLLE  in Long Leg Sugar Tong Splints - grossly moving all digits, brisk capillary refill present, compartments soft and compressible      A/P:  54y f s/p SAC LUE, Volar Splint RULIZBETH HOWELL Sugar Tong Splints on 12/4/2024 in OR.  -PT/OT  -NWB WILNER, NWB LIZBETH, NWB LULYNDA with elbow ROM allowed   -Pain Control  -DVT ppx: discussion had with outpatient cardiologist Dr. Hinojosa with Dr. Luna medicine attending, no contraindication to AC, OK for Lovenox based on dosing per SICU  -SICU management appreciated  -Rest, ice, compress and elevate the extremity as needed  -Incentive Spirometry  -Medical management appreciated  -Dispo pending PT eval  -Discussed above with Dr. Newsome, who agrees with plan
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
## 2024-12-05 NOTE — OCCUPATIONAL THERAPY INITIAL EVALUATION ADULT - LEVEL OF INDEPENDENCE: BED TO CHAIR, REHAB EVAL
pt is confused, alert to self only/unable to assess dependent (less than 25% patients effort)/unable to perform
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
## 2024-12-06 LAB
ALBUMIN SERPL ELPH-MCNC: 3.1 G/DL — LOW (ref 3.3–5)
ALBUMIN SERPL ELPH-MCNC: 3.1 G/DL — LOW (ref 3.3–5)
ALP SERPL-CCNC: 75 U/L — SIGNIFICANT CHANGE UP (ref 40–120)
ALP SERPL-CCNC: 79 U/L — SIGNIFICANT CHANGE UP (ref 40–120)
ALT FLD-CCNC: 6 U/L — LOW (ref 10–45)
ALT FLD-CCNC: 9 U/L — LOW (ref 10–45)
ANION GAP SERPL CALC-SCNC: 14 MMOL/L — SIGNIFICANT CHANGE UP (ref 5–17)
ANION GAP SERPL CALC-SCNC: 16 MMOL/L — SIGNIFICANT CHANGE UP (ref 5–17)
AST SERPL-CCNC: 47 U/L — HIGH (ref 10–40)
AST SERPL-CCNC: 55 U/L — HIGH (ref 10–40)
BILIRUB SERPL-MCNC: 1.1 MG/DL — SIGNIFICANT CHANGE UP (ref 0.2–1.2)
BILIRUB SERPL-MCNC: 1.8 MG/DL — HIGH (ref 0.2–1.2)
BLD GP AB SCN SERPL QL: NEGATIVE — SIGNIFICANT CHANGE UP
BUN SERPL-MCNC: 21 MG/DL — SIGNIFICANT CHANGE UP (ref 7–23)
BUN SERPL-MCNC: 29 MG/DL — HIGH (ref 7–23)
CALCIUM SERPL-MCNC: 8.5 MG/DL — SIGNIFICANT CHANGE UP (ref 8.4–10.5)
CALCIUM SERPL-MCNC: 9 MG/DL — SIGNIFICANT CHANGE UP (ref 8.4–10.5)
CHLORIDE SERPL-SCNC: 92 MMOL/L — LOW (ref 96–108)
CHLORIDE SERPL-SCNC: 93 MMOL/L — LOW (ref 96–108)
CO2 SERPL-SCNC: 19 MMOL/L — LOW (ref 22–31)
CO2 SERPL-SCNC: 24 MMOL/L — SIGNIFICANT CHANGE UP (ref 22–31)
CREAT SERPL-MCNC: <0.3 MG/DL — LOW (ref 0.5–1.3)
CREAT SERPL-MCNC: <0.3 MG/DL — LOW (ref 0.5–1.3)
EGFR: 124 ML/MIN/1.73M2 — SIGNIFICANT CHANGE UP
EGFR: 124 ML/MIN/1.73M2 — SIGNIFICANT CHANGE UP
GAS PNL BLDV: SIGNIFICANT CHANGE UP
GLUCOSE SERPL-MCNC: 104 MG/DL — HIGH (ref 70–99)
GLUCOSE SERPL-MCNC: 118 MG/DL — HIGH (ref 70–99)
HCT VFR BLD CALC: 27.1 % — LOW (ref 34.5–45)
HCT VFR BLD CALC: 28.6 % — LOW (ref 34.5–45)
HGB BLD-MCNC: 9.2 G/DL — LOW (ref 11.5–15.5)
HGB BLD-MCNC: 9.9 G/DL — LOW (ref 11.5–15.5)
MAGNESIUM SERPL-MCNC: 1.9 MG/DL — SIGNIFICANT CHANGE UP (ref 1.6–2.6)
MAGNESIUM SERPL-MCNC: 2.2 MG/DL — SIGNIFICANT CHANGE UP (ref 1.6–2.6)
MCHC RBC-ENTMCNC: 28.6 PG — SIGNIFICANT CHANGE UP (ref 27–34)
MCHC RBC-ENTMCNC: 29.5 PG — SIGNIFICANT CHANGE UP (ref 27–34)
MCHC RBC-ENTMCNC: 33.9 G/DL — SIGNIFICANT CHANGE UP (ref 32–36)
MCHC RBC-ENTMCNC: 34.6 G/DL — SIGNIFICANT CHANGE UP (ref 32–36)
MCV RBC AUTO: 84.2 FL — SIGNIFICANT CHANGE UP (ref 80–100)
MCV RBC AUTO: 85.1 FL — SIGNIFICANT CHANGE UP (ref 80–100)
NRBC # BLD: 0 /100 WBCS — SIGNIFICANT CHANGE UP (ref 0–0)
NRBC # BLD: 0 /100 WBCS — SIGNIFICANT CHANGE UP (ref 0–0)
PHOSPHATE SERPL-MCNC: 1.6 MG/DL — LOW (ref 2.5–4.5)
PHOSPHATE SERPL-MCNC: 2.2 MG/DL — LOW (ref 2.5–4.5)
PLATELET # BLD AUTO: 76 K/UL — LOW (ref 150–400)
PLATELET # BLD AUTO: 98 K/UL — LOW (ref 150–400)
POTASSIUM SERPL-MCNC: 3.6 MMOL/L — SIGNIFICANT CHANGE UP (ref 3.5–5.3)
POTASSIUM SERPL-MCNC: 4.5 MMOL/L — SIGNIFICANT CHANGE UP (ref 3.5–5.3)
POTASSIUM SERPL-SCNC: 3.6 MMOL/L — SIGNIFICANT CHANGE UP (ref 3.5–5.3)
POTASSIUM SERPL-SCNC: 4.5 MMOL/L — SIGNIFICANT CHANGE UP (ref 3.5–5.3)
PROT SERPL-MCNC: 5.4 G/DL — LOW (ref 6–8.3)
PROT SERPL-MCNC: 5.5 G/DL — LOW (ref 6–8.3)
RBC # BLD: 3.22 M/UL — LOW (ref 3.8–5.2)
RBC # BLD: 3.36 M/UL — LOW (ref 3.8–5.2)
RBC # FLD: 13.5 % — SIGNIFICANT CHANGE UP (ref 10.3–14.5)
RBC # FLD: 13.6 % — SIGNIFICANT CHANGE UP (ref 10.3–14.5)
RH IG SCN BLD-IMP: POSITIVE — SIGNIFICANT CHANGE UP
SODIUM SERPL-SCNC: 128 MMOL/L — LOW (ref 135–145)
SODIUM SERPL-SCNC: 130 MMOL/L — LOW (ref 135–145)
WBC # BLD: 10.32 K/UL — SIGNIFICANT CHANGE UP (ref 3.8–10.5)
WBC # BLD: 13.8 K/UL — HIGH (ref 3.8–10.5)
WBC # FLD AUTO: 10.32 K/UL — SIGNIFICANT CHANGE UP (ref 3.8–10.5)
WBC # FLD AUTO: 13.8 K/UL — HIGH (ref 3.8–10.5)

## 2024-12-06 PROCEDURE — 71045 X-RAY EXAM CHEST 1 VIEW: CPT | Mod: 26,77

## 2024-12-06 PROCEDURE — 71045 X-RAY EXAM CHEST 1 VIEW: CPT | Mod: 26

## 2024-12-06 PROCEDURE — 99233 SBSQ HOSP IP/OBS HIGH 50: CPT

## 2024-12-06 RX ORDER — GABAPENTIN 300 MG/1
100 CAPSULE ORAL EVERY 8 HOURS
Refills: 0 | Status: DISCONTINUED | OUTPATIENT
Start: 2024-12-06 | End: 2024-12-09

## 2024-12-06 RX ORDER — ALPRAZOLAM 0.5 MG
0.25 TABLET ORAL THREE TIMES A DAY
Refills: 0 | Status: DISCONTINUED | OUTPATIENT
Start: 2024-12-06 | End: 2024-12-12

## 2024-12-06 RX ORDER — METOPROLOL TARTRATE 100 MG/1
5 TABLET, FILM COATED ORAL ONCE
Refills: 0 | Status: COMPLETED | OUTPATIENT
Start: 2024-12-06 | End: 2024-12-06

## 2024-12-06 RX ORDER — SODIUM,POTASSIUM PHOSPHATES 278-250MG
2 POWDER IN PACKET (EA) ORAL EVERY 4 HOURS
Refills: 0 | Status: DISCONTINUED | OUTPATIENT
Start: 2024-12-06 | End: 2024-12-07

## 2024-12-06 RX ORDER — ALPRAZOLAM 0.5 MG
0.25 TABLET ORAL
Refills: 0 | Status: DISCONTINUED | OUTPATIENT
Start: 2024-12-06 | End: 2024-12-06

## 2024-12-06 RX ORDER — FUROSEMIDE 40 MG/1
20 TABLET ORAL ONCE
Refills: 0 | Status: COMPLETED | OUTPATIENT
Start: 2024-12-06 | End: 2024-12-06

## 2024-12-06 RX ORDER — HYDROMORPHONE HYDROCHLORIDE 2 MG/1
0.25 TABLET ORAL ONCE
Refills: 0 | Status: DISCONTINUED | OUTPATIENT
Start: 2024-12-06 | End: 2024-12-06

## 2024-12-06 RX ORDER — HEPARIN SODIUM 5000 [USP'U]/.5ML
30 INJECTION, SOLUTION INTRAVENOUS; SUBCUTANEOUS ONCE
Refills: 0 | Status: COMPLETED | OUTPATIENT
Start: 2024-12-06 | End: 2024-12-06

## 2024-12-06 RX ORDER — HALOPERIDOL 2 MG
2.5 TABLET ORAL ONCE
Refills: 0 | Status: COMPLETED | OUTPATIENT
Start: 2024-12-06 | End: 2024-12-06

## 2024-12-06 RX ORDER — METOCLOPRAMIDE HYDROCHLORIDE 10 MG/1
5 TABLET ORAL ONCE
Refills: 0 | Status: COMPLETED | OUTPATIENT
Start: 2024-12-06 | End: 2024-12-06

## 2024-12-06 RX ORDER — ESCITALOPRAM OXALATE 10 MG/1
5 TABLET, FILM COATED ORAL DAILY
Refills: 0 | Status: DISCONTINUED | OUTPATIENT
Start: 2024-12-06 | End: 2024-12-15

## 2024-12-06 RX ADMIN — OXYCODONE HYDROCHLORIDE 5 MILLIGRAM(S): 30 TABLET ORAL at 10:29

## 2024-12-06 RX ADMIN — OXYCODONE HYDROCHLORIDE 5 MILLIGRAM(S): 30 TABLET ORAL at 11:30

## 2024-12-06 RX ADMIN — SODIUM CHLORIDE 1 GRAM(S): 9 INJECTION, SOLUTION INTRAMUSCULAR; INTRAVENOUS; SUBCUTANEOUS at 14:12

## 2024-12-06 RX ADMIN — METOPROLOL TARTRATE 12.5 MILLIGRAM(S): 100 TABLET, FILM COATED ORAL at 05:01

## 2024-12-06 RX ADMIN — Medication 0.25 MILLIGRAM(S): at 23:31

## 2024-12-06 RX ADMIN — METOCLOPRAMIDE HYDROCHLORIDE 5 MILLIGRAM(S): 10 TABLET ORAL at 22:40

## 2024-12-06 RX ADMIN — HEPARIN SODIUM 255 MILLIMOLE(S): 5000 INJECTION, SOLUTION INTRAVENOUS; SUBCUTANEOUS at 08:18

## 2024-12-06 RX ADMIN — Medication 25 GRAM(S): at 23:32

## 2024-12-06 RX ADMIN — FUROSEMIDE 20 MILLIGRAM(S): 40 TABLET ORAL at 15:31

## 2024-12-06 RX ADMIN — ESCITALOPRAM OXALATE 10 MILLIGRAM(S): 10 TABLET, FILM COATED ORAL at 12:20

## 2024-12-06 RX ADMIN — Medication 1 TABLET(S): at 12:20

## 2024-12-06 RX ADMIN — CHLORHEXIDINE GLUCONATE 1 APPLICATION(S): 1.2 RINSE ORAL at 05:01

## 2024-12-06 RX ADMIN — HYDROMORPHONE HYDROCHLORIDE 0.25 MILLIGRAM(S): 2 TABLET ORAL at 23:45

## 2024-12-06 RX ADMIN — DIAZEPAM 2.5 MILLIGRAM(S): 10 TABLET ORAL at 09:35

## 2024-12-06 RX ADMIN — SODIUM CHLORIDE 1 GRAM(S): 9 INJECTION, SOLUTION INTRAMUSCULAR; INTRAVENOUS; SUBCUTANEOUS at 05:01

## 2024-12-06 RX ADMIN — ENOXAPARIN SODIUM 30 MILLIGRAM(S): 30 INJECTION SUBCUTANEOUS at 14:12

## 2024-12-06 RX ADMIN — OXYCODONE HYDROCHLORIDE 5 MILLIGRAM(S): 30 TABLET ORAL at 20:45

## 2024-12-06 RX ADMIN — OXYCODONE HYDROCHLORIDE 5 MILLIGRAM(S): 30 TABLET ORAL at 20:15

## 2024-12-06 RX ADMIN — SODIUM CHLORIDE 1 GRAM(S): 9 INJECTION, SOLUTION INTRAMUSCULAR; INTRAVENOUS; SUBCUTANEOUS at 22:20

## 2024-12-06 RX ADMIN — METOPROLOL TARTRATE 5 MILLIGRAM(S): 100 TABLET, FILM COATED ORAL at 18:41

## 2024-12-06 RX ADMIN — HYDROMORPHONE HYDROCHLORIDE 0.25 MILLIGRAM(S): 2 TABLET ORAL at 12:56

## 2024-12-06 RX ADMIN — POLYETHYLENE GLYCOL 3350 17 GRAM(S): 17 POWDER, FOR SOLUTION ORAL at 12:20

## 2024-12-06 RX ADMIN — Medication 0.25 MILLIGRAM(S): at 02:40

## 2024-12-06 RX ADMIN — METOPROLOL TARTRATE 12.5 MILLIGRAM(S): 100 TABLET, FILM COATED ORAL at 17:37

## 2024-12-06 RX ADMIN — AMLODIPINE BESYLATE 2.5 MILLIGRAM(S): 10 TABLET ORAL at 03:17

## 2024-12-06 RX ADMIN — Medication 2.5 MILLIGRAM(S): at 04:22

## 2024-12-06 NOTE — PROGRESS NOTE ADULT - SUBJECTIVE AND OBJECTIVE BOX
Surgery Progress Note     Interval/Subjective:  - pigtail to waterseal repeat CXR stable  - Na 125, started salt tabs, 1L fluid restriction, con't monitoring  - started metoprolol 12.5mg PO bid  __________________________________________________________________  Vitals:  T(C): 36.7 (07:00), Max: 36.9 (19:00)  HR: 107 (10:00) (91 - 116)  BP: 161/69 (10:00) (127/72 - 199/82)  RR: 23 (10:00) (14 - 37)  SpO2: 97% (10:00) (94% - 99%)    Physical Exam:  General: NAD, resting comfortably in bed, chest tube to water seal  MSK:   LUE in SAC - grossly moving all digits, brisk capillary refill present, compartments soft and compressible  RUE in Volar Splint - grossly moving all digits, brisk capillary refill present, compartments soft and compressible  BLLE  in Long Leg Sugar Tong Splints - grossly moving all digits, brisk capillary refill present, compartments soft and compressible    __________________________________________________________________    A:  54y f s/p SAC LUE, Volar Splint RUE, BLLE Sugar Tong Splints on 12/4/2024 in OR.  -PT/OT  -Activity status recommendations: NWB LUE, NWB BLLE, NWB RUE with elbow ROM allowed, patient can sit up in chair per ortho    -Pain Control  -Lovenox based on dosing per SICU  -SICU management appreciated  -Rest, ice, compress and elevate the extremity as needed  -Incentive Spirometry  -Medical co-management appreciated  -Dispo pending PT eval    ACS/Trauma  [id number]

## 2024-12-06 NOTE — PROGRESS NOTE ADULT - SUBJECTIVE AND OBJECTIVE BOX
SICU Daily Progress Note  =====================================================  Interval/Overnight Events:     - pigtail to waterseal repeat CXR stable  - Na 125, started salt tabs, 1L fluid restriction, con't monitoring  - started metoprolol 12.5mg PO bid    Allergies: No Known Allergies      MEDICATIONS:   --------------------------------------------------------------------------------------  Neurologic Medications  acetaminophen     Tablet .. 650 milliGRAM(s) Oral every 6 hours PRN Mild Pain (1 - 3)  diazepam  Injectable 2.5 milliGRAM(s) IV Push every 6 hours PRN back spasm  escitalopram 10 milliGRAM(s) Oral daily  LORazepam     Tablet 0.25 milliGRAM(s) Oral every 6 hours PRN Anxiety  oxyCODONE    IR 5 milliGRAM(s) Oral every 4 hours PRN Severe Pain (7 - 10)  oxyCODONE    IR 2.5 milliGRAM(s) Oral every 4 hours PRN Moderate Pain (4 - 6)    Respiratory Medications    Cardiovascular Medications  amLODIPine   Tablet 2.5 milliGRAM(s) Oral every 24 hours  metoprolol tartrate 12.5 milliGRAM(s) Oral every 12 hours    Gastrointestinal Medications  multivitamin 1 Tablet(s) Oral daily  polyethylene glycol 3350 17 Gram(s) Oral daily  senna 2 Tablet(s) Oral at bedtime  sodium chloride 1 Gram(s) Oral three times a day    Genitourinary Medications    Hematologic/Oncologic Medications  enoxaparin Injectable 30 milliGRAM(s) SubCutaneous every 24 hours    Antimicrobial/Immunologic Medications    Endocrine/Metabolic Medications    Topical/Other Medications  chlorhexidine 2% Cloths 1 Application(s) Topical <User Schedule>    --------------------------------------------------------------------------------------    VITAL SIGNS, INS/OUTS (last 24 hours):  --------------------------------------------------------------------------------------  T(C): 36.8 (12-05-24 @ 22:00), Max: 36.9 (12-05-24 @ 11:00)  HR: 108 (12-06-24 @ 00:00) (92 - 115)  BP: 147/71 (12-06-24 @ 00:00) (120/55 - 199/82)  RR: 20 (12-06-24 @ 00:00) (14 - 24)  SpO2: 96% (12-06-24 @ 00:00) (92% - 99%)    12-04-24 @ 07:01  -  12-05-24 @ 07:00  --------------------------------------------------------  IN: 490 mL / OUT: 215 mL / NET: 275 mL    12-05-24 @ 07:01  -  12-06-24 @ 00:10  --------------------------------------------------------  IN: 1140 mL / OUT: 300 mL / NET: 840 mL      --------------------------------------------------------------------------------------    EXAM  NEUROLOGY  Exam: Normal, NAD, alert, oriented x3, no focal deficits.    HEENT  Exam: Normocephalic, atraumatic, EOMI.     RESPIRATORY  Exam: Normal expansion/effort.    CARDIOVASCULAR  Exam: Regular rate and rhythm.       GI/NUTRITION  Exam: Abdomen soft, Non-tender, Non-distended.     VASCULAR  Exam: Extremities warm, pink, well-perfused.     MUSCULOSKELETAL  Exam: All extremities moving spontaneously without limitations. Every extremity is splinted. NVI distally.     SKIN  Exam: Good skin turgor, no skin breakdown.       LABS  --------------------------------------------------------------------------------------                        10.0   9.95  )-----------( 82       ( 05 Dec 2024 22:52 )             29.1   12-05    124[L]  |  90[L]  |  41[H]  ----------------------------<  107[H]  5.1   |  21[L]  |  0.40[L]    Ca    8.8      05 Dec 2024 17:41  Phos  3.3     12-05  Mg     2.7     12-05    TPro  5.4[L]  /  Alb  3.3  /  TBili  0.8  /  DBili  x   /  AST  56[H]  /  ALT  7[L]  /  AlkPhos  76  12-05  ABG - ( 04 Dec 2024 06:57 )  pH, Arterial: 7.27  pH, Blood: x     /  pCO2: 43    /  pO2: 88    / HCO3: 20    / Base Excess: -6.9  /  SaO2: 98.7            Urinalysis Basic - ( 05 Dec 2024 17:41 )    Color: x / Appearance: x / SG: x / pH: x  Gluc: 107 mg/dL / Ketone: x  / Bili: x / Urobili: x   Blood: x / Protein: x / Nitrite: x   Leuk Esterase: x / RBC: x / WBC x   Sq Epi: x / Non Sq Epi: x / Bacteria: x    PT/INR - ( 05 Dec 2024 03:10 )   PT: 12.7 sec;   INR: 1.12 ratio         PTT - ( 05 Dec 2024 03:10 )  PTT:32.4 sec  --------------------------------------------------------------------------------------
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
## 2024-12-06 NOTE — PROGRESS NOTE ADULT - NS ATTEND AMEND GEN_ALL_CORE FT
Patient care and plan discussed and reviewed with Advanced Care Provider. Plan as outlined above edited by me to reflect our discussion. Fofty five Critical care minutes spent on encounter, of which more than fifty percent of the encounter was spent on counseling and/or coordinating care by the attending physician.

## 2024-12-06 NOTE — PROGRESS NOTE ADULT - SUBJECTIVE AND OBJECTIVE BOX
ORTHOPAEDIC PROGRESS NOTE    SUBJECTIVE:  Pt seen and examined at bedside this am.  Doing well.  No acute events overnight.  Pt states pain is well controlled. Denies numbness or tingling.     OBJECTIVE:  Vital Signs Last 24 Hrs  T(C): 36.6 (06 Dec 2024 05:00), Max: 36.9 (05 Dec 2024 11:00)  T(F): 97.9 (06 Dec 2024 05:00), Max: 98.4 (05 Dec 2024 11:00)  HR: 111 (06 Dec 2024 05:00) (92 - 115)  BP: 175/74 (06 Dec 2024 05:00) (122/60 - 199/82)  BP(mean): 106 (06 Dec 2024 05:00) (77 - 118)  RR: 23 (06 Dec 2024 05:00) (14 - 23)  SpO2: 95% (06 Dec 2024 05:00) (94% - 99%)    Parameters below as of 06 Dec 2024 05:00  Patient On (Oxygen Delivery Method): nasal cannula  O2 Flow (L/min): 1      Physical Exam:  General: NAD, resting comfortably in bed  MSK:   LUE in SAC - grossly moving all digits, brisk capillary refill present, compartments soft and compressible  RUE in Volar Splint - grossly moving all digits, brisk capillary refill present, compartments soft and compressible  BLLE  in Long Leg Sugar Tong Splints - grossly moving all digits, brisk capillary refill present, compartments soft and compressible    LABS                        9.9    10.32 )-----------( 76       ( 06 Dec 2024 05:24 )             28.6       12-06    128[L]  |  93[L]  |  29[H]  ----------------------------<  104[H]  4.5   |  19[L]  |  <0.30[L]    Ca    9.0      06 Dec 2024 05:24  Phos  1.6     12-06  Mg     2.2     12-06    TPro  5.5[L]  /  Alb  3.1[L]  /  TBili  1.8[H]  /  DBili  x   /  AST  55[H]  /  ALT  6[L]  /  AlkPhos  75  12-06      PT/INR - ( 05 Dec 2024 03:10 )   PT: 12.7 sec;   INR: 1.12 ratio         PTT - ( 05 Dec 2024 03:10 )  PTT:32.4 sec    I&O's Summary    04 Dec 2024 07:01  -  05 Dec 2024 07:00  --------------------------------------------------------  IN: 490 mL / OUT: 215 mL / NET: 275 mL    05 Dec 2024 07:01  -  06 Dec 2024 06:40  --------------------------------------------------------  IN: 1260 mL / OUT: 300 mL / NET: 960 mL        acetaminophen     Tablet .. 650 milliGRAM(s) Oral every 6 hours PRN  ALPRAZolam 0.25 milliGRAM(s) Oral three times a day PRN  amLODIPine   Tablet 2.5 milliGRAM(s) Oral every 24 hours  chlorhexidine 2% Cloths 1 Application(s) Topical <User Schedule>  diazepam  Injectable 2.5 milliGRAM(s) IV Push every 6 hours PRN  enoxaparin Injectable 30 milliGRAM(s) SubCutaneous every 24 hours  escitalopram 10 milliGRAM(s) Oral daily  metoprolol tartrate 12.5 milliGRAM(s) Oral every 12 hours  multivitamin 1 Tablet(s) Oral daily  oxyCODONE    IR 5 milliGRAM(s) Oral every 4 hours PRN  oxyCODONE    IR 2.5 milliGRAM(s) Oral every 4 hours PRN  polyethylene glycol 3350 17 Gram(s) Oral daily  senna 2 Tablet(s) Oral at bedtime  sodium chloride 1 Gram(s) Oral three times a day  sodium phosphate 30 milliMole(s)/500 mL IVPB 30 milliMole(s) IV Intermittent once      Assessment  54y f s/p SAC LUE, Volar Splint LIZBETH WU Sugar Tong Splints on 12/4/2024 in OR.    P:  -PT/OT  -VALDEMAR KEBEDE NWB LUE with elbow ROM allowed   -Pain Control  -DVT ppx: discussion had with outpatient cardiologist Dr. Hionjosa with Dr. Luna medicine attending, no contraindication to AC, OK for Lovenox based on dosing per SICU  -SICU management appreciated  -Rest, ice, compress and elevate the extremity as needed  -Incentive Spirometry  -Medical management appreciated  -Dispo pending PT eval    Matthew Wheeler MD   [facility] Orthopaedic Surgery Resident PGY1    For any questions, please DO NOT reach out via Teams.  [facility] [phone]  [facility] [phone]  [facility] [phone]
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
## 2024-12-07 DIAGNOSIS — E87.1 HYPO-OSMOLALITY AND HYPONATREMIA: ICD-10-CM

## 2024-12-07 LAB
ALBUMIN SERPL ELPH-MCNC: 3.1 G/DL — LOW (ref 3.3–5)
ALP SERPL-CCNC: 75 U/L — SIGNIFICANT CHANGE UP (ref 40–120)
ALT FLD-CCNC: 8 U/L — LOW (ref 10–45)
ANION GAP SERPL CALC-SCNC: 14 MMOL/L — SIGNIFICANT CHANGE UP (ref 5–17)
AST SERPL-CCNC: 35 U/L — SIGNIFICANT CHANGE UP (ref 10–40)
BILIRUB SERPL-MCNC: 1.1 MG/DL — SIGNIFICANT CHANGE UP (ref 0.2–1.2)
BUN SERPL-MCNC: 18 MG/DL — SIGNIFICANT CHANGE UP (ref 7–23)
CALCIUM SERPL-MCNC: 8.7 MG/DL — SIGNIFICANT CHANGE UP (ref 8.4–10.5)
CHLORIDE SERPL-SCNC: 93 MMOL/L — LOW (ref 96–108)
CO2 SERPL-SCNC: 25 MMOL/L — SIGNIFICANT CHANGE UP (ref 22–31)
CREAT SERPL-MCNC: <0.3 MG/DL — LOW (ref 0.5–1.3)
EGFR: 124 ML/MIN/1.73M2 — SIGNIFICANT CHANGE UP
GLUCOSE SERPL-MCNC: 96 MG/DL — SIGNIFICANT CHANGE UP (ref 70–99)
HCT VFR BLD CALC: 26.5 % — LOW (ref 34.5–45)
HGB BLD-MCNC: 8.9 G/DL — LOW (ref 11.5–15.5)
MAGNESIUM SERPL-MCNC: 2.4 MG/DL — SIGNIFICANT CHANGE UP (ref 1.6–2.6)
MCHC RBC-ENTMCNC: 29.4 PG — SIGNIFICANT CHANGE UP (ref 27–34)
MCHC RBC-ENTMCNC: 33.6 G/DL — SIGNIFICANT CHANGE UP (ref 32–36)
MCV RBC AUTO: 87.5 FL — SIGNIFICANT CHANGE UP (ref 80–100)
NRBC # BLD: 0 /100 WBCS — SIGNIFICANT CHANGE UP (ref 0–0)
PHOSPHATE SERPL-MCNC: 1.8 MG/DL — LOW (ref 2.5–4.5)
PLATELET # BLD AUTO: 139 K/UL — LOW (ref 150–400)
POTASSIUM SERPL-MCNC: 4.4 MMOL/L — SIGNIFICANT CHANGE UP (ref 3.5–5.3)
POTASSIUM SERPL-SCNC: 4.4 MMOL/L — SIGNIFICANT CHANGE UP (ref 3.5–5.3)
PROT SERPL-MCNC: 5.4 G/DL — LOW (ref 6–8.3)
RBC # BLD: 3.03 M/UL — LOW (ref 3.8–5.2)
RBC # FLD: 13.8 % — SIGNIFICANT CHANGE UP (ref 10.3–14.5)
SODIUM SERPL-SCNC: 132 MMOL/L — LOW (ref 135–145)
WBC # BLD: 11.4 K/UL — HIGH (ref 3.8–10.5)
WBC # FLD AUTO: 11.4 K/UL — HIGH (ref 3.8–10.5)

## 2024-12-07 PROCEDURE — 99233 SBSQ HOSP IP/OBS HIGH 50: CPT

## 2024-12-07 PROCEDURE — 71045 X-RAY EXAM CHEST 1 VIEW: CPT | Mod: 26

## 2024-12-07 RX ORDER — METOPROLOL TARTRATE 100 MG/1
12.5 TABLET, FILM COATED ORAL ONCE
Refills: 0 | Status: COMPLETED | OUTPATIENT
Start: 2024-12-07 | End: 2024-12-07

## 2024-12-07 RX ORDER — FUROSEMIDE 40 MG/1
20 TABLET ORAL ONCE
Refills: 0 | Status: COMPLETED | OUTPATIENT
Start: 2024-12-07 | End: 2024-12-07

## 2024-12-07 RX ORDER — METOPROLOL TARTRATE 100 MG/1
25 TABLET, FILM COATED ORAL EVERY 8 HOURS
Refills: 0 | Status: DISCONTINUED | OUTPATIENT
Start: 2024-12-07 | End: 2024-12-15

## 2024-12-07 RX ORDER — SODIUM CHLORIDE 9 MG/ML
4 INJECTION, SOLUTION INTRAMUSCULAR; INTRAVENOUS; SUBCUTANEOUS EVERY 12 HOURS
Refills: 0 | Status: DISCONTINUED | OUTPATIENT
Start: 2024-12-07 | End: 2024-12-09

## 2024-12-07 RX ORDER — IPRATROPIUM BROMIDE AND ALBUTEROL SULFATE 2.5; .5 MG/3ML; MG/3ML
3 SOLUTION RESPIRATORY (INHALATION) ONCE
Refills: 0 | Status: COMPLETED | OUTPATIENT
Start: 2024-12-07 | End: 2024-12-07

## 2024-12-07 RX ORDER — LOSARTAN POTASSIUM 100 MG/1
50 TABLET, FILM COATED ORAL ONCE
Refills: 0 | Status: COMPLETED | OUTPATIENT
Start: 2024-12-07 | End: 2024-12-07

## 2024-12-07 RX ORDER — HEPARIN SODIUM 5000 [USP'U]/.5ML
15 INJECTION, SOLUTION INTRAVENOUS; SUBCUTANEOUS ONCE
Refills: 0 | Status: COMPLETED | OUTPATIENT
Start: 2024-12-07 | End: 2024-12-07

## 2024-12-07 RX ORDER — LOSARTAN POTASSIUM 100 MG/1
50 TABLET, FILM COATED ORAL DAILY
Refills: 0 | Status: DISCONTINUED | OUTPATIENT
Start: 2024-12-08 | End: 2024-12-15

## 2024-12-07 RX ORDER — LABETALOL 100 MG/1
5 TABLET, FILM COATED ORAL ONCE
Refills: 0 | Status: COMPLETED | OUTPATIENT
Start: 2024-12-07 | End: 2024-12-07

## 2024-12-07 RX ORDER — POTASSIUM PHOSPHATE, MONOBASIC POTASSIUM PHOSPHATE, DIBASIC INJECTION, 236; 224 MG/ML; MG/ML
15 SOLUTION, CONCENTRATE INTRAVENOUS ONCE
Refills: 0 | Status: COMPLETED | OUTPATIENT
Start: 2024-12-07 | End: 2024-12-07

## 2024-12-07 RX ADMIN — OXYCODONE HYDROCHLORIDE 5 MILLIGRAM(S): 30 TABLET ORAL at 01:34

## 2024-12-07 RX ADMIN — HYDROMORPHONE HYDROCHLORIDE 0.25 MILLIGRAM(S): 2 TABLET ORAL at 00:15

## 2024-12-07 RX ADMIN — ACETAMINOPHEN 500MG 650 MILLIGRAM(S): 500 TABLET, COATED ORAL at 05:30

## 2024-12-07 RX ADMIN — GABAPENTIN 100 MILLIGRAM(S): 300 CAPSULE ORAL at 13:27

## 2024-12-07 RX ADMIN — Medication 500 MICROGRAM(S): at 23:05

## 2024-12-07 RX ADMIN — SODIUM CHLORIDE 1 GRAM(S): 9 INJECTION, SOLUTION INTRAMUSCULAR; INTRAVENOUS; SUBCUTANEOUS at 13:26

## 2024-12-07 RX ADMIN — OXYCODONE HYDROCHLORIDE 2.5 MILLIGRAM(S): 30 TABLET ORAL at 19:34

## 2024-12-07 RX ADMIN — OXYCODONE HYDROCHLORIDE 2.5 MILLIGRAM(S): 30 TABLET ORAL at 20:04

## 2024-12-07 RX ADMIN — SODIUM CHLORIDE 1 GRAM(S): 9 INJECTION, SOLUTION INTRAMUSCULAR; INTRAVENOUS; SUBCUTANEOUS at 22:08

## 2024-12-07 RX ADMIN — FUROSEMIDE 20 MILLIGRAM(S): 40 TABLET ORAL at 07:44

## 2024-12-07 RX ADMIN — SODIUM CHLORIDE 1 GRAM(S): 9 INJECTION, SOLUTION INTRAMUSCULAR; INTRAVENOUS; SUBCUTANEOUS at 05:57

## 2024-12-07 RX ADMIN — LABETALOL 5 MILLIGRAM(S): 100 TABLET, FILM COATED ORAL at 03:25

## 2024-12-07 RX ADMIN — GABAPENTIN 100 MILLIGRAM(S): 300 CAPSULE ORAL at 22:08

## 2024-12-07 RX ADMIN — METOPROLOL TARTRATE 12.5 MILLIGRAM(S): 100 TABLET, FILM COATED ORAL at 05:56

## 2024-12-07 RX ADMIN — DIAZEPAM 2.5 MILLIGRAM(S): 10 TABLET ORAL at 22:49

## 2024-12-07 RX ADMIN — LOSARTAN POTASSIUM 50 MILLIGRAM(S): 100 TABLET, FILM COATED ORAL at 13:27

## 2024-12-07 RX ADMIN — GABAPENTIN 100 MILLIGRAM(S): 300 CAPSULE ORAL at 05:57

## 2024-12-07 RX ADMIN — CHLORHEXIDINE GLUCONATE 1 APPLICATION(S): 1.2 RINSE ORAL at 05:39

## 2024-12-07 RX ADMIN — HEPARIN SODIUM 63.75 MILLIMOLE(S): 5000 INJECTION, SOLUTION INTRAVENOUS; SUBCUTANEOUS at 23:09

## 2024-12-07 RX ADMIN — METOPROLOL TARTRATE 25 MILLIGRAM(S): 100 TABLET, FILM COATED ORAL at 22:07

## 2024-12-07 RX ADMIN — ENOXAPARIN SODIUM 30 MILLIGRAM(S): 30 INJECTION SUBCUTANEOUS at 13:28

## 2024-12-07 RX ADMIN — Medication 1 TABLET(S): at 11:29

## 2024-12-07 RX ADMIN — ESCITALOPRAM OXALATE 5 MILLIGRAM(S): 10 TABLET, FILM COATED ORAL at 11:29

## 2024-12-07 RX ADMIN — SODIUM CHLORIDE 4 MILLILITER(S): 9 INJECTION, SOLUTION INTRAMUSCULAR; INTRAVENOUS; SUBCUTANEOUS at 23:35

## 2024-12-07 RX ADMIN — ACETAMINOPHEN 500MG 650 MILLIGRAM(S): 500 TABLET, COATED ORAL at 06:00

## 2024-12-07 RX ADMIN — METOPROLOL TARTRATE 25 MILLIGRAM(S): 100 TABLET, FILM COATED ORAL at 13:27

## 2024-12-07 RX ADMIN — OXYCODONE HYDROCHLORIDE 5 MILLIGRAM(S): 30 TABLET ORAL at 02:04

## 2024-12-07 RX ADMIN — METOPROLOL TARTRATE 12.5 MILLIGRAM(S): 100 TABLET, FILM COATED ORAL at 07:44

## 2024-12-07 RX ADMIN — FUROSEMIDE 20 MILLIGRAM(S): 40 TABLET ORAL at 23:02

## 2024-12-07 RX ADMIN — POTASSIUM PHOSPHATE, MONOBASIC POTASSIUM PHOSPHATE, DIBASIC INJECTION, 62.5 MILLIMOLE(S): 236; 224 SOLUTION, CONCENTRATE INTRAVENOUS at 05:39

## 2024-12-07 NOTE — PROGRESS NOTE ADULT - SUBJECTIVE AND OBJECTIVE BOX
SICU Daily Progress Note  =====================================================  Interval/Overnight Events:     - tachy and mild resp distress, chest tube to stay on water seal  - repeat CXR stable  - lopressor 5mg x1  - lasix 20 mg x1    Allergies: No Known Allergies      MEDICATIONS:   --------------------------------------------------------------------------------------  Neurologic Medications  acetaminophen     Tablet .. 650 milliGRAM(s) Oral every 6 hours PRN Mild Pain (1 - 3)  ALPRAZolam 0.25 milliGRAM(s) Oral three times a day PRN anxiety  diazepam  Injectable 2.5 milliGRAM(s) IV Push every 6 hours PRN back spasm  escitalopram 5 milliGRAM(s) Oral daily  gabapentin 100 milliGRAM(s) Oral every 8 hours  oxyCODONE    IR 5 milliGRAM(s) Oral every 4 hours PRN Severe Pain (7 - 10)  oxyCODONE    IR 2.5 milliGRAM(s) Oral every 4 hours PRN Moderate Pain (4 - 6)    Respiratory Medications    Cardiovascular Medications  amLODIPine   Tablet 2.5 milliGRAM(s) Oral every 24 hours  metoprolol tartrate 12.5 milliGRAM(s) Oral every 12 hours    Gastrointestinal Medications  multivitamin 1 Tablet(s) Oral daily  polyethylene glycol 3350 17 Gram(s) Oral daily  potassium phosphate / sodium phosphate Tablet (K-PHOS No. 2) 2 Tablet(s) Oral every 4 hours  senna 2 Tablet(s) Oral at bedtime  sodium chloride 1 Gram(s) Oral three times a day    Genitourinary Medications    Hematologic/Oncologic Medications  enoxaparin Injectable 30 milliGRAM(s) SubCutaneous every 24 hours    Antimicrobial/Immunologic Medications    Endocrine/Metabolic Medications    Topical/Other Medications  chlorhexidine 2% Cloths 1 Application(s) Topical <User Schedule>    --------------------------------------------------------------------------------------    VITAL SIGNS, INS/OUTS (last 24 hours):  --------------------------------------------------------------------------------------  T(C): 36.8 (12-06-24 @ 22:00), Max: 36.8 (12-06-24 @ 19:00)  HR: 102 (12-07-24 @ 00:00) (91 - 143)  BP: 163/72 (12-07-24 @ 00:00) (150/65 - 178/79)  RR: 18 (12-07-24 @ 00:00) (14 - 37)  SpO2: 96% (12-07-24 @ 00:00) (95% - 99%)    12-05-24 @ 07:01  -  12-06-24 @ 07:00  --------------------------------------------------------  IN: 1260 mL / OUT: 525 mL / NET: 735 mL    12-06-24 @ 07:01  -  12-07-24 @ 02:19  --------------------------------------------------------  IN: 839.8 mL / OUT: 934 mL / NET: -94.2 mL      --------------------------------------------------------------------------------------    EXAM  NEUROLOGY  Exam: Normal, NAD, alert, oriented x3, no focal deficits.    HEENT  Exam: Normocephalic, atraumatic, EOMI.     RESPIRATORY  Exam: Normal expansion/effort.    CARDIOVASCULAR  Exam: Regular rate and rhythm.    GI/NUTRITION  Exam: Abdomen soft, Non-tender, Non-distended.     VASCULAR  Exam: Extremities warm, pink, well-perfused.     MUSCULOSKELETAL  Exam: All extremities casted, NVI distally.     SKIN  Exam: Good skin turgor, no skin breakdown.       LABS  --------------------------------------------------------------------------------------                        9.2    13.80 )-----------( 98       ( 06 Dec 2024 22:37 )             27.1   12-06    130[L]  |  92[L]  |  21  ----------------------------<  118[H]  3.6   |  24  |  <0.30[L]    Ca    8.5      06 Dec 2024 22:37  Phos  2.2     12-06  Mg     1.9     12-06    TPro  5.4[L]  /  Alb  3.1[L]  /  TBili  1.1  /  DBili  x   /  AST  47[H]  /  ALT  9[L]  /  AlkPhos  79  12-06  Urinalysis Basic - ( 06 Dec 2024 22:37 )    Color: x / Appearance: x / SG: x / pH: x  Gluc: 118 mg/dL / Ketone: x  / Bili: x / Urobili: x   Blood: x / Protein: x / Nitrite: x   Leuk Esterase: x / RBC: x / WBC x   Sq Epi: x / Non Sq Epi: x / Bacteria: x    PT/INR - ( 05 Dec 2024 03:10 )   PT: 12.7 sec;   INR: 1.12 ratio         PTT - ( 05 Dec 2024 03:10 )  PTT:32.4 sec  --------------------------------------------------------------------------------------
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
## 2024-12-07 NOTE — PROGRESS NOTE ADULT - SUBJECTIVE AND OBJECTIVE BOX
SURGERY DAILY PROGRESS NOTE        SUBJECTIVE: Patient seen and evaluated on AM rounds. Pt is resting comfortably in bed with no acute complaints.    ------------------------------------------------------------------------------------------------------------  OBJECTIVE:  Vital Signs Last 24 Hrs  T(C): 36.7 (07 Dec 2024 15:00), Max: 37.2 (07 Dec 2024 08:00)  T(F): 98.1 (07 Dec 2024 15:00), Max: 99 (07 Dec 2024 08:00)  HR: 83 (07 Dec 2024 15:00) (78 - 143)  BP: 143/65 (07 Dec 2024 15:00) (137/62 - 207/83)  BP(mean): 93 (07 Dec 2024 15:00) (89 - 120)  RR: 22 (07 Dec 2024 15:00) (14 - 34)  SpO2: 98% (07 Dec 2024 15:00) (95% - 99%)    Parameters below as of 07 Dec 2024 08:00  Patient On (Oxygen Delivery Method): nasal cannula  O2 Flow (L/min): 2    I&O's Detail    06 Dec 2024 07:01  -  07 Dec 2024 07:00  --------------------------------------------------------  IN:    IV PiggyBack: 737.3 mL    Oral Fluid: 360 mL  Total IN: 1097.3 mL    OUT:    Chest Tube (mL): 150 mL    Voided (mL): 1250 mL  Total OUT: 1400 mL    Total NET: -302.7 mL      07 Dec 2024 07:01  -  07 Dec 2024 15:55  --------------------------------------------------------  IN:    IV PiggyBack: 62.5 mL    Oral Fluid: 250 mL  Total IN: 312.5 mL    OUT:    Voided (mL): 500 mL  Total OUT: 500 mL    Total NET: -187.5 mL          LABS:                        9.2    13.80 )-----------( 98       ( 06 Dec 2024 22:37 )             27.1     12-06    130[L]  |  92[L]  |  21  ----------------------------<  118[H]  3.6   |  24  |  <0.30[L]    Ca    8.5      06 Dec 2024 22:37  Phos  2.2     12-06  Mg     1.9     12-06    TPro  5.4[L]  /  Alb  3.1[L]  /  TBili  1.1  /  DBili  x   /  AST  47[H]  /  ALT  9[L]  /  AlkPhos  79  12-06    LIVER FUNCTIONS - ( 06 Dec 2024 22:37 )  Alb: 3.1 g/dL / Pro: 5.4 g/dL / ALK PHOS: 79 U/L / ALT: 9 U/L / AST: 47 U/L / GGT: x             Urinalysis Basic - ( 06 Dec 2024 22:37 )    Color: x / Appearance: x / SG: x / pH: x  Gluc: 118 mg/dL / Ketone: x  / Bili: x / Urobili: x   Blood: x / Protein: x / Nitrite: x   Leuk Esterase: x / RBC: x / WBC x   Sq Epi: x / Non Sq Epi: x / Bacteria: x      Physical Exam:  General: NAD, resting comfortably in bed, chest tube to water seal  MSK:   LUE in SAC - grossly moving all digits, brisk capillary refill present, compartments soft and compressible  RUE in Volar Splint - grossly moving all digits, brisk capillary refill present, compartments soft and compressible  BLLE  in Long Leg Sugar Tong Splints - grossly moving all digits, brisk capillary refill present, compartments soft and compressible      A/P  -PT/OT  -NWB LUE, NWB BLLE, NWB LUE with elbow ROM allowed   -Pain Control  -DVT ppx: discussion had with outpatient cardiologist Dr. Hinojosa with Dr. Luna medicine attending, no contraindication to AC, OK for Lovenox based on dosing per SICU  -Rest, ice, compress and elevate the extremity as needed  -Incentive Spirometry  -Medical management appreciated  -care per SICU  -Dispo pending PT eval

## 2024-12-07 NOTE — PROGRESS NOTE ADULT - ASSESSMENT
Assessment  54y f s/p SAC LUE, Volar Splint LIZBETH WU Sugar Tong Splints on 12/4/2024 in OR.    P:  -PT/OT  -NWB WILNER, NWB LIZBETH, NWB WILNER with elbow ROM allowed   -Pain Control  -DVT ppx: discussion had with outpatient cardiologist Dr. Hinojosa with Dr. Luna medicine attending, no contraindication to AC, OK for Lovenox based on dosing per SICU  -SICU management appreciated  -Rest, ice, compress and elevate the extremity as needed  -Incentive Spirometry  -Medical management appreciated  -Dispo pending PT tera Antunez MD  Orthopaedic Surgery Resident    For all questions, please reach out via the following numbers for the on-call resident; do not reach out via Teams.  [facility] [id number]  J        [id number]  [facility]  [id number]/ [phone]

## 2024-12-07 NOTE — PROGRESS NOTE ADULT - SUBJECTIVE AND OBJECTIVE BOX
Orthopedic Surgery Progress Note     S: Patient seen and examined today. No acute events overnight. Pain is well controlled. Denies f/c, chest pain, shortness of breath, dizziness.    MEDICATIONS  (STANDING):  albuterol/ipratropium for Nebulization. 3 milliLiter(s) Nebulizer once  amLODIPine   Tablet 2.5 milliGRAM(s) Oral every 24 hours  chlorhexidine 2% Cloths 1 Application(s) Topical <User Schedule>  enoxaparin Injectable 30 milliGRAM(s) SubCutaneous every 24 hours  escitalopram 5 milliGRAM(s) Oral daily  gabapentin 100 milliGRAM(s) Oral every 8 hours  metoprolol tartrate 12.5 milliGRAM(s) Oral every 12 hours  multivitamin 1 Tablet(s) Oral daily  polyethylene glycol 3350 17 Gram(s) Oral daily  potassium phosphate IVPB 15 milliMole(s) IV Intermittent once  senna 2 Tablet(s) Oral at bedtime  sodium chloride 1 Gram(s) Oral three times a day    MEDICATIONS  (PRN):  acetaminophen     Tablet .. 650 milliGRAM(s) Oral every 6 hours PRN Mild Pain (1 - 3)  ALPRAZolam 0.25 milliGRAM(s) Oral three times a day PRN anxiety  diazepam  Injectable 2.5 milliGRAM(s) IV Push every 6 hours PRN back spasm  oxyCODONE    IR 5 milliGRAM(s) Oral every 4 hours PRN Severe Pain (7 - 10)  oxyCODONE    IR 2.5 milliGRAM(s) Oral every 4 hours PRN Moderate Pain (4 - 6)      Physical Exam:  MSK:   LUE in SAC - grossly moving all digits, brisk capillary refill present, compartments soft and compressible  RUE in Volar Splint - grossly moving all digits, brisk capillary refill present, compartments soft and compressible  BLLE  in Long Leg Sugar Tong Splints - grossly moving all digits, brisk capillary refill present, compartments soft and compressible    Vital Signs Last 24 Hrs  T(C): 36.8 (06 Dec 2024 22:00), Max: 36.8 (06 Dec 2024 19:00)  T(F): 98.3 (06 Dec 2024 22:00), Max: 98.3 (06 Dec 2024 19:00)  HR: 93 (07 Dec 2024 05:00) (91 - 143)  BP: 153/67 (07 Dec 2024 05:00) (137/62 - 207/83)  BP(mean): 96 (07 Dec 2024 05:00) (89 - 119)  RR: 26 (07 Dec 2024 05:00) (14 - 37)  SpO2: 95% (07 Dec 2024 05:00) (95% - 99%)    Parameters below as of 07 Dec 2024 05:00  Patient On (Oxygen Delivery Method): nasal cannula  O2 Flow (L/min): 2      12-05-24 @ 07:01  -  12-06-24 @ 07:00  --------------------------------------------------------  IN: 1260 mL / OUT: 525 mL / NET: 735 mL    12-06-24 @ 07:01  -  12-07-24 @ 05:13  --------------------------------------------------------  IN: 859.8 mL / OUT: 1034 mL / NET: -174.2 mL        LABS:                        9.2    13.80 )-----------( 98       ( 06 Dec 2024 22:37 )             27.1     12-06    130[L]  |  92[L]  |  21  ----------------------------<  118[H]  3.6   |  24  |  <0.30[L]    Ca    8.5      06 Dec 2024 22:37  Phos  2.2     12-06  Mg     1.9     12-06    TPro  5.4[L]  /  Alb  3.1[L]  /  TBili  1.1  /  DBili  x   /  AST  47[H]  /  ALT  9[L]  /  AlkPhos  79  12-06

## 2024-12-08 LAB
ALBUMIN SERPL ELPH-MCNC: 3 G/DL — LOW (ref 3.3–5)
ALP SERPL-CCNC: 76 U/L — SIGNIFICANT CHANGE UP (ref 40–120)
ALT FLD-CCNC: 7 U/L — LOW (ref 10–45)
ANION GAP SERPL CALC-SCNC: 10 MMOL/L — SIGNIFICANT CHANGE UP (ref 5–17)
AST SERPL-CCNC: 27 U/L — SIGNIFICANT CHANGE UP (ref 10–40)
BILIRUB SERPL-MCNC: 1.2 MG/DL — SIGNIFICANT CHANGE UP (ref 0.2–1.2)
BUN SERPL-MCNC: 16 MG/DL — SIGNIFICANT CHANGE UP (ref 7–23)
CALCIUM SERPL-MCNC: 8.1 MG/DL — LOW (ref 8.4–10.5)
CHLORIDE SERPL-SCNC: 90 MMOL/L — LOW (ref 96–108)
CO2 SERPL-SCNC: 32 MMOL/L — HIGH (ref 22–31)
CREAT SERPL-MCNC: 0.32 MG/DL — LOW (ref 0.5–1.3)
EGFR: 122 ML/MIN/1.73M2 — SIGNIFICANT CHANGE UP
GLUCOSE BLDC GLUCOMTR-MCNC: 122 MG/DL — HIGH (ref 70–99)
GLUCOSE SERPL-MCNC: 109 MG/DL — HIGH (ref 70–99)
HCT VFR BLD CALC: 26.2 % — LOW (ref 34.5–45)
HGB BLD-MCNC: 8.6 G/DL — LOW (ref 11.5–15.5)
MAGNESIUM SERPL-MCNC: 1.9 MG/DL — SIGNIFICANT CHANGE UP (ref 1.6–2.6)
MCHC RBC-ENTMCNC: 29 PG — SIGNIFICANT CHANGE UP (ref 27–34)
MCHC RBC-ENTMCNC: 32.8 G/DL — SIGNIFICANT CHANGE UP (ref 32–36)
MCV RBC AUTO: 88.2 FL — SIGNIFICANT CHANGE UP (ref 80–100)
NRBC # BLD: 0 /100 WBCS — SIGNIFICANT CHANGE UP (ref 0–0)
PHOSPHATE SERPL-MCNC: 3.3 MG/DL — SIGNIFICANT CHANGE UP (ref 2.5–4.5)
PLATELET # BLD AUTO: 150 K/UL — SIGNIFICANT CHANGE UP (ref 150–400)
POTASSIUM SERPL-MCNC: 3.5 MMOL/L — SIGNIFICANT CHANGE UP (ref 3.5–5.3)
POTASSIUM SERPL-SCNC: 3.5 MMOL/L — SIGNIFICANT CHANGE UP (ref 3.5–5.3)
PROT SERPL-MCNC: 5.2 G/DL — LOW (ref 6–8.3)
RBC # BLD: 2.97 M/UL — LOW (ref 3.8–5.2)
RBC # FLD: 13.8 % — SIGNIFICANT CHANGE UP (ref 10.3–14.5)
SODIUM SERPL-SCNC: 132 MMOL/L — LOW (ref 135–145)
WBC # BLD: 11.59 K/UL — HIGH (ref 3.8–10.5)
WBC # FLD AUTO: 11.59 K/UL — HIGH (ref 3.8–10.5)

## 2024-12-08 PROCEDURE — 71045 X-RAY EXAM CHEST 1 VIEW: CPT | Mod: 26,77

## 2024-12-08 PROCEDURE — 71045 X-RAY EXAM CHEST 1 VIEW: CPT | Mod: 26

## 2024-12-08 PROCEDURE — 99233 SBSQ HOSP IP/OBS HIGH 50: CPT

## 2024-12-08 RX ORDER — POTASSIUM CHLORIDE 600 MG/1
40 TABLET, EXTENDED RELEASE ORAL ONCE
Refills: 0 | Status: COMPLETED | OUTPATIENT
Start: 2024-12-08 | End: 2024-12-08

## 2024-12-08 RX ORDER — HEPARIN SODIUM 5000 [USP'U]/.5ML
15 INJECTION, SOLUTION INTRAVENOUS; SUBCUTANEOUS
Refills: 0 | Status: COMPLETED | OUTPATIENT
Start: 2024-12-08 | End: 2024-12-08

## 2024-12-08 RX ORDER — FUROSEMIDE 40 MG/1
20 TABLET ORAL ONCE
Refills: 0 | Status: COMPLETED | OUTPATIENT
Start: 2024-12-08 | End: 2024-12-08

## 2024-12-08 RX ADMIN — METOPROLOL TARTRATE 25 MILLIGRAM(S): 100 TABLET, FILM COATED ORAL at 05:22

## 2024-12-08 RX ADMIN — ESCITALOPRAM OXALATE 5 MILLIGRAM(S): 10 TABLET, FILM COATED ORAL at 11:29

## 2024-12-08 RX ADMIN — POTASSIUM CHLORIDE 40 MILLIEQUIVALENT(S): 600 TABLET, EXTENDED RELEASE ORAL at 23:48

## 2024-12-08 RX ADMIN — OXYCODONE HYDROCHLORIDE 2.5 MILLIGRAM(S): 30 TABLET ORAL at 23:40

## 2024-12-08 RX ADMIN — HEPARIN SODIUM 127.5 MILLIMOLE(S): 5000 INJECTION, SOLUTION INTRAVENOUS; SUBCUTANEOUS at 11:30

## 2024-12-08 RX ADMIN — SODIUM CHLORIDE 1 GRAM(S): 9 INJECTION, SOLUTION INTRAMUSCULAR; INTRAVENOUS; SUBCUTANEOUS at 05:22

## 2024-12-08 RX ADMIN — ACETAMINOPHEN 500MG 650 MILLIGRAM(S): 500 TABLET, COATED ORAL at 22:59

## 2024-12-08 RX ADMIN — OXYCODONE HYDROCHLORIDE 5 MILLIGRAM(S): 30 TABLET ORAL at 12:29

## 2024-12-08 RX ADMIN — Medication 1 TABLET(S): at 11:29

## 2024-12-08 RX ADMIN — HEPARIN SODIUM 127.5 MILLIMOLE(S): 5000 INJECTION, SOLUTION INTRAVENOUS; SUBCUTANEOUS at 07:55

## 2024-12-08 RX ADMIN — ACETAMINOPHEN 500MG 650 MILLIGRAM(S): 500 TABLET, COATED ORAL at 22:29

## 2024-12-08 RX ADMIN — OXYCODONE HYDROCHLORIDE 5 MILLIGRAM(S): 30 TABLET ORAL at 01:15

## 2024-12-08 RX ADMIN — OXYCODONE HYDROCHLORIDE 5 MILLIGRAM(S): 30 TABLET ORAL at 11:29

## 2024-12-08 RX ADMIN — METOPROLOL TARTRATE 25 MILLIGRAM(S): 100 TABLET, FILM COATED ORAL at 13:16

## 2024-12-08 RX ADMIN — OXYCODONE HYDROCHLORIDE 5 MILLIGRAM(S): 30 TABLET ORAL at 01:45

## 2024-12-08 RX ADMIN — ENOXAPARIN SODIUM 30 MILLIGRAM(S): 30 INJECTION SUBCUTANEOUS at 13:16

## 2024-12-08 RX ADMIN — CHLORHEXIDINE GLUCONATE 1 APPLICATION(S): 1.2 RINSE ORAL at 05:22

## 2024-12-08 RX ADMIN — METOPROLOL TARTRATE 25 MILLIGRAM(S): 100 TABLET, FILM COATED ORAL at 22:30

## 2024-12-08 RX ADMIN — OXYCODONE HYDROCHLORIDE 2.5 MILLIGRAM(S): 30 TABLET ORAL at 23:10

## 2024-12-08 RX ADMIN — AMLODIPINE BESYLATE 2.5 MILLIGRAM(S): 10 TABLET ORAL at 03:52

## 2024-12-08 RX ADMIN — SODIUM CHLORIDE 1 GRAM(S): 9 INJECTION, SOLUTION INTRAMUSCULAR; INTRAVENOUS; SUBCUTANEOUS at 22:30

## 2024-12-08 RX ADMIN — FUROSEMIDE 20 MILLIGRAM(S): 40 TABLET ORAL at 09:53

## 2024-12-08 RX ADMIN — Medication 500 MICROGRAM(S): at 10:02

## 2024-12-08 RX ADMIN — LOSARTAN POTASSIUM 50 MILLIGRAM(S): 100 TABLET, FILM COATED ORAL at 05:22

## 2024-12-08 RX ADMIN — SODIUM CHLORIDE 1 GRAM(S): 9 INJECTION, SOLUTION INTRAMUSCULAR; INTRAVENOUS; SUBCUTANEOUS at 13:17

## 2024-12-08 NOTE — PROGRESS NOTE ADULT - SUBJECTIVE AND OBJECTIVE BOX
Surgery Progress Note     Interval/Subjective:  Patient seen and examined. No acute events overnight. Vitals stable. Afebrile. Pain controlled with medications.   Last Bowel Movement: 07-Dec-2024 (12-08-24 @ 07:00)  __________________________________________________________________  Vitals:  T(C): 36.6 (15:00), Max: 37.8 (22:00)  HR: 83 (16:00) (74 - 97)  BP: 150/65 (16:00) (118/58 - 165/71)  RR: 15 (16:00) (12 - 20)  SpO2: 98% (16:00) (95% - 100%)    Physical Exam:  General: NAD, resting comfortably in bed, chest tube to water seal  MSK:   LUE in SAC - grossly moving all digits, brisk capillary refill present, compartments soft and compressible  RUE in Volar Splint - grossly moving all digits, brisk capillary refill present, compartments soft and compressible  BLLE  in Long Leg Sugar Tong Splints - grossly moving all digits, brisk capillary refill present, compartments soft and compressible    __________________________________________________________________    A:  LO VALDOVINOS is a 54y f s/p SAC LUE, Volar Splint RUE, BLLE Sugar Tong Splints on 12/4/2024 in OR.  P:  -PT/OT  -NWB LUE, NWB BLLE, NWB LUE with elbow ROM allowed   -Pain Control  -DVT ppx: discussion had with outpatient cardiologist Dr. Hinojosa with Dr. Luna medicine attending, no contraindication to AC, OK for Lovenox based on dosing per SICU  -Rest, ice, compress and elevate the extremity as needed  -Incentive Spirometry  -Medical management appreciated  -care per SICU  -Dispo pending PT eval
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
## 2024-12-08 NOTE — PROGRESS NOTE ADULT - SUBJECTIVE AND OBJECTIVE BOX
· Subjective and Objective:   SICU Daily Progress Note  =====================================================  Interval/Overnight Events:     - Lasix 20mg given   - Metop increased 12.5mg -> 25mg q8  - losartan restarted  - CXR: no PTX, mild interval increase in confluence of opacification    - Lasix 20mg     Allergies: No Known Allergies    NEURO  RASS (if intubated): 		CAM ICU (if concern for delirium):  Exam:   Meds: acetaminophen     Tablet .. 650 milliGRAM(s) Oral every 6 hours PRN Mild Pain (1 - 3)  ALPRAZolam 0.25 milliGRAM(s) Oral three times a day PRN anxiety  diazepam  Injectable 2.5 milliGRAM(s) IV Push every 6 hours PRN back spasm  escitalopram 5 milliGRAM(s) Oral daily  gabapentin 100 milliGRAM(s) Oral every 8 hours  oxyCODONE    IR 5 milliGRAM(s) Oral every 4 hours PRN Severe Pain (7 - 10)  oxyCODONE    IR 2.5 milliGRAM(s) Oral every 4 hours PRN Moderate Pain (4 - 6)    RESPIRATORY  RR: 14 (12-08-24 @ 01:00) (13 - 34)  SpO2: 99% (12-08-24 @ 01:00) (95% - 99%)  Wt(kg): --  Exam: Lungs CTA b/l  Mechanical Ventilation:     Meds: ipratropium    for Nebulization 500 MICROGram(s) Nebulizer every 8 hours  sodium chloride 3%  Inhalation 4 milliLiter(s) Inhalation every 12 hours    CARDIOVASCULAR  HR: 80 (12-08-24 @ 01:00) (78 - 104)  BP: 140/62 (12-08-24 @ 01:00) (118/58 - 207/83)  BP(mean): 89 (12-08-24 @ 01:00) (83 - 120)  ABP: --  ABP(mean): --  Wt(kg): --  CVP(cm H2O): --  VBG - ( 06 Dec 2024 05:18 )  pH: 7.31  /  pCO2: 43    /  pO2: 50    / HCO3: 22    / Base Excess: -4.4  /  SaO2: 90.0   Lactate: 1.0      Exam: Normal S1/S2 w/o murmurs or rubs  Cardiac Rhythm:   Perfusion     [ ]Adequate   [ ]Inadequate  Mentation   [ ]Normal       [ ]Reduced  Extremities  [ ]Warm         [ ]Cool  Volume Status [ ]Hypervolemic [ ]Euvolemic [ ]Hypovolemic  Meds: amLODIPine   Tablet 2.5 milliGRAM(s) Oral every 24 hours  losartan 50 milliGRAM(s) Oral daily  metoprolol tartrate 25 milliGRAM(s) Oral every 8 hours    GI/NUTRITION  Exam:   Diet:   Last Bowel Movement: 07-Dec-2024 (12-07-24 @ 19:00)  Last Bowel Movement: 07-Dec-2024 (12-07-24 @ 07:00)  Last Bowel Movement: 06-Dec-2024 (12-06-24 @ 19:00)    acetaminophen     Tablet .. 650 milliGRAM(s) Oral every 6 hours PRN Mild Pain (1 - 3)  ALPRAZolam 0.25 milliGRAM(s) Oral three times a day PRN anxiety  diazepam  Injectable 2.5 milliGRAM(s) IV Push every 6 hours PRN back spasm  escitalopram 5 milliGRAM(s) Oral daily  gabapentin 100 milliGRAM(s) Oral every 8 hours  oxyCODONE    IR 5 milliGRAM(s) Oral every 4 hours PRN Severe Pain (7 - 10)  oxyCODONE    IR 2.5 milliGRAM(s) Oral every 4 hours PRN Moderate Pain (4 - 6)    Meds: polyethylene glycol 3350 17 Gram(s) Oral daily  senna 2 Tablet(s) Oral at bedtime      GENITOURINARY  I&O's Detail    12-06 @ 07:01  -  12-07 @ 07:00  --------------------------------------------------------  IN:    IV PiggyBack: 737.3 mL    Oral Fluid: 360 mL  Total IN: 1097.3 mL    OUT:    Chest Tube (mL): 150 mL    Voided (mL): 1250 mL  Total OUT: 1400 mL    Total NET: -302.7 mL    12-07 @ 07:01  -  12-08 @ 02:40  --------------------------------------------------------  IN:    IV PiggyBack: 187.5 mL    Oral Fluid: 430 mL  Total IN: 617.5 mL    OUT:    Voided (mL): 600 mL  Total OUT: 600 mL    Total NET: 17.5 mL    12-07    132[L]  |  93[L]  |  18  ----------------------------<  96  4.4   |  25  |  <0.30[L]    Ca    8.7      07 Dec 2024 22:10  Phos  1.8     12-07  Mg     2.4     12-07    TPro  5.4[L]  /  Alb  3.1[L]  /  TBili  1.1  /  DBili  x   /  AST  35  /  ALT  8[L]  /  AlkPhos  75  12-07    Meds: multivitamin 1 Tablet(s) Oral daily  sodium chloride 1 Gram(s) Oral three times a day    HEMATOLOGIC  Meds: enoxaparin Injectable 30 milliGRAM(s) SubCutaneous every 24 hours                          8.9    11.40 )-----------( 139      ( 07 Dec 2024 22:10 )             26.5     INFECTIOUS DISEASES  T(C): 37.8 (12-07-24 @ 22:00), Max: 37.8 (12-07-24 @ 22:00)  Wt(kg): --  WBC Count: 11.40 K/uL (12-07 @ 22:10)    Recent Cultures:    Meds:     ENDOCRINE  Capillary Blood Glucose    Meds:     ACCESS DEVICES:  [ ] Peripheral IV  [ ] Central Venous Line		[ ] R	[ ] L	[ ] IJ	[ ] Fem	[ ] SC	Placed:   [ ] Arterial Line			[ ] R	[ ] L	[ ] Fem	[ ] Rad	[ ] Ax	Placed:   [ ] PICC:					[ ] Mediport  [ ] Urinary Catheter, Date Placed:   [ ] Necessity of urinary, arterial, and venous catheters discussed    OTHER MEDICATIONS:  chlorhexidine 2% Cloths 1 Application(s) Topical <User Schedule>      IMAGING:

## 2024-12-08 NOTE — PROGRESS NOTE ADULT - SUBJECTIVE AND OBJECTIVE BOX
Orthopedic Surgery Progress Note     S: Patient seen and examined today. No acute events overnight. Pain is well controlled. Denies f/c, chest pain, shortness of breath, dizziness.    MEDICATIONS  (STANDING):  amLODIPine   Tablet 2.5 milliGRAM(s) Oral every 24 hours  chlorhexidine 2% Cloths 1 Application(s) Topical <User Schedule>  enoxaparin Injectable 30 milliGRAM(s) SubCutaneous every 24 hours  escitalopram 5 milliGRAM(s) Oral daily  gabapentin 100 milliGRAM(s) Oral every 8 hours  ipratropium    for Nebulization 500 MICROGram(s) Nebulizer every 8 hours  losartan 50 milliGRAM(s) Oral daily  metoprolol tartrate 25 milliGRAM(s) Oral every 8 hours  multivitamin 1 Tablet(s) Oral daily  polyethylene glycol 3350 17 Gram(s) Oral daily  senna 2 Tablet(s) Oral at bedtime  sodium chloride 1 Gram(s) Oral three times a day  sodium chloride 3%  Inhalation 4 milliLiter(s) Inhalation every 12 hours  sodium phosphate 15 milliMole(s)/250 mL IVPB 15 milliMole(s) IV Intermittent every 2 hours    MEDICATIONS  (PRN):  acetaminophen     Tablet .. 650 milliGRAM(s) Oral every 6 hours PRN Mild Pain (1 - 3)  ALPRAZolam 0.25 milliGRAM(s) Oral three times a day PRN anxiety  diazepam  Injectable 2.5 milliGRAM(s) IV Push every 6 hours PRN back spasm  oxyCODONE    IR 5 milliGRAM(s) Oral every 4 hours PRN Severe Pain (7 - 10)  oxyCODONE    IR 2.5 milliGRAM(s) Oral every 4 hours PRN Moderate Pain (4 - 6)      Vital Signs Last 24 Hrs  T(C): 36.4 (08 Dec 2024 05:00), Max: 37.8 (07 Dec 2024 22:00)  T(F): 97.6 (08 Dec 2024 05:00), Max: 100 (07 Dec 2024 22:00)  HR: 75 (08 Dec 2024 07:00) (75 - 103)  BP: 135/62 (08 Dec 2024 07:00) (118/58 - 181/84)  BP(mean): 89 (08 Dec 2024 07:00) (83 - 120)  RR: 20 (08 Dec 2024 07:00) (12 - 34)  SpO2: 98% (08 Dec 2024 07:00) (96% - 99%)    Parameters below as of 08 Dec 2024 07:00  Patient On (Oxygen Delivery Method): nasal cannula  O2 Flow (L/min): 2      12-07-24 @ 07:01  -  12-08-24 @ 07:00  --------------------------------------------------------  IN: 742.5 mL / OUT: 1200 mL / NET: -457.5 mL        Physical Exam:  Gen: NAD  LUE in SAC - grossly moving all digits, brisk capillary refill present, compartments soft and compressible  RUE in Volar Splint - grossly moving all digits, brisk capillary refill present, compartments soft and compressible  BLLE in Long Leg Sugar Tong Splints - grossly moving all digits, brisk capillary refill present, compartments soft and compressible    LABS:                        8.9    11.40 )-----------( 139      ( 07 Dec 2024 22:10 )             26.5     12-07    132[L]  |  93[L]  |  18  ----------------------------<  96  4.4   |  25  |  <0.30[L]    Ca    8.7      07 Dec 2024 22:10  Phos  1.8     12-07  Mg     2.4     12-07    TPro  5.4[L]  /  Alb  3.1[L]  /  TBili  1.1  /  DBili  x   /  AST  35  /  ALT  8[L]  /  AlkPhos  75  12-07
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
## 2024-12-08 NOTE — PROGRESS NOTE ADULT - ASSESSMENT
54y f s/p SAC LUE, Volar Splint LIZBETH WU Sugar Tong Splints on 12/4/2024 in OR.    Plan:  -PT/OT  -VALDEMAR DARBY, VALDEMAR ESCAMILLA with elbow ROM allowed   -Pain Control  -DVT ppx: discussion had with outpatient cardiologist Dr. Hinojosa with Dr. Luna medicine attending, no contraindication to AC, OK for Lovenox based on dosing per SICU  -SICU management appreciated  -Rest, ice, compress and elevate the extremity as needed  -Incentive Spirometry  -Medical co-management appreciated  -Dispo pending PT eval
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
## 2024-12-09 LAB
HCT VFR BLD CALC: 26.9 % — LOW (ref 34.5–45)
HGB BLD-MCNC: 8.7 G/DL — LOW (ref 11.5–15.5)
MCHC RBC-ENTMCNC: 29.7 PG — SIGNIFICANT CHANGE UP (ref 27–34)
MCHC RBC-ENTMCNC: 32.3 G/DL — SIGNIFICANT CHANGE UP (ref 32–36)
MCV RBC AUTO: 91.8 FL — SIGNIFICANT CHANGE UP (ref 80–100)
NRBC # BLD: 0 /100 WBCS — SIGNIFICANT CHANGE UP (ref 0–0)
RBC # BLD: 2.93 M/UL — LOW (ref 3.8–5.2)
RBC # FLD: 13.8 % — SIGNIFICANT CHANGE UP (ref 10.3–14.5)
WBC # BLD: 12.44 K/UL — HIGH (ref 3.8–10.5)
WBC # FLD AUTO: 12.44 K/UL — HIGH (ref 3.8–10.5)

## 2024-12-09 PROCEDURE — 99232 SBSQ HOSP IP/OBS MODERATE 35: CPT | Mod: GC

## 2024-12-09 PROCEDURE — 71045 X-RAY EXAM CHEST 1 VIEW: CPT | Mod: 26

## 2024-12-09 RX ORDER — GABAPENTIN 300 MG/1
100 CAPSULE ORAL THREE TIMES A DAY
Refills: 0 | Status: DISCONTINUED | OUTPATIENT
Start: 2024-12-09 | End: 2024-12-15

## 2024-12-09 RX ORDER — EPLERENONE 25 MG/1
25 TABLET ORAL EVERY 24 HOURS
Refills: 0 | Status: DISCONTINUED | OUTPATIENT
Start: 2024-12-09 | End: 2024-12-15

## 2024-12-09 RX ORDER — IPRATROPIUM BROMIDE AND ALBUTEROL SULFATE 2.5; .5 MG/3ML; MG/3ML
3 SOLUTION RESPIRATORY (INHALATION) EVERY 6 HOURS
Refills: 0 | Status: DISCONTINUED | OUTPATIENT
Start: 2024-12-09 | End: 2024-12-15

## 2024-12-09 RX ORDER — FUROSEMIDE 40 MG/1
20 TABLET ORAL DAILY
Refills: 0 | Status: DISCONTINUED | OUTPATIENT
Start: 2024-12-09 | End: 2024-12-13

## 2024-12-09 RX ADMIN — GABAPENTIN 100 MILLIGRAM(S): 300 CAPSULE ORAL at 14:24

## 2024-12-09 RX ADMIN — AMLODIPINE BESYLATE 2.5 MILLIGRAM(S): 10 TABLET ORAL at 03:06

## 2024-12-09 RX ADMIN — OXYCODONE HYDROCHLORIDE 2.5 MILLIGRAM(S): 30 TABLET ORAL at 03:46

## 2024-12-09 RX ADMIN — FUROSEMIDE 20 MILLIGRAM(S): 40 TABLET ORAL at 12:26

## 2024-12-09 RX ADMIN — METOPROLOL TARTRATE 25 MILLIGRAM(S): 100 TABLET, FILM COATED ORAL at 05:14

## 2024-12-09 RX ADMIN — CHLORHEXIDINE GLUCONATE 1 APPLICATION(S): 1.2 RINSE ORAL at 05:14

## 2024-12-09 RX ADMIN — METOPROLOL TARTRATE 25 MILLIGRAM(S): 100 TABLET, FILM COATED ORAL at 13:09

## 2024-12-09 RX ADMIN — OXYCODONE HYDROCHLORIDE 2.5 MILLIGRAM(S): 30 TABLET ORAL at 13:38

## 2024-12-09 RX ADMIN — IPRATROPIUM BROMIDE AND ALBUTEROL SULFATE 3 MILLILITER(S): 2.5; .5 SOLUTION RESPIRATORY (INHALATION) at 05:44

## 2024-12-09 RX ADMIN — Medication 2 TABLET(S): at 21:33

## 2024-12-09 RX ADMIN — ENOXAPARIN SODIUM 30 MILLIGRAM(S): 30 INJECTION SUBCUTANEOUS at 13:09

## 2024-12-09 RX ADMIN — LOSARTAN POTASSIUM 50 MILLIGRAM(S): 100 TABLET, FILM COATED ORAL at 05:14

## 2024-12-09 RX ADMIN — SODIUM CHLORIDE 1 GRAM(S): 9 INJECTION, SOLUTION INTRAMUSCULAR; INTRAVENOUS; SUBCUTANEOUS at 13:09

## 2024-12-09 RX ADMIN — OXYCODONE HYDROCHLORIDE 2.5 MILLIGRAM(S): 30 TABLET ORAL at 13:08

## 2024-12-09 RX ADMIN — Medication 25 GRAM(S): at 09:24

## 2024-12-09 RX ADMIN — GABAPENTIN 100 MILLIGRAM(S): 300 CAPSULE ORAL at 21:33

## 2024-12-09 RX ADMIN — Medication 1 TABLET(S): at 12:26

## 2024-12-09 RX ADMIN — SODIUM CHLORIDE 4 MILLILITER(S): 9 INJECTION, SOLUTION INTRAMUSCULAR; INTRAVENOUS; SUBCUTANEOUS at 05:44

## 2024-12-09 RX ADMIN — DIAZEPAM 2.5 MILLIGRAM(S): 10 TABLET ORAL at 09:36

## 2024-12-09 RX ADMIN — SODIUM CHLORIDE 1 GRAM(S): 9 INJECTION, SOLUTION INTRAMUSCULAR; INTRAVENOUS; SUBCUTANEOUS at 05:14

## 2024-12-09 RX ADMIN — ESCITALOPRAM OXALATE 5 MILLIGRAM(S): 10 TABLET, FILM COATED ORAL at 12:26

## 2024-12-09 RX ADMIN — EPLERENONE 25 MILLIGRAM(S): 25 TABLET ORAL at 12:26

## 2024-12-09 RX ADMIN — SODIUM CHLORIDE 1 GRAM(S): 9 INJECTION, SOLUTION INTRAMUSCULAR; INTRAVENOUS; SUBCUTANEOUS at 21:33

## 2024-12-09 RX ADMIN — METOPROLOL TARTRATE 25 MILLIGRAM(S): 100 TABLET, FILM COATED ORAL at 21:33

## 2024-12-09 RX ADMIN — POLYETHYLENE GLYCOL 3350 17 GRAM(S): 17 POWDER, FOR SOLUTION ORAL at 12:26

## 2024-12-09 RX ADMIN — DIAZEPAM 2.5 MILLIGRAM(S): 10 TABLET ORAL at 00:00

## 2024-12-09 RX ADMIN — OXYCODONE HYDROCHLORIDE 2.5 MILLIGRAM(S): 30 TABLET ORAL at 03:16

## 2024-12-09 NOTE — PROGRESS NOTE ADULT - SUBJECTIVE AND OBJECTIVE BOX
Orthopedic Surgery Progress Note     S: Patient seen and examined today. No acute events overnight. Pain is well controlled. Denies f/c, chest pain, shortness of breath, dizziness. Chest tube removed yesterday.    MEDICATIONS  (STANDING):  amLODIPine   Tablet 2.5 milliGRAM(s) Oral every 24 hours  chlorhexidine 2% Cloths 1 Application(s) Topical <User Schedule>  enoxaparin Injectable 30 milliGRAM(s) SubCutaneous every 24 hours  escitalopram 5 milliGRAM(s) Oral daily  gabapentin 100 milliGRAM(s) Oral every 8 hours  losartan 50 milliGRAM(s) Oral daily  metoprolol tartrate 25 milliGRAM(s) Oral every 8 hours  multivitamin 1 Tablet(s) Oral daily  polyethylene glycol 3350 17 Gram(s) Oral daily  senna 2 Tablet(s) Oral at bedtime  sodium chloride 1 Gram(s) Oral three times a day  sodium chloride 3%  Inhalation 4 milliLiter(s) Inhalation every 12 hours    MEDICATIONS  (PRN):  acetaminophen     Tablet .. 650 milliGRAM(s) Oral every 6 hours PRN Mild Pain (1 - 3)  albuterol/ipratropium for Nebulization 3 milliLiter(s) Nebulizer every 6 hours PRN Respiratory Distress  ALPRAZolam 0.25 milliGRAM(s) Oral three times a day PRN anxiety  diazepam  Injectable 2.5 milliGRAM(s) IV Push every 6 hours PRN back spasm  oxyCODONE    IR 2.5 milliGRAM(s) Oral every 4 hours PRN Moderate Pain (4 - 6)      Vital Signs Last 24 Hrs  T(C): 36.6 (09 Dec 2024 05:00), Max: 36.7 (08 Dec 2024 08:00)  T(F): 97.8 (09 Dec 2024 05:00), Max: 98.1 (08 Dec 2024 08:00)  HR: 81 (09 Dec 2024 05:44) (74 - 97)  BP: 162/67 (09 Dec 2024 05:00) (125/60 - 176/74)  BP(mean): 96 (09 Dec 2024 05:00) (86 - 106)  RR: 12 (09 Dec 2024 05:00) (12 - 20)  SpO2: 97% (09 Dec 2024 05:44) (95% - 100%)    Parameters below as of 09 Dec 2024 05:44  Patient On (Oxygen Delivery Method): nasal cannula        12-07-24 @ 07:01  -  12-08-24 @ 07:00  --------------------------------------------------------  IN: 742.5 mL / OUT: 1200 mL / NET: -457.5 mL    12-08-24 @ 07:01  -  12-09-24 @ 06:25  --------------------------------------------------------  IN: 500 mL / OUT: 1130 mL / NET: -630 mL        Physical Exam:  Gen: NAD  LUE in SAC - grossly moving all digits, brisk capillary refill present, compartments soft and compressible  RUE in Volar Splint - grossly moving all digits, brisk capillary refill present, compartments soft and compressible  BLLE in Long Leg Sugar Tong Splints - grossly moving all digits, brisk capillary refill present, compartments soft and compressible      LABS:                        8.6    11.59 )-----------( 150      ( 08 Dec 2024 22:28 )             26.2     12-08    132[L]  |  90[L]  |  16  ----------------------------<  109[H]  3.5   |  32[H]  |  0.32[L]    Ca    8.1[L]      08 Dec 2024 22:27  Phos  3.3     12-08  Mg     1.9     12-08    TPro  5.2[L]  /  Alb  3.0[L]  /  TBili  1.2  /  DBili  x   /  AST  27  /  ALT  7[L]  /  AlkPhos  76  12-08

## 2024-12-09 NOTE — PROGRESS NOTE ADULT - SUBJECTIVE AND OBJECTIVE BOX
24 HOUR EVENTS:  - d/c'ed chest tube, f/u 7PM- no PTX   - lasix 20 mg IV x1    NEURO  Exam: A&O x 4  Meds: acetaminophen     Tablet .. 650 milliGRAM(s) Oral every 6 hours PRN Mild Pain (1 - 3)  ALPRAZolam 0.25 milliGRAM(s) Oral three times a day PRN anxiety  diazepam  Injectable 2.5 milliGRAM(s) IV Push every 6 hours PRN back spasm  escitalopram 5 milliGRAM(s) Oral daily  gabapentin 100 milliGRAM(s) Oral every 8 hours  oxyCODONE    IR 2.5 milliGRAM(s) Oral every 4 hours PRN Moderate Pain (4 - 6)      RESPIRATORY  RR: 16 (12-09-24 @ 00:00) (12 - 20)  SpO2: 97% (12-09-24 @ 00:00) (95% - 100%)  Exam: unlabored     Meds: ipratropium    for Nebulization 500 MICROGram(s) Nebulizer every 8 hours  sodium chloride 3%  Inhalation 4 milliLiter(s) Inhalation every 12 hours      CARDIOVASCULAR  HR: 82 (12-09-24 @ 00:00) (74 - 97)  BP: 133/60 (12-09-24 @ 00:00) (125/60 - 176/74)  BP(mean): 87 (12-09-24 @ 00:00) (86 - 106)      Exam:   Cardiac Rhythm:   Perfusion     [ x]Adequate   [ ]Inadequate  Mentation   [x ]Normal       [ ]Reduced  Extremities  [x ]Warm         [ ]Cool  Volume Status [ ]Hypervolemic [x ]Euvolemic [ ]Hypovolemic  Meds: amLODIPine   Tablet 2.5 milliGRAM(s) Oral every 24 hours  losartan 50 milliGRAM(s) Oral daily  metoprolol tartrate 25 milliGRAM(s) Oral every 8 hours      GI/NUTRITION  Exam: soft, NT/ND  Diet: regular  Meds: polyethylene glycol 3350 17 Gram(s) Oral daily  senna 2 Tablet(s) Oral at bedtime      GENITOURINARY  I&O's Detail    12-07 @ 07:01  -  12-08 @ 07:00  --------------------------------------------------------  IN:    IV PiggyBack: 312.5 mL    Oral Fluid: 430 mL  Total IN: 742.5 mL    OUT:    Voided (mL): 1200 mL  Total OUT: 1200 mL    Total NET: -457.5 mL      12-08 @ 07:01  -  12-09 @ 02:05  --------------------------------------------------------  IN:    IV PiggyBack: 500 mL  Total IN: 500 mL    OUT:    Chest Tube (mL): 80 mL    Voided (mL): 650 mL  Total OUT: 730 mL    Total NET: -230 mL          12-08    132[L]  |  90[L]  |  16  ----------------------------<  109[H]  3.5   |  32[H]  |  0.32[L]    Ca    8.1[L]      08 Dec 2024 22:27  Phos  3.3     12-08  Mg     1.9     12-08    TPro  5.2[L]  /  Alb  3.0[L]  /  TBili  1.2  /  DBili  x   /  AST  27  /  ALT  7[L]  /  AlkPhos  76  12-08    Meds: multivitamin 1 Tablet(s) Oral daily  sodium chloride 1 Gram(s) Oral three times a day      HEMATOLOGIC  Meds: enoxaparin Injectable 30 milliGRAM(s) SubCutaneous every 24 hours                          8.6    11.59 )-----------( 150      ( 08 Dec 2024 22:28 )             26.2         INFECTIOUS DISEASES  T(C): 36.7 (12-08-24 @ 22:00), Max: 36.7 (12-08-24 @ 08:00)  Wt(kg): --  WBC Count: 11.59 K/uL (12-08 @ 22:28)    Recent Cultures:    Meds:     ENDOCRINE  Capillary Blood Glucose    Meds:     ACCESS DEVICES:  [x ] Peripheral IV  [ ] Central Venous Line		[ ] R	[ ] L	[ ] IJ	[ ] Fem	[ ] SC	Placed:   [ ] Arterial Line			[ ] R	[ ] L	[ ] Fem	[ ] Rad	[ ] Ax	Placed:   [ ] PICC:					[ ] Mediport  [ ] Urinary Catheter, Date Placed:   [ ] Necessity of urinary, arterial, and venous catheters discussed    OTHER MEDICATIONS:  chlorhexidine 2% Cloths 1 Application(s) Topical <User Schedule>      IMAGING:

## 2024-12-09 NOTE — PROGRESS NOTE ADULT - ATTENDING COMMENTS
Dr. Maharaj (Attending Physician)  acute resp insufficiency on 2L nasal cannula, doing IS with family  HTN on home meds, given lasix yesterday  hyponatremia on 1L fluid restriction

## 2024-12-09 NOTE — PROGRESS NOTE ADULT - ASSESSMENT
54y f s/p SAC LUE, Volar Splint LIZBETH WU Sugar Tong Splints on 12/4/2024 in OR.    Plan:  -PT/OT  -VALDEMAR DARBY, VALDEMAR ESCAMILLA with elbow ROM allowed   -Pain Control  -DVT ppx: discussion had with outpatient cardiologist Dr. Hinojosa with Dr. Luna medicine attending, no contraindication to AC, OK for Lovenox based on dosing per SICU  -Primary and SICU management appreciated  -Rest, ice, compress and elevate the extremity as needed  -Incentive Spirometry  -Dispo pending PT tera Sow, PGY-2  Orthopedic Surgery    [facility] [place]: [id number]  [facility]: [id number]  [facility]:  [id number]/ [phone]
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
## 2024-12-09 NOTE — PROGRESS NOTE ADULT - SUBJECTIVE AND OBJECTIVE BOX
SURGERY DAILY PROGRESS NOTE        SUBJECTIVE: Patient seen and evaluated on AM rounds. Pt is resting comfortably in bed with no acute complaints.  ------------------------------------------------------------------------------------------------------------  OBJECTIVE:  Vital Signs Last 24 Hrs  T(C): 36.6 (09 Dec 2024 05:00), Max: 36.7 (08 Dec 2024 22:00)  T(F): 97.8 (09 Dec 2024 05:00), Max: 98 (08 Dec 2024 22:00)  HR: 78 (09 Dec 2024 08:00) (78 - 97)  BP: 157/67 (09 Dec 2024 08:00) (125/60 - 176/74)  BP(mean): 96 (09 Dec 2024 08:00) (86 - 109)  RR: 15 (09 Dec 2024 08:00) (12 - 20)  SpO2: 98% (09 Dec 2024 08:00) (95% - 100%)    Parameters below as of 09 Dec 2024 07:00  Patient On (Oxygen Delivery Method): room air      I&O's Detail    08 Dec 2024 07:01  -  09 Dec 2024 07:00  --------------------------------------------------------  IN:    IV PiggyBack: 500 mL  Total IN: 500 mL    OUT:    Chest Tube (mL): 80 mL    Voided (mL): 1050 mL  Total OUT: 1130 mL    Total NET: -630 mL          LABS:                        8.6    11.59 )-----------( 150      ( 08 Dec 2024 22:28 )             26.2     12-08    132[L]  |  90[L]  |  16  ----------------------------<  109[H]  3.5   |  32[H]  |  0.32[L]    Ca    8.1[L]      08 Dec 2024 22:27  Phos  3.3     12-08  Mg     1.9     12-08    TPro  5.2[L]  /  Alb  3.0[L]  /  TBili  1.2  /  DBili  x   /  AST  27  /  ALT  7[L]  /  AlkPhos  76  12-08    LIVER FUNCTIONS - ( 08 Dec 2024 22:27 )  Alb: 3.0 g/dL / Pro: 5.2 g/dL / ALK PHOS: 76 U/L / ALT: 7 U/L / AST: 27 U/L / GGT: x             Urinalysis Basic - ( 08 Dec 2024 22:27 )    Color: x / Appearance: x / SG: x / pH: x  Gluc: 109 mg/dL / Ketone: x  / Bili: x / Urobili: x   Blood: x / Protein: x / Nitrite: x   Leuk Esterase: x / RBC: x / WBC x   Sq Epi: x / Non Sq Epi: x / Bacteria: x    Physical Exam:  Gen: NAD  LUE in SAC - grossly moving all digits, brisk capillary refill present, compartments soft and compressible  RUE in Volar Splint - grossly moving all digits, brisk capillary refill present, compartments soft and compressible  BLLE in Long Leg Sugar Tong Splints - grossly moving all digits, brisk capillary refill present, compartments soft and compressible    A/P  54y f s/p SAC LUE, Volar Splint RUE, BLLE Sugar Tong Splints on 12/4/2024 in OR.    Plan:  -needs PT/OT  -NWB LUE, NWB BLLE, NWB LUE with elbow ROM allowed   -Pain Control  -DVT ppx: discussion had with outpatient cardiologist Dr. Hinojosa with Dr. Luna medicine attending, no contraindication to AC, OK for Lovenox based on dosing per SICU  -Rest, ice, compress and elevate the extremity as needed  -Incentive Spirometry  -SICU care  -Dispo pending PT eval

## 2024-12-10 LAB
ALBUMIN SERPL ELPH-MCNC: 3 G/DL — LOW (ref 3.3–5)
ALP SERPL-CCNC: 79 U/L — SIGNIFICANT CHANGE UP (ref 40–120)
ALT FLD-CCNC: <5 U/L — LOW (ref 10–45)
ANION GAP SERPL CALC-SCNC: 7 MMOL/L — SIGNIFICANT CHANGE UP (ref 5–17)
AST SERPL-CCNC: 23 U/L — SIGNIFICANT CHANGE UP (ref 10–40)
BILIRUB SERPL-MCNC: 1.2 MG/DL — SIGNIFICANT CHANGE UP (ref 0.2–1.2)
BLD GP AB SCN SERPL QL: NEGATIVE — SIGNIFICANT CHANGE UP
BUN SERPL-MCNC: 12 MG/DL — SIGNIFICANT CHANGE UP (ref 7–23)
CALCIUM SERPL-MCNC: 8.5 MG/DL — SIGNIFICANT CHANGE UP (ref 8.4–10.5)
CHLORIDE SERPL-SCNC: 93 MMOL/L — LOW (ref 96–108)
CO2 SERPL-SCNC: 36 MMOL/L — HIGH (ref 22–31)
CREAT SERPL-MCNC: <0.3 MG/DL — LOW (ref 0.5–1.3)
EGFR: 124 ML/MIN/1.73M2 — SIGNIFICANT CHANGE UP
GLUCOSE SERPL-MCNC: 110 MG/DL — HIGH (ref 70–99)
MAGNESIUM SERPL-MCNC: 2.6 MG/DL — SIGNIFICANT CHANGE UP (ref 1.6–2.6)
PHOSPHATE SERPL-MCNC: 2.6 MG/DL — SIGNIFICANT CHANGE UP (ref 2.5–4.5)
PLATELET # BLD AUTO: 238 K/UL — SIGNIFICANT CHANGE UP (ref 150–400)
POTASSIUM SERPL-MCNC: 4.9 MMOL/L — SIGNIFICANT CHANGE UP (ref 3.5–5.3)
POTASSIUM SERPL-SCNC: 4.9 MMOL/L — SIGNIFICANT CHANGE UP (ref 3.5–5.3)
PROT SERPL-MCNC: 5.2 G/DL — LOW (ref 6–8.3)
RH IG SCN BLD-IMP: POSITIVE — SIGNIFICANT CHANGE UP
SODIUM SERPL-SCNC: 136 MMOL/L — SIGNIFICANT CHANGE UP (ref 135–145)

## 2024-12-10 PROCEDURE — 99231 SBSQ HOSP IP/OBS SF/LOW 25: CPT

## 2024-12-10 PROCEDURE — 99232 SBSQ HOSP IP/OBS MODERATE 35: CPT | Mod: GC

## 2024-12-10 PROCEDURE — 71045 X-RAY EXAM CHEST 1 VIEW: CPT | Mod: 26

## 2024-12-10 RX ORDER — AMLODIPINE BESYLATE 10 MG/1
2.5 TABLET ORAL ONCE
Refills: 0 | Status: COMPLETED | OUTPATIENT
Start: 2024-12-10 | End: 2024-12-10

## 2024-12-10 RX ORDER — AMLODIPINE BESYLATE 10 MG/1
5 TABLET ORAL EVERY 24 HOURS
Refills: 0 | Status: DISCONTINUED | OUTPATIENT
Start: 2024-12-11 | End: 2024-12-15

## 2024-12-10 RX ORDER — ACETAMINOPHEN, DIPHENHYDRAMINE HCL, PHENYLEPHRINE HCL 325; 25; 5 MG/1; MG/1; MG/1
5 TABLET ORAL AT BEDTIME
Refills: 0 | Status: DISCONTINUED | OUTPATIENT
Start: 2024-12-10 | End: 2024-12-15

## 2024-12-10 RX ADMIN — ESCITALOPRAM OXALATE 5 MILLIGRAM(S): 10 TABLET, FILM COATED ORAL at 11:04

## 2024-12-10 RX ADMIN — ACETAMINOPHEN 500MG 650 MILLIGRAM(S): 500 TABLET, COATED ORAL at 09:39

## 2024-12-10 RX ADMIN — Medication 1 TABLET(S): at 11:04

## 2024-12-10 RX ADMIN — METOPROLOL TARTRATE 25 MILLIGRAM(S): 100 TABLET, FILM COATED ORAL at 21:44

## 2024-12-10 RX ADMIN — GABAPENTIN 100 MILLIGRAM(S): 300 CAPSULE ORAL at 13:05

## 2024-12-10 RX ADMIN — METOPROLOL TARTRATE 25 MILLIGRAM(S): 100 TABLET, FILM COATED ORAL at 05:08

## 2024-12-10 RX ADMIN — Medication 0.25 MILLIGRAM(S): at 21:45

## 2024-12-10 RX ADMIN — CHLORHEXIDINE GLUCONATE 1 APPLICATION(S): 1.2 RINSE ORAL at 05:08

## 2024-12-10 RX ADMIN — AMLODIPINE BESYLATE 2.5 MILLIGRAM(S): 10 TABLET ORAL at 06:42

## 2024-12-10 RX ADMIN — GABAPENTIN 100 MILLIGRAM(S): 300 CAPSULE ORAL at 05:08

## 2024-12-10 RX ADMIN — OXYCODONE HYDROCHLORIDE 2.5 MILLIGRAM(S): 30 TABLET ORAL at 13:35

## 2024-12-10 RX ADMIN — METOPROLOL TARTRATE 25 MILLIGRAM(S): 100 TABLET, FILM COATED ORAL at 13:05

## 2024-12-10 RX ADMIN — OXYCODONE HYDROCHLORIDE 2.5 MILLIGRAM(S): 30 TABLET ORAL at 14:05

## 2024-12-10 RX ADMIN — ENOXAPARIN SODIUM 30 MILLIGRAM(S): 30 INJECTION SUBCUTANEOUS at 13:06

## 2024-12-10 RX ADMIN — ACETAMINOPHEN, DIPHENHYDRAMINE HCL, PHENYLEPHRINE HCL 5 MILLIGRAM(S): 325; 25; 5 TABLET ORAL at 21:45

## 2024-12-10 RX ADMIN — SODIUM CHLORIDE 1 GRAM(S): 9 INJECTION, SOLUTION INTRAMUSCULAR; INTRAVENOUS; SUBCUTANEOUS at 05:08

## 2024-12-10 RX ADMIN — LOSARTAN POTASSIUM 50 MILLIGRAM(S): 100 TABLET, FILM COATED ORAL at 05:07

## 2024-12-10 RX ADMIN — EPLERENONE 25 MILLIGRAM(S): 25 TABLET ORAL at 11:04

## 2024-12-10 RX ADMIN — ACETAMINOPHEN 500MG 650 MILLIGRAM(S): 500 TABLET, COATED ORAL at 09:09

## 2024-12-10 RX ADMIN — Medication 2 TABLET(S): at 21:44

## 2024-12-10 RX ADMIN — FUROSEMIDE 20 MILLIGRAM(S): 40 TABLET ORAL at 05:07

## 2024-12-10 RX ADMIN — AMLODIPINE BESYLATE 2.5 MILLIGRAM(S): 10 TABLET ORAL at 09:09

## 2024-12-10 RX ADMIN — Medication 81 MILLIGRAM(S): at 11:04

## 2024-12-10 RX ADMIN — GABAPENTIN 100 MILLIGRAM(S): 300 CAPSULE ORAL at 21:45

## 2024-12-10 RX ADMIN — Medication 12.5 MILLIGRAM(S): at 21:45

## 2024-12-10 NOTE — PROGRESS NOTE ADULT - SUBJECTIVE AND OBJECTIVE BOX
Orthopedic Surgery Progress Note     S: Patient seen and examined today. No acute events overnight. Pain is well controlled. Denies f/c, chest pain, shortness of breath, dizziness. Proximal RLE ACE Wrap removed and replaced due to soiling.     MEDICATIONS  (STANDING):  amLODIPine   Tablet 2.5 milliGRAM(s) Oral every 24 hours  chlorhexidine 2% Cloths 1 Application(s) Topical <User Schedule>  enoxaparin Injectable 30 milliGRAM(s) SubCutaneous every 24 hours  escitalopram 5 milliGRAM(s) Oral daily  gabapentin 100 milliGRAM(s) Oral every 8 hours  losartan 50 milliGRAM(s) Oral daily  metoprolol tartrate 25 milliGRAM(s) Oral every 8 hours  multivitamin 1 Tablet(s) Oral daily  polyethylene glycol 3350 17 Gram(s) Oral daily  senna 2 Tablet(s) Oral at bedtime  sodium chloride 1 Gram(s) Oral three times a day  sodium chloride 3%  Inhalation 4 milliLiter(s) Inhalation every 12 hours    MEDICATIONS  (PRN):  acetaminophen     Tablet .. 650 milliGRAM(s) Oral every 6 hours PRN Mild Pain (1 - 3)  albuterol/ipratropium for Nebulization 3 milliLiter(s) Nebulizer every 6 hours PRN Respiratory Distress  ALPRAZolam 0.25 milliGRAM(s) Oral three times a day PRN anxiety  diazepam  Injectable 2.5 milliGRAM(s) IV Push every 6 hours PRN back spasm  oxyCODONE    IR 2.5 milliGRAM(s) Oral every 4 hours PRN Moderate Pain (4 - 6)      Vital Signs (24 Hrs):  T(C): 36.7 (12-09-24 @ 19:00), Max: 36.9 (12-09-24 @ 15:00)  HR: 94 (12-09-24 @ 22:00) (78 - 108)  BP: 174/74 (12-09-24 @ 22:00) (126/58 - 174/74)  RR: 19 (12-09-24 @ 22:00) (12 - 20)  SpO2: 96% (12-09-24 @ 22:00) (96% - 100%)  Wt(kg): --    LABS:                          8.7    12.44 )-----------( 238      ( 09 Dec 2024 23:46 )             26.9     12-09    136  |  93[L]  |  12  ----------------------------<  110[H]  4.9   |  36[H]  |  <0.30[L]    Ca    8.5      09 Dec 2024 23:46  Phos  2.6     12-09  Mg     2.6     12-09    TPro  5.2[L]  /  Alb  3.0[L]  /  TBili  1.2  /  DBili  x   /  AST  23  /  ALT  <5[L]  /  AlkPhos  79  12-09    LIVER FUNCTIONS - ( 09 Dec 2024 23:46 )  Alb: 3.0 g/dL / Pro: 5.2 g/dL / ALK PHOS: 79 U/L / ALT: <5 U/L / AST: 23 U/L / GGT: x             Physical Exam:  Gen: NAD  LUE in SAC - grossly moving all digits, brisk capillary refill present, compartments soft and compressible  RUE in Volar Splint - grossly moving all digits, brisk capillary refill present, compartments soft and compressible  BLLE in Long Leg Sugar Tong Splints - grossly moving all digits, brisk capillary refill present, compartments soft and compressible

## 2024-12-10 NOTE — PROGRESS NOTE ADULT - ASSESSMENT
54y f s/p SAC LUE, Volar Splint LIZBETH WU Sugar Tong Splints on 12/4/2024 in OR.    Plan:  -PT/OT  -VALDEMAR DARBY, VALDEMAR ESCAMILLA with elbow ROM allowed   -Pain Control  -DVT ppx: discussion had with outpatient cardiologist Dr. Hinojosa with Dr. Luna medicine attending, no contraindication to AC, OK for Lovenox based on dosing per SICU  -Primary and SICU management appreciated  -Rest, ice, compress and elevate the extremity as needed  -Incentive Spirometry  -Dispo per PT tera Melendez, PGY-2  Orthopedic Surgery    [facility] [place] [id number]  [facility]: [id number]  [facility]:  [id number]/ [phone]

## 2024-12-10 NOTE — PROGRESS NOTE ADULT - SUBJECTIVE AND OBJECTIVE BOX
24 HOUR EVENTS:  Interval/Overnight Events:     - start lasix 20 po qd and eplerenone 25mg po qd per cards rec  - f/u PT!  - pt wants to use her memory foam mattress --> approved    PM  -     NEURO  RASS (if intubated): 		CAM ICU (if concern for delirium):  Exam:   Meds: acetaminophen     Tablet .. 650 milliGRAM(s) Oral every 6 hours PRN Mild Pain (1 - 3)  ALPRAZolam 0.25 milliGRAM(s) Oral three times a day PRN anxiety  diazepam  Injectable 2.5 milliGRAM(s) IV Push every 6 hours PRN back spasm  escitalopram 5 milliGRAM(s) Oral daily  gabapentin Solution 100 milliGRAM(s) Oral three times a day  oxyCODONE    IR 2.5 milliGRAM(s) Oral every 4 hours PRN Moderate Pain (4 - 6)    RESPIRATORY  RR: 19 (12-09-24 @ 22:00) (12 - 20)  SpO2: 96% (12-09-24 @ 22:00) (96% - 100%)  Wt(kg): --  Exam: Lungs CTA b/l  Mechanical Ventilation:     Meds: albuterol/ipratropium for Nebulization 3 milliLiter(s) Nebulizer every 6 hours PRN Respiratory Distress    CARDIOVASCULAR  HR: 94 (12-09-24 @ 22:00) (78 - 108)  BP: 174/74 (12-09-24 @ 22:00) (126/58 - 174/74)  BP(mean): 107 (12-09-24 @ 22:00) (83 - 109)  ABP: --  ABP(mean): --  Wt(kg): --  CVP(cm H2O): --    Exam: Normal S1/S2 w/o murmurs or rubs  Cardiac Rhythm:   Perfusion     [ ]Adequate   [ ]Inadequate  Mentation   [ ]Normal       [ ]Reduced  Extremities  [ ]Warm         [ ]Cool  Volume Status [ ]Hypervolemic [ ]Euvolemic [ ]Hypovolemic  Meds: amLODIPine   Tablet 2.5 milliGRAM(s) Oral every 24 hours  eplerenone 25 milliGRAM(s) Oral every 24 hours  furosemide    Tablet 20 milliGRAM(s) Oral daily  losartan 50 milliGRAM(s) Oral daily  metoprolol tartrate 25 milliGRAM(s) Oral every 8 hours    GI/NUTRITION  Exam:   Diet:   Last Bowel Movement: 09-Dec-2024 (12-09-24 @ 19:00)  Last Bowel Movement: 08-Dec-2024 (12-09-24 @ 07:00)  Last Bowel Movement: 08-Dec-2024 (12-08-24 @ 19:00)  Last Bowel Movement: 07-Dec-2024 (12-08-24 @ 07:00)  Last Bowel Movement: 07-Dec-2024 (12-07-24 @ 19:00)  Last Bowel Movement: 07-Dec-2024 (12-07-24 @ 07:00)  Last Bowel Movement: 06-Dec-2024 (12-06-24 @ 19:00)    acetaminophen     Tablet .. 650 milliGRAM(s) Oral every 6 hours PRN Mild Pain (1 - 3)  ALPRAZolam 0.25 milliGRAM(s) Oral three times a day PRN anxiety  diazepam  Injectable 2.5 milliGRAM(s) IV Push every 6 hours PRN back spasm  escitalopram 5 milliGRAM(s) Oral daily  gabapentin Solution 100 milliGRAM(s) Oral three times a day  oxyCODONE    IR 2.5 milliGRAM(s) Oral every 4 hours PRN Moderate Pain (4 - 6)    Meds: polyethylene glycol 3350 17 Gram(s) Oral daily  senna 2 Tablet(s) Oral at bedtime    GENITOURINARY  I&O's Detail    12-08 @ 07:01  -  12-09 @ 07:00  --------------------------------------------------------  IN:    IV PiggyBack: 500 mL  Total IN: 500 mL    OUT:    Chest Tube (mL): 80 mL    Voided (mL): 1050 mL  Total OUT: 1130 mL  Total NET: -630 mL    12-09 @ 07:01  -  12-10 @ 00:13  --------------------------------------------------------  IN:    IV PiggyBack: 100 mL    Oral Fluid: 550 mL  Total IN: 650 mL  OUT:    Voided (mL): 650 mL  Total OUT: 650 mL  Total NET: 0 mL    12-08    132[L]  |  90[L]  |  16  ----------------------------<  109[H]  3.5   |  32[H]  |  0.32[L]    Ca    8.1[L]      08 Dec 2024 22:27  Phos  3.3     12-08  Mg     1.9     12-08    TPro  5.2[L]  /  Alb  3.0[L]  /  TBili  1.2  /  DBili  x   /  AST  27  /  ALT  7[L]  /  AlkPhos  76  12-08    Meds: multivitamin 1 Tablet(s) Oral daily  sodium chloride 1 Gram(s) Oral three times a day    HEMATOLOGIC  Meds: enoxaparin Injectable 30 milliGRAM(s) SubCutaneous every 24 hours                       8.7    12.44 )-----------( x        ( 09 Dec 2024 23:46 )             26.9     INFECTIOUS DISEASES  T(C): 36.7 (12-09-24 @ 19:00), Max: 36.9 (12-09-24 @ 15:00)  Wt(kg): --  WBC Count: 12.44 K/uL (12-09 @ 23:46)    Recent Cultures:    Meds:     ENDOCRINE  Capillary Blood Glucose    Meds:     ACCESS DEVICES:  [ ] Peripheral IV  [ ] Central Venous Line		[ ] R	[ ] L	[ ] IJ	[ ] Fem	[ ] SC	Placed:   [ ] Arterial Line			[ ] R	[ ] L	[ ] Fem	[ ] Rad	[ ] Ax	Placed:   [ ] PICC:					[ ] Mediport  [ ] Urinary Catheter, Date Placed:   [ ] Necessity of urinary, arterial, and venous catheters discussed    OTHER MEDICATIONS:  chlorhexidine 2% Cloths 1 Application(s) Topical <User Schedule>      IMAGING:
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
## 2024-12-10 NOTE — PROGRESS NOTE ADULT - ATTENDING COMMENTS
Dr. Maharaj (Attending Physician)  Memory foam mattress placed on bed at patients request with improved pain.  Valium prn for muscle spasm. on home xanax dose  Delirium start melatonin and seroquel for sleep.  Acute resp insufficiency on 1 L NC with right sided Rib Fractures  Eplerenone and lasix started daily, dc salt tabs as hyponatremia resolved with diuresis  lovenox for dvt ppx

## 2024-12-10 NOTE — DIETITIAN NUTRITION RISK NOTIFICATION - TREATMENT: THE FOLLOWING DIET HAS BEEN RECOMMENDED
Diet, Easy to Chew:   1000mL Fluid Restriction ([id number])  Supplement Feeding Modality:  Oral  Ensure Plus High Protein Cans or Servings Per Day:  1       Frequency:  Daily (12-08-24 @ 10:43) [Active]
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
## 2024-12-10 NOTE — PROGRESS NOTE ADULT - SUBJECTIVE AND OBJECTIVE BOX
SURGERY DAILY PROGRESS NOTE:     Interval/Overnight Events:     - start lasix 20 po qd and eplerenone 25mg po qd per cards rec  - f/u PT!  - pt wants to use her memory foam mattress --> approved    SUBJECTIVE/ROS: Patient seen and evaluated on AM rounds.       OBJECTIVE:    Vital Signs Last 24 Hrs  T(C): 36.8 (10 Dec 2024 05:00), Max: 36.9 (09 Dec 2024 15:00)  T(F): 98.3 (10 Dec 2024 05:00), Max: 98.5 (09 Dec 2024 15:00)  HR: 83 (10 Dec 2024 07:00) (79 - 108)  BP: 162/67 (10 Dec 2024 07:00) (126/58 - 174/74)  BP(mean): 96 (10 Dec 2024 07:00) (83 - 111)  RR: 19 (10 Dec 2024 07:00) (13 - 22)  SpO2: 97% (10 Dec 2024 07:00) (96% - 100%)    Parameters below as of 10 Dec 2024 07:00  Patient On (Oxygen Delivery Method): nasal cannula  O2 Flow (L/min): 1    I&O's Detail    09 Dec 2024 07:01  -  10 Dec 2024 07:00  --------------------------------------------------------  IN:    IV PiggyBack: 100 mL    Oral Fluid: 850 mL  Total IN: 950 mL    OUT:    Voided (mL): 900 mL  Total OUT: 900 mL    Total NET: 50 mL        Daily     Daily   MEDICATIONS  (STANDING):  amLODIPine   Tablet 2.5 milliGRAM(s) Oral every 24 hours  chlorhexidine 2% Cloths 1 Application(s) Topical <User Schedule>  enoxaparin Injectable 30 milliGRAM(s) SubCutaneous every 24 hours  eplerenone 25 milliGRAM(s) Oral every 24 hours  escitalopram 5 milliGRAM(s) Oral daily  furosemide    Tablet 20 milliGRAM(s) Oral daily  gabapentin Solution 100 milliGRAM(s) Oral three times a day  losartan 50 milliGRAM(s) Oral daily  metoprolol tartrate 25 milliGRAM(s) Oral every 8 hours  multivitamin 1 Tablet(s) Oral daily  polyethylene glycol 3350 17 Gram(s) Oral daily  senna 2 Tablet(s) Oral at bedtime  sodium chloride 1 Gram(s) Oral three times a day    MEDICATIONS  (PRN):  acetaminophen     Tablet .. 650 milliGRAM(s) Oral every 6 hours PRN Mild Pain (1 - 3)  albuterol/ipratropium for Nebulization 3 milliLiter(s) Nebulizer every 6 hours PRN Respiratory Distress  ALPRAZolam 0.25 milliGRAM(s) Oral three times a day PRN anxiety  diazepam  Injectable 2.5 milliGRAM(s) IV Push every 6 hours PRN back spasm  oxyCODONE    IR 2.5 milliGRAM(s) Oral every 4 hours PRN Moderate Pain (4 - 6)      LABS:                        8.7    12.44 )-----------( 238      ( 09 Dec 2024 23:46 )             26.9     12-09    136  |  93[L]  |  12  ----------------------------<  110[H]  4.9   |  36[H]  |  <0.30[L]    Ca    8.5      09 Dec 2024 23:46  Phos  2.6     12-09  Mg     2.6     12-09    TPro  5.2[L]  /  Alb  3.0[L]  /  TBili  1.2  /  DBili  x   /  AST  23  /  ALT  <5[L]  /  AlkPhos  79  12-09      Urinalysis Basic - ( 09 Dec 2024 23:46 )    Color: x / Appearance: x / SG: x / pH: x  Gluc: 110 mg/dL / Ketone: x  / Bili: x / Urobili: x   Blood: x / Protein: x / Nitrite: x   Leuk Esterase: x / RBC: x / WBC x   Sq Epi: x / Non Sq Epi: x / Bacteria: x          Physical Exam:  Gen: NAD  LUE in SAC - grossly moving all digits, brisk capillary refill present, compartments soft and compressible  RUE in Volar Splint - grossly moving all digits, brisk capillary refill present, compartments soft and compressible  BLLE in Long Leg Sugar Tong Splints - grossly moving all digits, brisk capillary refill present, compartments soft and compressible
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
## 2024-12-11 LAB
ALBUMIN SERPL ELPH-MCNC: 3 G/DL — LOW (ref 3.3–5)
ALP SERPL-CCNC: 79 U/L — SIGNIFICANT CHANGE UP (ref 40–120)
ALT FLD-CCNC: 5 U/L — LOW (ref 10–45)
ANION GAP SERPL CALC-SCNC: 5 MMOL/L — SIGNIFICANT CHANGE UP (ref 5–17)
AST SERPL-CCNC: 20 U/L — SIGNIFICANT CHANGE UP (ref 10–40)
BILIRUB SERPL-MCNC: 1.1 MG/DL — SIGNIFICANT CHANGE UP (ref 0.2–1.2)
BUN SERPL-MCNC: 11 MG/DL — SIGNIFICANT CHANGE UP (ref 7–23)
CALCIUM SERPL-MCNC: 9 MG/DL — SIGNIFICANT CHANGE UP (ref 8.4–10.5)
CHLORIDE SERPL-SCNC: 95 MMOL/L — LOW (ref 96–108)
CO2 SERPL-SCNC: 37 MMOL/L — HIGH (ref 22–31)
CREAT SERPL-MCNC: 0.33 MG/DL — LOW (ref 0.5–1.3)
EGFR: 122 ML/MIN/1.73M2 — SIGNIFICANT CHANGE UP
GLUCOSE SERPL-MCNC: 95 MG/DL — SIGNIFICANT CHANGE UP (ref 70–99)
HCT VFR BLD CALC: 27.4 % — LOW (ref 34.5–45)
HGB BLD-MCNC: 8.6 G/DL — LOW (ref 11.5–15.5)
MAGNESIUM SERPL-MCNC: 2.1 MG/DL — SIGNIFICANT CHANGE UP (ref 1.6–2.6)
MCHC RBC-ENTMCNC: 29.5 PG — SIGNIFICANT CHANGE UP (ref 27–34)
MCHC RBC-ENTMCNC: 31.4 G/DL — LOW (ref 32–36)
MCV RBC AUTO: 93.8 FL — SIGNIFICANT CHANGE UP (ref 80–100)
NRBC # BLD: 0 /100 WBCS — SIGNIFICANT CHANGE UP (ref 0–0)
PHOSPHATE SERPL-MCNC: 3.1 MG/DL — SIGNIFICANT CHANGE UP (ref 2.5–4.5)
PLATELET # BLD AUTO: 292 K/UL — SIGNIFICANT CHANGE UP (ref 150–400)
POTASSIUM SERPL-MCNC: 4.6 MMOL/L — SIGNIFICANT CHANGE UP (ref 3.5–5.3)
POTASSIUM SERPL-SCNC: 4.6 MMOL/L — SIGNIFICANT CHANGE UP (ref 3.5–5.3)
PROT SERPL-MCNC: 5.3 G/DL — LOW (ref 6–8.3)
RBC # BLD: 2.92 M/UL — LOW (ref 3.8–5.2)
RBC # FLD: 14.5 % — SIGNIFICANT CHANGE UP (ref 10.3–14.5)
SODIUM SERPL-SCNC: 137 MMOL/L — SIGNIFICANT CHANGE UP (ref 135–145)
WBC # BLD: 10.28 K/UL — SIGNIFICANT CHANGE UP (ref 3.8–10.5)
WBC # FLD AUTO: 10.28 K/UL — SIGNIFICANT CHANGE UP (ref 3.8–10.5)

## 2024-12-11 PROCEDURE — 71045 X-RAY EXAM CHEST 1 VIEW: CPT | Mod: 26

## 2024-12-11 PROCEDURE — 99231 SBSQ HOSP IP/OBS SF/LOW 25: CPT

## 2024-12-11 PROCEDURE — 99222 1ST HOSP IP/OBS MODERATE 55: CPT

## 2024-12-11 RX ADMIN — ACETAMINOPHEN 500MG 650 MILLIGRAM(S): 500 TABLET, COATED ORAL at 12:43

## 2024-12-11 RX ADMIN — METOPROLOL TARTRATE 25 MILLIGRAM(S): 100 TABLET, FILM COATED ORAL at 21:24

## 2024-12-11 RX ADMIN — GABAPENTIN 100 MILLIGRAM(S): 300 CAPSULE ORAL at 14:48

## 2024-12-11 RX ADMIN — GABAPENTIN 100 MILLIGRAM(S): 300 CAPSULE ORAL at 21:24

## 2024-12-11 RX ADMIN — Medication 0.25 MILLIGRAM(S): at 20:39

## 2024-12-11 RX ADMIN — METOPROLOL TARTRATE 25 MILLIGRAM(S): 100 TABLET, FILM COATED ORAL at 14:47

## 2024-12-11 RX ADMIN — Medication 81 MILLIGRAM(S): at 11:30

## 2024-12-11 RX ADMIN — ESCITALOPRAM OXALATE 5 MILLIGRAM(S): 10 TABLET, FILM COATED ORAL at 11:30

## 2024-12-11 RX ADMIN — LOSARTAN POTASSIUM 50 MILLIGRAM(S): 100 TABLET, FILM COATED ORAL at 05:03

## 2024-12-11 RX ADMIN — CHLORHEXIDINE GLUCONATE 1 APPLICATION(S): 1.2 RINSE ORAL at 05:03

## 2024-12-11 RX ADMIN — ACETAMINOPHEN 500MG 650 MILLIGRAM(S): 500 TABLET, COATED ORAL at 12:13

## 2024-12-11 RX ADMIN — ENOXAPARIN SODIUM 30 MILLIGRAM(S): 30 INJECTION SUBCUTANEOUS at 14:47

## 2024-12-11 RX ADMIN — ACETAMINOPHEN, DIPHENHYDRAMINE HCL, PHENYLEPHRINE HCL 5 MILLIGRAM(S): 325; 25; 5 TABLET ORAL at 21:24

## 2024-12-11 RX ADMIN — METOPROLOL TARTRATE 25 MILLIGRAM(S): 100 TABLET, FILM COATED ORAL at 05:03

## 2024-12-11 RX ADMIN — Medication 12.5 MILLIGRAM(S): at 21:24

## 2024-12-11 RX ADMIN — EPLERENONE 25 MILLIGRAM(S): 25 TABLET ORAL at 11:30

## 2024-12-11 RX ADMIN — Medication 1 TABLET(S): at 11:30

## 2024-12-11 RX ADMIN — AMLODIPINE BESYLATE 5 MILLIGRAM(S): 10 TABLET ORAL at 05:03

## 2024-12-11 RX ADMIN — FUROSEMIDE 20 MILLIGRAM(S): 40 TABLET ORAL at 05:03
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
## 2024-12-11 NOTE — CONSULT NOTE ADULT - CONSULT REQUESTED DATE/TIME
03-Dec-2024 18:49
11-Dec-2024 09:18
03-Dec-2024 17:32
03-Dec-2024
03-Dec-2024 16:36
03-Dec-2024 19:35
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
## 2024-12-11 NOTE — PROGRESS NOTE ADULT - SUBJECTIVE AND OBJECTIVE BOX
ORTHOPAEDIC PROGRESS NOTE    SUBJECTIVE:  Pt seen and examined at bedside this am.  Doing well.  No acute events overnight.  Pt states pain is well controlled. Counseled on range of motion at the right hand.     OBJECTIVE:  Vital Signs Last 24 Hrs  T(C): 36.8 (11 Dec 2024 05:00), Max: 36.9 (10 Dec 2024 15:00)  T(F): 98.3 (11 Dec 2024 05:00), Max: 98.5 (10 Dec 2024 15:00)  HR: 87 (11 Dec 2024 07:00) (79 - 110)  BP: 125/59 (11 Dec 2024 07:00) (112/58 - 172/71)  BP(mean): 85 (11 Dec 2024 07:00) (82 - 111)  RR: 16 (11 Dec 2024 07:00) (14 - 27)  SpO2: 98% (11 Dec 2024 07:00) (94% - 99%)    Parameters below as of 11 Dec 2024 07:00  Patient On (Oxygen Delivery Method): nasal cannula  O2 Flow (L/min): 1      Physical Exam:  Gen: NAD  LUE in SAC - grossly moving all digits, brisk capillary refill present, compartments soft and compressible  RUE in Volar Splint - grossly moving all digits, brisk capillary refill present, compartments soft and compressible  BLLE in Long Leg Sugar Tong Splints - grossly moving all digits, brisk capillary refill present, compartments soft and compressible    LABS                        8.6    10.28 )-----------( 292      ( 11 Dec 2024 05:05 )             27.4       12-11    137  |  95[L]  |  11  ----------------------------<  95  4.6   |  37[H]  |  0.33[L]    Ca    9.0      11 Dec 2024 05:05  Phos  3.1     12-11  Mg     2.1     12-11    TPro  5.3[L]  /  Alb  3.0[L]  /  TBili  1.1  /  DBili  x   /  AST  20  /  ALT  5[L]  /  AlkPhos  79  12-11          I&O's Summary    10 Dec 2024 07:01  -  11 Dec 2024 07:00  --------------------------------------------------------  IN: 886 mL / OUT: 1100 mL / NET: -214 mL        acetaminophen     Tablet .. 650 milliGRAM(s) Oral every 6 hours PRN  albuterol/ipratropium for Nebulization 3 milliLiter(s) Nebulizer every 6 hours PRN  ALPRAZolam 0.25 milliGRAM(s) Oral three times a day PRN  amLODIPine   Tablet 5 milliGRAM(s) Oral every 24 hours  aspirin enteric coated 81 milliGRAM(s) Oral daily  chlorhexidine 2% Cloths 1 Application(s) Topical <User Schedule>  diazepam  Injectable 2.5 milliGRAM(s) IV Push every 6 hours PRN  enoxaparin Injectable 30 milliGRAM(s) SubCutaneous every 24 hours  eplerenone 25 milliGRAM(s) Oral every 24 hours  escitalopram 5 milliGRAM(s) Oral daily  furosemide    Tablet 20 milliGRAM(s) Oral daily  gabapentin Solution 100 milliGRAM(s) Oral three times a day  losartan 50 milliGRAM(s) Oral daily  melatonin 5 milliGRAM(s) Oral at bedtime  metoprolol tartrate 25 milliGRAM(s) Oral every 8 hours  multivitamin 1 Tablet(s) Oral daily  oxyCODONE    IR 2.5 milliGRAM(s) Oral every 4 hours PRN  polyethylene glycol 3350 17 Gram(s) Oral daily  QUEtiapine 12.5 milliGRAM(s) Oral at bedtime  senna 2 Tablet(s) Oral at bedtime      Assessment  54y f s/p SAC LUE, Volar Splint LIZBETH WU Sugar Tong Splints on 12/4/2024 in OR.    Plan:  -PT/OT  -NWB LUE, NWB LIZBETH, NWB LUE with elbow ROM allowed   -Pain Control  -DVT ppx: discussion had with outpatient cardiologist Dr. Hinojosa with Dr. Luna medicine attending, no contraindication to AC, OK for Lovenox based on dosing per SICU  -Primary and SICU management appreciated  -Rest, ice, compress and elevate the extremity as needed  -Incentive Spirometry  -Dispo per PT tera Wheeler MD   NS/RENETTA Orthopaedic Surgery Resident PGY1    For any questions, please DO NOT reach out via Teams.  RENETTA [id number]  [facility] [id number]  [facility] [phone]

## 2024-12-11 NOTE — PROGRESS NOTE ADULT - SUBJECTIVE AND OBJECTIVE BOX
24 HOUR EVENTS:  - sat up with PT yesterday  -melatonin QHS  -seroquel 12.5 QHS  -DC salt tabs  -restart home ASA   -amlodipine increased to 5mg qd  -pmr consulted    NEURO  Exam: A&O x 4  Meds: acetaminophen     Tablet .. 650 milliGRAM(s) Oral every 6 hours PRN Mild Pain (1 - 3)  ALPRAZolam 0.25 milliGRAM(s) Oral three times a day PRN anxiety  diazepam  Injectable 2.5 milliGRAM(s) IV Push every 6 hours PRN back spasm  escitalopram 5 milliGRAM(s) Oral daily  gabapentin Solution 100 milliGRAM(s) Oral three times a day  melatonin 5 milliGRAM(s) Oral at bedtime  oxyCODONE    IR 2.5 milliGRAM(s) Oral every 4 hours PRN Moderate Pain (4 - 6)  QUEtiapine 12.5 milliGRAM(s) Oral at bedtime      RESPIRATORY  RR: 15 (12-11-24 @ 02:00) (15 - 27)  SpO2: 98% (12-11-24 @ 02:00) (94% - 99%)  Exam: unlabored    Meds: albuterol/ipratropium for Nebulization 3 milliLiter(s) Nebulizer every 6 hours PRN Respiratory Distress      CARDIOVASCULAR  HR: 94 (12-11-24 @ 02:00) (79 - 110)  BP: 144/65 (12-11-24 @ 02:00) (140/63 - 169/77)  BP(mean): 94 (12-11-24 @ 02:00) (90 - 111)    Exam:   Cardiac Rhythm:   Perfusion     [x]Adequate   [ ]Inadequate  Mentation   [x ]Normal       [ ]Reduced  Extremities  [x ]Warm         [ ]Cool  Volume Status [ ]Hypervolemic [x ]Euvolemic [ ]Hypovolemic  Meds: amLODIPine   Tablet 5 milliGRAM(s) Oral every 24 hours  eplerenone 25 milliGRAM(s) Oral every 24 hours  furosemide    Tablet 20 milliGRAM(s) Oral daily  losartan 50 milliGRAM(s) Oral daily  metoprolol tartrate 25 milliGRAM(s) Oral every 8 hours      GI/NUTRITION  Exam: soft, NT/ND  Diet: regular diet  Meds: polyethylene glycol 3350 17 Gram(s) Oral daily  senna 2 Tablet(s) Oral at bedtime      GENITOURINARY  I&O's Detail    12-09 @ 07:01  -  12-10 @ 07:00  --------------------------------------------------------  IN:    IV PiggyBack: 100 mL    Oral Fluid: 850 mL  Total IN: 950 mL    OUT:    Voided (mL): 900 mL  Total OUT: 900 mL    Total NET: 50 mL      12-10 @ 07:01  -  12-11 @ 02:18  --------------------------------------------------------  IN:    Oral Fluid: 786 mL  Total IN: 786 mL    OUT:    Voided (mL): 550 mL  Total OUT: 550 mL    Total NET: 236 mL          12-09    136  |  93[L]  |  12  ----------------------------<  110[H]  4.9   |  36[H]  |  <0.30[L]    Ca    8.5      09 Dec 2024 23:46  Phos  2.6     12-09  Mg     2.6     12-09    TPro  5.2[L]  /  Alb  3.0[L]  /  TBili  1.2  /  DBili  x   /  AST  23  /  ALT  <5[L]  /  AlkPhos  79  12-09    Meds: multivitamin 1 Tablet(s) Oral daily      HEMATOLOGIC  Meds: aspirin enteric coated 81 milliGRAM(s) Oral daily  enoxaparin Injectable 30 milliGRAM(s) SubCutaneous every 24 hours                          8.7    12.44 )-----------( 238      ( 09 Dec 2024 23:46 )             26.9         INFECTIOUS DISEASES  T(C): 36.8 (12-10-24 @ 22:00), Max: 36.9 (12-10-24 @ 15:00)  Wt(kg): --    Recent Cultures:    Meds:     ENDOCRINE  Capillary Blood Glucose    Meds:     ACCESS DEVICES:  [ x] Peripheral IV  [ ] Central Venous Line		[ ] R	[ ] L	[ ] IJ	[ ] Fem	[ ] SC	Placed:   [ ] Arterial Line			[ ] R	[ ] L	[ ] Fem	[ ] Rad	[ ] Ax	Placed:   [ ] PICC:					[ ] Mediport  [ ] Urinary Catheter, Date Placed:   [ ] Necessity of urinary, arterial, and venous catheters discussed    OTHER MEDICATIONS:  chlorhexidine 2% Cloths 1 Application(s) Topical <User Schedule>      IMAGING:

## 2024-12-11 NOTE — PROGRESS NOTE ADULT - NS ATTEND AMEND GEN_ALL_CORE FT
Dr. Maharaj (Attending Physician)  Improved sleep with melatonin and seroquel  Advised sitting upright as much as possible improved mobilization  Only pulling 500 mL on IS
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
## 2024-12-12 ENCOUNTER — TRANSCRIPTION ENCOUNTER (OUTPATIENT)
Age: 54
End: 2024-12-12

## 2024-12-12 PROCEDURE — 99232 SBSQ HOSP IP/OBS MODERATE 35: CPT | Mod: GC

## 2024-12-12 RX ORDER — OXYCODONE HYDROCHLORIDE 30 MG/1
2.5 TABLET ORAL EVERY 6 HOURS
Refills: 0 | Status: DISCONTINUED | OUTPATIENT
Start: 2024-12-12 | End: 2024-12-15

## 2024-12-12 RX ORDER — OXYCODONE HYDROCHLORIDE 30 MG/1
5 TABLET ORAL EVERY 6 HOURS
Refills: 0 | Status: DISCONTINUED | OUTPATIENT
Start: 2024-12-12 | End: 2024-12-15

## 2024-12-12 RX ORDER — CYCLOBENZAPRINE HCL 10 MG
5 TABLET ORAL THREE TIMES A DAY
Refills: 0 | Status: DISCONTINUED | OUTPATIENT
Start: 2024-12-12 | End: 2024-12-15

## 2024-12-12 RX ADMIN — ACETAMINOPHEN, DIPHENHYDRAMINE HCL, PHENYLEPHRINE HCL 5 MILLIGRAM(S): 325; 25; 5 TABLET ORAL at 21:25

## 2024-12-12 RX ADMIN — ENOXAPARIN SODIUM 30 MILLIGRAM(S): 30 INJECTION SUBCUTANEOUS at 14:21

## 2024-12-12 RX ADMIN — Medication 81 MILLIGRAM(S): at 11:30

## 2024-12-12 RX ADMIN — OXYCODONE HYDROCHLORIDE 5 MILLIGRAM(S): 30 TABLET ORAL at 11:30

## 2024-12-12 RX ADMIN — CHLORHEXIDINE GLUCONATE 1 APPLICATION(S): 1.2 RINSE ORAL at 05:29

## 2024-12-12 RX ADMIN — METOPROLOL TARTRATE 25 MILLIGRAM(S): 100 TABLET, FILM COATED ORAL at 14:25

## 2024-12-12 RX ADMIN — Medication 5 MILLIGRAM(S): at 12:28

## 2024-12-12 RX ADMIN — Medication 5 MILLIGRAM(S): at 19:20

## 2024-12-12 RX ADMIN — METOPROLOL TARTRATE 25 MILLIGRAM(S): 100 TABLET, FILM COATED ORAL at 21:25

## 2024-12-12 RX ADMIN — ACETAMINOPHEN 500MG 650 MILLIGRAM(S): 500 TABLET, COATED ORAL at 04:03

## 2024-12-12 RX ADMIN — AMLODIPINE BESYLATE 5 MILLIGRAM(S): 10 TABLET ORAL at 05:28

## 2024-12-12 RX ADMIN — Medication 0.25 MILLIGRAM(S): at 21:25

## 2024-12-12 RX ADMIN — ESCITALOPRAM OXALATE 5 MILLIGRAM(S): 10 TABLET, FILM COATED ORAL at 11:30

## 2024-12-12 RX ADMIN — ACETAMINOPHEN 500MG 650 MILLIGRAM(S): 500 TABLET, COATED ORAL at 19:20

## 2024-12-12 RX ADMIN — GABAPENTIN 100 MILLIGRAM(S): 300 CAPSULE ORAL at 05:28

## 2024-12-12 RX ADMIN — Medication 1 TABLET(S): at 11:31

## 2024-12-12 RX ADMIN — OXYCODONE HYDROCHLORIDE 5 MILLIGRAM(S): 30 TABLET ORAL at 12:30

## 2024-12-12 RX ADMIN — FUROSEMIDE 20 MILLIGRAM(S): 40 TABLET ORAL at 05:29

## 2024-12-12 RX ADMIN — EPLERENONE 25 MILLIGRAM(S): 25 TABLET ORAL at 11:31

## 2024-12-12 RX ADMIN — ACETAMINOPHEN 500MG 650 MILLIGRAM(S): 500 TABLET, COATED ORAL at 05:03

## 2024-12-12 RX ADMIN — METOPROLOL TARTRATE 25 MILLIGRAM(S): 100 TABLET, FILM COATED ORAL at 05:29

## 2024-12-12 RX ADMIN — LOSARTAN POTASSIUM 50 MILLIGRAM(S): 100 TABLET, FILM COATED ORAL at 05:29
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
## 2024-12-12 NOTE — CHART NOTE - NSCHARTNOTESELECT_GEN_ALL_CORE
Nutrition Services
Orthopedics
Orthopedics Resident/Event Note
Tertiary Survey
post-operative check
Compartment Check/Event Note
Transfer Note
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
## 2024-12-12 NOTE — DISCHARGE NOTE PROVIDER - CARE PROVIDERS DIRECT ADDRESSES
[email or web address]
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
## 2024-12-12 NOTE — CHART NOTE - NSCHARTNOTEFT_GEN_A_CORE
Spoke w patient, who was concerned that now that her edema decreased in her extremities, her cast/splints do not feel as tight. Pt was examined, and LUE cast and RUE, LLE, and RLE splints are appropriate and in place. Pt noted that she has trouble working w PT due to muscle spasms in her back. I spoke with primary team and recommended prn flexeril to work with PT.

## 2024-12-12 NOTE — DISCHARGE NOTE PROVIDER - NSDCACTIVITY_GEN_ALL_CORE
Called [facility] and spoke with Mary Ann, writer asked if it was okay to send the order of the Levothyroxine as long as it's electronically signed by the doctor, writer advised it was and would sent it over, Mary Ann verbalized understanding and had no further questions at this time.     Follow Instructions Provided by your Surgical Team

## 2024-12-12 NOTE — DISCHARGE NOTE PROVIDER - NSDCFUADDINST_GEN_ALL_CORE_FT
Home Medications: Please resume all home meds.   New Medications: Oxycodone for severe pain and flexeril sent to [facility], please  on your way out of the hospital today. Over the counter Tylenol as needed for mild to moderate pain.   Please make f/u appointment with ortho within 1-2 weeks.    Home Medications: Please resume all home meds.   New Medications: Oxycodone for severe pain and flexeril sent to [facility], please  on your way out of the hospital today. Over the counter Tylenol as needed for mild to moderate pain. Aspirin 81 mg twice per day.   Please make f/u appointment with ortho within 1-2 weeks.
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
## 2024-12-12 NOTE — DISCHARGE NOTE PROVIDER - NSDCMRMEDTOKEN_GEN_ALL_CORE_FT
acetaminophen 325 mg oral tablet: 3 tab(s) orally every 6 hours As needed Mild Pain (1 - 3)  Cozaar 50 mg oral tablet: 1 tab(s) orally 2 times a day  gabapentin 100 mg oral capsule: 1 cap(s) orally once a day (at bedtime)  Inspra 25 mg oral tablet: 1 tab(s) orally once a day  Lexapro 5 mg oral tablet: 1 tab(s) orally once a day  methocarbamol 500 mg oral tablet: 1 tab(s) orally 4 times a day as needed for Muscle Spasm  Multiple Vitamins oral tablet: 1 tab(s) orally once a day  Norvasc 2.5 mg oral tablet: 1 tab(s) orally once a day  polyethylene glycol 3350 oral powder for reconstitution: 17 gram(s) orally once a day As needed Constipation  senna leaf extract oral tablet: 1 tab(s) orally once a day (at bedtime)  Ziac 5 mg-6.25 mg oral tablet: 1 tab(s) orally once a day   acetaminophen 325 mg oral tablet: 3 tab(s) orally every 6 hours As needed Mild Pain (1 - 3)  Cozaar 50 mg oral tablet: 1 tab(s) orally 2 times a day  cyclobenzaprine 5 mg oral tablet: 1 tab(s) orally 3 times a day  gabapentin 100 mg oral capsule: 1 cap(s) orally once a day (at bedtime)  Inspra 25 mg oral tablet: 1 tab(s) orally once a day  Lexapro 5 mg oral tablet: 1 tab(s) orally once a day  methocarbamol 500 mg oral tablet: 1 tab(s) orally 4 times a day as needed for Muscle Spasm  Multiple Vitamins oral tablet: 1 tab(s) orally once a day  Norvasc 2.5 mg oral tablet: 1 tab(s) orally once a day  oxyCODONE 5 mg oral tablet: 1 tab(s) orally every 6 hours MDD: 4  polyethylene glycol 3350 oral powder for reconstitution: 17 gram(s) orally once a day As needed Constipation  senna leaf extract oral tablet: 1 tab(s) orally once a day (at bedtime)  Ziac 5 mg-6.25 mg oral tablet: 1 tab(s) orally once a day   acetaminophen 325 mg oral tablet: 3 tab(s) orally every 6 hours As needed Mild Pain (1 - 3)  aspirin 81 mg oral capsule: 1 cap(s) orally 2 times a day  Cozaar 50 mg oral tablet: 1 tab(s) orally 2 times a day  cyclobenzaprine 5 mg oral tablet: 1 tab(s) orally 3 times a day  gabapentin 100 mg oral capsule: 1 cap(s) orally once a day (at bedtime)  Inspra 25 mg oral tablet: 1 tab(s) orally once a day  Lexapro 5 mg oral tablet: 1 tab(s) orally once a day  methocarbamol 500 mg oral tablet: 1 tab(s) orally 4 times a day as needed for Muscle Spasm  Multiple Vitamins oral tablet: 1 tab(s) orally once a day  Norvasc 2.5 mg oral tablet: 1 tab(s) orally once a day  oxyCODONE 5 mg oral tablet: 1 tab(s) orally every 6 hours MDD: 4  polyethylene glycol 3350 oral powder for reconstitution: 17 gram(s) orally once a day As needed Constipation  senna leaf extract oral tablet: 1 tab(s) orally once a day (at bedtime)  Ziac 5 mg-6.25 mg oral tablet: 1 tab(s) orally once a day
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
## 2024-12-12 NOTE — DISCHARGE NOTE PROVIDER - CARE PROVIDER_API CALL
Domenic Newsome  Orthopaedic Surgery  [address]  [place] [address]  Phone: [phone]  Fax: [phone]  Follow Up Time: 2 weeks

## 2024-12-13 RX ORDER — LIDOCAINE 40 MG/G
1 CREAM TOPICAL ONCE
Refills: 0 | Status: COMPLETED | OUTPATIENT
Start: 2024-12-13 | End: 2024-12-13

## 2024-12-13 RX ORDER — LIDOCAINE 40 MG/G
1 CREAM TOPICAL ONCE
Refills: 0 | Status: DISCONTINUED | OUTPATIENT
Start: 2024-12-13 | End: 2024-12-13

## 2024-12-13 RX ADMIN — EPLERENONE 25 MILLIGRAM(S): 25 TABLET ORAL at 11:22

## 2024-12-13 RX ADMIN — METOPROLOL TARTRATE 25 MILLIGRAM(S): 100 TABLET, FILM COATED ORAL at 14:09

## 2024-12-13 RX ADMIN — ENOXAPARIN SODIUM 30 MILLIGRAM(S): 30 INJECTION SUBCUTANEOUS at 14:09

## 2024-12-13 RX ADMIN — OXYCODONE HYDROCHLORIDE 2.5 MILLIGRAM(S): 30 TABLET ORAL at 10:39

## 2024-12-13 RX ADMIN — ACETAMINOPHEN, DIPHENHYDRAMINE HCL, PHENYLEPHRINE HCL 5 MILLIGRAM(S): 325; 25; 5 TABLET ORAL at 21:44

## 2024-12-13 RX ADMIN — METOPROLOL TARTRATE 25 MILLIGRAM(S): 100 TABLET, FILM COATED ORAL at 06:18

## 2024-12-13 RX ADMIN — Medication 81 MILLIGRAM(S): at 11:22

## 2024-12-13 RX ADMIN — FUROSEMIDE 20 MILLIGRAM(S): 40 TABLET ORAL at 06:19

## 2024-12-13 RX ADMIN — METOPROLOL TARTRATE 25 MILLIGRAM(S): 100 TABLET, FILM COATED ORAL at 21:45

## 2024-12-13 RX ADMIN — LIDOCAINE 1 APPLICATION(S): 40 CREAM TOPICAL at 12:35

## 2024-12-13 RX ADMIN — Medication 5 MILLIGRAM(S): at 06:18

## 2024-12-13 RX ADMIN — Medication 2 TABLET(S): at 21:44

## 2024-12-13 RX ADMIN — OXYCODONE HYDROCHLORIDE 2.5 MILLIGRAM(S): 30 TABLET ORAL at 09:39

## 2024-12-13 RX ADMIN — Medication 12.5 MILLIGRAM(S): at 21:45

## 2024-12-13 RX ADMIN — OXYCODONE HYDROCHLORIDE 2.5 MILLIGRAM(S): 30 TABLET ORAL at 18:38

## 2024-12-13 RX ADMIN — ESCITALOPRAM OXALATE 5 MILLIGRAM(S): 10 TABLET, FILM COATED ORAL at 11:22

## 2024-12-13 RX ADMIN — LOSARTAN POTASSIUM 50 MILLIGRAM(S): 100 TABLET, FILM COATED ORAL at 06:18

## 2024-12-13 RX ADMIN — Medication 5 MILLIGRAM(S): at 14:12

## 2024-12-13 RX ADMIN — Medication 1 TABLET(S): at 11:22

## 2024-12-13 RX ADMIN — AMLODIPINE BESYLATE 5 MILLIGRAM(S): 10 TABLET ORAL at 06:18

## 2024-12-13 NOTE — PROGRESS NOTE ADULT - NS ATTEND AMEND GEN_ALL_CORE FT
Patient care and plan discussed and reviewed with Advanced Care Provider. Plan as outlined above edited by me to reflect our discussion. I had a prolonged conversation with the patient/family regarding hospital course, differential diagnosis and results of diagnostic tests.  Plan of care discussed with patient/family after the evaluation. Patient/family express clear understanding and satisfaction with the plan of care.  Thirty five minutes spent on encounter, of which more than fifty percent of the encounter was spent on counseling and/or coordinating care by the attending physician.
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
## 2024-12-14 RX ADMIN — ACETAMINOPHEN, DIPHENHYDRAMINE HCL, PHENYLEPHRINE HCL 5 MILLIGRAM(S): 325; 25; 5 TABLET ORAL at 21:00

## 2024-12-14 RX ADMIN — LOSARTAN POTASSIUM 50 MILLIGRAM(S): 100 TABLET, FILM COATED ORAL at 06:12

## 2024-12-14 RX ADMIN — Medication 12.5 MILLIGRAM(S): at 21:00

## 2024-12-14 RX ADMIN — METOPROLOL TARTRATE 25 MILLIGRAM(S): 100 TABLET, FILM COATED ORAL at 21:00

## 2024-12-14 RX ADMIN — ESCITALOPRAM OXALATE 5 MILLIGRAM(S): 10 TABLET, FILM COATED ORAL at 13:26

## 2024-12-14 RX ADMIN — ENOXAPARIN SODIUM 30 MILLIGRAM(S): 30 INJECTION SUBCUTANEOUS at 13:27

## 2024-12-14 RX ADMIN — OXYCODONE HYDROCHLORIDE 5 MILLIGRAM(S): 30 TABLET ORAL at 08:51

## 2024-12-14 RX ADMIN — Medication 1 TABLET(S): at 13:26

## 2024-12-14 RX ADMIN — Medication 81 MILLIGRAM(S): at 13:25

## 2024-12-14 RX ADMIN — AMLODIPINE BESYLATE 5 MILLIGRAM(S): 10 TABLET ORAL at 06:11

## 2024-12-14 RX ADMIN — CHLORHEXIDINE GLUCONATE 1 APPLICATION(S): 1.2 RINSE ORAL at 06:12

## 2024-12-14 RX ADMIN — OXYCODONE HYDROCHLORIDE 5 MILLIGRAM(S): 30 TABLET ORAL at 07:51

## 2024-12-14 RX ADMIN — Medication 5 MILLIGRAM(S): at 06:20

## 2024-12-14 RX ADMIN — OXYCODONE HYDROCHLORIDE 5 MILLIGRAM(S): 30 TABLET ORAL at 16:03

## 2024-12-14 RX ADMIN — METOPROLOL TARTRATE 25 MILLIGRAM(S): 100 TABLET, FILM COATED ORAL at 06:11

## 2024-12-14 RX ADMIN — METOPROLOL TARTRATE 25 MILLIGRAM(S): 100 TABLET, FILM COATED ORAL at 13:26

## 2024-12-14 RX ADMIN — OXYCODONE HYDROCHLORIDE 5 MILLIGRAM(S): 30 TABLET ORAL at 15:03

## 2024-12-14 RX ADMIN — OXYCODONE HYDROCHLORIDE 5 MILLIGRAM(S): 30 TABLET ORAL at 21:13

## 2024-12-14 RX ADMIN — OXYCODONE HYDROCHLORIDE 5 MILLIGRAM(S): 30 TABLET ORAL at 22:13

## 2024-12-14 RX ADMIN — EPLERENONE 25 MILLIGRAM(S): 25 TABLET ORAL at 13:27

## 2024-12-14 RX ADMIN — Medication 5 MILLIGRAM(S): at 13:26
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
## 2024-12-14 NOTE — PROGRESS NOTE ADULT - NUTRITIONAL ASSESSMENT
This patient has been assessed with a concern for Malnutrition and has been determined to have a diagnosis/diagnoses of Severe protein-calorie malnutrition.    This patient is being managed with:   Diet Easy to Chew-  1000mL Fluid Restriction ([id number])  Supplement Feeding Modality:  Oral  Ensure Plus High Protein Cans or Servings Per Day:  1       Frequency:  Daily  Entered: Dec  8 2024 10:43AM  
This patient has been assessed with a concern for Malnutrition and has been determined to have a diagnosis/diagnoses of Severe protein-calorie malnutrition.    This patient is being managed with:   Diet Easy to Chew-  1000mL Fluid Restriction ([id number])  Supplement Feeding Modality:  Oral  Ensure Plus High Protein Cans or Servings Per Day:  1       Frequency:  Daily  Entered: Dec  8 2024 10:43AM
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
## 2024-12-14 NOTE — PROGRESS NOTE ADULT - SUBJECTIVE AND OBJECTIVE BOX
SURGERY DAILY PROGRESS NOTE        SUBJECTIVE: Patient seen and evaluated on AM rounds. Pt is resting comfortably in bed with no acute complaints.    ------------------------------------------------------------------------------------------------------------  OBJECTIVE:  Vital Signs Last 24 Hrs  T(C): 36.8 (14 Dec 2024 16:24), Max: 37 (14 Dec 2024 08:04)  T(F): 98.3 (14 Dec 2024 16:24), Max: 98.6 (14 Dec 2024 08:04)  HR: 94 (14 Dec 2024 16:24) (89 - 102)  BP: 148/66 (14 Dec 2024 16:24) (132/71 - 160/72)  BP(mean): --  RR: 18 (14 Dec 2024 16:24) (18 - 18)  SpO2: 96% (14 Dec 2024 16:24) (96% - 98%)    Parameters below as of 14 Dec 2024 16:24  Patient On (Oxygen Delivery Method): nasal cannula  O2 Flow (L/min): 1    I&O's Detail    13 Dec 2024 07:01  -  14 Dec 2024 07:00  --------------------------------------------------------  IN:    Oral Fluid: 500 mL  Total IN: 500 mL    OUT:    Voided (mL): 650 mL  Total OUT: 650 mL    Total NET: -150 mL      14 Dec 2024 07:01  -  14 Dec 2024 17:01  --------------------------------------------------------  IN:    Oral Fluid: 350 mL  Total IN: 350 mL    OUT:    Voided (mL): 350 mL  Total OUT: 350 mL    Total NET: 0 mL        Physical Exam:  Gen: NAD  LUE in SAC - grossly moving all digits, brisk capillary refill present, compartments soft and compressible  RUE in Volar Splint - grossly moving all digits, brisk capillary refill present, compartments soft and compressible  BLLE in Long Leg Sugar Tong Splints - grossly moving all digits, brisk capillary refill present, compartments soft and compressible    A/P  -medically stable for discharge  -DC home tomorrow 12/15/24 with home PT, oxy, flexeril  -CM, patient, and  are on board with plan

## 2024-12-15 VITALS — HEART RATE: 100 BPM | SYSTOLIC BLOOD PRESSURE: 146 MMHG | DIASTOLIC BLOOD PRESSURE: 77 MMHG

## 2024-12-15 PROCEDURE — 82570 ASSAY OF URINE CREATININE: CPT

## 2024-12-15 PROCEDURE — 73590 X-RAY EXAM OF LOWER LEG: CPT

## 2024-12-15 PROCEDURE — 86850 RBC ANTIBODY SCREEN: CPT

## 2024-12-15 PROCEDURE — 73110 X-RAY EXAM OF WRIST: CPT

## 2024-12-15 PROCEDURE — 83690 ASSAY OF LIPASE: CPT

## 2024-12-15 PROCEDURE — 83605 ASSAY OF LACTIC ACID: CPT

## 2024-12-15 PROCEDURE — 82330 ASSAY OF CALCIUM: CPT

## 2024-12-15 PROCEDURE — 73610 X-RAY EXAM OF ANKLE: CPT

## 2024-12-15 PROCEDURE — 73700 CT LOWER EXTREMITY W/O DYE: CPT | Mod: MC

## 2024-12-15 PROCEDURE — 84156 ASSAY OF PROTEIN URINE: CPT

## 2024-12-15 PROCEDURE — P9016: CPT

## 2024-12-15 PROCEDURE — 86923 COMPATIBILITY TEST ELECTRIC: CPT

## 2024-12-15 PROCEDURE — 85014 HEMATOCRIT: CPT

## 2024-12-15 PROCEDURE — 85027 COMPLETE CBC AUTOMATED: CPT

## 2024-12-15 PROCEDURE — 97110 THERAPEUTIC EXERCISES: CPT

## 2024-12-15 PROCEDURE — 84295 ASSAY OF SERUM SODIUM: CPT

## 2024-12-15 PROCEDURE — 84540 ASSAY OF URINE/UREA-N: CPT

## 2024-12-15 PROCEDURE — 97112 NEUROMUSCULAR REEDUCATION: CPT

## 2024-12-15 PROCEDURE — 73060 X-RAY EXAM OF HUMERUS: CPT

## 2024-12-15 PROCEDURE — 80307 DRUG TEST PRSMV CHEM ANLYZR: CPT

## 2024-12-15 PROCEDURE — 73070 X-RAY EXAM OF ELBOW: CPT

## 2024-12-15 PROCEDURE — 84300 ASSAY OF URINE SODIUM: CPT

## 2024-12-15 PROCEDURE — 73000 X-RAY EXAM OF COLLAR BONE: CPT

## 2024-12-15 PROCEDURE — 99285 EMERGENCY DEPT VISIT HI MDM: CPT

## 2024-12-15 PROCEDURE — 72170 X-RAY EXAM OF PELVIS: CPT

## 2024-12-15 PROCEDURE — 73200 CT UPPER EXTREMITY W/O DYE: CPT | Mod: MC

## 2024-12-15 PROCEDURE — 96375 TX/PRO/DX INJ NEW DRUG ADDON: CPT

## 2024-12-15 PROCEDURE — 73120 X-RAY EXAM OF HAND: CPT

## 2024-12-15 PROCEDURE — 83930 ASSAY OF BLOOD OSMOLALITY: CPT

## 2024-12-15 PROCEDURE — 82803 BLOOD GASES ANY COMBINATION: CPT

## 2024-12-15 PROCEDURE — 83735 ASSAY OF MAGNESIUM: CPT

## 2024-12-15 PROCEDURE — 84702 CHORIONIC GONADOTROPIN TEST: CPT

## 2024-12-15 PROCEDURE — 97530 THERAPEUTIC ACTIVITIES: CPT

## 2024-12-15 PROCEDURE — 70450 CT HEAD/BRAIN W/O DYE: CPT | Mod: MC

## 2024-12-15 PROCEDURE — 84132 ASSAY OF SERUM POTASSIUM: CPT

## 2024-12-15 PROCEDURE — 86901 BLOOD TYPING SEROLOGIC RH(D): CPT

## 2024-12-15 PROCEDURE — 85018 HEMOGLOBIN: CPT

## 2024-12-15 PROCEDURE — 74177 CT ABD & PELVIS W/CONTRAST: CPT | Mod: MC

## 2024-12-15 PROCEDURE — 73552 X-RAY EXAM OF FEMUR 2/>: CPT

## 2024-12-15 PROCEDURE — 84478 ASSAY OF TRIGLYCERIDES: CPT

## 2024-12-15 PROCEDURE — 85610 PROTHROMBIN TIME: CPT

## 2024-12-15 PROCEDURE — 71260 CT THORAX DX C+: CPT | Mod: MC

## 2024-12-15 PROCEDURE — 82962 GLUCOSE BLOOD TEST: CPT

## 2024-12-15 PROCEDURE — 83935 ASSAY OF URINE OSMOLALITY: CPT

## 2024-12-15 PROCEDURE — 82435 ASSAY OF BLOOD CHLORIDE: CPT

## 2024-12-15 PROCEDURE — 94640 AIRWAY INHALATION TREATMENT: CPT

## 2024-12-15 PROCEDURE — 71045 X-RAY EXAM CHEST 1 VIEW: CPT

## 2024-12-15 PROCEDURE — 84100 ASSAY OF PHOSPHORUS: CPT

## 2024-12-15 PROCEDURE — 81001 URINALYSIS AUTO W/SCOPE: CPT

## 2024-12-15 PROCEDURE — 73090 X-RAY EXAM OF FOREARM: CPT

## 2024-12-15 PROCEDURE — 96374 THER/PROPH/DIAG INJ IV PUSH: CPT

## 2024-12-15 PROCEDURE — 82947 ASSAY GLUCOSE BLOOD QUANT: CPT

## 2024-12-15 PROCEDURE — 84133 ASSAY OF URINE POTASSIUM: CPT

## 2024-12-15 PROCEDURE — 76000 FLUOROSCOPY <1 HR PHYS/QHP: CPT

## 2024-12-15 PROCEDURE — 97166 OT EVAL MOD COMPLEX 45 MIN: CPT

## 2024-12-15 PROCEDURE — 76937 US GUIDE VASCULAR ACCESS: CPT

## 2024-12-15 PROCEDURE — 85025 COMPLETE CBC W/AUTO DIFF WBC: CPT

## 2024-12-15 PROCEDURE — 86900 BLOOD TYPING SEROLOGIC ABO: CPT

## 2024-12-15 PROCEDURE — 36430 TRANSFUSION BLD/BLD COMPNT: CPT

## 2024-12-15 PROCEDURE — 93306 TTE W/DOPPLER COMPLETE: CPT

## 2024-12-15 PROCEDURE — 80053 COMPREHEN METABOLIC PANEL: CPT

## 2024-12-15 PROCEDURE — 36415 COLL VENOUS BLD VENIPUNCTURE: CPT

## 2024-12-15 PROCEDURE — 97162 PT EVAL MOD COMPLEX 30 MIN: CPT

## 2024-12-15 PROCEDURE — 73560 X-RAY EXAM OF KNEE 1 OR 2: CPT

## 2024-12-15 PROCEDURE — 76377 3D RENDER W/INTRP POSTPROCES: CPT

## 2024-12-15 PROCEDURE — 84443 ASSAY THYROID STIM HORMONE: CPT

## 2024-12-15 PROCEDURE — 72125 CT NECK SPINE W/O DYE: CPT | Mod: MC

## 2024-12-15 PROCEDURE — 85730 THROMBOPLASTIN TIME PARTIAL: CPT

## 2024-12-15 RX ORDER — CYCLOBENZAPRINE HCL 10 MG
1 TABLET ORAL
Qty: 21 | Refills: 0
Start: 2024-12-15 | End: 2024-12-21

## 2024-12-15 RX ORDER — OXYCODONE HYDROCHLORIDE 30 MG/1
1 TABLET ORAL
Qty: 10 | Refills: 0
Start: 2024-12-15

## 2024-12-15 RX ADMIN — ACETAMINOPHEN 500MG 650 MILLIGRAM(S): 500 TABLET, COATED ORAL at 13:27

## 2024-12-15 RX ADMIN — METOPROLOL TARTRATE 25 MILLIGRAM(S): 100 TABLET, FILM COATED ORAL at 13:28

## 2024-12-15 RX ADMIN — EPLERENONE 25 MILLIGRAM(S): 25 TABLET ORAL at 13:28

## 2024-12-15 RX ADMIN — Medication 5 MILLIGRAM(S): at 10:26

## 2024-12-15 RX ADMIN — OXYCODONE HYDROCHLORIDE 5 MILLIGRAM(S): 30 TABLET ORAL at 08:22

## 2024-12-15 RX ADMIN — CHLORHEXIDINE GLUCONATE 1 APPLICATION(S): 1.2 RINSE ORAL at 05:55

## 2024-12-15 RX ADMIN — LOSARTAN POTASSIUM 50 MILLIGRAM(S): 100 TABLET, FILM COATED ORAL at 05:55

## 2024-12-15 RX ADMIN — Medication 81 MILLIGRAM(S): at 13:28

## 2024-12-15 RX ADMIN — Medication 1 TABLET(S): at 13:27

## 2024-12-15 RX ADMIN — ENOXAPARIN SODIUM 30 MILLIGRAM(S): 30 INJECTION SUBCUTANEOUS at 13:28

## 2024-12-15 RX ADMIN — METOPROLOL TARTRATE 25 MILLIGRAM(S): 100 TABLET, FILM COATED ORAL at 05:55

## 2024-12-15 RX ADMIN — ESCITALOPRAM OXALATE 5 MILLIGRAM(S): 10 TABLET, FILM COATED ORAL at 13:28

## 2024-12-15 RX ADMIN — OXYCODONE HYDROCHLORIDE 5 MILLIGRAM(S): 30 TABLET ORAL at 09:22

## 2024-12-15 RX ADMIN — AMLODIPINE BESYLATE 5 MILLIGRAM(S): 10 TABLET ORAL at 05:55

## 2024-12-15 NOTE — PROGRESS NOTE ADULT - TIME BILLING
Continue ATC cardiopulmonary monitoring in this critically ill Patient   Discussed treatment plan with patient and  at bedside.  DC planning to rehab
Continue ATC cardiopulmonary monitoring in this critically ill Patient   Discussed treatment plan with patient and  at bedside.
Discussed treatment plan with patient  at bedside.  DC planning home
Continue ATC cardiopulmonary monitoring in this critically ill Patient   Discussed treatment plan with patient and  at bedside.
Discussed treatment plan with patient  at bedside.  DC planning home
Continue ATC cardiopulmonary monitoring in this critically ill Patient   Discussed treatment plan with patient and  at bedside.
Discussed treatment plan with patient  at bedside.  DC planning home

## 2024-12-15 NOTE — PROGRESS NOTE ADULT - PROBLEM SELECTOR PLAN 2
Patient with a hx of aortic insufficiency  Patient has been asymptomatic  As per the Patient has a recent echocardiogram  Cardiology following.  Discussed with Patient cardiologist Dr Hinojosa
Patient with a hx of aortic insufficiency  Patient has been asymptomatic  As per the Patient has a recent echocardiogram  Cardiology following.  Discussed with Patient cardiologist Dr Hinojosa  would continue lasix as needed  O2 requirements have improved
Patient with a hx of aortic insufficiency  Patient has been asymptomatic  As per the Patient has a recent echocardiogram  Cardiology following.  Discussed with Patient cardiologist Dr Hinojosa
Patient with a hx of aortic insufficiency  Patient has been asymptomatic  As per the Patient has a recent echocardiogram  Cardiology following.  Discussed with Patient cardiologist Dr Hinojosa  would continue luly
Patient with a hx of aortic insufficiency  Patient has been asymptomatic  As per the Patient has a recent echocardiogram  Cardiology following.  Discussed with Patient cardiologist Dr Hinojosa  would continue lasix 20mg every other day   O2 requirements have improved
Patient with a hx of aortic insufficiency  Patient has been asymptomatic  As per the Patient has a recent echocardiogram  Cardiology following.  Discussed with Patient cardiologist Dr Hinojosa  would continue luly
Patient with a hx of aortic insufficiency  Patient has been asymptomatic  As per the Patient has a recent echocardiogram  Cardiology following.  Discussed with Patient cardiologist Dr Hinojosa  would continue luly
Patient with a hx of aortic insufficiency  Patient has been asymptomatic  As per the Patient has a recent echocardiogram  Cardiology following.  Discussed with Patient cardiologist Dr Hinojosa  would continue lasix 20mg every other day   O2 requirements have improved
Patient with a hx of aortic insufficiency  Patient has been asymptomatic  As per the Patient has a recent echocardiogram  Cardiology following.  Discussed with Patient cardiologist Dr Hinojosa  would continue luly
Patient with a hx of aortic insufficiency  Patient has been asymptomatic  As per the Patient has a recent echocardiogram  Cardiology following.  Discussed with Patient cardiologist Dr Hinojosa  would continue luly
Patient with a hx of aortic insufficiency  Patient has been asymptomatic  As per the Patient has a recent echocardiogram  Cardiology following.  Discussed with Patient cardiologist Dr Hinojosa
Patient with a hx of aortic insufficiency  Patient has been asymptomatic  As per the Patient has a recent echocardiogram  Cardiology following.  Discussed with Patient cardiologist Dr Hinojosa  would continue luly

## 2024-12-15 NOTE — PROGRESS NOTE ADULT - NS ATTEND OPT1 GEN_ALL_CORE
I attest my time as attending is greater than 50% of the total combined time spent on qualifying patient care activities by the PA/NP and attending.
I independently performed the documented:
I attest my time as attending is greater than 50% of the total combined time spent on qualifying patient care activities by the PA/NP and attending.
I independently performed the documented:
I attest my time as attending is greater than 50% of the total combined time spent on qualifying patient care activities by the PA/NP and attending.
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
## 2024-12-15 NOTE — PROGRESS NOTE ADULT - PROBLEM SELECTOR PROBLEM 7
Tachycardia
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
## 2024-12-15 NOTE — DISCHARGE NOTE NURSING/CASE MANAGEMENT/SOCIAL WORK - PATIENT PORTAL LINK FT
You can access the FollowMyHealth Patient Portal offered by [facility] by registering at the following website: [email or web address]. By joining [facility]’s FollowMyHealth portal, you will also be able to view your health information using other applications (apps) compatible with our system.

## 2024-12-15 NOTE — PROGRESS NOTE ADULT - PROBLEM SELECTOR PLAN 5
continue to adjust pain meds as needed  Patient could not tolerate valium  follow for oversedation  GI regime to prevent constipation.
Pain meds as needed  follow for oversedation  GI regime to prevent constipation.
adjust pain meds as needed   follow for oversedation  GI regime to prevent constipation.
Pain meds as needed  follow for oversedation  GI regime to prevent constipation.
continue to adjust pain meds as needed  Patient could not tolerate valium  follow for oversedation  GI regime to prevent constipation.
adjust pain meds as needed   follow for oversedation  GI regime to prevent constipation.
continue to adjust pain meds as needed  Patient could not tolerate valium  follow for oversedation  GI regime to prevent constipation.
continue to adjust pain meds as needed  Patient could not tolerate valium  follow for oversedation  GI regime to prevent constipation.
adjust pain meds as needed   follow for oversedation  GI regime to prevent constipation.
continue to adjust pain meds as needed  Patient could not tolerate valium  follow for oversedation  GI regime to prevent constipation.

## 2024-12-15 NOTE — PROGRESS NOTE ADULT - PROBLEM SELECTOR PLAN 7
Probably due to anemia  transfuse as needed  continue to monitor rate   Pain management as needed

## 2024-12-15 NOTE — PROGRESS NOTE ADULT - PROBLEM SELECTOR PLAN 4
continue to monitor BP  Patient has been hypertension  would continue diuresis  adjust meds as needed  would restart amlodipine 5mg BID
continue to monitor BP  clarify home meds  Will adjust meds as needed.
continue to monitor BP  Patient has been hypertension  would continue diuresis  adjust meds as needed
continue to monitor BP  clarify home meds  Will adjust meds as needed.
continue to monitor BP  Patient has been hypertension  would continue diuresis  adjust meds as needed
continue to monitor BP  clarify home meds  Will adjust meds as needed.
continue to monitor BP  Patient has been hypertension  would continue diuresis  adjust meds as needed  would restart amlodipine 5mg BID
continue to monitor BP  Patient has been hypertension  would continue diuresis  adjust meds as needed

## 2024-12-15 NOTE — PROGRESS NOTE ADULT - PROBLEM SELECTOR PLAN 8
continue to monitor sodium  consider diuresis with oral salt tabs as needed
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
## 2024-12-15 NOTE — PROGRESS NOTE ADULT - PROBLEM SELECTOR PLAN 3
Chest tube DCed  continue care as per ICU team  follow O2 sats   Nebs as needed
Chest tube in place follow out put   continue care as per ICU team  follow O2 sats   serial CXR.
Chest tube in place follow out put   continue care as per ICU team  follow O2 sats   serial CXR.
Chest tube currently on water seal  continue care as per ICU team  follow O2 sats   serial CXR.
Chest tube DCed  continue care as per ICU team  follow O2 sats   Nebs as needed
Chest tube DCed  continue care as per ICU team  follow O2 sats   Nebs as needed
Chest tube currently on water seal  continue care as per ICU team  follow O2 sats   serial CXR.
Chest tube DCed  continue care as per ICU team  follow O2 sats   Nebs as needed

## 2024-12-15 NOTE — DISCHARGE NOTE NURSING/CASE MANAGEMENT/SOCIAL WORK - FINANCIAL ASSISTANCE
[facility] provides services at a reduced cost to those who are determined to be eligible through [facility]’s financial assistance program. Information regarding [facility]’s financial assistance program can be found by going to [email or web address] or by calling [phone].

## 2024-12-15 NOTE — PROGRESS NOTE ADULT - PROBLEM SELECTOR PROBLEM 2
Aortic insufficiency

## 2024-12-15 NOTE — PROGRESS NOTE ADULT - PROBLEM SELECTOR PLAN 6
Patient received a unit of PRBC  continue to monitor H&H  transfuse as needed
Patient received a unit of PRBC  continue to monitor H&H  transfuse as needed
would transfuse 1 unit of PRBC  continue to monitor H&H  transfuse as needed
Patient received a unit of PRBC  continue to monitor H&H  transfuse as needed

## 2024-12-15 NOTE — PROGRESS NOTE ADULT - PROBLEM SELECTOR PROBLEM 6
ABLA (acute blood loss anemia)
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
## 2024-12-15 NOTE — PROGRESS NOTE ADULT - PROVIDER SPECIALTY LIST ADULT
Cardiology
Orthopedics
SICU
SICU
Surgery
Surgery
Cardiology
Orthopedics
SICU
Surgery
Trauma Surgery
Cardiology
Cardiology
Orthopedics
Orthopedics
SICU
Surgery
Orthopedics
SICU
SICU
Surgery
Internal Medicine

## 2024-12-30 ENCOUNTER — APPOINTMENT (OUTPATIENT)
Dept: ORTHOPEDIC SURGERY | Facility: CLINIC | Age: 54
End: 2024-12-30

## 2025-01-04 NOTE — PATIENT PROFILE ADULT - FUNCTIONAL ASSESSMENT - BASIC MOBILITY 6.
No longer with heavy menses.     Currently on folic acid 1 mg daily, vitamin B12 1000 mcg daily and ferrous sulfate 325 mg twice daily.  B12 and folate were normal 10/2022.  Saw GI, recommended Hematology referral prior to scopes.   Sees Dr. James.  Last hemoglobin was 11.6 which is adequate for surgery.      3-calculated by average/Not able to assess (calculate score using [facility] averaging method)

## 2025-01-06 ENCOUNTER — APPOINTMENT (OUTPATIENT)
Dept: ORTHOPEDIC SURGERY | Facility: CLINIC | Age: 55
End: 2025-01-06
Payer: COMMERCIAL

## 2025-01-06 VITALS — WEIGHT: 55 LBS | BODY MASS INDEX: 12.73 KG/M2 | HEIGHT: 55 IN

## 2025-01-06 DIAGNOSIS — S82.209A UNSPECIFIED FRACTURE OF SHAFT OF UNSPECIFIED TIBIA, INITIAL ENCOUNTER FOR CLOSED FRACTURE: ICD-10-CM

## 2025-01-06 DIAGNOSIS — S62.92XA UNSPECIFIED FRACTURE OF LEFT WRIST AND HAND, INITIAL ENCOUNTER FOR CLOSED FRACTURE: ICD-10-CM

## 2025-01-06 DIAGNOSIS — S62.109A FRACTURE OF UNSPECIFIED CARPAL BONE, UNSPECIFIED WRIST, INITIAL ENCOUNTER FOR CLOSED FRACTURE: ICD-10-CM

## 2025-01-06 DIAGNOSIS — S52.90XA UNSPECIFIED FRACTURE OF UNSPECIFIED FOREARM, INITIAL ENCOUNTER FOR CLOSED FRACTURE: ICD-10-CM

## 2025-01-06 DIAGNOSIS — S72.92XA UNSPECIFIED FRACTURE OF LEFT FEMUR, INITIAL ENCOUNTER FOR CLOSED FRACTURE: ICD-10-CM

## 2025-01-06 PROCEDURE — 73552 X-RAY EXAM OF FEMUR 2/>: CPT | Mod: LT

## 2025-01-06 PROCEDURE — 73590 X-RAY EXAM OF LOWER LEG: CPT | Mod: RT

## 2025-01-06 PROCEDURE — 73100 X-RAY EXAM OF WRIST: CPT | Mod: RT

## 2025-01-06 PROCEDURE — 73130 X-RAY EXAM OF HAND: CPT | Mod: RT

## 2025-01-06 PROCEDURE — 73090 X-RAY EXAM OF FOREARM: CPT | Mod: LT

## 2025-01-06 PROCEDURE — 99213 OFFICE O/P EST LOW 20 MIN: CPT | Mod: 24

## 2025-02-20 ENCOUNTER — APPOINTMENT (OUTPATIENT)
Dept: ORTHOPEDIC SURGERY | Facility: CLINIC | Age: 55
End: 2025-02-20
Payer: COMMERCIAL

## 2025-02-20 VITALS — WEIGHT: 53 LBS | BODY MASS INDEX: 12.27 KG/M2 | HEIGHT: 55 IN

## 2025-02-20 DIAGNOSIS — S72.92XA UNSPECIFIED FRACTURE OF LEFT FEMUR, INITIAL ENCOUNTER FOR CLOSED FRACTURE: ICD-10-CM

## 2025-02-20 DIAGNOSIS — S62.92XA UNSPECIFIED FRACTURE OF LEFT WRIST AND HAND, INITIAL ENCOUNTER FOR CLOSED FRACTURE: ICD-10-CM

## 2025-02-20 DIAGNOSIS — S42.309A UNSPECIFIED FRACTURE OF SHAFT OF HUMERUS, UNSPECIFIED ARM, INITIAL ENCOUNTER FOR CLOSED FRACTURE: ICD-10-CM

## 2025-02-20 DIAGNOSIS — S72.91XA UNSPECIFIED FRACTURE OF RIGHT FEMUR, INITIAL ENCOUNTER FOR CLOSED FRACTURE: ICD-10-CM

## 2025-02-20 DIAGNOSIS — S52.90XA UNSPECIFIED FRACTURE OF UNSPECIFIED FOREARM, INITIAL ENCOUNTER FOR CLOSED FRACTURE: ICD-10-CM

## 2025-02-20 PROCEDURE — 99213 OFFICE O/P EST LOW 20 MIN: CPT | Mod: 24

## 2025-02-20 RX ORDER — LIDOCAINE 5% 700 MG/1
5 PATCH TOPICAL
Qty: 30 | Refills: 2 | Status: ACTIVE | COMMUNITY
Start: 2025-02-20 | End: 1900-01-01

## 2025-05-06 ENCOUNTER — APPOINTMENT (OUTPATIENT)
Dept: ORTHOPEDIC SURGERY | Facility: CLINIC | Age: 55
End: 2025-05-06

## 2025-05-06 DIAGNOSIS — M79.641 PAIN IN RIGHT HAND: ICD-10-CM

## 2025-05-06 DIAGNOSIS — M79.642 PAIN IN LEFT HAND: ICD-10-CM

## 2025-05-06 DIAGNOSIS — S52.90XA UNSPECIFIED FRACTURE OF UNSPECIFIED FOREARM, INITIAL ENCOUNTER FOR CLOSED FRACTURE: ICD-10-CM

## 2025-05-06 PROCEDURE — 73090 X-RAY EXAM OF FOREARM: CPT | Mod: LT

## 2025-05-06 PROCEDURE — 99204 OFFICE O/P NEW MOD 45 MIN: CPT

## 2025-05-06 PROCEDURE — 73130 X-RAY EXAM OF HAND: CPT | Mod: 50

## 2025-06-18 ENCOUNTER — NON-APPOINTMENT (OUTPATIENT)
Age: 55
End: 2025-06-18

## 2025-06-19 ENCOUNTER — APPOINTMENT (OUTPATIENT)
Dept: ORTHOPEDIC SURGERY | Facility: CLINIC | Age: 55
End: 2025-06-19
Payer: COMMERCIAL

## 2025-06-19 PROCEDURE — 99213 OFFICE O/P EST LOW 20 MIN: CPT | Mod: 25

## 2025-06-29 ENCOUNTER — NON-APPOINTMENT (OUTPATIENT)
Age: 55
End: 2025-06-29

## 2025-09-04 ENCOUNTER — APPOINTMENT (OUTPATIENT)
Dept: ORTHOPEDIC SURGERY | Facility: CLINIC | Age: 55
End: 2025-09-04
Payer: COMMERCIAL

## 2025-09-04 DIAGNOSIS — M79.641 PAIN IN RIGHT HAND: ICD-10-CM

## 2025-09-04 DIAGNOSIS — M25.532 PAIN IN RIGHT WRIST: ICD-10-CM

## 2025-09-04 DIAGNOSIS — M79.642 PAIN IN LEFT HAND: ICD-10-CM

## 2025-09-04 DIAGNOSIS — M25.531 PAIN IN RIGHT WRIST: ICD-10-CM

## 2025-09-04 PROCEDURE — 99213 OFFICE O/P EST LOW 20 MIN: CPT | Mod: 25

## (undated) DEVICE — DRAIN RESERVOIR FOR JACKSON PRATT 100CC CARDINAL

## (undated) DEVICE — DRSG CAST FIBERGLASS 3"

## (undated) DEVICE — SOL IRR POUR NS 0.9% 500ML

## (undated) DEVICE — DRAIN JACKSON PRATT 10MM FLAT FULL NO TROCAR

## (undated) DEVICE — SUT MONOCRYL 4-0 18" PS-2

## (undated) DEVICE — DRAPE C ARM UNIVERSAL

## (undated) DEVICE — WARMING BLANKET LOWER ADULT

## (undated) DEVICE — DRSG DERMABOND 0.7ML

## (undated) DEVICE — DRAIN JACKSON PRATT 7MM FLAT FULL W 15 FR TROCAR

## (undated) DEVICE — DRSG ACE BANDAGE 4" NS

## (undated) DEVICE — DRAPE IOBAN 23" X 23"

## (undated) DEVICE — WARMING BLANKET UPPER ADULT

## (undated) DEVICE — POSITIONER FOAM EGG CRATE ULNAR 2PCS (PINK)

## (undated) DEVICE — DRSG VAC GRANUFOAM LARGE (BLACK)

## (undated) DEVICE — GLV 8 PROTEXIS (BLUE)

## (undated) DEVICE — SPECIMEN CONTAINER 100ML

## (undated) DEVICE — DRSG WEBRIL 4"

## (undated) DEVICE — DRAPE HAND 77" X 146"

## (undated) DEVICE — BLADE SCALPEL SAFETYLOCK #15

## (undated) DEVICE — SUT MONOSOF 5-0 18" C-13

## (undated) DEVICE — GOWN TRIMAX LG

## (undated) DEVICE — DRAIN JACKSON PRATT 3 SPRING RESERVOIR W 15FR PVC DRAIN

## (undated) DEVICE — POSITIONER BOOT CRADLE

## (undated) DEVICE — ELCTR GROUNDING PAD ADULT COVIDIEN

## (undated) DEVICE — DRSG STOCKINETTE IMPERVIOUS MED 8 X 38"

## (undated) DEVICE — DRAPE SPLIT SHEET 77" X 108"

## (undated) DEVICE — SOL IRR POUR H2O 250ML

## (undated) DEVICE — DRAPE TOWEL BLUE 17" X 24"

## (undated) DEVICE — TOURNIQUET CUFF 30" SINGLE PORT W PLC

## (undated) DEVICE — VENODYNE/SCD SLEEVE CALF MEDIUM

## (undated) DEVICE — DRSG STERISTRIPS 0.5 X 4"

## (undated) DEVICE — POSITIONER FOAM HEADREST (PINK)

## (undated) DEVICE — CANISTER KCI 500ML GEL SENSA TRAC

## (undated) DEVICE — SOL IRR BAG NS 0.9% 3000ML

## (undated) DEVICE — PACK EXTREMITY

## (undated) DEVICE — SUT POLYSORB 3-0 30" P-12 UNDYED

## (undated) DEVICE — DRAPE MAYO STAND 30"

## (undated) DEVICE — VISITEC 4X4

## (undated) DEVICE — DRAIN JACKSON PRATT 10FR ROUND END NO TROCAR

## (undated) DEVICE — GLV 7.5 PROTEXIS (WHITE)

## (undated) DEVICE — FOLEY TRAY 16FR 5CC LTX UMETER CLOSED

## (undated) DEVICE — STRYKER PULSE LAVAGE WITH HIGH FLOW TIP

## (undated) DEVICE — TOURNIQUET CUFF 18" DUAL PORT SINGLE BLADDER W PLC  (BLACK)

## (undated) DEVICE — ELCTR BOVIE PENCIL SMOKE EVACUATION

## (undated) DEVICE — BLADE SCALPEL SAFETYLOCK #10